# Patient Record
Sex: FEMALE | Race: WHITE | NOT HISPANIC OR LATINO | Employment: FULL TIME | URBAN - METROPOLITAN AREA
[De-identification: names, ages, dates, MRNs, and addresses within clinical notes are randomized per-mention and may not be internally consistent; named-entity substitution may affect disease eponyms.]

---

## 2020-01-01 ENCOUNTER — HOSPITAL ENCOUNTER (OUTPATIENT)
Dept: RADIOLOGY | Facility: HOSPITAL | Age: 45
Discharge: HOME | End: 2020-10-08
Attending: INTERNAL MEDICINE
Payer: COMMERCIAL

## 2020-01-01 ENCOUNTER — TELEPHONE (OUTPATIENT)
Dept: TRANSPLANT | Facility: CLINIC | Age: 45
End: 2020-01-01

## 2020-01-01 ENCOUNTER — PATIENT OUTREACH (OUTPATIENT)
Dept: ADMINISTRATIVE | Facility: CLINIC | Age: 45
End: 2020-01-01

## 2020-01-01 ENCOUNTER — OFFICE VISIT (OUTPATIENT)
Dept: GASTROENTEROLOGY | Facility: CLINIC | Age: 45
End: 2020-01-01
Payer: COMMERCIAL

## 2020-01-01 ENCOUNTER — CONFERENCE (OUTPATIENT)
Dept: TRANSPLANT | Facility: CLINIC | Age: 45
End: 2020-01-01

## 2020-01-01 ENCOUNTER — TELEPHONE (OUTPATIENT)
Dept: GASTROENTEROLOGY | Facility: CLINIC | Age: 45
End: 2020-01-01

## 2020-01-01 ENCOUNTER — PATIENT MESSAGE (OUTPATIENT)
Dept: TRANSPLANT | Facility: CLINIC | Age: 45
End: 2020-01-01

## 2020-01-01 ENCOUNTER — OFFICE VISIT (OUTPATIENT)
Dept: INTERVENTIONAL RADIOLOGY/VASCULAR | Facility: CLINIC | Age: 45
End: 2020-01-01
Payer: COMMERCIAL

## 2020-01-01 ENCOUNTER — HOSPITAL ENCOUNTER (OUTPATIENT)
Facility: HOSPITAL | Age: 45
Discharge: HOME | End: 2020-09-09
Attending: INTERNAL MEDICINE | Admitting: INTERNAL MEDICINE
Payer: COMMERCIAL

## 2020-01-01 ENCOUNTER — HOSPITAL ENCOUNTER (OUTPATIENT)
Dept: CARDIOLOGY | Facility: HOSPITAL | Age: 45
Discharge: HOME | End: 2020-10-09
Attending: INTERNAL MEDICINE
Payer: COMMERCIAL

## 2020-01-01 ENCOUNTER — DOCUMENTATION ONLY (OUTPATIENT)
Dept: TRANSPLANT | Facility: CLINIC | Age: 45
End: 2020-01-01

## 2020-01-01 ENCOUNTER — HOSPITAL ENCOUNTER (OUTPATIENT)
Dept: RADIOLOGY | Facility: HOSPITAL | Age: 45
Discharge: HOME | End: 2020-10-20
Attending: INTERNAL MEDICINE
Payer: COMMERCIAL

## 2020-01-01 ENCOUNTER — LAB VISIT (OUTPATIENT)
Dept: LAB | Facility: HOSPITAL | Age: 45
End: 2020-01-01
Attending: INTERNAL MEDICINE
Payer: COMMERCIAL

## 2020-01-01 ENCOUNTER — HOSPITAL ENCOUNTER (OUTPATIENT)
Dept: RADIOLOGY | Facility: CLINIC | Age: 45
Discharge: HOME | End: 2020-10-09
Attending: INTERNAL MEDICINE
Payer: COMMERCIAL

## 2020-01-01 ENCOUNTER — CLINICAL SUPPORT (OUTPATIENT)
Dept: TRANSPLANT | Facility: CLINIC | Age: 45
End: 2020-01-01
Payer: COMMERCIAL

## 2020-01-01 ENCOUNTER — PATIENT MESSAGE (OUTPATIENT)
Dept: GASTROENTEROLOGY | Facility: CLINIC | Age: 45
End: 2020-01-01

## 2020-01-01 ENCOUNTER — SOCIAL WORK (OUTPATIENT)
Dept: TRANSPLANT | Facility: CLINIC | Age: 45
End: 2020-01-01
Payer: COMMERCIAL

## 2020-01-01 ENCOUNTER — HOSPITAL ENCOUNTER (INPATIENT)
Facility: HOSPITAL | Age: 45
LOS: 2 days | Discharge: HOME | DRG: 433 | End: 2020-10-17
Attending: EMERGENCY MEDICINE | Admitting: INTERNAL MEDICINE
Payer: COMMERCIAL

## 2020-01-01 ENCOUNTER — HOSPITAL ENCOUNTER (INPATIENT)
Facility: HOSPITAL | Age: 45
LOS: 1 days | Discharge: HOME | DRG: 187 | End: 2020-10-05
Attending: EMERGENCY MEDICINE | Admitting: INTERNAL MEDICINE
Payer: COMMERCIAL

## 2020-01-01 ENCOUNTER — HOSPITAL ENCOUNTER (EMERGENCY)
Facility: HOSPITAL | Age: 45
Discharge: ANOTHER HOSPITAL | End: 2020-10-30
Attending: STUDENT IN AN ORGANIZED HEALTH CARE EDUCATION/TRAINING PROGRAM
Payer: COMMERCIAL

## 2020-01-01 ENCOUNTER — HOSPITAL ENCOUNTER (INPATIENT)
Facility: HOSPITAL | Age: 45
LOS: 8 days | DRG: 432 | End: 2020-11-07
Attending: INTERNAL MEDICINE | Admitting: INTERNAL MEDICINE
Payer: COMMERCIAL

## 2020-01-01 ENCOUNTER — COMMITTEE REVIEW (OUTPATIENT)
Dept: TRANSPLANT | Facility: CLINIC | Age: 45
End: 2020-01-01

## 2020-01-01 ENCOUNTER — TELEPHONE (OUTPATIENT)
Dept: ADMINISTRATIVE | Facility: HOSPITAL | Age: 45
End: 2020-01-01

## 2020-01-01 ENCOUNTER — INITIAL CONSULT (OUTPATIENT)
Dept: TRANSPLANT | Facility: CLINIC | Age: 45
End: 2020-01-01
Payer: COMMERCIAL

## 2020-01-01 ENCOUNTER — HOSPITAL ENCOUNTER (OUTPATIENT)
Dept: RADIOLOGY | Facility: HOSPITAL | Age: 45
Discharge: HOME | End: 2020-10-28
Attending: INTERNAL MEDICINE
Payer: COMMERCIAL

## 2020-01-01 ENCOUNTER — LAB VISIT (OUTPATIENT)
Dept: LAB | Facility: HOSPITAL | Age: 45
End: 2020-01-01
Attending: NURSE PRACTITIONER
Payer: COMMERCIAL

## 2020-01-01 ENCOUNTER — TELEPHONE (OUTPATIENT)
Dept: RADIOLOGY | Facility: HOSPITAL | Age: 45
End: 2020-01-01

## 2020-01-01 ENCOUNTER — ANESTHESIA (OUTPATIENT)
Dept: ENDOSCOPY | Facility: HOSPITAL | Age: 45
End: 2020-01-01
Payer: COMMERCIAL

## 2020-01-01 ENCOUNTER — TELEPHONE (OUTPATIENT)
Dept: PULMONOLOGY | Facility: CLINIC | Age: 45
End: 2020-01-01

## 2020-01-01 ENCOUNTER — ANESTHESIA EVENT (OUTPATIENT)
Dept: ENDOSCOPY | Facility: HOSPITAL | Age: 45
End: 2020-01-01
Payer: COMMERCIAL

## 2020-01-01 VITALS
DIASTOLIC BLOOD PRESSURE: 70 MMHG | HEART RATE: 94 BPM | SYSTOLIC BLOOD PRESSURE: 112 MMHG | WEIGHT: 158.75 LBS | HEIGHT: 67 IN | BODY MASS INDEX: 24.92 KG/M2

## 2020-01-01 VITALS
WEIGHT: 156.06 LBS | DIASTOLIC BLOOD PRESSURE: 70 MMHG | SYSTOLIC BLOOD PRESSURE: 112 MMHG | BODY MASS INDEX: 24.49 KG/M2 | OXYGEN SATURATION: 99 % | HEART RATE: 85 BPM | HEIGHT: 67 IN

## 2020-01-01 VITALS
DIASTOLIC BLOOD PRESSURE: 52 MMHG | DIASTOLIC BLOOD PRESSURE: 51 MMHG | HEART RATE: 75 BPM | SYSTOLIC BLOOD PRESSURE: 89 MMHG | HEART RATE: 77 BPM | OXYGEN SATURATION: 95 % | TEMPERATURE: 98 F | RESPIRATION RATE: 20 BRPM | BODY MASS INDEX: 24.75 KG/M2 | WEIGHT: 157.06 LBS | RESPIRATION RATE: 21 BRPM | BODY MASS INDEX: 25.24 KG/M2 | WEIGHT: 154 LBS | SYSTOLIC BLOOD PRESSURE: 108 MMHG | HEIGHT: 66 IN | HEIGHT: 66 IN

## 2020-01-01 VITALS
SYSTOLIC BLOOD PRESSURE: 130 MMHG | HEART RATE: 70 BPM | OXYGEN SATURATION: 96 % | HEIGHT: 67 IN | TEMPERATURE: 98 F | RESPIRATION RATE: 18 BRPM | WEIGHT: 153.69 LBS | BODY MASS INDEX: 24.12 KG/M2 | DIASTOLIC BLOOD PRESSURE: 66 MMHG

## 2020-01-01 VITALS
DIASTOLIC BLOOD PRESSURE: 52 MMHG | WEIGHT: 157 LBS | TEMPERATURE: 98 F | RESPIRATION RATE: 18 BRPM | OXYGEN SATURATION: 95 % | BODY MASS INDEX: 23.79 KG/M2 | SYSTOLIC BLOOD PRESSURE: 99 MMHG | HEART RATE: 90 BPM | HEIGHT: 68 IN

## 2020-01-01 VITALS
HEART RATE: 97 BPM | OXYGEN SATURATION: 93 % | BODY MASS INDEX: 24.8 KG/M2 | SYSTOLIC BLOOD PRESSURE: 130 MMHG | RESPIRATION RATE: 16 BRPM | HEIGHT: 66 IN | DIASTOLIC BLOOD PRESSURE: 65 MMHG | WEIGHT: 154.31 LBS

## 2020-01-01 VITALS
BODY MASS INDEX: 29.97 KG/M2 | WEIGHT: 186.5 LBS | TEMPERATURE: 98 F | DIASTOLIC BLOOD PRESSURE: 44 MMHG | OXYGEN SATURATION: 69 % | HEART RATE: 31 BPM | SYSTOLIC BLOOD PRESSURE: 97 MMHG | RESPIRATION RATE: 24 BRPM | HEIGHT: 66 IN

## 2020-01-01 VITALS
HEART RATE: 97 BPM | WEIGHT: 154.31 LBS | OXYGEN SATURATION: 93 % | BODY MASS INDEX: 24.8 KG/M2 | SYSTOLIC BLOOD PRESSURE: 130 MMHG | DIASTOLIC BLOOD PRESSURE: 65 MMHG | OXYGEN SATURATION: 93 % | RESPIRATION RATE: 16 BRPM | RESPIRATION RATE: 16 BRPM | WEIGHT: 154.31 LBS | HEART RATE: 97 BPM | HEIGHT: 66 IN | HEIGHT: 66 IN | SYSTOLIC BLOOD PRESSURE: 130 MMHG | DIASTOLIC BLOOD PRESSURE: 65 MMHG | BODY MASS INDEX: 24.8 KG/M2

## 2020-01-01 VITALS
WEIGHT: 152.75 LBS | OXYGEN SATURATION: 94 % | BODY MASS INDEX: 24.55 KG/M2 | HEART RATE: 85 BPM | RESPIRATION RATE: 16 BRPM | TEMPERATURE: 98 F | DIASTOLIC BLOOD PRESSURE: 51 MMHG | HEIGHT: 66 IN | SYSTOLIC BLOOD PRESSURE: 94 MMHG

## 2020-01-01 VITALS
DIASTOLIC BLOOD PRESSURE: 60 MMHG | BODY MASS INDEX: 24.48 KG/M2 | WEIGHT: 152.31 LBS | SYSTOLIC BLOOD PRESSURE: 100 MMHG | HEIGHT: 66 IN | HEART RATE: 92 BPM

## 2020-01-01 DIAGNOSIS — K70.9 ALCOHOLIC LIVER DISEASE: ICD-10-CM

## 2020-01-01 DIAGNOSIS — J90 PLEURAL EFFUSION ON RIGHT: Primary | ICD-10-CM

## 2020-01-01 DIAGNOSIS — K74.69 OTHER CIRRHOSIS OF LIVER: Primary | ICD-10-CM

## 2020-01-01 DIAGNOSIS — K74.69 OTHER CIRRHOSIS OF LIVER: ICD-10-CM

## 2020-01-01 DIAGNOSIS — R18.8 OTHER ASCITES: ICD-10-CM

## 2020-01-01 DIAGNOSIS — R06.02 SOB (SHORTNESS OF BREATH): Primary | ICD-10-CM

## 2020-01-01 DIAGNOSIS — K74.60 DECOMPENSATED HEPATIC CIRRHOSIS: ICD-10-CM

## 2020-01-01 DIAGNOSIS — J94.8 HYDROTHORAX: ICD-10-CM

## 2020-01-01 DIAGNOSIS — E87.20 LACTIC ACIDOSIS: ICD-10-CM

## 2020-01-01 DIAGNOSIS — K74.60 HEPATIC CIRRHOSIS, UNSPECIFIED HEPATIC CIRRHOSIS TYPE, UNSPECIFIED WHETHER ASCITES PRESENT: ICD-10-CM

## 2020-01-01 DIAGNOSIS — R06.00 DYSPNEA: ICD-10-CM

## 2020-01-01 DIAGNOSIS — Z76.82 ORGAN TRANSPLANT CANDIDATE: ICD-10-CM

## 2020-01-01 DIAGNOSIS — E88.01 ALPHA-1-ANTITRYPSIN DEFICIENCY: ICD-10-CM

## 2020-01-01 DIAGNOSIS — K72.90 DECOMPENSATED HEPATIC CIRRHOSIS: ICD-10-CM

## 2020-01-01 DIAGNOSIS — K76.9 PLEURAL EFFUSION ASSOCIATED WITH HEPATIC DISORDER: ICD-10-CM

## 2020-01-01 DIAGNOSIS — E55.9 VITAMIN D DEFICIENCY: Primary | ICD-10-CM

## 2020-01-01 DIAGNOSIS — K76.9 LIVER LESION: ICD-10-CM

## 2020-01-01 DIAGNOSIS — J90 PLEURAL EFFUSION: ICD-10-CM

## 2020-01-01 DIAGNOSIS — J18.9 PNEUMONIA DUE TO INFECTIOUS ORGANISM, UNSPECIFIED LATERALITY, UNSPECIFIED PART OF LUNG: ICD-10-CM

## 2020-01-01 DIAGNOSIS — K72.10 END STAGE LIVER DISEASE: ICD-10-CM

## 2020-01-01 DIAGNOSIS — R57.9 SHOCK: ICD-10-CM

## 2020-01-01 DIAGNOSIS — E87.1 HYPONATREMIA: ICD-10-CM

## 2020-01-01 DIAGNOSIS — J90 PLEURAL EFFUSION: Primary | ICD-10-CM

## 2020-01-01 DIAGNOSIS — R16.0 LIVER MASS: Primary | ICD-10-CM

## 2020-01-01 DIAGNOSIS — R06.02 SOB (SHORTNESS OF BREATH): ICD-10-CM

## 2020-01-01 DIAGNOSIS — R89.9 ABNORMAL LABORATORY TEST: ICD-10-CM

## 2020-01-01 DIAGNOSIS — E88.01 ALPHA-1-ANTITRYPSIN DEFICIENCY: Primary | ICD-10-CM

## 2020-01-01 DIAGNOSIS — I85.10 SECONDARY ESOPHAGEAL VARICES WITHOUT BLEEDING: ICD-10-CM

## 2020-01-01 DIAGNOSIS — K76.9 DISORDER OF LIVER: ICD-10-CM

## 2020-01-01 DIAGNOSIS — R57.9 SHOCK, UNSPECIFIED: ICD-10-CM

## 2020-01-01 DIAGNOSIS — J96.01 ACUTE HYPOXEMIC RESPIRATORY FAILURE: ICD-10-CM

## 2020-01-01 DIAGNOSIS — R74.01 ELEVATED TRANSAMINASE LEVEL: ICD-10-CM

## 2020-01-01 DIAGNOSIS — N17.0 ACUTE RENAL FAILURE WITH TUBULAR NECROSIS: ICD-10-CM

## 2020-01-01 DIAGNOSIS — K64.4 EXTERNAL HEMORRHOIDS: ICD-10-CM

## 2020-01-01 DIAGNOSIS — R89.9 ABNORMAL LABORATORY TEST: Primary | ICD-10-CM

## 2020-01-01 DIAGNOSIS — G47.62 NOCTURNAL LEG CRAMPS: Primary | ICD-10-CM

## 2020-01-01 DIAGNOSIS — R25.8 NOCTURNAL LEG MOVEMENTS: ICD-10-CM

## 2020-01-01 DIAGNOSIS — Z23 NEED FOR HEPATITIS A VACCINATION: ICD-10-CM

## 2020-01-01 DIAGNOSIS — E60 ZINC DEFICIENCY: Primary | ICD-10-CM

## 2020-01-01 DIAGNOSIS — Z01.818 PRE-TRANSPLANT EVALUATION FOR LIVER TRANSPLANT: Primary | ICD-10-CM

## 2020-01-01 DIAGNOSIS — N17.9 ACUTE RENAL FAILURE, UNSPECIFIED ACUTE RENAL FAILURE TYPE: ICD-10-CM

## 2020-01-01 DIAGNOSIS — C22.0 HCC (HEPATOCELLULAR CARCINOMA): Primary | ICD-10-CM

## 2020-01-01 DIAGNOSIS — E87.20 METABOLIC ACIDOSIS: ICD-10-CM

## 2020-01-01 DIAGNOSIS — J91.8 PLEURAL EFFUSION ASSOCIATED WITH HEPATIC DISORDER: ICD-10-CM

## 2020-01-01 DIAGNOSIS — R18.8 OTHER ASCITES: Primary | ICD-10-CM

## 2020-01-01 DIAGNOSIS — A41.9 SEPSIS, DUE TO UNSPECIFIED ORGANISM, UNSPECIFIED WHETHER ACUTE ORGAN DYSFUNCTION PRESENT: ICD-10-CM

## 2020-01-01 LAB
1,3 BETA GLUCAN SER-MCNC: 167 PG/ML
25(OH)D3+25(OH)D2 SERPL-MCNC: 20 NG/ML (ref 30–96)
ABO + RH BLD: NORMAL
ACANTHOCYTES BLD QL SMEAR: PRESENT
AFP SERPL-MCNC: 8 NG/ML (ref 0–8.4)
ALBUMIN FLD-MCNC: 0.4 G/DL
ALBUMIN SERPL BCP-MCNC: 1.4 G/DL (ref 3.5–5.2)
ALBUMIN SERPL BCP-MCNC: 1.5 G/DL (ref 3.5–5.2)
ALBUMIN SERPL BCP-MCNC: 1.5 G/DL (ref 3.5–5.2)
ALBUMIN SERPL BCP-MCNC: 1.6 G/DL (ref 3.5–5.2)
ALBUMIN SERPL BCP-MCNC: 1.7 G/DL (ref 3.5–5.2)
ALBUMIN SERPL BCP-MCNC: 1.8 G/DL (ref 3.5–5.2)
ALBUMIN SERPL BCP-MCNC: 1.9 G/DL (ref 3.5–5.2)
ALBUMIN SERPL BCP-MCNC: 1.9 G/DL (ref 3.5–5.2)
ALBUMIN SERPL BCP-MCNC: 2 G/DL (ref 3.5–5.2)
ALBUMIN SERPL BCP-MCNC: 2 G/DL (ref 3.5–5.2)
ALBUMIN SERPL BCP-MCNC: 2.1 G/DL (ref 3.5–5.2)
ALBUMIN SERPL BCP-MCNC: 2.5 G/DL (ref 3.5–5.2)
ALBUMIN SERPL BCP-MCNC: 2.6 G/DL (ref 3.5–5.2)
ALBUMIN SERPL BCP-MCNC: 2.6 G/DL (ref 3.5–5.2)
ALBUMIN SERPL BCP-MCNC: 2.7 G/DL (ref 3.5–5.2)
ALBUMIN SERPL BCP-MCNC: 2.8 G/DL (ref 3.5–5.2)
ALBUMIN SERPL BCP-MCNC: 2.9 G/DL (ref 3.5–5.2)
ALBUMIN SERPL BCP-MCNC: 3 G/DL (ref 3.5–5.2)
ALBUMIN SERPL BCP-MCNC: 3.1 G/DL (ref 3.5–5.2)
ALBUMIN SERPL BCP-MCNC: 3.3 G/DL (ref 3.5–5.2)
ALBUMIN SERPL BCP-MCNC: 3.4 G/DL (ref 3.5–5.2)
ALBUMIN SERPL BCP-MCNC: 3.5 G/DL (ref 3.5–5.2)
ALBUMIN SERPL BCP-MCNC: 3.6 G/DL (ref 3.5–5.2)
ALBUMIN SERPL BCP-MCNC: 3.6 G/DL (ref 3.5–5.2)
ALLENS TEST: ABNORMAL
ALP SERPL-CCNC: 210 U/L (ref 55–135)
ALP SERPL-CCNC: 234 U/L (ref 55–135)
ALP SERPL-CCNC: 249 U/L (ref 55–135)
ALP SERPL-CCNC: 260 U/L (ref 55–135)
ALP SERPL-CCNC: 264 U/L (ref 55–135)
ALP SERPL-CCNC: 265 U/L (ref 55–135)
ALP SERPL-CCNC: 266 U/L (ref 55–135)
ALP SERPL-CCNC: 275 U/L (ref 55–135)
ALP SERPL-CCNC: 275 U/L (ref 55–135)
ALP SERPL-CCNC: 277 U/L (ref 55–135)
ALP SERPL-CCNC: 287 U/L (ref 55–135)
ALP SERPL-CCNC: 296 U/L (ref 55–135)
ALP SERPL-CCNC: 352 U/L (ref 55–135)
ALP SERPL-CCNC: 408 U/L (ref 55–135)
ALP SERPL-CCNC: 408 U/L (ref 55–135)
ALP SERPL-CCNC: 477 U/L (ref 55–135)
ALP SERPL-CCNC: 528 U/L (ref 55–135)
ALP SERPL-CCNC: 539 U/L (ref 55–135)
ALP SERPL-CCNC: 540 U/L (ref 55–135)
ALP SERPL-CCNC: 545 U/L (ref 55–135)
ALP SERPL-CCNC: 546 U/L (ref 55–135)
ALP SERPL-CCNC: 563 U/L (ref 55–135)
ALP SERPL-CCNC: 580 U/L (ref 55–135)
ALP SERPL-CCNC: 582 U/L (ref 55–135)
ALP SERPL-CCNC: 604 U/L (ref 55–135)
ALP SERPL-CCNC: 642 U/L (ref 55–135)
ALP SERPL-CCNC: 735 U/L (ref 55–135)
ALP SERPL-CCNC: 761 U/L (ref 55–135)
ALT SERPL W/O P-5'-P-CCNC: 109 U/L (ref 10–44)
ALT SERPL W/O P-5'-P-CCNC: 109 U/L (ref 10–44)
ALT SERPL W/O P-5'-P-CCNC: 112 U/L (ref 10–44)
ALT SERPL W/O P-5'-P-CCNC: 112 U/L (ref 10–44)
ALT SERPL W/O P-5'-P-CCNC: 113 U/L (ref 10–44)
ALT SERPL W/O P-5'-P-CCNC: 123 U/L (ref 10–44)
ALT SERPL W/O P-5'-P-CCNC: 167 U/L (ref 10–44)
ALT SERPL W/O P-5'-P-CCNC: 170 U/L (ref 10–44)
ALT SERPL W/O P-5'-P-CCNC: 176 U/L (ref 10–44)
ALT SERPL W/O P-5'-P-CCNC: 180 U/L (ref 10–44)
ALT SERPL W/O P-5'-P-CCNC: 323 U/L (ref 10–44)
ALT SERPL W/O P-5'-P-CCNC: 53 U/L (ref 10–44)
ALT SERPL W/O P-5'-P-CCNC: 56 U/L (ref 10–44)
ALT SERPL W/O P-5'-P-CCNC: 58 U/L (ref 10–44)
ALT SERPL W/O P-5'-P-CCNC: 58 U/L (ref 10–44)
ALT SERPL W/O P-5'-P-CCNC: 68 U/L (ref 10–44)
ALT SERPL W/O P-5'-P-CCNC: 69 U/L (ref 10–44)
ALT SERPL W/O P-5'-P-CCNC: 720 U/L (ref 10–44)
ALT SERPL W/O P-5'-P-CCNC: 78 U/L (ref 10–44)
ALT SERPL W/O P-5'-P-CCNC: 78 U/L (ref 10–44)
ALT SERPL W/O P-5'-P-CCNC: 81 U/L (ref 10–44)
ALT SERPL W/O P-5'-P-CCNC: 82 U/L (ref 10–44)
ALT SERPL W/O P-5'-P-CCNC: 91 U/L (ref 10–44)
ALT SERPL W/O P-5'-P-CCNC: 94 U/L (ref 10–44)
ALT SERPL W/O P-5'-P-CCNC: 95 U/L (ref 10–44)
ALT SERPL W/O P-5'-P-CCNC: 99 U/L (ref 10–44)
AMMONIA PLAS-SCNC: 37 UMOL/L (ref 10–50)
AMMONIA PLAS-SCNC: 60 UMOL/L (ref 10–50)
AMMONIA PLAS-SCNC: 63 UMOL/L (ref 10–50)
AMPHET+METHAMPHET UR QL: NEGATIVE
AMYLASE SERPL-CCNC: 115 U/L (ref 20–110)
AMYLASE, BODY FLUID: 17 U/L
AMYLASE, BODY FLUID: 25 U/L
ANION GAP SERPL CALC-SCNC: 10 MMOL/L (ref 8–16)
ANION GAP SERPL CALC-SCNC: 11 MMOL/L (ref 8–16)
ANION GAP SERPL CALC-SCNC: 12 MMOL/L (ref 8–16)
ANION GAP SERPL CALC-SCNC: 13 MMOL/L (ref 8–16)
ANION GAP SERPL CALC-SCNC: 14 MMOL/L (ref 8–16)
ANION GAP SERPL CALC-SCNC: 15 MMOL/L (ref 8–16)
ANION GAP SERPL CALC-SCNC: 17 MMOL/L (ref 8–16)
ANION GAP SERPL CALC-SCNC: 18 MMOL/L (ref 8–16)
ANION GAP SERPL CALC-SCNC: 18 MMOL/L (ref 8–16)
ANION GAP SERPL CALC-SCNC: 19 MMOL/L (ref 8–16)
ANION GAP SERPL CALC-SCNC: 19 MMOL/L (ref 8–16)
ANION GAP SERPL CALC-SCNC: 20 MMOL/L (ref 8–16)
ANION GAP SERPL CALC-SCNC: 21 MMOL/L (ref 8–16)
ANION GAP SERPL CALC-SCNC: 23 MMOL/L (ref 8–16)
ANION GAP SERPL CALC-SCNC: 5 MMOL/L (ref 8–16)
ANION GAP SERPL CALC-SCNC: 6 MMOL/L (ref 8–16)
ANION GAP SERPL CALC-SCNC: 8 MMOL/L (ref 8–16)
ANION GAP SERPL CALC-SCNC: 9 MMOL/L (ref 8–16)
ANISOCYTOSIS BLD QL SMEAR: ABNORMAL
ANISOCYTOSIS BLD QL SMEAR: SLIGHT
AORTIC ROOT ANNULUS: 2.86 CM
APPEARANCE FLD: CLEAR
APPEARANCE FLD: NORMAL
APTT BLDCRRT: 31.3 SEC (ref 21–32)
APTT BLDCRRT: 33.5 SEC (ref 21–32)
APTT BLDCRRT: 33.6 SEC (ref 21–32)
APTT BLDCRRT: 34.5 SEC (ref 21–32)
APTT BLDCRRT: 39.6 SEC (ref 21–32)
APTT BLDCRRT: 40.4 SEC (ref 21–32)
ASCENDING AORTA: 2.57 CM
ASCENDING AORTA: 2.84 CM
ASCENDING AORTA: 2.92 CM
AST SERPL-CCNC: 111 U/L (ref 10–40)
AST SERPL-CCNC: 121 U/L (ref 10–40)
AST SERPL-CCNC: 121 U/L (ref 10–40)
AST SERPL-CCNC: 1245 U/L (ref 10–40)
AST SERPL-CCNC: 134 U/L (ref 10–40)
AST SERPL-CCNC: 141 U/L (ref 10–40)
AST SERPL-CCNC: 142 U/L (ref 10–40)
AST SERPL-CCNC: 164 U/L (ref 10–40)
AST SERPL-CCNC: 166 U/L (ref 10–40)
AST SERPL-CCNC: 176 U/L (ref 10–40)
AST SERPL-CCNC: 179 U/L (ref 10–40)
AST SERPL-CCNC: 180 U/L (ref 10–40)
AST SERPL-CCNC: 186 U/L (ref 10–40)
AST SERPL-CCNC: 187 U/L (ref 10–40)
AST SERPL-CCNC: 195 U/L (ref 10–40)
AST SERPL-CCNC: 207 U/L (ref 10–40)
AST SERPL-CCNC: 2999 U/L (ref 10–40)
AST SERPL-CCNC: 309 U/L (ref 10–40)
AST SERPL-CCNC: 314 U/L (ref 10–40)
AST SERPL-CCNC: 514 U/L (ref 10–40)
AST SERPL-CCNC: 519 U/L (ref 10–40)
AST SERPL-CCNC: 561 U/L (ref 10–40)
AST SERPL-CCNC: 632 U/L (ref 10–40)
AST SERPL-CCNC: 74 U/L (ref 10–40)
AST SERPL-CCNC: 80 U/L (ref 10–40)
AST SERPL-CCNC: 81 U/L (ref 10–40)
AST SERPL-CCNC: 89 U/L (ref 10–40)
AST SERPL-CCNC: 89 U/L (ref 10–40)
AV INDEX (PROSTH): 0.79
AV INDEX (PROSTH): 0.82
AV INDEX (PROSTH): 0.96
AV MEAN GRADIENT: 12 MMHG
AV MEAN GRADIENT: 15 MMHG
AV MEAN GRADIENT: 7 MMHG
AV PEAK GRADIENT: 17 MMHG
AV PEAK GRADIENT: 20 MMHG
AV PEAK GRADIENT: 21 MMHG
AV VALVE AREA: 2.34 CM2
AV VALVE AREA: 2.43 CM2
AV VALVE AREA: 2.62 CM2
AV VELOCITY RATIO: 0.71
AV VELOCITY RATIO: 0.83
AV VELOCITY RATIO: 0.88
BACTERIA #/AREA URNS AUTO: ABNORMAL /HPF
BACTERIA #/AREA URNS AUTO: ABNORMAL /HPF
BACTERIA #/AREA URNS AUTO: NORMAL /HPF
BACTERIA BLD CULT: NORMAL
BACTERIA FLD AEROBE CULT: NO GROWTH
BACTERIA FLD AEROBE CULT: NO GROWTH
BACTERIA SPEC AEROBE CULT: NO GROWTH
BACTERIA SPEC AEROBE CULT: NORMAL
BACTERIA SPEC AEROBE CULT: NORMAL
BACTERIA SPEC ANAEROBE CULT: NORMAL
BACTERIA UR CULT: NO GROWTH
BACTERIA UR CULT: NORMAL
BARBITURATES UR QL SCN>200 NG/ML: NEGATIVE
BASO STIPL BLD QL SMEAR: ABNORMAL
BASOPHILS # BLD AUTO: 0.01 K/UL (ref 0–0.2)
BASOPHILS # BLD AUTO: 0.02 K/UL (ref 0–0.2)
BASOPHILS # BLD AUTO: 0.03 K/UL (ref 0–0.2)
BASOPHILS # BLD AUTO: 0.03 K/UL (ref 0–0.2)
BASOPHILS # BLD AUTO: 0.04 K/UL (ref 0–0.2)
BASOPHILS # BLD AUTO: 0.05 K/UL (ref 0–0.2)
BASOPHILS # BLD AUTO: ABNORMAL K/UL (ref 0–0.2)
BASOPHILS NFR BLD: 0 % (ref 0–1.9)
BASOPHILS NFR BLD: 0.2 % (ref 0–1.9)
BASOPHILS NFR BLD: 0.3 % (ref 0–1.9)
BASOPHILS NFR BLD: 0.3 % (ref 0–1.9)
BASOPHILS NFR BLD: 0.4 % (ref 0–1.9)
BASOPHILS NFR BLD: 0.5 % (ref 0–1.9)
BASOPHILS NFR BLD: 0.5 % (ref 0–1.9)
BASOPHILS NFR BLD: 0.7 % (ref 0–1.9)
BASOPHILS NFR BLD: 0.8 % (ref 0–1.9)
BENZODIAZ UR QL SCN>200 NG/ML: NEGATIVE
BILIRUB DIRECT SERPL-MCNC: >14 MG/DL (ref 0.1–0.3)
BILIRUB SERPL-MCNC: 13.8 MG/DL (ref 0.1–1)
BILIRUB SERPL-MCNC: 14.2 MG/DL (ref 0.1–1)
BILIRUB SERPL-MCNC: 15.9 MG/DL (ref 0.1–1)
BILIRUB SERPL-MCNC: 16.6 MG/DL (ref 0.1–1)
BILIRUB SERPL-MCNC: 17.1 MG/DL (ref 0.1–1)
BILIRUB SERPL-MCNC: 17.2 MG/DL (ref 0.1–1)
BILIRUB SERPL-MCNC: 17.7 MG/DL (ref 0.1–1)
BILIRUB SERPL-MCNC: 19.3 MG/DL (ref 0.1–1)
BILIRUB SERPL-MCNC: 24 MG/DL (ref 0.1–1)
BILIRUB SERPL-MCNC: 24.6 MG/DL (ref 0.1–1)
BILIRUB SERPL-MCNC: 24.9 MG/DL (ref 0.1–1)
BILIRUB SERPL-MCNC: 25 MG/DL (ref 0.1–1)
BILIRUB SERPL-MCNC: 25 MG/DL (ref 0.1–1)
BILIRUB SERPL-MCNC: 25.6 MG/DL (ref 0.1–1)
BILIRUB SERPL-MCNC: 27.5 MG/DL (ref 0.1–1)
BILIRUB SERPL-MCNC: 28.3 MG/DL (ref 0.1–1)
BILIRUB SERPL-MCNC: 28.6 MG/DL (ref 0.1–1)
BILIRUB SERPL-MCNC: 29.1 MG/DL (ref 0.1–1)
BILIRUB SERPL-MCNC: 29.2 MG/DL (ref 0.1–1)
BILIRUB SERPL-MCNC: 29.2 MG/DL (ref 0.1–1)
BILIRUB SERPL-MCNC: 29.4 MG/DL (ref 0.1–1)
BILIRUB SERPL-MCNC: 31.6 MG/DL (ref 0.1–1)
BILIRUB SERPL-MCNC: 32.1 MG/DL (ref 0.1–1)
BILIRUB SERPL-MCNC: 32.3 MG/DL (ref 0.1–1)
BILIRUB SERPL-MCNC: 32.3 MG/DL (ref 0.1–1)
BILIRUB SERPL-MCNC: 32.6 MG/DL (ref 0.1–1)
BILIRUB SERPL-MCNC: 33.8 MG/DL (ref 0.1–1)
BILIRUB SERPL-MCNC: 34.2 MG/DL (ref 0.1–1)
BILIRUB UR QL STRIP: ABNORMAL
BLASTOMYCES AG INTERP: NEGATIVE
BLASTOMYCES AG RESULT: NORMAL NG/ML
BLD GP AB SCN CELLS X3 SERPL QL: NORMAL
BLD PROD TYP BPU: NORMAL
BLOOD UNIT EXPIRATION DATE: NORMAL
BLOOD UNIT TYPE CODE: 1700
BLOOD UNIT TYPE CODE: 1700
BLOOD UNIT TYPE CODE: 5100
BLOOD UNIT TYPE CODE: 5100
BLOOD UNIT TYPE CODE: 600
BLOOD UNIT TYPE CODE: 6200
BLOOD UNIT TYPE CODE: 7300
BLOOD UNIT TYPE CODE: 9500
BLOOD UNIT TYPE CODE: NORMAL
BLOOD UNIT TYPE: NORMAL
BNP SERPL-MCNC: 84 PG/ML (ref 0–99)
BODY FLD TYPE: NORMAL
BODY FLUID SOURCE AMYLASE: NORMAL
BODY FLUID SOURCE AMYLASE: NORMAL
BODY FLUID SOURCE, LDH: NORMAL
BSA FOR ECHO PROCEDURE: 1.8 M2
BSA FOR ECHO PROCEDURE: 1.82 M2
BSA FOR ECHO PROCEDURE: 1.85 M2
BUN SERPL-MCNC: 10 MG/DL (ref 6–20)
BUN SERPL-MCNC: 11 MG/DL (ref 6–20)
BUN SERPL-MCNC: 13 MG/DL (ref 6–20)
BUN SERPL-MCNC: 13 MG/DL (ref 6–20)
BUN SERPL-MCNC: 19 MG/DL (ref 6–20)
BUN SERPL-MCNC: 19 MG/DL (ref 6–20)
BUN SERPL-MCNC: 20 MG/DL (ref 6–20)
BUN SERPL-MCNC: 21 MG/DL (ref 6–20)
BUN SERPL-MCNC: 22 MG/DL (ref 6–20)
BUN SERPL-MCNC: 22 MG/DL (ref 6–20)
BUN SERPL-MCNC: 3 MG/DL (ref 6–20)
BUN SERPL-MCNC: 3 MG/DL (ref 6–20)
BUN SERPL-MCNC: 31 MG/DL (ref 6–20)
BUN SERPL-MCNC: 32 MG/DL (ref 6–20)
BUN SERPL-MCNC: 33 MG/DL (ref 6–20)
BUN SERPL-MCNC: 33 MG/DL (ref 6–20)
BUN SERPL-MCNC: 36 MG/DL (ref 6–20)
BUN SERPL-MCNC: 4 MG/DL (ref 6–20)
BUN SERPL-MCNC: 4 MG/DL (ref 6–20)
BUN SERPL-MCNC: 48 MG/DL (ref 6–20)
BUN SERPL-MCNC: 54 MG/DL (ref 6–20)
BUN SERPL-MCNC: 55 MG/DL (ref 6–20)
BUN SERPL-MCNC: 59 MG/DL (ref 6–20)
BUN SERPL-MCNC: 6 MG/DL (ref 6–20)
BUN SERPL-MCNC: 60 MG/DL (ref 6–20)
BUN SERPL-MCNC: 62 MG/DL (ref 6–20)
BUN SERPL-MCNC: 7 MG/DL (ref 6–20)
BUN SERPL-MCNC: 7 MG/DL (ref 6–20)
BUN SERPL-MCNC: 72 MG/DL (ref 6–20)
BUN SERPL-MCNC: 76 MG/DL (ref 6–20)
BUN SERPL-MCNC: 76 MG/DL (ref 6–20)
BUN SERPL-MCNC: 8 MG/DL (ref 6–20)
BUN SERPL-MCNC: 8 MG/DL (ref 6–20)
BUN SERPL-MCNC: 9 MG/DL (ref 6–20)
BURR CELLS BLD QL SMEAR: ABNORMAL
BZE UR QL SCN: NEGATIVE
CA-I BLDV-SCNC: 0.99 MMOL/L (ref 1.06–1.42)
CALCIUM SERPL-MCNC: 7.2 MG/DL (ref 8.7–10.5)
CALCIUM SERPL-MCNC: 7.2 MG/DL (ref 8.7–10.5)
CALCIUM SERPL-MCNC: 7.3 MG/DL (ref 8.7–10.5)
CALCIUM SERPL-MCNC: 7.4 MG/DL (ref 8.7–10.5)
CALCIUM SERPL-MCNC: 7.6 MG/DL (ref 8.7–10.5)
CALCIUM SERPL-MCNC: 7.7 MG/DL (ref 8.7–10.5)
CALCIUM SERPL-MCNC: 7.8 MG/DL (ref 8.7–10.5)
CALCIUM SERPL-MCNC: 7.9 MG/DL (ref 8.7–10.5)
CALCIUM SERPL-MCNC: 8 MG/DL (ref 8.7–10.5)
CALCIUM SERPL-MCNC: 8.1 MG/DL (ref 8.7–10.5)
CALCIUM SERPL-MCNC: 8.3 MG/DL (ref 8.7–10.5)
CALCIUM SERPL-MCNC: 8.4 MG/DL (ref 8.7–10.5)
CANNABINOIDS UR QL SCN: NEGATIVE
CHLORIDE SERPL-SCNC: 100 MMOL/L (ref 95–110)
CHLORIDE SERPL-SCNC: 100 MMOL/L (ref 95–110)
CHLORIDE SERPL-SCNC: 101 MMOL/L (ref 95–110)
CHLORIDE SERPL-SCNC: 102 MMOL/L (ref 95–110)
CHLORIDE SERPL-SCNC: 104 MMOL/L (ref 95–110)
CHLORIDE SERPL-SCNC: 104 MMOL/L (ref 95–110)
CHLORIDE SERPL-SCNC: 87 MMOL/L (ref 95–110)
CHLORIDE SERPL-SCNC: 88 MMOL/L (ref 95–110)
CHLORIDE SERPL-SCNC: 89 MMOL/L (ref 95–110)
CHLORIDE SERPL-SCNC: 90 MMOL/L (ref 95–110)
CHLORIDE SERPL-SCNC: 92 MMOL/L (ref 95–110)
CHLORIDE SERPL-SCNC: 93 MMOL/L (ref 95–110)
CHLORIDE SERPL-SCNC: 93 MMOL/L (ref 95–110)
CHLORIDE SERPL-SCNC: 94 MMOL/L (ref 95–110)
CHLORIDE SERPL-SCNC: 95 MMOL/L (ref 95–110)
CHLORIDE SERPL-SCNC: 95 MMOL/L (ref 95–110)
CHLORIDE SERPL-SCNC: 96 MMOL/L (ref 95–110)
CHLORIDE SERPL-SCNC: 97 MMOL/L (ref 95–110)
CHLORIDE SERPL-SCNC: 98 MMOL/L (ref 95–110)
CHLORIDE SERPL-SCNC: 99 MMOL/L (ref 95–110)
CHLORIDE SERPL-SCNC: 99 MMOL/L (ref 95–110)
CHLORIDE UR-SCNC: <20 MMOL/L (ref 25–200)
CHOLEST FLD-MCNC: 20 MG/DL
CHOLEST FLD-MCNC: 9 MG/DL
CK MB SERPL-MCNC: 0.8 NG/ML (ref 0.1–6.5)
CK MB SERPL-RTO: 1.7 % (ref 0–5)
CK SERPL-CCNC: 47 U/L (ref 20–180)
CLARITY UR REFRACT.AUTO: ABNORMAL
CLARITY UR REFRACT.AUTO: ABNORMAL
CLARITY UR REFRACT.AUTO: CLEAR
CO2 SERPL-SCNC: 11 MMOL/L (ref 23–29)
CO2 SERPL-SCNC: 11 MMOL/L (ref 23–29)
CO2 SERPL-SCNC: 13 MMOL/L (ref 23–29)
CO2 SERPL-SCNC: 14 MMOL/L (ref 23–29)
CO2 SERPL-SCNC: 15 MMOL/L (ref 23–29)
CO2 SERPL-SCNC: 16 MMOL/L (ref 23–29)
CO2 SERPL-SCNC: 16 MMOL/L (ref 23–29)
CO2 SERPL-SCNC: 17 MMOL/L (ref 23–29)
CO2 SERPL-SCNC: 18 MMOL/L (ref 23–29)
CO2 SERPL-SCNC: 19 MMOL/L (ref 23–29)
CO2 SERPL-SCNC: 20 MMOL/L (ref 23–29)
CO2 SERPL-SCNC: 21 MMOL/L (ref 23–29)
CO2 SERPL-SCNC: 22 MMOL/L (ref 23–29)
CO2 SERPL-SCNC: 23 MMOL/L (ref 23–29)
CO2 SERPL-SCNC: 24 MMOL/L (ref 23–29)
CO2 SERPL-SCNC: 25 MMOL/L (ref 23–29)
CO2 SERPL-SCNC: 25 MMOL/L (ref 23–29)
CO2 SERPL-SCNC: 26 MMOL/L (ref 23–29)
CO2 SERPL-SCNC: 26 MMOL/L (ref 23–29)
CO2 SERPL-SCNC: 28 MMOL/L (ref 23–29)
CODING SYSTEM: NORMAL
COLOR FLD: NORMAL
COLOR FLD: YELLOW
COLOR FLD: YELLOW
COLOR UR AUTO: ABNORMAL
COLOR UR AUTO: ABNORMAL
COLOR UR AUTO: YELLOW
CREAT SERPL-MCNC: 0.4 MG/DL (ref 0.5–1.4)
CREAT SERPL-MCNC: 0.5 MG/DL (ref 0.5–1.4)
CREAT SERPL-MCNC: 0.6 MG/DL (ref 0.5–1.4)
CREAT SERPL-MCNC: 0.7 MG/DL (ref 0.5–1.4)
CREAT SERPL-MCNC: 0.8 MG/DL (ref 0.5–1.4)
CREAT SERPL-MCNC: 0.9 MG/DL (ref 0.5–1.4)
CREAT SERPL-MCNC: 1 MG/DL (ref 0.5–1.4)
CREAT SERPL-MCNC: 1.1 MG/DL (ref 0.5–1.4)
CREAT SERPL-MCNC: 1.2 MG/DL (ref 0.5–1.4)
CREAT SERPL-MCNC: 1.7 MG/DL (ref 0.5–1.4)
CREAT SERPL-MCNC: 1.8 MG/DL (ref 0.5–1.4)
CREAT SERPL-MCNC: 2.1 MG/DL (ref 0.5–1.4)
CREAT SERPL-MCNC: 2.7 MG/DL (ref 0.5–1.4)
CREAT SERPL-MCNC: 2.8 MG/DL (ref 0.5–1.4)
CREAT SERPL-MCNC: 2.9 MG/DL (ref 0.5–1.4)
CREAT SERPL-MCNC: 2.9 MG/DL (ref 0.5–1.4)
CREAT SERPL-MCNC: 3 MG/DL (ref 0.5–1.4)
CREAT SERPL-MCNC: 3.3 MG/DL (ref 0.5–1.4)
CREAT SERPL-MCNC: 4 MG/DL (ref 0.5–1.4)
CREAT SERPL-MCNC: 4.1 MG/DL (ref 0.5–1.4)
CREAT SERPL-MCNC: 4.4 MG/DL (ref 0.5–1.4)
CREAT SERPL-MCNC: 4.6 MG/DL (ref 0.5–1.4)
CREAT SERPL-MCNC: 4.8 MG/DL (ref 0.5–1.4)
CREAT SERPL-MCNC: 5.4 MG/DL (ref 0.5–1.4)
CREAT SERPL-MCNC: 5.5 MG/DL (ref 0.5–1.4)
CREAT UR-MCNC: 150 MG/DL (ref 15–325)
CREAT UR-MCNC: 182 MG/DL (ref 15–325)
CRYPTOC AG SER QL LA: NEGATIVE
CV ECHO LV RWT: 0.38 CM
CV ECHO LV RWT: 0.48 CM
CV ECHO LV RWT: 0.64 CM
CV STRESS BASE HR: 75 BPM
D DIMER PPP IA.FEU-MCNC: >33 MG/L FEU
DACRYOCYTES BLD QL SMEAR: ABNORMAL
DELSYS: ABNORMAL
DIASTOLIC BLOOD PRESSURE: 65 MMHG
DIFFERENTIAL METHOD: ABNORMAL
DISPENSE STATUS: NORMAL
DOHLE BOD BLD QL SMEAR: PRESENT
DOP CALC AO PEAK VEL: 2.05 M/S
DOP CALC AO PEAK VEL: 2.24 M/S
DOP CALC AO PEAK VEL: 2.29 M/S
DOP CALC AO VTI: 32.84 CM
DOP CALC AO VTI: 36.54 CM
DOP CALC AO VTI: 44.89 CM
DOP CALC LVOT AREA: 2.7 CM2
DOP CALC LVOT AREA: 2.8 CM2
DOP CALC LVOT AREA: 3.1 CM2
DOP CALC LVOT DIAMETER: 1.87 CM
DOP CALC LVOT DIAMETER: 1.9 CM
DOP CALC LVOT DIAMETER: 1.98 CM
DOP CALC LVOT PEAK VEL: 1.58 M/S
DOP CALC LVOT PEAK VEL: 1.8 M/S
DOP CALC LVOT PEAK VEL: 1.89 M/S
DOP CALC LVOT STROKE VOLUME: 104.94 CM3
DOP CALC LVOT STROKE VOLUME: 86.17 CM3
DOP CALC LVOT STROKE VOLUME: 88.79 CM3
DOP CALC RVOT PEAK VEL: 1.47 M/S
DOP CALC RVOT VTI: 21.88 CM
DOP CALCLVOT PEAK VEL VTI: 28.85 CM
DOP CALCLVOT PEAK VEL VTI: 31.39 CM
DOP CALCLVOT PEAK VEL VTI: 37.03 CM
E WAVE DECELERATION TIME: 173.25 MSEC
E WAVE DECELERATION TIME: 192.93 MSEC
E WAVE DECELERATION TIME: 215.11 MSEC
E/A RATIO: 0.95
E/A RATIO: 1.24
E/A RATIO: 2.13
E/E' RATIO: 11.89 M/S
E/E' RATIO: 9.43 M/S
ECHO LV POSTERIOR WALL: 0.79 CM (ref 0.6–1.1)
ECHO LV POSTERIOR WALL: 1 CM (ref 0.6–1.1)
ECHO LV POSTERIOR WALL: 1.27 CM (ref 0.6–1.1)
EOSINOPHIL # BLD AUTO: 0.1 K/UL (ref 0–0.5)
EOSINOPHIL # BLD AUTO: 0.2 K/UL (ref 0–0.5)
EOSINOPHIL # BLD AUTO: 0.3 K/UL (ref 0–0.5)
EOSINOPHIL # BLD AUTO: 0.3 K/UL (ref 0–0.5)
EOSINOPHIL # BLD AUTO: 0.4 K/UL (ref 0–0.5)
EOSINOPHIL # BLD AUTO: ABNORMAL K/UL (ref 0–0.5)
EOSINOPHIL NFR BLD: 0 % (ref 0–8)
EOSINOPHIL NFR BLD: 0.2 % (ref 0–8)
EOSINOPHIL NFR BLD: 0.8 % (ref 0–8)
EOSINOPHIL NFR BLD: 1 % (ref 0–8)
EOSINOPHIL NFR BLD: 1.1 % (ref 0–8)
EOSINOPHIL NFR BLD: 1.3 % (ref 0–8)
EOSINOPHIL NFR BLD: 1.5 % (ref 0–8)
EOSINOPHIL NFR BLD: 1.6 % (ref 0–8)
EOSINOPHIL NFR BLD: 1.9 % (ref 0–8)
EOSINOPHIL NFR BLD: 2 % (ref 0–8)
EOSINOPHIL NFR BLD: 2.5 % (ref 0–8)
EOSINOPHIL NFR BLD: 2.8 % (ref 0–8)
EOSINOPHIL NFR BLD: 3.1 % (ref 0–8)
EOSINOPHIL NFR BLD: 3.2 % (ref 0–8)
EOSINOPHIL NFR BLD: 3.4 % (ref 0–8)
ERYTHROCYTE [DISTWIDTH] IN BLOOD BY AUTOMATED COUNT: 15.9 % (ref 11.5–14.5)
ERYTHROCYTE [DISTWIDTH] IN BLOOD BY AUTOMATED COUNT: 16.5 % (ref 11.5–14.5)
ERYTHROCYTE [DISTWIDTH] IN BLOOD BY AUTOMATED COUNT: 16.8 % (ref 11.5–14.5)
ERYTHROCYTE [DISTWIDTH] IN BLOOD BY AUTOMATED COUNT: 16.8 % (ref 11.5–14.5)
ERYTHROCYTE [DISTWIDTH] IN BLOOD BY AUTOMATED COUNT: 17.2 % (ref 11.5–14.5)
ERYTHROCYTE [DISTWIDTH] IN BLOOD BY AUTOMATED COUNT: 17.5 % (ref 11.5–14.5)
ERYTHROCYTE [DISTWIDTH] IN BLOOD BY AUTOMATED COUNT: 17.8 % (ref 11.5–14.5)
ERYTHROCYTE [DISTWIDTH] IN BLOOD BY AUTOMATED COUNT: 17.8 % (ref 11.5–14.5)
ERYTHROCYTE [DISTWIDTH] IN BLOOD BY AUTOMATED COUNT: 18.2 % (ref 11.5–14.5)
ERYTHROCYTE [DISTWIDTH] IN BLOOD BY AUTOMATED COUNT: 18.3 % (ref 11.5–14.5)
ERYTHROCYTE [DISTWIDTH] IN BLOOD BY AUTOMATED COUNT: 18.3 % (ref 11.5–14.5)
ERYTHROCYTE [DISTWIDTH] IN BLOOD BY AUTOMATED COUNT: 18.4 % (ref 11.5–14.5)
ERYTHROCYTE [DISTWIDTH] IN BLOOD BY AUTOMATED COUNT: 18.5 % (ref 11.5–14.5)
ERYTHROCYTE [DISTWIDTH] IN BLOOD BY AUTOMATED COUNT: 18.6 % (ref 11.5–14.5)
ERYTHROCYTE [DISTWIDTH] IN BLOOD BY AUTOMATED COUNT: 18.8 % (ref 11.5–14.5)
ERYTHROCYTE [DISTWIDTH] IN BLOOD BY AUTOMATED COUNT: 18.8 % (ref 11.5–14.5)
ERYTHROCYTE [DISTWIDTH] IN BLOOD BY AUTOMATED COUNT: 18.9 % (ref 11.5–14.5)
ERYTHROCYTE [SEDIMENTATION RATE] IN BLOOD BY WESTERGREN METHOD: 20 MM/H
ERYTHROCYTE [SEDIMENTATION RATE] IN BLOOD BY WESTERGREN METHOD: 24 MM/H
ERYTHROCYTE [SEDIMENTATION RATE] IN BLOOD BY WESTERGREN METHOD: 26 MM/H
EST. GFR  (AFRICAN AMERICAN): 10.2 ML/MIN/1.73 M^2
EST. GFR  (AFRICAN AMERICAN): 11.7 ML/MIN/1.73 M^2
EST. GFR  (AFRICAN AMERICAN): 12.3 ML/MIN/1.73 M^2
EST. GFR  (AFRICAN AMERICAN): 13 ML/MIN/1.73 M^2
EST. GFR  (AFRICAN AMERICAN): 14.2 ML/MIN/1.73 M^2
EST. GFR  (AFRICAN AMERICAN): 14.6 ML/MIN/1.73 M^2
EST. GFR  (AFRICAN AMERICAN): 18.4 ML/MIN/1.73 M^2
EST. GFR  (AFRICAN AMERICAN): 20.7 ML/MIN/1.73 M^2
EST. GFR  (AFRICAN AMERICAN): 21.6 ML/MIN/1.73 M^2
EST. GFR  (AFRICAN AMERICAN): 21.6 ML/MIN/1.73 M^2
EST. GFR  (AFRICAN AMERICAN): 22.5 ML/MIN/1.73 M^2
EST. GFR  (AFRICAN AMERICAN): 23 ML/MIN/1.73 M^2
EST. GFR  (AFRICAN AMERICAN): 32 ML/MIN/1.73 M^2
EST. GFR  (AFRICAN AMERICAN): 38.4 ML/MIN/1.73 M^2
EST. GFR  (AFRICAN AMERICAN): 41.1 ML/MIN/1.73 M^2
EST. GFR  (AFRICAN AMERICAN): 9.9 ML/MIN/1.73 M^2
EST. GFR  (AFRICAN AMERICAN): >60 ML/MIN/1.73 M^2
EST. GFR  (NON AFRICAN AMERICAN): 10.2 ML/MIN/1.73 M^2
EST. GFR  (NON AFRICAN AMERICAN): 10.7 ML/MIN/1.73 M^2
EST. GFR  (NON AFRICAN AMERICAN): 11.3 ML/MIN/1.73 M^2
EST. GFR  (NON AFRICAN AMERICAN): 12.3 ML/MIN/1.73 M^2
EST. GFR  (NON AFRICAN AMERICAN): 12.7 ML/MIN/1.73 M^2
EST. GFR  (NON AFRICAN AMERICAN): 16 ML/MIN/1.73 M^2
EST. GFR  (NON AFRICAN AMERICAN): 17.9 ML/MIN/1.73 M^2
EST. GFR  (NON AFRICAN AMERICAN): 18.7 ML/MIN/1.73 M^2
EST. GFR  (NON AFRICAN AMERICAN): 18.7 ML/MIN/1.73 M^2
EST. GFR  (NON AFRICAN AMERICAN): 19.5 ML/MIN/1.73 M^2
EST. GFR  (NON AFRICAN AMERICAN): 20 ML/MIN/1.73 M^2
EST. GFR  (NON AFRICAN AMERICAN): 28 ML/MIN/1.73 M^2
EST. GFR  (NON AFRICAN AMERICAN): 33.3 ML/MIN/1.73 M^2
EST. GFR  (NON AFRICAN AMERICAN): 35.7 ML/MIN/1.73 M^2
EST. GFR  (NON AFRICAN AMERICAN): 54.3 ML/MIN/1.73 M^2
EST. GFR  (NON AFRICAN AMERICAN): 54.3 ML/MIN/1.73 M^2
EST. GFR  (NON AFRICAN AMERICAN): 55 ML/MIN/1.73 M^2
EST. GFR  (NON AFRICAN AMERICAN): 55 ML/MIN/1.73 M^2
EST. GFR  (NON AFRICAN AMERICAN): 8.6 ML/MIN/1.73 M^2
EST. GFR  (NON AFRICAN AMERICAN): 8.8 ML/MIN/1.73 M^2
EST. GFR  (NON AFRICAN AMERICAN): >60 ML/MIN/1.73 M^2
ETHANOL UR-MCNC: <10 MG/DL
FIBRINOGEN PPP-MCNC: 246 MG/DL (ref 182–366)
FIBRINOGEN PPP-MCNC: <70 MG/DL (ref 182–366)
FINAL PATHOLOGIC DIAGNOSIS: NORMAL
FIO2: 100
FIO2: 100
FIO2: 40
FIO2: 50
FIO2: 60
FIO2: 70
FIO2: 80
FIO2: 80
FLOW: 15
FRACTIONAL SHORTENING: 38 % (ref 28–44)
FRACTIONAL SHORTENING: 40 % (ref 28–44)
FRACTIONAL SHORTENING: 40 % (ref 28–44)
FUNGITELL COMMENTS: POSITIVE
GALACTOMANNAN AG SERPL IA-ACNC: <0.5 INDEX
GAMMA INTERFERON BACKGROUND BLD IA-ACNC: 0.03 IU/ML
GLUCOSE FLD-MCNC: 105 MG/DL
GLUCOSE FLD-MCNC: 110 MG/DL
GLUCOSE FLD-MCNC: 112 MG/DL
GLUCOSE FLD-MCNC: 78 MG/DL
GLUCOSE SERPL-MCNC: 100 MG/DL (ref 70–110)
GLUCOSE SERPL-MCNC: 100 MG/DL (ref 70–110)
GLUCOSE SERPL-MCNC: 102 MG/DL (ref 70–110)
GLUCOSE SERPL-MCNC: 102 MG/DL (ref 70–110)
GLUCOSE SERPL-MCNC: 105 MG/DL (ref 70–110)
GLUCOSE SERPL-MCNC: 105 MG/DL (ref 70–110)
GLUCOSE SERPL-MCNC: 107 MG/DL (ref 70–110)
GLUCOSE SERPL-MCNC: 112 MG/DL (ref 70–110)
GLUCOSE SERPL-MCNC: 113 MG/DL (ref 70–110)
GLUCOSE SERPL-MCNC: 119 MG/DL (ref 70–110)
GLUCOSE SERPL-MCNC: 120 MG/DL (ref 70–110)
GLUCOSE SERPL-MCNC: 121 MG/DL (ref 70–110)
GLUCOSE SERPL-MCNC: 123 MG/DL (ref 70–110)
GLUCOSE SERPL-MCNC: 123 MG/DL (ref 70–110)
GLUCOSE SERPL-MCNC: 132 MG/DL (ref 70–110)
GLUCOSE SERPL-MCNC: 147 MG/DL (ref 70–110)
GLUCOSE SERPL-MCNC: 148 MG/DL (ref 70–110)
GLUCOSE SERPL-MCNC: 149 MG/DL (ref 70–110)
GLUCOSE SERPL-MCNC: 155 MG/DL (ref 70–110)
GLUCOSE SERPL-MCNC: 155 MG/DL (ref 70–110)
GLUCOSE SERPL-MCNC: 158 MG/DL (ref 70–110)
GLUCOSE SERPL-MCNC: 159 MG/DL (ref 70–110)
GLUCOSE SERPL-MCNC: 179 MG/DL (ref 70–110)
GLUCOSE SERPL-MCNC: 63 MG/DL (ref 70–110)
GLUCOSE SERPL-MCNC: 64 MG/DL (ref 70–110)
GLUCOSE SERPL-MCNC: 64 MG/DL (ref 70–110)
GLUCOSE SERPL-MCNC: 66 MG/DL (ref 70–110)
GLUCOSE SERPL-MCNC: 68 MG/DL (ref 70–110)
GLUCOSE SERPL-MCNC: 74 MG/DL (ref 70–110)
GLUCOSE SERPL-MCNC: 75 MG/DL (ref 70–110)
GLUCOSE SERPL-MCNC: 76 MG/DL (ref 70–110)
GLUCOSE SERPL-MCNC: 80 MG/DL (ref 70–110)
GLUCOSE SERPL-MCNC: 80 MG/DL (ref 70–110)
GLUCOSE SERPL-MCNC: 83 MG/DL (ref 70–110)
GLUCOSE SERPL-MCNC: 85 MG/DL (ref 70–110)
GLUCOSE SERPL-MCNC: 86 MG/DL (ref 70–110)
GLUCOSE SERPL-MCNC: 89 MG/DL (ref 70–110)
GLUCOSE SERPL-MCNC: 90 MG/DL (ref 70–110)
GLUCOSE SERPL-MCNC: 90 MG/DL (ref 70–110)
GLUCOSE SERPL-MCNC: 92 MG/DL (ref 70–110)
GLUCOSE SERPL-MCNC: 92 MG/DL (ref 70–110)
GLUCOSE SERPL-MCNC: 94 MG/DL (ref 70–110)
GLUCOSE SERPL-MCNC: 96 MG/DL (ref 70–110)
GLUCOSE SERPL-MCNC: 99 MG/DL (ref 70–110)
GLUCOSE UR QL STRIP: ABNORMAL
GLUCOSE UR QL STRIP: NEGATIVE
GLUCOSE UR QL STRIP: NEGATIVE
GRAM STN SPEC: NORMAL
GRAN CASTS UR QL COMP ASSIST: 20 /LPF
GROSS: NORMAL
HAPTOGLOB SERPL-MCNC: <10 MG/DL (ref 30–250)
HCO3 UR-SCNC: 15.3 MMOL/L (ref 24–28)
HCO3 UR-SCNC: 16.6 MMOL/L (ref 24–28)
HCO3 UR-SCNC: 17.9 MMOL/L (ref 24–28)
HCO3 UR-SCNC: 18.1 MMOL/L (ref 24–28)
HCO3 UR-SCNC: 18.6 MMOL/L (ref 24–28)
HCO3 UR-SCNC: 20.2 MMOL/L (ref 24–28)
HCO3 UR-SCNC: 21.5 MMOL/L (ref 24–28)
HCO3 UR-SCNC: 23.3 MMOL/L (ref 24–28)
HCO3 UR-SCNC: 24.5 MMOL/L (ref 24–28)
HCO3 UR-SCNC: 25.2 MMOL/L (ref 24–28)
HCO3 UR-SCNC: 25.4 MMOL/L (ref 24–28)
HCO3 UR-SCNC: 25.8 MMOL/L (ref 24–28)
HCT VFR BLD AUTO: 17.2 % (ref 37–48.5)
HCT VFR BLD AUTO: 20 % (ref 37–48.5)
HCT VFR BLD AUTO: 20.6 % (ref 37–48.5)
HCT VFR BLD AUTO: 20.8 % (ref 37–48.5)
HCT VFR BLD AUTO: 21 % (ref 37–48.5)
HCT VFR BLD AUTO: 22.2 % (ref 37–48.5)
HCT VFR BLD AUTO: 22.4 % (ref 37–48.5)
HCT VFR BLD AUTO: 22.6 % (ref 37–48.5)
HCT VFR BLD AUTO: 22.9 % (ref 37–48.5)
HCT VFR BLD AUTO: 22.9 % (ref 37–48.5)
HCT VFR BLD AUTO: 23.3 % (ref 37–48.5)
HCT VFR BLD AUTO: 23.3 % (ref 37–48.5)
HCT VFR BLD AUTO: 23.5 % (ref 37–48.5)
HCT VFR BLD AUTO: 23.8 % (ref 37–48.5)
HCT VFR BLD AUTO: 23.9 % (ref 37–48.5)
HCT VFR BLD AUTO: 26.7 % (ref 37–48.5)
HCT VFR BLD AUTO: 26.8 % (ref 37–48.5)
HCT VFR BLD AUTO: 27.6 % (ref 37–48.5)
HCT VFR BLD AUTO: 28.2 % (ref 37–48.5)
HCT VFR BLD AUTO: 29.2 % (ref 37–48.5)
HCT VFR BLD AUTO: 29.4 % (ref 37–48.5)
HCT VFR BLD AUTO: 30.3 % (ref 37–48.5)
HCT VFR BLD AUTO: 30.6 % (ref 37–48.5)
HCT VFR BLD AUTO: 30.8 % (ref 37–48.5)
HCT VFR BLD AUTO: 31.3 % (ref 37–48.5)
HCT VFR BLD AUTO: 33.7 % (ref 37–48.5)
HCT VFR BLD AUTO: 34.7 % (ref 37–48.5)
HCT VFR BLD AUTO: 36.5 % (ref 37–48.5)
HCT VFR BLD AUTO: 37 % (ref 37–48.5)
HCT VFR BLD CALC: 17 %PCV (ref 36–54)
HCV AB SERPL QL IA: NEGATIVE
HEPARIN INDUCED THROMBOCYTOPENIA: 0.13 OD (ref 0–0.4)
HGB BLD-MCNC: 10 G/DL (ref 12–16)
HGB BLD-MCNC: 10.1 G/DL (ref 12–16)
HGB BLD-MCNC: 10.3 G/DL (ref 12–16)
HGB BLD-MCNC: 10.8 G/DL (ref 12–16)
HGB BLD-MCNC: 11 G/DL (ref 12–16)
HGB BLD-MCNC: 11.2 G/DL (ref 12–16)
HGB BLD-MCNC: 11.6 G/DL (ref 12–16)
HGB BLD-MCNC: 11.9 G/DL (ref 12–16)
HGB BLD-MCNC: 11.9 G/DL (ref 12–16)
HGB BLD-MCNC: 12.2 G/DL (ref 12–16)
HGB BLD-MCNC: 12.2 G/DL (ref 12–16)
HGB BLD-MCNC: 12.4 G/DL (ref 12–16)
HGB BLD-MCNC: 5.7 G/DL (ref 12–16)
HGB BLD-MCNC: 6.8 G/DL (ref 12–16)
HGB BLD-MCNC: 6.9 G/DL (ref 12–16)
HGB BLD-MCNC: 7 G/DL (ref 12–16)
HGB BLD-MCNC: 7.5 G/DL (ref 12–16)
HGB BLD-MCNC: 7.6 G/DL (ref 12–16)
HGB BLD-MCNC: 7.7 G/DL (ref 12–16)
HGB BLD-MCNC: 7.7 G/DL (ref 12–16)
HGB BLD-MCNC: 7.8 G/DL (ref 12–16)
HGB BLD-MCNC: 7.9 G/DL (ref 12–16)
HGB BLD-MCNC: 7.9 G/DL (ref 12–16)
HGB BLD-MCNC: 8.1 G/DL (ref 12–16)
HGB BLD-MCNC: 8.2 G/DL (ref 12–16)
HGB BLD-MCNC: 8.9 G/DL (ref 12–16)
HGB BLD-MCNC: 9.3 G/DL (ref 12–16)
HGB BLD-MCNC: 9.4 G/DL (ref 12–16)
HGB BLD-MCNC: 9.5 G/DL (ref 12–16)
HGB UR QL STRIP: ABNORMAL
HGB UR QL STRIP: ABNORMAL
HGB UR QL STRIP: NEGATIVE
HISTOPLASMA AG INTERP.: NEGATIVE
HISTOPLASMA AG VALUE: 0.22 NG/ML
HISTOPLASMA ANTIGEN URINE: ABNORMAL
HISTOPLASMA RESULT: NORMAL NG/ML
HIV 1+2 AB+HIV1 P24 AG SERPL QL IA: NEGATIVE
HYALINE CASTS UR QL AUTO: 87 /LPF
HYALINE CASTS UR QL AUTO: 88 /LPF
HYPOCHROMIA BLD QL SMEAR: ABNORMAL
IMM GRANULOCYTES # BLD AUTO: 0.01 K/UL (ref 0–0.04)
IMM GRANULOCYTES # BLD AUTO: 0.01 K/UL (ref 0–0.04)
IMM GRANULOCYTES # BLD AUTO: 0.02 K/UL (ref 0–0.04)
IMM GRANULOCYTES # BLD AUTO: 0.02 K/UL (ref 0–0.04)
IMM GRANULOCYTES # BLD AUTO: 0.03 K/UL (ref 0–0.04)
IMM GRANULOCYTES # BLD AUTO: 0.12 K/UL (ref 0–0.04)
IMM GRANULOCYTES # BLD AUTO: 0.13 K/UL (ref 0–0.04)
IMM GRANULOCYTES # BLD AUTO: 0.14 K/UL (ref 0–0.04)
IMM GRANULOCYTES # BLD AUTO: 0.23 K/UL (ref 0–0.04)
IMM GRANULOCYTES # BLD AUTO: 0.39 K/UL (ref 0–0.04)
IMM GRANULOCYTES # BLD AUTO: 0.73 K/UL (ref 0–0.04)
IMM GRANULOCYTES # BLD AUTO: ABNORMAL K/UL (ref 0–0.04)
IMM GRANULOCYTES NFR BLD AUTO: 0.2 % (ref 0–0.5)
IMM GRANULOCYTES NFR BLD AUTO: 0.2 % (ref 0–0.5)
IMM GRANULOCYTES NFR BLD AUTO: 0.3 % (ref 0–0.5)
IMM GRANULOCYTES NFR BLD AUTO: 0.4 % (ref 0–0.5)
IMM GRANULOCYTES NFR BLD AUTO: 0.4 % (ref 0–0.5)
IMM GRANULOCYTES NFR BLD AUTO: 0.5 % (ref 0–0.5)
IMM GRANULOCYTES NFR BLD AUTO: 0.5 % (ref 0–0.5)
IMM GRANULOCYTES NFR BLD AUTO: 0.6 % (ref 0–0.5)
IMM GRANULOCYTES NFR BLD AUTO: 0.7 % (ref 0–0.5)
IMM GRANULOCYTES NFR BLD AUTO: 0.7 % (ref 0–0.5)
IMM GRANULOCYTES NFR BLD AUTO: 1.1 % (ref 0–0.5)
IMM GRANULOCYTES NFR BLD AUTO: 1.3 % (ref 0–0.5)
IMM GRANULOCYTES NFR BLD AUTO: 1.8 % (ref 0–0.5)
IMM GRANULOCYTES NFR BLD AUTO: 2.9 % (ref 0–0.5)
IMM GRANULOCYTES NFR BLD AUTO: ABNORMAL % (ref 0–0.5)
INR PPP: 1.4 (ref 0.8–1.2)
INR PPP: 1.4 (ref 0.8–1.2)
INR PPP: 1.5 (ref 0.8–1.2)
INR PPP: 1.6 (ref 0.8–1.2)
INR PPP: 1.7 (ref 0.8–1.2)
INR PPP: 1.9 (ref 0.8–1.2)
INR PPP: 2.3 (ref 0.8–1.2)
INR PPP: 2.4 (ref 0.8–1.2)
INR PPP: 2.6 (ref 0.8–1.2)
INR PPP: 2.7 (ref 0.8–1.2)
INR PPP: 2.7 (ref 0.8–1.2)
INR PPP: 2.8 (ref 0.8–1.2)
INR PPP: 3 (ref 0.8–1.2)
INR PPP: 3.1 (ref 0.8–1.2)
INR PPP: 3.4 (ref 0.8–1.2)
INR PPP: 3.5 (ref 0.8–1.2)
INR PPP: 3.5 (ref 0.8–1.2)
INR PPP: 3.6 (ref 0.8–1.2)
INR PPP: 4.1 (ref 0.8–1.2)
INTERVENTRICULAR SEPTUM: 0.85 CM (ref 0.6–1.1)
INTERVENTRICULAR SEPTUM: 0.88 CM (ref 0.6–1.1)
INTERVENTRICULAR SEPTUM: 1.38 CM (ref 0.6–1.1)
IVRT: 62.8 MSEC
IVRT: 68.51 MSEC
KETONES UR QL STRIP: NEGATIVE
KOH PREP SPEC: NORMAL
L PNEUMO AG UR QL IA: NEGATIVE
LA MAJOR: 4.03 CM
LA MAJOR: 4.27 CM
LA MAJOR: 5.16 CM
LA MINOR: 3.94 CM
LA MINOR: 4.45 CM
LA MINOR: 4.69 CM
LA WIDTH: 3.55 CM
LA WIDTH: 3.94 CM
LACTATE SERPL-SCNC: 1.5 MMOL/L (ref 0.5–2.2)
LACTATE SERPL-SCNC: 1.5 MMOL/L (ref 0.5–2.2)
LACTATE SERPL-SCNC: 1.7 MMOL/L (ref 0.5–2.2)
LACTATE SERPL-SCNC: 1.9 MMOL/L (ref 0.5–2.2)
LACTATE SERPL-SCNC: 2.1 MMOL/L (ref 0.5–2.2)
LACTATE SERPL-SCNC: 2.5 MMOL/L (ref 0.5–2.2)
LACTATE SERPL-SCNC: 3.2 MMOL/L (ref 0.5–2.2)
LACTATE SERPL-SCNC: 3.4 MMOL/L (ref 0.5–2.2)
LACTATE SERPL-SCNC: 3.5 MMOL/L (ref 0.5–2.2)
LACTATE SERPL-SCNC: 3.7 MMOL/L (ref 0.5–2.2)
LACTATE SERPL-SCNC: 4.2 MMOL/L (ref 0.5–2.2)
LACTATE SERPL-SCNC: 4.2 MMOL/L (ref 0.5–2.2)
LACTATE SERPL-SCNC: 4.8 MMOL/L (ref 0.5–2.2)
LACTATE SERPL-SCNC: 5.8 MMOL/L (ref 0.5–2.2)
LDH FLD L TO P-CCNC: 276 U/L
LDH FLD L TO P-CCNC: 51 U/L
LDH FLD L TO P-CCNC: 59 U/L
LDH FLD L TO P-CCNC: 92 U/L
LDH SERPL L TO P-CCNC: 277 U/L (ref 110–260)
LDH SERPL L TO P-CCNC: 289 U/L (ref 110–260)
LDH SERPL L TO P-CCNC: 305 U/L (ref 110–260)
LDH SERPL L TO P-CCNC: 308 U/L (ref 110–260)
LDH SERPL L TO P-CCNC: 661 U/L (ref 110–260)
LEFT ATRIUM SIZE: 3.09 CM
LEFT ATRIUM SIZE: 3.23 CM
LEFT ATRIUM SIZE: 3.92 CM
LEFT ATRIUM VOLUME INDEX: 25.1 ML/M2
LEFT ATRIUM VOLUME INDEX: 31.3 ML/M2
LEFT ATRIUM VOLUME: 44.86 CM3
LEFT ATRIUM VOLUME: 56.53 CM3
LEFT INTERNAL DIMENSION IN SYSTOLE: 2.38 CM (ref 2.1–4)
LEFT INTERNAL DIMENSION IN SYSTOLE: 2.47 CM (ref 2.1–4)
LEFT INTERNAL DIMENSION IN SYSTOLE: 2.62 CM (ref 2.1–4)
LEFT VENTRICLE DIASTOLIC VOLUME INDEX: 37.64 ML/M2
LEFT VENTRICLE DIASTOLIC VOLUME INDEX: 42.09 ML/M2
LEFT VENTRICLE DIASTOLIC VOLUME INDEX: 43.46 ML/M2
LEFT VENTRICLE DIASTOLIC VOLUME: 69.41 ML
LEFT VENTRICLE DIASTOLIC VOLUME: 75.33 ML
LEFT VENTRICLE DIASTOLIC VOLUME: 78.43 ML
LEFT VENTRICLE MASS INDEX: 104 G/M2
LEFT VENTRICLE MASS INDEX: 58 G/M2
LEFT VENTRICLE MASS INDEX: 68 G/M2
LEFT VENTRICLE SYSTOLIC VOLUME INDEX: 10.7 ML/M2
LEFT VENTRICLE SYSTOLIC VOLUME INDEX: 12.1 ML/M2
LEFT VENTRICLE SYSTOLIC VOLUME INDEX: 13.9 ML/M2
LEFT VENTRICLE SYSTOLIC VOLUME: 19.7 ML
LEFT VENTRICLE SYSTOLIC VOLUME: 21.64 ML
LEFT VENTRICLE SYSTOLIC VOLUME: 25.11 ML
LEFT VENTRICULAR INTERNAL DIMENSION IN DIASTOLE: 3.99 CM (ref 3.5–6)
LEFT VENTRICULAR INTERNAL DIMENSION IN DIASTOLE: 4.13 CM (ref 3.5–6)
LEFT VENTRICULAR INTERNAL DIMENSION IN DIASTOLE: 4.2 CM (ref 3.5–6)
LEFT VENTRICULAR MASS: 104.67 G
LEFT VENTRICULAR MASS: 122.6 G
LEFT VENTRICULAR MASS: 191.32 G
LEUKOCYTE ESTERASE UR QL STRIP: ABNORMAL
LEUKOCYTE ESTERASE UR QL STRIP: NEGATIVE
LEUKOCYTE ESTERASE UR QL STRIP: NEGATIVE
LIPASE SERPL-CCNC: 45 U/L (ref 4–60)
LIPASE SERPL-CCNC: 46 U/L (ref 4–60)
LIPASE SERPL-CCNC: 52 U/L (ref 4–60)
LV LATERAL E/E' RATIO: 8.25 M/S
LV LATERAL E/E' RATIO: 9.42 M/S
LV SEPTAL E/E' RATIO: 11 M/S
LV SEPTAL E/E' RATIO: 16.14 M/S
LYMPHOCYTES # BLD AUTO: 0.9 K/UL (ref 1–4.8)
LYMPHOCYTES # BLD AUTO: 1.2 K/UL (ref 1–4.8)
LYMPHOCYTES # BLD AUTO: 1.3 K/UL (ref 1–4.8)
LYMPHOCYTES # BLD AUTO: 1.5 K/UL (ref 1–4.8)
LYMPHOCYTES # BLD AUTO: 1.6 K/UL (ref 1–4.8)
LYMPHOCYTES # BLD AUTO: 1.6 K/UL (ref 1–4.8)
LYMPHOCYTES # BLD AUTO: 1.7 K/UL (ref 1–4.8)
LYMPHOCYTES # BLD AUTO: 1.7 K/UL (ref 1–4.8)
LYMPHOCYTES # BLD AUTO: 1.9 K/UL (ref 1–4.8)
LYMPHOCYTES # BLD AUTO: 2.1 K/UL (ref 1–4.8)
LYMPHOCYTES # BLD AUTO: ABNORMAL K/UL (ref 1–4.8)
LYMPHOCYTES NFR BLD: 1.5 % (ref 18–48)
LYMPHOCYTES NFR BLD: 10 % (ref 18–48)
LYMPHOCYTES NFR BLD: 10 % (ref 18–48)
LYMPHOCYTES NFR BLD: 11.1 % (ref 18–48)
LYMPHOCYTES NFR BLD: 13 % (ref 18–48)
LYMPHOCYTES NFR BLD: 14.3 % (ref 18–48)
LYMPHOCYTES NFR BLD: 17.5 % (ref 18–48)
LYMPHOCYTES NFR BLD: 2 % (ref 18–48)
LYMPHOCYTES NFR BLD: 2 % (ref 18–48)
LYMPHOCYTES NFR BLD: 22.2 % (ref 18–48)
LYMPHOCYTES NFR BLD: 23.2 % (ref 18–48)
LYMPHOCYTES NFR BLD: 24 % (ref 18–48)
LYMPHOCYTES NFR BLD: 25.4 % (ref 18–48)
LYMPHOCYTES NFR BLD: 27.5 % (ref 18–48)
LYMPHOCYTES NFR BLD: 27.8 % (ref 18–48)
LYMPHOCYTES NFR BLD: 30.2 % (ref 18–48)
LYMPHOCYTES NFR BLD: 4 % (ref 18–48)
LYMPHOCYTES NFR BLD: 4.5 % (ref 18–48)
LYMPHOCYTES NFR BLD: 4.5 % (ref 18–48)
LYMPHOCYTES NFR BLD: 5 % (ref 18–48)
LYMPHOCYTES NFR BLD: 6 % (ref 18–48)
LYMPHOCYTES NFR BLD: 6.5 % (ref 18–48)
LYMPHOCYTES NFR BLD: 7 % (ref 18–48)
LYMPHOCYTES NFR BLD: 7.7 % (ref 18–48)
LYMPHOCYTES NFR BLD: 8 % (ref 18–48)
LYMPHOCYTES NFR BLD: 9.9 % (ref 18–48)
LYMPHOCYTES NFR FLD MANUAL: 18 %
LYMPHOCYTES NFR FLD MANUAL: 19 %
LYMPHOCYTES NFR FLD MANUAL: 23 %
LYMPHOCYTES NFR FLD MANUAL: 31 %
LYMPHOCYTES NFR FLD MANUAL: 6 %
LYMPHOCYTES NFR FLD MANUAL: 9 %
M TB IFN-G BLD-IMP: NEGATIVE
M TB IFN-G CD4+ BCKGRND COR BLD-ACNC: -0.01 IU/ML
MAGNESIUM SERPL-MCNC: 1.8 MG/DL (ref 1.6–2.6)
MAGNESIUM SERPL-MCNC: 1.9 MG/DL (ref 1.6–2.6)
MAGNESIUM SERPL-MCNC: 2 MG/DL (ref 1.6–2.6)
MAGNESIUM SERPL-MCNC: 2.1 MG/DL (ref 1.6–2.6)
MAGNESIUM SERPL-MCNC: 2.2 MG/DL (ref 1.6–2.6)
MAGNESIUM SERPL-MCNC: 2.3 MG/DL (ref 1.6–2.6)
MAGNESIUM SERPL-MCNC: 2.3 MG/DL (ref 1.6–2.6)
MAGNESIUM SERPL-MCNC: 2.4 MG/DL (ref 1.6–2.6)
MAGNESIUM SERPL-MCNC: 2.5 MG/DL (ref 1.6–2.6)
MAGNESIUM SERPL-MCNC: 2.8 MG/DL (ref 1.6–2.6)
MAGNESIUM SERPL-MCNC: 3 MG/DL (ref 1.6–2.6)
MAGNESIUM SERPL-MCNC: 3.1 MG/DL (ref 1.6–2.6)
MAGNESIUM SERPL-MCNC: 3.1 MG/DL (ref 1.6–2.6)
MAGNESIUM SERPL-MCNC: 3.5 MG/DL (ref 1.6–2.6)
MAGNESIUM SERPL-MCNC: 3.6 MG/DL (ref 1.6–2.6)
MAGNESIUM SERPL-MCNC: 3.7 MG/DL (ref 1.6–2.6)
MCH RBC QN AUTO: 30.7 PG (ref 27–31)
MCH RBC QN AUTO: 31.2 PG (ref 27–31)
MCH RBC QN AUTO: 31.3 PG (ref 27–31)
MCH RBC QN AUTO: 31.5 PG (ref 27–31)
MCH RBC QN AUTO: 31.6 PG (ref 27–31)
MCH RBC QN AUTO: 31.7 PG (ref 27–31)
MCH RBC QN AUTO: 31.8 PG (ref 27–31)
MCH RBC QN AUTO: 31.9 PG (ref 27–31)
MCH RBC QN AUTO: 32 PG (ref 27–31)
MCH RBC QN AUTO: 32.1 PG (ref 27–31)
MCH RBC QN AUTO: 32.2 PG (ref 27–31)
MCH RBC QN AUTO: 32.5 PG (ref 27–31)
MCH RBC QN AUTO: 32.6 PG (ref 27–31)
MCH RBC QN AUTO: 32.7 PG (ref 27–31)
MCH RBC QN AUTO: 32.8 PG (ref 27–31)
MCH RBC QN AUTO: 32.9 PG (ref 27–31)
MCH RBC QN AUTO: 32.9 PG (ref 27–31)
MCH RBC QN AUTO: 33.1 PG (ref 27–31)
MCH RBC QN AUTO: 33.2 PG (ref 27–31)
MCHC RBC AUTO-ENTMCNC: 30.1 G/DL (ref 32–36)
MCHC RBC AUTO-ENTMCNC: 32.4 G/DL (ref 32–36)
MCHC RBC AUTO-ENTMCNC: 33 G/DL (ref 32–36)
MCHC RBC AUTO-ENTMCNC: 33 G/DL (ref 32–36)
MCHC RBC AUTO-ENTMCNC: 33.1 G/DL (ref 32–36)
MCHC RBC AUTO-ENTMCNC: 33.1 G/DL (ref 32–36)
MCHC RBC AUTO-ENTMCNC: 33.2 G/DL (ref 32–36)
MCHC RBC AUTO-ENTMCNC: 33.4 G/DL (ref 32–36)
MCHC RBC AUTO-ENTMCNC: 33.5 G/DL (ref 32–36)
MCHC RBC AUTO-ENTMCNC: 33.6 G/DL (ref 32–36)
MCHC RBC AUTO-ENTMCNC: 33.7 G/DL (ref 32–36)
MCHC RBC AUTO-ENTMCNC: 33.7 G/DL (ref 32–36)
MCHC RBC AUTO-ENTMCNC: 33.9 G/DL (ref 32–36)
MCHC RBC AUTO-ENTMCNC: 34 G/DL (ref 32–36)
MCHC RBC AUTO-ENTMCNC: 34.1 G/DL (ref 32–36)
MCHC RBC AUTO-ENTMCNC: 34.1 G/DL (ref 32–36)
MCHC RBC AUTO-ENTMCNC: 34.2 G/DL (ref 32–36)
MCHC RBC AUTO-ENTMCNC: 34.3 G/DL (ref 32–36)
MCHC RBC AUTO-ENTMCNC: 34.4 G/DL (ref 32–36)
MCHC RBC AUTO-ENTMCNC: 34.5 G/DL (ref 32–36)
MCHC RBC AUTO-ENTMCNC: 34.8 G/DL (ref 32–36)
MCHC RBC AUTO-ENTMCNC: 35.1 G/DL (ref 32–36)
MCHC RBC AUTO-ENTMCNC: 35.2 G/DL (ref 32–36)
MCHC RBC AUTO-ENTMCNC: 35.3 G/DL (ref 32–36)
MCHC RBC AUTO-ENTMCNC: 36.4 G/DL (ref 32–36)
MCV RBC AUTO: 101 FL (ref 82–98)
MCV RBC AUTO: 102 FL (ref 82–98)
MCV RBC AUTO: 89 FL (ref 82–98)
MCV RBC AUTO: 90 FL (ref 82–98)
MCV RBC AUTO: 90 FL (ref 82–98)
MCV RBC AUTO: 91 FL (ref 82–98)
MCV RBC AUTO: 92 FL (ref 82–98)
MCV RBC AUTO: 93 FL (ref 82–98)
MCV RBC AUTO: 94 FL (ref 82–98)
MCV RBC AUTO: 95 FL (ref 82–98)
MCV RBC AUTO: 96 FL (ref 82–98)
MCV RBC AUTO: 97 FL (ref 82–98)
MCV RBC AUTO: 97 FL (ref 82–98)
MCV RBC AUTO: 98 FL (ref 82–98)
MCV RBC AUTO: 99 FL (ref 82–98)
MESOTHL CELL NFR FLD MANUAL: 10 %
MESOTHL CELL NFR FLD MANUAL: 13 %
MESOTHL CELL NFR FLD MANUAL: 2 %
METAMYELOCYTES NFR BLD MANUAL: 1 %
METAMYELOCYTES NFR BLD MANUAL: 1.5 %
METAMYELOCYTES NFR BLD MANUAL: 2 %
METAMYELOCYTES NFR BLD MANUAL: 2.5 %
METAMYELOCYTES NFR BLD MANUAL: 3 %
METAMYELOCYTES NFR BLD MANUAL: 5 %
METHADONE UR QL SCN>300 NG/ML: NEGATIVE
MICROSCOPIC COMMENT: ABNORMAL
MICROSCOPIC COMMENT: ABNORMAL
MICROSCOPIC COMMENT: NORMAL
MICROSCOPIC EXAM: NORMAL
MICROSCOPIC EXAM: NORMAL
MIN VOL: 10.5
MIN VOL: 10.7
MIN VOL: 9.7
MITOGEN IGNF BCKGRD COR BLD-ACNC: 0.03 IU/ML
MODE: ABNORMAL
MONOCYTES # BLD AUTO: 0.3 K/UL (ref 0.3–1)
MONOCYTES # BLD AUTO: 0.4 K/UL (ref 0.3–1)
MONOCYTES # BLD AUTO: 0.5 K/UL (ref 0.3–1)
MONOCYTES # BLD AUTO: 0.5 K/UL (ref 0.3–1)
MONOCYTES # BLD AUTO: 0.6 K/UL (ref 0.3–1)
MONOCYTES # BLD AUTO: 0.7 K/UL (ref 0.3–1)
MONOCYTES # BLD AUTO: 0.9 K/UL (ref 0.3–1)
MONOCYTES # BLD AUTO: 1.1 K/UL (ref 0.3–1)
MONOCYTES # BLD AUTO: 1.2 K/UL (ref 0.3–1)
MONOCYTES # BLD AUTO: 1.3 K/UL (ref 0.3–1)
MONOCYTES # BLD AUTO: 1.3 K/UL (ref 0.3–1)
MONOCYTES # BLD AUTO: 1.4 K/UL (ref 0.3–1)
MONOCYTES # BLD AUTO: ABNORMAL K/UL (ref 0.3–1)
MONOCYTES NFR BLD: 1 % (ref 4–15)
MONOCYTES NFR BLD: 14 % (ref 4–15)
MONOCYTES NFR BLD: 3 % (ref 4–15)
MONOCYTES NFR BLD: 4 % (ref 4–15)
MONOCYTES NFR BLD: 4 % (ref 4–15)
MONOCYTES NFR BLD: 5 % (ref 4–15)
MONOCYTES NFR BLD: 5.4 % (ref 4–15)
MONOCYTES NFR BLD: 5.8 % (ref 4–15)
MONOCYTES NFR BLD: 6 % (ref 4–15)
MONOCYTES NFR BLD: 6 % (ref 4–15)
MONOCYTES NFR BLD: 6.2 % (ref 4–15)
MONOCYTES NFR BLD: 6.2 % (ref 4–15)
MONOCYTES NFR BLD: 6.5 % (ref 4–15)
MONOCYTES NFR BLD: 6.5 % (ref 4–15)
MONOCYTES NFR BLD: 6.8 % (ref 4–15)
MONOCYTES NFR BLD: 6.9 % (ref 4–15)
MONOCYTES NFR BLD: 7 % (ref 4–15)
MONOCYTES NFR BLD: 7.4 % (ref 4–15)
MONOCYTES NFR BLD: 7.5 % (ref 4–15)
MONOCYTES NFR BLD: 8.1 % (ref 4–15)
MONOCYTES NFR BLD: 8.3 % (ref 4–15)
MONOCYTES NFR BLD: 9.5 % (ref 4–15)
MONOCYTES NFR BLD: 9.6 % (ref 4–15)
MONOCYTES NFR BLD: 9.7 % (ref 4–15)
MONOCYTES NFR BLD: 9.9 % (ref 4–15)
MONOS+MACROS NFR FLD MANUAL: 14 %
MONOS+MACROS NFR FLD MANUAL: 16 %
MONOS+MACROS NFR FLD MANUAL: 23 %
MONOS+MACROS NFR FLD MANUAL: 6 %
MONOS+MACROS NFR FLD MANUAL: 65 %
MONOS+MACROS NFR FLD MANUAL: 71 %
MV PEAK A VEL: 0.82 M/S
MV PEAK A VEL: 0.91 M/S
MV PEAK A VEL: 1.04 M/S
MV PEAK E VEL: 0.99 M/S
MV PEAK E VEL: 1.13 M/S
MV PEAK E VEL: 1.75 M/S
MV STENOSIS PRESSURE HALF TIME: 50.24 MS
MV STENOSIS PRESSURE HALF TIME: 55.95 MS
MV STENOSIS PRESSURE HALF TIME: 62.38 MS
MV VALVE AREA P 1/2 METHOD: 3.53 CM2
MV VALVE AREA P 1/2 METHOD: 3.93 CM2
MV VALVE AREA P 1/2 METHOD: 4.38 CM2
MYELOCYTES NFR BLD MANUAL: 0.5 %
MYELOCYTES NFR BLD MANUAL: 1 %
MYELOCYTES NFR BLD MANUAL: 10 %
MYELOCYTES NFR BLD MANUAL: 2 %
MYELOCYTES NFR BLD MANUAL: 2.5 %
MYELOCYTES NFR BLD MANUAL: 3 %
MYELOCYTES NFR BLD MANUAL: 3.5 %
MYELOCYTES NFR BLD MANUAL: 3.5 %
MYELOCYTES NFR BLD MANUAL: 4 %
MYELOCYTES NFR BLD MANUAL: 5 %
NEUTROPHILS # BLD AUTO: 13.8 K/UL (ref 1.8–7.7)
NEUTROPHILS # BLD AUTO: 14.1 K/UL (ref 1.8–7.7)
NEUTROPHILS # BLD AUTO: 15.9 K/UL (ref 1.8–7.7)
NEUTROPHILS # BLD AUTO: 18.6 K/UL (ref 1.8–7.7)
NEUTROPHILS # BLD AUTO: 2.9 K/UL (ref 1.8–7.7)
NEUTROPHILS # BLD AUTO: 21.3 K/UL (ref 1.8–7.7)
NEUTROPHILS # BLD AUTO: 3 K/UL (ref 1.8–7.7)
NEUTROPHILS # BLD AUTO: 3.3 K/UL (ref 1.8–7.7)
NEUTROPHILS # BLD AUTO: 3.3 K/UL (ref 1.8–7.7)
NEUTROPHILS # BLD AUTO: 3.7 K/UL (ref 1.8–7.7)
NEUTROPHILS # BLD AUTO: 4.3 K/UL (ref 1.8–7.7)
NEUTROPHILS # BLD AUTO: 4.4 K/UL (ref 1.8–7.7)
NEUTROPHILS # BLD AUTO: 4.9 K/UL (ref 1.8–7.7)
NEUTROPHILS # BLD AUTO: 8.5 K/UL (ref 1.8–7.7)
NEUTROPHILS NFR BLD: 57.2 % (ref 38–73)
NEUTROPHILS NFR BLD: 58.6 % (ref 38–73)
NEUTROPHILS NFR BLD: 61.6 % (ref 38–73)
NEUTROPHILS NFR BLD: 63 % (ref 38–73)
NEUTROPHILS NFR BLD: 64.6 % (ref 38–73)
NEUTROPHILS NFR BLD: 66.2 % (ref 38–73)
NEUTROPHILS NFR BLD: 68.2 % (ref 38–73)
NEUTROPHILS NFR BLD: 69.4 % (ref 38–73)
NEUTROPHILS NFR BLD: 73.8 % (ref 38–73)
NEUTROPHILS NFR BLD: 77 % (ref 38–73)
NEUTROPHILS NFR BLD: 77 % (ref 38–73)
NEUTROPHILS NFR BLD: 78 % (ref 38–73)
NEUTROPHILS NFR BLD: 78 % (ref 38–73)
NEUTROPHILS NFR BLD: 79 % (ref 38–73)
NEUTROPHILS NFR BLD: 80 % (ref 38–73)
NEUTROPHILS NFR BLD: 80 % (ref 38–73)
NEUTROPHILS NFR BLD: 80.3 % (ref 38–73)
NEUTROPHILS NFR BLD: 81 % (ref 38–73)
NEUTROPHILS NFR BLD: 81.5 % (ref 38–73)
NEUTROPHILS NFR BLD: 81.5 % (ref 38–73)
NEUTROPHILS NFR BLD: 82 % (ref 38–73)
NEUTROPHILS NFR BLD: 83 % (ref 38–73)
NEUTROPHILS NFR BLD: 83.7 % (ref 38–73)
NEUTROPHILS NFR BLD: 84.1 % (ref 38–73)
NEUTROPHILS NFR BLD: 84.5 % (ref 38–73)
NEUTROPHILS NFR BLD: 84.8 % (ref 38–73)
NEUTROPHILS NFR BLD: 85 % (ref 38–73)
NEUTROPHILS NFR BLD: 86 % (ref 38–73)
NEUTROPHILS NFR BLD: 87 % (ref 38–73)
NEUTROPHILS NFR BLD: 87 % (ref 38–73)
NEUTROPHILS NFR BLD: 90 % (ref 38–73)
NEUTROPHILS NFR FLD MANUAL: 16 %
NEUTROPHILS NFR FLD MANUAL: 44 %
NEUTROPHILS NFR FLD MANUAL: 6 %
NEUTROPHILS NFR FLD MANUAL: 66 %
NEUTROPHILS NFR FLD MANUAL: 67 %
NEUTROPHILS NFR FLD MANUAL: 75 %
NEUTS BAND NFR BLD MANUAL: 1 %
NEUTS BAND NFR BLD MANUAL: 1.5 %
NEUTS BAND NFR BLD MANUAL: 2 %
NEUTS BAND NFR BLD MANUAL: 2.5 %
NEUTS BAND NFR BLD MANUAL: 2.5 %
NEUTS BAND NFR BLD MANUAL: 3 %
NEUTS BAND NFR BLD MANUAL: 3 %
NEUTS BAND NFR BLD MANUAL: 4 %
NEUTS BAND NFR BLD MANUAL: 5 %
NITRITE UR QL STRIP: NEGATIVE
NON-SQ EPI CELLS #/AREA URNS AUTO: 4 /HPF
NRBC BLD-RTO: 0 /100 WBC
NRBC BLD-RTO: 1 /100 WBC
NRBC BLD-RTO: 1 /100 WBC
NRBC BLD-RTO: 3 /100 WBC
NRBC BLD-RTO: 4 /100 WBC
NRBC BLD-RTO: 4 /100 WBC
NRBC BLD-RTO: 5 /100 WBC
NRBC BLD-RTO: 6 /100 WBC
NRBC BLD-RTO: 7 /100 WBC
NRBC BLD-RTO: 7 /100 WBC
NUM UNITS TRANS FFP: NORMAL
NUM UNITS TRANS PACKED RBC: NORMAL
OHS CV CPX 1 MINUTE RECOVERY HEART RATE: 96 BPM
OHS CV CPX 85 PERCENT MAX PREDICTED HEART RATE MALE: 141
OHS CV CPX MAX PREDICTED HEART RATE: 166
OHS CV CPX PATIENT IS FEMALE: 1
OHS CV CPX PATIENT IS MALE: 0
OHS CV CPX PEAK DIASTOLIC BLOOD PRESSURE: 41 MMHG
OHS CV CPX PEAK HEAR RATE: 101 BPM
OHS CV CPX PEAK RATE PRESSURE PRODUCT: 8181
OHS CV CPX PEAK SYSTOLIC BLOOD PRESSURE: 81 MMHG
OHS CV CPX PERCENT MAX PREDICTED HEART RATE ACHIEVED: 61
OHS CV CPX RATE PRESSURE PRODUCT PRESENTING: 7725
OPIATES UR QL SCN: NEGATIVE
OSMOLALITY UR: 315 MOSM/KG (ref 50–1200)
OVALOCYTES BLD QL SMEAR: ABNORMAL
PATH INTERP FLD-IMP: NORMAL
PATH INTERP FLD-IMP: NORMAL
PCO2 BLDA: 35.6 MMHG (ref 35–45)
PCO2 BLDA: 36.4 MMHG (ref 35–45)
PCO2 BLDA: 36.9 MMHG (ref 35–45)
PCO2 BLDA: 37.3 MMHG (ref 35–45)
PCO2 BLDA: 37.6 MMHG (ref 35–45)
PCO2 BLDA: 39.7 MMHG (ref 35–45)
PCO2 BLDA: 39.7 MMHG (ref 35–45)
PCO2 BLDA: 40.6 MMHG (ref 35–45)
PCO2 BLDA: 41.2 MMHG (ref 35–45)
PCO2 BLDA: 45.6 MMHG (ref 35–45)
PCO2 BLDA: 48 MMHG (ref 35–45)
PCO2 BLDA: 49.7 MMHG (ref 35–45)
PCP UR QL SCN>25 NG/ML: NEGATIVE
PEEP: 10
PEEP: 5
PEEP: 8
PH SMN: 7.2 [PH] (ref 7.35–7.45)
PH SMN: 7.22 [PH] (ref 7.35–7.45)
PH SMN: 7.23 [PH] (ref 7.35–7.45)
PH SMN: 7.25 [PH] (ref 7.35–7.45)
PH SMN: 7.26 [PH] (ref 7.35–7.45)
PH SMN: 7.29 [PH] (ref 7.35–7.45)
PH SMN: 7.31 [PH] (ref 7.35–7.45)
PH SMN: 7.39 [PH] (ref 7.35–7.45)
PH SMN: 7.4 [PH] (ref 7.35–7.45)
PH SMN: 7.46 [PH] (ref 7.35–7.45)
PH UR STRIP: 5 [PH] (ref 5–8)
PH UR STRIP: 5 [PH] (ref 5–8)
PH UR STRIP: 6 [PH] (ref 5–8)
PHOSPHATE SERPL-MCNC: 2.3 MG/DL (ref 2.7–4.5)
PHOSPHATE SERPL-MCNC: 2.4 MG/DL (ref 2.7–4.5)
PHOSPHATE SERPL-MCNC: 2.5 MG/DL (ref 2.7–4.5)
PHOSPHATE SERPL-MCNC: 2.5 MG/DL (ref 2.7–4.5)
PHOSPHATE SERPL-MCNC: 2.8 MG/DL (ref 2.7–4.5)
PHOSPHATE SERPL-MCNC: 3 MG/DL (ref 2.7–4.5)
PHOSPHATE SERPL-MCNC: 3.1 MG/DL (ref 2.7–4.5)
PHOSPHATE SERPL-MCNC: 3.1 MG/DL (ref 2.7–4.5)
PHOSPHATE SERPL-MCNC: 3.2 MG/DL (ref 2.7–4.5)
PHOSPHATE SERPL-MCNC: 3.2 MG/DL (ref 2.7–4.5)
PHOSPHATE SERPL-MCNC: 3.3 MG/DL (ref 2.7–4.5)
PHOSPHATE SERPL-MCNC: 3.9 MG/DL (ref 2.7–4.5)
PHOSPHATE SERPL-MCNC: 4.3 MG/DL (ref 2.7–4.5)
PHOSPHATE SERPL-MCNC: 4.3 MG/DL (ref 2.7–4.5)
PHOSPHATE SERPL-MCNC: 4.6 MG/DL (ref 2.7–4.5)
PHOSPHATE SERPL-MCNC: 4.6 MG/DL (ref 2.7–4.5)
PHOSPHATE SERPL-MCNC: 4.9 MG/DL (ref 2.7–4.5)
PHOSPHATE SERPL-MCNC: 5.1 MG/DL (ref 2.7–4.5)
PHOSPHATE SERPL-MCNC: 5.9 MG/DL (ref 2.7–4.5)
PHOSPHATE SERPL-MCNC: 7.1 MG/DL (ref 2.7–4.5)
PHOSPHATE SERPL-MCNC: 7.3 MG/DL (ref 2.7–4.5)
PHOSPHATE SERPL-MCNC: 8 MG/DL (ref 2.7–4.5)
PHOSPHATE SERPL-MCNC: 8.9 MG/DL (ref 2.7–4.5)
PIP: 30
PIP: 38
PIP: 40
PISA TR MAX VEL: 2.68 M/S
PISA TR MAX VEL: 2.77 M/S
PISA TR MAX VEL: 3.08 M/S
PLATELET # BLD AUTO: 104 K/UL (ref 150–350)
PLATELET # BLD AUTO: 105 K/UL (ref 150–350)
PLATELET # BLD AUTO: 107 K/UL (ref 150–350)
PLATELET # BLD AUTO: 108 K/UL (ref 150–350)
PLATELET # BLD AUTO: 114 K/UL (ref 150–350)
PLATELET # BLD AUTO: 117 K/UL (ref 150–350)
PLATELET # BLD AUTO: 119 K/UL (ref 150–350)
PLATELET # BLD AUTO: 122 K/UL (ref 150–350)
PLATELET # BLD AUTO: 124 K/UL (ref 150–350)
PLATELET # BLD AUTO: 134 K/UL (ref 150–350)
PLATELET # BLD AUTO: 150 K/UL (ref 150–350)
PLATELET # BLD AUTO: 163 K/UL (ref 150–350)
PLATELET # BLD AUTO: 171 K/UL (ref 150–350)
PLATELET # BLD AUTO: 176 K/UL (ref 150–350)
PLATELET # BLD AUTO: 234 K/UL (ref 150–350)
PLATELET # BLD AUTO: 33 K/UL (ref 150–350)
PLATELET # BLD AUTO: 37 K/UL (ref 150–350)
PLATELET # BLD AUTO: 40 K/UL (ref 150–350)
PLATELET # BLD AUTO: 40 K/UL (ref 150–350)
PLATELET # BLD AUTO: 42 K/UL (ref 150–350)
PLATELET # BLD AUTO: 43 K/UL (ref 150–350)
PLATELET # BLD AUTO: 48 K/UL (ref 150–350)
PLATELET # BLD AUTO: 52 K/UL (ref 150–350)
PLATELET # BLD AUTO: 60 K/UL (ref 150–350)
PLATELET # BLD AUTO: 61 K/UL (ref 150–350)
PLATELET # BLD AUTO: 61 K/UL (ref 150–350)
PLATELET # BLD AUTO: 62 K/UL (ref 150–350)
PLATELET # BLD AUTO: 63 K/UL (ref 150–350)
PLATELET # BLD AUTO: 80 K/UL (ref 150–350)
PLATELET BLD QL SMEAR: ABNORMAL
PMV BLD AUTO: 10.2 FL (ref 9.2–12.9)
PMV BLD AUTO: 10.4 FL (ref 9.2–12.9)
PMV BLD AUTO: 10.5 FL (ref 9.2–12.9)
PMV BLD AUTO: 10.6 FL (ref 9.2–12.9)
PMV BLD AUTO: 10.7 FL (ref 9.2–12.9)
PMV BLD AUTO: 10.7 FL (ref 9.2–12.9)
PMV BLD AUTO: 10.8 FL (ref 9.2–12.9)
PMV BLD AUTO: 11.1 FL (ref 9.2–12.9)
PMV BLD AUTO: 11.2 FL (ref 9.2–12.9)
PMV BLD AUTO: 11.3 FL (ref 9.2–12.9)
PMV BLD AUTO: 11.3 FL (ref 9.2–12.9)
PMV BLD AUTO: 11.4 FL (ref 9.2–12.9)
PMV BLD AUTO: 11.5 FL (ref 9.2–12.9)
PMV BLD AUTO: 11.5 FL (ref 9.2–12.9)
PMV BLD AUTO: 11.8 FL (ref 9.2–12.9)
PMV BLD AUTO: 12 FL (ref 9.2–12.9)
PMV BLD AUTO: 12.1 FL (ref 9.2–12.9)
PMV BLD AUTO: 12.1 FL (ref 9.2–12.9)
PMV BLD AUTO: 12.4 FL (ref 9.2–12.9)
PMV BLD AUTO: 12.5 FL (ref 9.2–12.9)
PMV BLD AUTO: 12.6 FL (ref 9.2–12.9)
PMV BLD AUTO: 12.9 FL (ref 9.2–12.9)
PMV BLD AUTO: 13 FL (ref 9.2–12.9)
PMV BLD AUTO: 13.1 FL (ref 9.2–12.9)
PMV BLD AUTO: 9.8 FL (ref 9.2–12.9)
PO2 BLDA: 103 MMHG (ref 80–100)
PO2 BLDA: 111 MMHG (ref 80–100)
PO2 BLDA: 206 MMHG (ref 80–100)
PO2 BLDA: 209 MMHG (ref 40–60)
PO2 BLDA: 237 MMHG (ref 80–100)
PO2 BLDA: 49 MMHG (ref 40–60)
PO2 BLDA: 69 MMHG (ref 80–100)
PO2 BLDA: 72 MMHG (ref 80–100)
PO2 BLDA: 76 MMHG (ref 80–100)
PO2 BLDA: 81 MMHG (ref 80–100)
PO2 BLDA: 81 MMHG (ref 80–100)
PO2 BLDA: 94 MMHG (ref 80–100)
POC BE: -1 MMOL/L
POC BE: -10 MMOL/L
POC BE: -11 MMOL/L
POC BE: -12 MMOL/L
POC BE: -2 MMOL/L
POC BE: -4 MMOL/L
POC BE: -7 MMOL/L
POC BE: -9 MMOL/L
POC BE: -9 MMOL/L
POC BE: 0 MMOL/L
POC BE: 1 MMOL/L
POC BE: 2 MMOL/L
POC IONIZED CALCIUM: 1 MMOL/L (ref 1.06–1.42)
POC SATURATED O2: 100 % (ref 95–100)
POC SATURATED O2: 78 % (ref 95–100)
POC SATURATED O2: 91 % (ref 95–100)
POC SATURATED O2: 91 % (ref 95–100)
POC SATURATED O2: 93 % (ref 95–100)
POC SATURATED O2: 95 % (ref 95–100)
POC SATURATED O2: 96 % (ref 95–100)
POC SATURATED O2: 96 % (ref 95–100)
POC SATURATED O2: 97 % (ref 95–100)
POC SATURATED O2: 98 % (ref 95–100)
POC TCO2: 16 MMOL/L (ref 23–27)
POC TCO2: 18 MMOL/L (ref 23–27)
POC TCO2: 19 MMOL/L (ref 23–27)
POC TCO2: 19 MMOL/L (ref 24–29)
POC TCO2: 20 MMOL/L (ref 23–27)
POC TCO2: 22 MMOL/L (ref 23–27)
POC TCO2: 23 MMOL/L (ref 24–29)
POC TCO2: 24 MMOL/L (ref 23–27)
POC TCO2: 26 MMOL/L (ref 23–27)
POC TCO2: 27 MMOL/L (ref 23–27)
POCT GLUCOSE: 101 MG/DL (ref 70–110)
POCT GLUCOSE: 104 MG/DL (ref 70–110)
POCT GLUCOSE: 106 MG/DL (ref 70–110)
POCT GLUCOSE: 107 MG/DL (ref 70–110)
POCT GLUCOSE: 111 MG/DL (ref 70–110)
POCT GLUCOSE: 113 MG/DL (ref 70–110)
POCT GLUCOSE: 117 MG/DL (ref 70–110)
POCT GLUCOSE: 121 MG/DL (ref 70–110)
POCT GLUCOSE: 123 MG/DL (ref 70–110)
POCT GLUCOSE: 132 MG/DL (ref 70–110)
POCT GLUCOSE: 135 MG/DL (ref 70–110)
POCT GLUCOSE: 148 MG/DL (ref 70–110)
POCT GLUCOSE: 156 MG/DL (ref 70–110)
POCT GLUCOSE: 157 MG/DL (ref 70–110)
POCT GLUCOSE: 202 MG/DL (ref 70–110)
POCT GLUCOSE: 51 MG/DL (ref 70–110)
POCT GLUCOSE: 56 MG/DL (ref 70–110)
POCT GLUCOSE: 64 MG/DL (ref 70–110)
POCT GLUCOSE: 74 MG/DL (ref 70–110)
POCT GLUCOSE: 75 MG/DL (ref 70–110)
POCT GLUCOSE: 75 MG/DL (ref 70–110)
POCT GLUCOSE: 76 MG/DL (ref 70–110)
POCT GLUCOSE: 77 MG/DL (ref 70–110)
POCT GLUCOSE: 78 MG/DL (ref 70–110)
POCT GLUCOSE: 80 MG/DL (ref 70–110)
POCT GLUCOSE: 85 MG/DL (ref 70–110)
POCT GLUCOSE: 86 MG/DL (ref 70–110)
POCT GLUCOSE: 93 MG/DL (ref 70–110)
POCT GLUCOSE: 96 MG/DL (ref 70–110)
POCT GLUCOSE: 97 MG/DL (ref 70–110)
POCT GLUCOSE: 98 MG/DL (ref 70–110)
POCT GLUCOSE: 99 MG/DL (ref 70–110)
POIKILOCYTOSIS BLD QL SMEAR: ABNORMAL
POIKILOCYTOSIS BLD QL SMEAR: SLIGHT
POLYCHROMASIA BLD QL SMEAR: ABNORMAL
POTASSIUM BLD-SCNC: 4.3 MMOL/L (ref 3.5–5.1)
POTASSIUM SERPL-SCNC: 3.2 MMOL/L (ref 3.5–5.1)
POTASSIUM SERPL-SCNC: 3.4 MMOL/L (ref 3.5–5.1)
POTASSIUM SERPL-SCNC: 3.5 MMOL/L (ref 3.5–5.1)
POTASSIUM SERPL-SCNC: 3.6 MMOL/L (ref 3.5–5.1)
POTASSIUM SERPL-SCNC: 3.7 MMOL/L (ref 3.5–5.1)
POTASSIUM SERPL-SCNC: 3.8 MMOL/L (ref 3.5–5.1)
POTASSIUM SERPL-SCNC: 3.9 MMOL/L (ref 3.5–5.1)
POTASSIUM SERPL-SCNC: 4 MMOL/L (ref 3.5–5.1)
POTASSIUM SERPL-SCNC: 4.1 MMOL/L (ref 3.5–5.1)
POTASSIUM SERPL-SCNC: 4.2 MMOL/L (ref 3.5–5.1)
POTASSIUM SERPL-SCNC: 4.3 MMOL/L (ref 3.5–5.1)
POTASSIUM SERPL-SCNC: 4.4 MMOL/L (ref 3.5–5.1)
POTASSIUM SERPL-SCNC: 4.5 MMOL/L (ref 3.5–5.1)
POTASSIUM SERPL-SCNC: 4.6 MMOL/L (ref 3.5–5.1)
POTASSIUM SERPL-SCNC: 4.6 MMOL/L (ref 3.5–5.1)
POTASSIUM SERPL-SCNC: 4.7 MMOL/L (ref 3.5–5.1)
POTASSIUM SERPL-SCNC: 4.8 MMOL/L (ref 3.5–5.1)
POTASSIUM SERPL-SCNC: 4.8 MMOL/L (ref 3.5–5.1)
POTASSIUM UR-SCNC: 53 MMOL/L (ref 15–95)
PROCALCITONIN SERPL IA-MCNC: 0.1 NG/ML
PROMYELOCYTES NFR BLD MANUAL: 0.5 %
PROMYELOCYTES NFR BLD MANUAL: 1 %
PROMYELOCYTES NFR BLD MANUAL: 2 %
PROT FLD-MCNC: <1 G/DL
PROT SERPL-MCNC: 4.2 G/DL (ref 6–8.4)
PROT SERPL-MCNC: 4.2 G/DL (ref 6–8.4)
PROT SERPL-MCNC: 4.4 G/DL (ref 6–8.4)
PROT SERPL-MCNC: 4.4 G/DL (ref 6–8.4)
PROT SERPL-MCNC: 4.5 G/DL (ref 6–8.4)
PROT SERPL-MCNC: 4.6 G/DL (ref 6–8.4)
PROT SERPL-MCNC: 4.9 G/DL (ref 6–8.4)
PROT SERPL-MCNC: 5 G/DL (ref 6–8.4)
PROT SERPL-MCNC: 5 G/DL (ref 6–8.4)
PROT SERPL-MCNC: 5.4 G/DL (ref 6–8.4)
PROT SERPL-MCNC: 5.6 G/DL (ref 6–8.4)
PROT SERPL-MCNC: 5.7 G/DL (ref 6–8.4)
PROT SERPL-MCNC: 5.8 G/DL (ref 6–8.4)
PROT SERPL-MCNC: 5.9 G/DL (ref 6–8.4)
PROT SERPL-MCNC: 5.9 G/DL (ref 6–8.4)
PROT SERPL-MCNC: 6.2 G/DL (ref 6–8.4)
PROT SERPL-MCNC: 6.2 G/DL (ref 6–8.4)
PROT SERPL-MCNC: 6.3 G/DL (ref 6–8.4)
PROT SERPL-MCNC: 6.3 G/DL (ref 6–8.4)
PROT SERPL-MCNC: 6.5 G/DL (ref 6–8.4)
PROT SERPL-MCNC: 6.5 G/DL (ref 6–8.4)
PROT SERPL-MCNC: 6.6 G/DL (ref 6–8.4)
PROT SERPL-MCNC: 6.7 G/DL (ref 6–8.4)
PROT SERPL-MCNC: 6.9 G/DL (ref 6–8.4)
PROT UR QL STRIP: ABNORMAL
PROT UR QL STRIP: NEGATIVE
PROT UR QL STRIP: NEGATIVE
PROTHROMBIN TIME: 14.9 SEC (ref 9–12.5)
PROTHROMBIN TIME: 15.1 SEC (ref 9–12.5)
PROTHROMBIN TIME: 15.9 SEC (ref 9–12.5)
PROTHROMBIN TIME: 16.7 SEC (ref 9–12.5)
PROTHROMBIN TIME: 17.7 SEC (ref 9–12.5)
PROTHROMBIN TIME: 19.8 SEC (ref 9–12.5)
PROTHROMBIN TIME: 23.6 SEC (ref 9–12.5)
PROTHROMBIN TIME: 25.2 SEC (ref 9–12.5)
PROTHROMBIN TIME: 25.6 SEC (ref 9–12.5)
PROTHROMBIN TIME: 26 SEC (ref 9–12.5)
PROTHROMBIN TIME: 26.6 SEC (ref 9–12.5)
PROTHROMBIN TIME: 27.8 SEC (ref 9–12.5)
PROTHROMBIN TIME: 27.8 SEC (ref 9–12.5)
PROTHROMBIN TIME: 27.9 SEC (ref 9–12.5)
PROTHROMBIN TIME: 28.7 SEC (ref 9–12.5)
PROTHROMBIN TIME: 28.7 SEC (ref 9–12.5)
PROTHROMBIN TIME: 29.3 SEC (ref 9–12.5)
PROTHROMBIN TIME: 31 SEC (ref 9–12.5)
PROTHROMBIN TIME: 31 SEC (ref 9–12.5)
PROTHROMBIN TIME: 31.5 SEC (ref 9–12.5)
PROTHROMBIN TIME: 31.9 SEC (ref 9–12.5)
PROTHROMBIN TIME: 33.9 SEC (ref 9–12.5)
PROTHROMBIN TIME: 35.5 SEC (ref 9–12.5)
PROTHROMBIN TIME: 36.8 SEC (ref 9–12.5)
PROTHROMBIN TIME: 38 SEC (ref 9–12.5)
PROTHROMBIN TIME: 40.9 SEC (ref 9–12.5)
PULM VEIN S/D RATIO: 1.65
PV MEAN GRADIENT: 6.4 MMHG
PV PEAK D VEL: 0.54 M/S
PV PEAK S VEL: 0.89 M/S
RA MAJOR: 3.25 CM
RA MAJOR: 3.43 CM
RA MAJOR: 4.48 CM
RA PRESSURE: 3 MMHG
RA WIDTH: 3.29 CM
RA WIDTH: 3.6 CM
RBC # BLD AUTO: 1.73 M/UL (ref 4–5.4)
RBC # BLD AUTO: 2.08 M/UL (ref 4–5.4)
RBC # BLD AUTO: 2.08 M/UL (ref 4–5.4)
RBC # BLD AUTO: 2.15 M/UL (ref 4–5.4)
RBC # BLD AUTO: 2.16 M/UL (ref 4–5.4)
RBC # BLD AUTO: 2.25 M/UL (ref 4–5.4)
RBC # BLD AUTO: 2.29 M/UL (ref 4–5.4)
RBC # BLD AUTO: 2.37 M/UL (ref 4–5.4)
RBC # BLD AUTO: 2.39 M/UL (ref 4–5.4)
RBC # BLD AUTO: 2.46 M/UL (ref 4–5.4)
RBC # BLD AUTO: 2.49 M/UL (ref 4–5.4)
RBC # BLD AUTO: 2.53 M/UL (ref 4–5.4)
RBC # BLD AUTO: 2.56 M/UL (ref 4–5.4)
RBC # BLD AUTO: 2.76 M/UL (ref 4–5.4)
RBC # BLD AUTO: 2.89 M/UL (ref 4–5.4)
RBC # BLD AUTO: 2.98 M/UL (ref 4–5.4)
RBC # BLD AUTO: 2.99 M/UL (ref 4–5.4)
RBC # BLD AUTO: 3.08 M/UL (ref 4–5.4)
RBC # BLD AUTO: 3.1 M/UL (ref 4–5.4)
RBC # BLD AUTO: 3.22 M/UL (ref 4–5.4)
RBC # BLD AUTO: 3.37 M/UL (ref 4–5.4)
RBC # BLD AUTO: 3.44 M/UL (ref 4–5.4)
RBC # BLD AUTO: 3.44 M/UL (ref 4–5.4)
RBC # BLD AUTO: 3.6 M/UL (ref 4–5.4)
RBC # BLD AUTO: 3.74 M/UL (ref 4–5.4)
RBC # BLD AUTO: 3.74 M/UL (ref 4–5.4)
RBC # BLD AUTO: 3.77 M/UL (ref 4–5.4)
RBC # BLD AUTO: 3.84 M/UL (ref 4–5.4)
RBC # BLD AUTO: 3.91 M/UL (ref 4–5.4)
RBC #/AREA URNS AUTO: 0 /HPF (ref 0–4)
RBC #/AREA URNS AUTO: 15 /HPF (ref 0–4)
RBC #/AREA URNS AUTO: 24 /HPF (ref 0–4)
RIGHT VENTRICULAR END-DIASTOLIC DIMENSION: 2.7 CM
RIGHT VENTRICULAR END-DIASTOLIC DIMENSION: 2.96 CM
RV TISSUE DOPPLER FREE WALL SYSTOLIC VELOCITY 1 (APICAL 4 CHAMBER VIEW): 20.61 CM/S
SAMPLE: ABNORMAL
SARS-COV-2 RDRP RESP QL NAA+PROBE: NEGATIVE
SARS-COV-2 RNA RESP QL NAA+PROBE: NOT DETECTED
SCHISTOCYTES BLD QL SMEAR: ABNORMAL
SINUS: 2.58 CM
SINUS: 2.76 CM
SINUS: 2.89 CM
SITE: ABNORMAL
SMUDGE CELLS BLD QL SMEAR: PRESENT
SODIUM BLD-SCNC: 134 MMOL/L (ref 136–145)
SODIUM SERPL-SCNC: 122 MMOL/L (ref 136–145)
SODIUM SERPL-SCNC: 122 MMOL/L (ref 136–145)
SODIUM SERPL-SCNC: 123 MMOL/L (ref 136–145)
SODIUM SERPL-SCNC: 123 MMOL/L (ref 136–145)
SODIUM SERPL-SCNC: 124 MMOL/L (ref 136–145)
SODIUM SERPL-SCNC: 124 MMOL/L (ref 136–145)
SODIUM SERPL-SCNC: 125 MMOL/L (ref 136–145)
SODIUM SERPL-SCNC: 126 MMOL/L (ref 136–145)
SODIUM SERPL-SCNC: 126 MMOL/L (ref 136–145)
SODIUM SERPL-SCNC: 127 MMOL/L (ref 136–145)
SODIUM SERPL-SCNC: 128 MMOL/L (ref 136–145)
SODIUM SERPL-SCNC: 129 MMOL/L (ref 136–145)
SODIUM SERPL-SCNC: 130 MMOL/L (ref 136–145)
SODIUM SERPL-SCNC: 131 MMOL/L (ref 136–145)
SODIUM SERPL-SCNC: 131 MMOL/L (ref 136–145)
SODIUM SERPL-SCNC: 132 MMOL/L (ref 136–145)
SODIUM SERPL-SCNC: 133 MMOL/L (ref 136–145)
SODIUM SERPL-SCNC: 135 MMOL/L (ref 136–145)
SODIUM SERPL-SCNC: 135 MMOL/L (ref 136–145)
SODIUM UR-SCNC: <20 MMOL/L (ref 20–250)
SP GR UR STRIP: 1.01 (ref 1–1.03)
SP GR UR STRIP: 1.02 (ref 1–1.03)
SP GR UR STRIP: 1.02 (ref 1–1.03)
SP02: 89
SP02: 90
SP02: 94
SP02: 96
SP02: 96
SP02: 97
SP02: 97
SP02: 98
SPECIMEN SOURCE: NORMAL
SPHEROCYTES BLD QL SMEAR: ABNORMAL
SQUAMOUS #/AREA URNS AUTO: 1 /HPF
SQUAMOUS #/AREA URNS AUTO: 6 /HPF
SQUAMOUS #/AREA URNS AUTO: 9 /HPF
STJ: 2.32 CM
STJ: 2.49 CM
STJ: 2.78 CM
SYSTOLIC BLOOD PRESSURE: 103 MMHG
TARGETS BLD QL SMEAR: ABNORMAL
TB GOLD PLUS: ABNORMAL
TB2 - NIL: 0 IU/ML
TDI LATERAL: 0.12 M/S
TDI LATERAL: 0.12 M/S
TDI SEPTAL: 0.07 M/S
TDI SEPTAL: 0.09 M/S
TDI: 0.1 M/S
TDI: 0.11 M/S
TOBRAMYCIN PEAK SERPL-MCNC: 7.3 UG/ML (ref 5–30)
TOBRAMYCIN SERPL-MCNC: 1.9 UG/ML
TOXIC GRANULES BLD QL SMEAR: PRESENT
TOXICOLOGY INFORMATION: NORMAL
TR MAX PG: 29 MMHG
TR MAX PG: 31 MMHG
TR MAX PG: 38 MMHG
TRANS ERYTHROCYTES VOL PATIENT: NORMAL ML
TRANS PLATPHERESIS VOL PATIENT: NORMAL ML
TRICUSPID ANNULAR PLANE SYSTOLIC EXCURSION: 2.13 CM
TRICUSPID ANNULAR PLANE SYSTOLIC EXCURSION: 2.66 CM
TROPONIN I SERPL DL<=0.01 NG/ML-MCNC: 0.01 NG/ML (ref 0–0.03)
TROPONIN I SERPL DL<=0.01 NG/ML-MCNC: 0.03 NG/ML (ref 0–0.03)
TROPONIN I SERPL DL<=0.01 NG/ML-MCNC: <0.006 NG/ML (ref 0–0.03)
TV REST PULMONARY ARTERY PRESSURE: 32 MMHG
TV REST PULMONARY ARTERY PRESSURE: 34 MMHG
TV REST PULMONARY ARTERY PRESSURE: 41 MMHG
UNIT NUMBER: NORMAL
URN SPEC COLLECT METH UR: ABNORMAL
VANCOMYCIN SERPL-MCNC: 12.7 UG/ML
VANCOMYCIN SERPL-MCNC: 14.7 UG/ML
VANCOMYCIN SERPL-MCNC: 15.7 UG/ML
VANCOMYCIN SERPL-MCNC: 16.1 UG/ML
VANCOMYCIN SERPL-MCNC: 16.3 UG/ML
VANCOMYCIN SERPL-MCNC: 18.5 UG/ML
VANCOMYCIN SERPL-MCNC: 25 UG/ML
VANCOMYCIN SERPL-MCNC: 25.6 UG/ML
VT: 400
VT: 430
WBC # BLD AUTO: 12.24 K/UL (ref 3.9–12.7)
WBC # BLD AUTO: 17.19 K/UL (ref 3.9–12.7)
WBC # BLD AUTO: 17.29 K/UL (ref 3.9–12.7)
WBC # BLD AUTO: 19.03 K/UL (ref 3.9–12.7)
WBC # BLD AUTO: 22.09 K/UL (ref 3.9–12.7)
WBC # BLD AUTO: 25.11 K/UL (ref 3.9–12.7)
WBC # BLD AUTO: 26.35 K/UL (ref 3.9–12.7)
WBC # BLD AUTO: 27.22 K/UL (ref 3.9–12.7)
WBC # BLD AUTO: 28.85 K/UL (ref 3.9–12.7)
WBC # BLD AUTO: 29.05 K/UL (ref 3.9–12.7)
WBC # BLD AUTO: 29.37 K/UL (ref 3.9–12.7)
WBC # BLD AUTO: 31.47 K/UL (ref 3.9–12.7)
WBC # BLD AUTO: 32.92 K/UL (ref 3.9–12.7)
WBC # BLD AUTO: 33.01 K/UL (ref 3.9–12.7)
WBC # BLD AUTO: 33.14 K/UL (ref 3.9–12.7)
WBC # BLD AUTO: 33.18 K/UL (ref 3.9–12.7)
WBC # BLD AUTO: 33.94 K/UL (ref 3.9–12.7)
WBC # BLD AUTO: 34.44 K/UL (ref 3.9–12.7)
WBC # BLD AUTO: 36.14 K/UL (ref 3.9–12.7)
WBC # BLD AUTO: 36.18 K/UL (ref 3.9–12.7)
WBC # BLD AUTO: 36.84 K/UL (ref 3.9–12.7)
WBC # BLD AUTO: 37.58 K/UL (ref 3.9–12.7)
WBC # BLD AUTO: 37.81 K/UL (ref 3.9–12.7)
WBC # BLD AUTO: 4.71 K/UL (ref 3.9–12.7)
WBC # BLD AUTO: 4.99 K/UL (ref 3.9–12.7)
WBC # BLD AUTO: 5.16 K/UL (ref 3.9–12.7)
WBC # BLD AUTO: 5.6 K/UL (ref 3.9–12.7)
WBC # BLD AUTO: 5.91 K/UL (ref 3.9–12.7)
WBC # BLD AUTO: 5.96 K/UL (ref 3.9–12.7)
WBC # BLD AUTO: 6.25 K/UL (ref 3.9–12.7)
WBC # BLD AUTO: 7.56 K/UL (ref 3.9–12.7)
WBC # FLD: 105 /CU MM
WBC # FLD: 2355 /CU MM
WBC # FLD: 2575 /CU MM
WBC # FLD: 268 /CU MM
WBC # FLD: 378 /CU MM
WBC # FLD: 860 /CU MM
WBC #/AREA URNS AUTO: 0 /HPF (ref 0–5)
WBC #/AREA URNS AUTO: 3 /HPF (ref 0–5)
WBC #/AREA URNS AUTO: 49 /HPF (ref 0–5)
WBC CLUMPS UR QL AUTO: ABNORMAL
WBC TOXIC VACUOLES BLD QL SMEAR: PRESENT
ZINC SERPL-MCNC: 43 UG/DL (ref 60–130)

## 2020-01-01 PROCEDURE — 36415 COLL VENOUS BLD VENIPUNCTURE: CPT | Mod: NTX

## 2020-01-01 PROCEDURE — 94660 CPAP INITIATION&MGMT: CPT | Mod: NTX

## 2020-01-01 PROCEDURE — 63600175 PHARM REV CODE 636 W HCPCS: Mod: NTX | Performed by: INTERNAL MEDICINE

## 2020-01-01 PROCEDURE — 99292 CRITICAL CARE ADDL 30 MIN: CPT | Mod: NTX,,, | Performed by: INTERNAL MEDICINE

## 2020-01-01 PROCEDURE — 25000003 PHARM REV CODE 250: Mod: NTX | Performed by: NURSE PRACTITIONER

## 2020-01-01 PROCEDURE — 82945 GLUCOSE OTHER FLUID: CPT | Mod: NTX

## 2020-01-01 PROCEDURE — 93325 DOPPLER ECHO COLOR FLOW MAPG: CPT | Mod: TXP

## 2020-01-01 PROCEDURE — 93325 DOPPLER ECHO COLOR FLOW MAPG: CPT | Mod: 26,TXP,, | Performed by: INTERNAL MEDICINE

## 2020-01-01 PROCEDURE — 83690 ASSAY OF LIPASE: CPT | Mod: NTX

## 2020-01-01 PROCEDURE — 99233 PR SUBSEQUENT HOSPITAL CARE,LEVL III: ICD-10-PCS | Mod: NTX,,, | Performed by: INTERNAL MEDICINE

## 2020-01-01 PROCEDURE — 25000003 PHARM REV CODE 250: Mod: NTX | Performed by: INTERNAL MEDICINE

## 2020-01-01 PROCEDURE — 89051 BODY FLUID CELL COUNT: CPT | Mod: 91,NTX

## 2020-01-01 PROCEDURE — 86850 RBC ANTIBODY SCREEN: CPT | Mod: NTX

## 2020-01-01 PROCEDURE — 80100008 HC CRRT DAILY MAINTENANCE: Mod: NTX

## 2020-01-01 PROCEDURE — 94760 N-INVAS EAR/PLS OXIMETRY 1: CPT | Mod: NTX

## 2020-01-01 PROCEDURE — 82150 ASSAY OF AMYLASE: CPT | Mod: NTX

## 2020-01-01 PROCEDURE — 85025 COMPLETE CBC W/AUTO DIFF WBC: CPT

## 2020-01-01 PROCEDURE — 83605 ASSAY OF LACTIC ACID: CPT | Mod: NTX

## 2020-01-01 PROCEDURE — 99900026 HC AIRWAY MAINTENANCE (STAT): Mod: NTX

## 2020-01-01 PROCEDURE — 87449 NOS EACH ORGANISM AG IA: CPT | Mod: 91,NTX

## 2020-01-01 PROCEDURE — 3008F BODY MASS INDEX DOCD: CPT | Mod: CPTII,S$GLB,, | Performed by: NURSE PRACTITIONER

## 2020-01-01 PROCEDURE — 99233 SBSQ HOSP IP/OBS HIGH 50: CPT | Mod: NTX,,, | Performed by: INTERNAL MEDICINE

## 2020-01-01 PROCEDURE — 85730 THROMBOPLASTIN TIME PARTIAL: CPT | Mod: NTX

## 2020-01-01 PROCEDURE — 93010 ELECTROCARDIOGRAM REPORT: CPT | Mod: NTX,,, | Performed by: INTERNAL MEDICINE

## 2020-01-01 PROCEDURE — C1751 CATH, INF, PER/CENT/MIDLINE: HCPCS | Mod: NTX

## 2020-01-01 PROCEDURE — 99233 SBSQ HOSP IP/OBS HIGH 50: CPT | Mod: NTX,,, | Performed by: STUDENT IN AN ORGANIZED HEALTH CARE EDUCATION/TRAINING PROGRAM

## 2020-01-01 PROCEDURE — 86403 PARTICLE AGGLUT ANTBDY SCRN: CPT | Mod: NTX

## 2020-01-01 PROCEDURE — 85025 COMPLETE CBC W/AUTO DIFF WBC: CPT | Mod: NTX

## 2020-01-01 PROCEDURE — 85610 PROTHROMBIN TIME: CPT | Mod: 91,NTX

## 2020-01-01 PROCEDURE — 63600175 PHARM REV CODE 636 W HCPCS: Mod: NTX | Performed by: PHYSICIAN ASSISTANT

## 2020-01-01 PROCEDURE — 36415 COLL VENOUS BLD VENIPUNCTURE: CPT

## 2020-01-01 PROCEDURE — 83735 ASSAY OF MAGNESIUM: CPT | Mod: NTX

## 2020-01-01 PROCEDURE — 84484 ASSAY OF TROPONIN QUANT: CPT | Mod: NTX

## 2020-01-01 PROCEDURE — 63600175 PHARM REV CODE 636 W HCPCS: Mod: NTX | Performed by: NURSE PRACTITIONER

## 2020-01-01 PROCEDURE — 85007 BL SMEAR W/DIFF WBC COUNT: CPT | Mod: 91,NTX

## 2020-01-01 PROCEDURE — 63600175 PHARM REV CODE 636 W HCPCS: Mod: NTX | Performed by: RADIOLOGY

## 2020-01-01 PROCEDURE — 83615 LACTATE (LD) (LDH) ENZYME: CPT | Mod: 91,NTX

## 2020-01-01 PROCEDURE — 71046 XR CHEST PA AND LATERAL: ICD-10-PCS | Mod: 26,NTX,, | Performed by: RADIOLOGY

## 2020-01-01 PROCEDURE — 84157 ASSAY OF PROTEIN OTHER: CPT | Mod: NTX

## 2020-01-01 PROCEDURE — 37799 UNLISTED PX VASCULAR SURGERY: CPT | Mod: NTX

## 2020-01-01 PROCEDURE — 88307 PR  SURG PATH,LEVEL V: ICD-10-PCS | Mod: 26,NTX,, | Performed by: PATHOLOGY

## 2020-01-01 PROCEDURE — 99233 PR SUBSEQUENT HOSPITAL CARE,LEVL III: ICD-10-PCS | Mod: NTX,,, | Performed by: PHYSICIAN ASSISTANT

## 2020-01-01 PROCEDURE — 99900035 HC TECH TIME PER 15 MIN (STAT): Mod: NTX

## 2020-01-01 PROCEDURE — 84100 ASSAY OF PHOSPHORUS: CPT | Mod: NTX

## 2020-01-01 PROCEDURE — 80053 COMPREHEN METABOLIC PANEL: CPT

## 2020-01-01 PROCEDURE — 87116 MYCOBACTERIA CULTURE: CPT | Mod: NTX

## 2020-01-01 PROCEDURE — 27000221 HC OXYGEN, UP TO 24 HOURS: Mod: NTX

## 2020-01-01 PROCEDURE — 94761 N-INVAS EAR/PLS OXIMETRY MLT: CPT | Mod: NTX

## 2020-01-01 PROCEDURE — P9017 PLASMA 1 DONOR FRZ W/IN 8 HR: HCPCS | Mod: NTX

## 2020-01-01 PROCEDURE — 99291 CRITICAL CARE FIRST HOUR: CPT | Mod: 25,NTX,, | Performed by: NURSE PRACTITIONER

## 2020-01-01 PROCEDURE — 94003 VENT MGMT INPAT SUBQ DAY: CPT | Mod: NTX

## 2020-01-01 PROCEDURE — 84155 ASSAY OF PROTEIN SERUM: CPT | Mod: NTX

## 2020-01-01 PROCEDURE — 88313 PR  SPECIAL STAINS,GROUP II: ICD-10-PCS | Mod: 26,NTX,, | Performed by: PATHOLOGY

## 2020-01-01 PROCEDURE — 99215 OFFICE O/P EST HI 40 MIN: CPT | Mod: NTX,S$GLB,, | Performed by: INTERNAL MEDICINE

## 2020-01-01 PROCEDURE — P9047 ALBUMIN (HUMAN), 25%, 50ML: HCPCS | Mod: JG,NTX | Performed by: PHYSICIAN ASSISTANT

## 2020-01-01 PROCEDURE — 83615 LACTATE (LD) (LDH) ENZYME: CPT | Mod: NTX

## 2020-01-01 PROCEDURE — 85384 FIBRINOGEN ACTIVITY: CPT | Mod: NTX

## 2020-01-01 PROCEDURE — 86965 POOLING BLOOD PLATELETS: CPT | Mod: NTX

## 2020-01-01 PROCEDURE — 63600175 PHARM REV CODE 636 W HCPCS: Mod: JG,NTX | Performed by: PHYSICIAN ASSISTANT

## 2020-01-01 PROCEDURE — 43235 EGD DIAGNOSTIC BRUSH WASH: CPT | Performed by: INTERNAL MEDICINE

## 2020-01-01 PROCEDURE — 85027 COMPLETE CBC AUTOMATED: CPT | Mod: NTX

## 2020-01-01 PROCEDURE — P9022 WASHED RED BLOOD CELLS UNIT: HCPCS | Mod: NTX

## 2020-01-01 PROCEDURE — 87206 SMEAR FLUORESCENT/ACID STAI: CPT | Mod: NTX

## 2020-01-01 PROCEDURE — 85335 FACTOR INHIBITOR TEST: CPT | Mod: NTX

## 2020-01-01 PROCEDURE — 80069 RENAL FUNCTION PANEL: CPT | Mod: NTX

## 2020-01-01 PROCEDURE — 25000003 PHARM REV CODE 250: Mod: NTX | Performed by: STUDENT IN AN ORGANIZED HEALTH CARE EDUCATION/TRAINING PROGRAM

## 2020-01-01 PROCEDURE — 85610 PROTHROMBIN TIME: CPT | Mod: NTX

## 2020-01-01 PROCEDURE — 82803 BLOOD GASES ANY COMBINATION: CPT | Mod: NTX

## 2020-01-01 PROCEDURE — 80069 RENAL FUNCTION PANEL: CPT | Mod: 91,NTX

## 2020-01-01 PROCEDURE — 99223 PR INITIAL HOSPITAL CARE,LEVL III: ICD-10-PCS | Mod: NTX,,, | Performed by: INTERNAL MEDICINE

## 2020-01-01 PROCEDURE — 63600175 PHARM REV CODE 636 W HCPCS: Mod: NTX | Performed by: EMERGENCY MEDICINE

## 2020-01-01 PROCEDURE — 88313 SPECIAL STAINS GROUP 2: CPT | Mod: 26,NTX,, | Performed by: PATHOLOGY

## 2020-01-01 PROCEDURE — 93975 VASCULAR STUDY: CPT | Mod: TC,TXP

## 2020-01-01 PROCEDURE — 99214 PR OFFICE/OUTPT VISIT, EST, LEVL IV, 30-39 MIN: ICD-10-PCS | Mod: S$GLB,,, | Performed by: NURSE PRACTITIONER

## 2020-01-01 PROCEDURE — 89051 BODY FLUID CELL COUNT: CPT | Mod: NTX

## 2020-01-01 PROCEDURE — 86901 BLOOD TYPING SEROLOGIC RH(D): CPT | Mod: NTX

## 2020-01-01 PROCEDURE — 20000000 HC ICU ROOM: Mod: NTX

## 2020-01-01 PROCEDURE — P9012 CRYOPRECIPITATE EACH UNIT: HCPCS | Mod: NTX

## 2020-01-01 PROCEDURE — 82140 ASSAY OF AMMONIA: CPT | Mod: NTX

## 2020-01-01 PROCEDURE — 99203 PR OFFICE/OUTPT VISIT, NEW, LEVL III, 30-44 MIN: ICD-10-PCS | Mod: 95,NTX,, | Performed by: FAMILY MEDICINE

## 2020-01-01 PROCEDURE — 99203 OFFICE O/P NEW LOW 30 MIN: CPT | Mod: 95,NTX,, | Performed by: FAMILY MEDICINE

## 2020-01-01 PROCEDURE — 83010 ASSAY OF HAPTOGLOBIN QUANT: CPT | Mod: NTX

## 2020-01-01 PROCEDURE — 84295 ASSAY OF SERUM SODIUM: CPT | Mod: NTX

## 2020-01-01 PROCEDURE — 27100171 HC OXYGEN HIGH FLOW UP TO 24 HOURS: Mod: NTX

## 2020-01-01 PROCEDURE — G0378 HOSPITAL OBSERVATION PER HR: HCPCS | Mod: NTX

## 2020-01-01 PROCEDURE — 90945 DIALYSIS ONE EVALUATION: CPT | Mod: NTX

## 2020-01-01 PROCEDURE — 99292 PR CRITICAL CARE, ADDL 30 MIN: ICD-10-PCS | Mod: NTX,,, | Performed by: INTERNAL MEDICINE

## 2020-01-01 PROCEDURE — 3008F PR BODY MASS INDEX (BMI) DOCUMENTED: ICD-10-PCS | Mod: CPTII,S$GLB,, | Performed by: NURSE PRACTITIONER

## 2020-01-01 PROCEDURE — 97803 MED NUTRITION INDIV SUBSEQ: CPT | Mod: NTX

## 2020-01-01 PROCEDURE — 85025 COMPLETE CBC W/AUTO DIFF WBC: CPT | Mod: 91,NTX

## 2020-01-01 PROCEDURE — 83735 ASSAY OF MAGNESIUM: CPT | Mod: 91,NTX

## 2020-01-01 PROCEDURE — 80200 ASSAY OF TOBRAMYCIN: CPT | Mod: NTX

## 2020-01-01 PROCEDURE — 99205 OFFICE O/P NEW HI 60 MIN: CPT | Mod: S$GLB,,, | Performed by: INTERNAL MEDICINE

## 2020-01-01 PROCEDURE — 93010 EKG 12-LEAD: ICD-10-PCS | Mod: NTX,,, | Performed by: INTERNAL MEDICINE

## 2020-01-01 PROCEDURE — 85610 PROTHROMBIN TIME: CPT

## 2020-01-01 PROCEDURE — 99205 PR OFFICE/OUTPT VISIT, NEW, LEVL V, 60-74 MIN: ICD-10-PCS | Mod: S$GLB,,, | Performed by: INTERNAL MEDICINE

## 2020-01-01 PROCEDURE — 81001 URINALYSIS AUTO W/SCOPE: CPT | Mod: TXP,59

## 2020-01-01 PROCEDURE — U0002 COVID-19 LAB TEST NON-CDC: HCPCS | Mod: NTX

## 2020-01-01 PROCEDURE — 76700 US EXAM ABDOM COMPLETE: CPT | Mod: 26,XS,TXP, | Performed by: RADIOLOGY

## 2020-01-01 PROCEDURE — C9113 INJ PANTOPRAZOLE SODIUM, VIA: HCPCS | Mod: NTX | Performed by: NURSE PRACTITIONER

## 2020-01-01 PROCEDURE — 25000003 PHARM REV CODE 250: Mod: NTX | Performed by: EMERGENCY MEDICINE

## 2020-01-01 PROCEDURE — 37000008 HC ANESTHESIA 1ST 15 MINUTES: Performed by: INTERNAL MEDICINE

## 2020-01-01 PROCEDURE — 77080 DXA BONE DENSITY AXIAL: CPT | Mod: 26,TXP,, | Performed by: INTERNAL MEDICINE

## 2020-01-01 PROCEDURE — 99999 PR PBB SHADOW E&M-EST. PATIENT-LVL III: CPT | Mod: PBBFAC,,, | Performed by: INTERNAL MEDICINE

## 2020-01-01 PROCEDURE — 31622 PR BRONCHOSCOPY,DIAGNOSTIC: ICD-10-PCS | Mod: 51,NTX,, | Performed by: INTERNAL MEDICINE

## 2020-01-01 PROCEDURE — 71046 X-RAY EXAM CHEST 2 VIEWS: CPT | Mod: TC,FY,TXP

## 2020-01-01 PROCEDURE — 99232 SBSQ HOSP IP/OBS MODERATE 35: CPT | Mod: NTX,,, | Performed by: INTERNAL MEDICINE

## 2020-01-01 PROCEDURE — 83880 ASSAY OF NATRIURETIC PEPTIDE: CPT | Mod: NTX

## 2020-01-01 PROCEDURE — 85014 HEMATOCRIT: CPT | Mod: NTX

## 2020-01-01 PROCEDURE — 99999 PR PBB SHADOW E&M-EST. PATIENT-LVL III: CPT | Mod: PBBFAC,TXP,, | Performed by: TRANSPLANT SURGERY

## 2020-01-01 PROCEDURE — 25000003 PHARM REV CODE 250: Mod: NTX | Performed by: PHYSICIAN ASSISTANT

## 2020-01-01 PROCEDURE — 99999 PR PBB SHADOW E&M-EST. PATIENT-LVL III: ICD-10-PCS | Mod: PBBFAC,,, | Performed by: NURSE PRACTITIONER

## 2020-01-01 PROCEDURE — 63600175 PHARM REV CODE 636 W HCPCS: Mod: NTX

## 2020-01-01 PROCEDURE — 87205 SMEAR GRAM STAIN: CPT | Mod: 59,NTX

## 2020-01-01 PROCEDURE — 80053 COMPREHEN METABOLIC PANEL: CPT | Mod: NTX

## 2020-01-01 PROCEDURE — 86901 BLOOD TYPING SEROLOGIC RH(D): CPT | Mod: TXP

## 2020-01-01 PROCEDURE — 77080 DEXA BONE DENSITY SPINE HIP: ICD-10-PCS | Mod: 26,TXP,, | Performed by: INTERNAL MEDICINE

## 2020-01-01 PROCEDURE — 88112 CYTOPATH CELL ENHANCE TECH: CPT | Mod: 26,NTX,, | Performed by: PATHOLOGY

## 2020-01-01 PROCEDURE — 99232 PR SUBSEQUENT HOSPITAL CARE,LEVL II: ICD-10-PCS | Mod: NTX,,, | Performed by: INTERNAL MEDICINE

## 2020-01-01 PROCEDURE — 87205 SMEAR GRAM STAIN: CPT | Mod: NTX

## 2020-01-01 PROCEDURE — 77080 DXA BONE DENSITY AXIAL: CPT | Mod: TC,TXP

## 2020-01-01 PROCEDURE — 87070 CULTURE OTHR SPECIMN AEROBIC: CPT | Mod: NTX

## 2020-01-01 PROCEDURE — 88112 PR  CYTOPATH, CELL ENHANCE TECH: ICD-10-PCS | Mod: 26,NTX,, | Performed by: PATHOLOGY

## 2020-01-01 PROCEDURE — 3008F PR BODY MASS INDEX (BMI) DOCUMENTED: ICD-10-PCS | Mod: CPTII,NTX,S$GLB, | Performed by: INTERNAL MEDICINE

## 2020-01-01 PROCEDURE — 63600175 PHARM REV CODE 636 W HCPCS: Mod: JG,NTX | Performed by: INTERNAL MEDICINE

## 2020-01-01 PROCEDURE — 88342 IMHCHEM/IMCYTCHM 1ST ANTB: CPT | Mod: NTX | Performed by: PATHOLOGY

## 2020-01-01 PROCEDURE — 99999 PR PBB SHADOW E&M-EST. PATIENT-LVL III: CPT | Mod: PBBFAC,TXP,,

## 2020-01-01 PROCEDURE — 87102 FUNGUS ISOLATION CULTURE: CPT | Mod: NTX

## 2020-01-01 PROCEDURE — 99223 1ST HOSP IP/OBS HIGH 75: CPT | Mod: NTX,,, | Performed by: INTERNAL MEDICINE

## 2020-01-01 PROCEDURE — 82330 ASSAY OF CALCIUM: CPT | Mod: NTX

## 2020-01-01 PROCEDURE — 87305 ASPERGILLUS AG IA: CPT | Mod: NTX

## 2020-01-01 PROCEDURE — 82550 ASSAY OF CK (CPK): CPT | Mod: NTX

## 2020-01-01 PROCEDURE — 99291 PR CRITICAL CARE, E/M 30-74 MINUTES: ICD-10-PCS | Mod: NTX,,, | Performed by: PHYSICIAN ASSISTANT

## 2020-01-01 PROCEDURE — 99999 PR PBB SHADOW E&M-EST. PATIENT-LVL I: CPT | Mod: PBBFAC,TXP,,

## 2020-01-01 PROCEDURE — 99291 CRITICAL CARE FIRST HOUR: CPT | Mod: NTX,,, | Performed by: NURSE PRACTITIONER

## 2020-01-01 PROCEDURE — 88112 CYTOPATH CELL ENHANCE TECH: CPT | Mod: NTX | Performed by: PATHOLOGY

## 2020-01-01 PROCEDURE — 84145 PROCALCITONIN (PCT): CPT | Mod: NTX

## 2020-01-01 PROCEDURE — 3008F BODY MASS INDEX DOCD: CPT | Mod: CPTII,S$GLB,, | Performed by: INTERNAL MEDICINE

## 2020-01-01 PROCEDURE — 87015 SPECIMEN INFECT AGNT CONCNTJ: CPT | Mod: NTX

## 2020-01-01 PROCEDURE — 85027 COMPLETE CBC AUTOMATED: CPT | Mod: 91,NTX

## 2020-01-01 PROCEDURE — 82306 VITAMIN D 25 HYDROXY: CPT

## 2020-01-01 PROCEDURE — 80053 COMPREHEN METABOLIC PANEL: CPT | Mod: 91,NTX

## 2020-01-01 PROCEDURE — 87086 URINE CULTURE/COLONY COUNT: CPT | Mod: NTX

## 2020-01-01 PROCEDURE — 51702 INSERT TEMP BLADDER CATH: CPT | Mod: NTX

## 2020-01-01 PROCEDURE — 99291 PR CRITICAL CARE, E/M 30-74 MINUTES: ICD-10-PCS | Mod: 25,NTX,, | Performed by: NURSE PRACTITIONER

## 2020-01-01 PROCEDURE — 80307 DRUG TEST PRSMV CHEM ANLYZR: CPT | Mod: TXP

## 2020-01-01 PROCEDURE — 82947 ASSAY GLUCOSE BLOOD QUANT: CPT | Mod: NTX

## 2020-01-01 PROCEDURE — 31500 INSERT EMERGENCY AIRWAY: CPT | Mod: 59,NTX,, | Performed by: INTERNAL MEDICINE

## 2020-01-01 PROCEDURE — 96376 TX/PRO/DX INJ SAME DRUG ADON: CPT

## 2020-01-01 PROCEDURE — 99285 EMERGENCY DEPT VISIT HI MDM: CPT | Mod: 25,NTX

## 2020-01-01 PROCEDURE — 88305 TISSUE EXAM BY PATHOLOGIST: CPT | Mod: 26,NTX,, | Performed by: PATHOLOGY

## 2020-01-01 PROCEDURE — 32555 ASPIRATE PLEURA W/ IMAGING: CPT | Mod: NTX

## 2020-01-01 PROCEDURE — 94640 AIRWAY INHALATION TREATMENT: CPT | Mod: NTX

## 2020-01-01 PROCEDURE — 25000003 PHARM REV CODE 250: Mod: NTX | Performed by: RADIOLOGY

## 2020-01-01 PROCEDURE — 99999 PR PBB SHADOW E&M-EST. PATIENT-LVL III: ICD-10-PCS | Mod: PBBFAC,TXP,,

## 2020-01-01 PROCEDURE — 81001 URINALYSIS AUTO W/SCOPE: CPT | Mod: NTX

## 2020-01-01 PROCEDURE — 99292 PR CRITICAL CARE, ADDL 30 MIN: ICD-10-PCS | Mod: ,,, | Performed by: INTERNAL MEDICINE

## 2020-01-01 PROCEDURE — 82105 ALPHA-FETOPROTEIN SERUM: CPT

## 2020-01-01 PROCEDURE — 36000 PLACE NEEDLE IN VEIN: CPT | Mod: NTX

## 2020-01-01 PROCEDURE — 76700 US ABDOMEN COMP WITH DOPPLER_PRE LIVER TRANSPLANT: ICD-10-PCS | Mod: 26,XS,TXP, | Performed by: RADIOLOGY

## 2020-01-01 PROCEDURE — 94002 VENT MGMT INPAT INIT DAY: CPT | Mod: NTX

## 2020-01-01 PROCEDURE — 80202 ASSAY OF VANCOMYCIN: CPT | Mod: NTX

## 2020-01-01 PROCEDURE — 93320 DOPPLER ECHO COMPLETE: CPT | Mod: 26,TXP,, | Performed by: INTERNAL MEDICINE

## 2020-01-01 PROCEDURE — P9035 PLATELET PHERES LEUKOREDUCED: HCPCS | Mod: NTX

## 2020-01-01 PROCEDURE — P9016 RBC LEUKOCYTES REDUCED: HCPCS | Mod: NTX

## 2020-01-01 PROCEDURE — 63600175 PHARM REV CODE 636 W HCPCS: Performed by: NURSE ANESTHETIST, CERTIFIED REGISTERED

## 2020-01-01 PROCEDURE — 99223 PR INITIAL HOSPITAL CARE,LEVL III: ICD-10-PCS | Mod: NTX,,, | Performed by: PHYSICIAN ASSISTANT

## 2020-01-01 PROCEDURE — 36415 COLL VENOUS BLD VENIPUNCTURE: CPT | Mod: TXP

## 2020-01-01 PROCEDURE — 99243 OFF/OP CNSLTJ NEW/EST LOW 30: CPT | Mod: 25,NTX,, | Performed by: INTERNAL MEDICINE

## 2020-01-01 PROCEDURE — 82040 ASSAY OF SERUM ALBUMIN: CPT | Mod: NTX

## 2020-01-01 PROCEDURE — 93975 VASCULAR STUDY: CPT | Mod: 26,TXP,, | Performed by: RADIOLOGY

## 2020-01-01 PROCEDURE — 87385 HISTOPLASMA CAPSUL AG IA: CPT | Mod: NTX

## 2020-01-01 PROCEDURE — 96365 THER/PROPH/DIAG IV INF INIT: CPT | Mod: NTX

## 2020-01-01 PROCEDURE — 99999 PR PBB SHADOW E&M-EST. PATIENT-LVL III: ICD-10-PCS | Mod: PBBFAC,TXP,, | Performed by: INTERNAL MEDICINE

## 2020-01-01 PROCEDURE — 86481 TB AG RESPONSE T-CELL SUSP: CPT | Mod: NTX

## 2020-01-01 PROCEDURE — 21400001 HC TELEMETRY ROOM: Mod: NTX

## 2020-01-01 PROCEDURE — 93320 STRESS ECHO (CUPID ONLY): ICD-10-PCS | Mod: 26,TXP,, | Performed by: INTERNAL MEDICINE

## 2020-01-01 PROCEDURE — 90686 IIV4 VACC NO PRSV 0.5 ML IM: CPT | Mod: NTX | Performed by: INTERNAL MEDICINE

## 2020-01-01 PROCEDURE — 99291 PR CRITICAL CARE, E/M 30-74 MINUTES: ICD-10-PCS | Mod: NTX,,, | Performed by: NURSE PRACTITIONER

## 2020-01-01 PROCEDURE — 88305 TISSUE EXAM BY PATHOLOGIST: CPT | Mod: NTX | Performed by: PATHOLOGY

## 2020-01-01 PROCEDURE — 85007 BL SMEAR W/DIFF WBC COUNT: CPT | Mod: NTX

## 2020-01-01 PROCEDURE — 99999 PR PBB SHADOW E&M-EST. PATIENT-LVL III: ICD-10-PCS | Mod: PBBFAC,TXP,, | Performed by: TRANSPLANT SURGERY

## 2020-01-01 PROCEDURE — 93975 US ABDOMEN COMP WITH DOPPLER_PRE LIVER TRANSPLANT: ICD-10-PCS | Mod: 26,TXP,, | Performed by: RADIOLOGY

## 2020-01-01 PROCEDURE — 99233 PR SUBSEQUENT HOSPITAL CARE,LEVL III: ICD-10-PCS | Mod: 25,NTX,, | Performed by: INTERNAL MEDICINE

## 2020-01-01 PROCEDURE — 36430 TRANSFUSION BLD/BLD COMPNT: CPT

## 2020-01-01 PROCEDURE — 71046 XR CHEST PA AND LATERAL: ICD-10-PCS | Mod: 26,TXP,, | Performed by: RADIOLOGY

## 2020-01-01 PROCEDURE — 3008F BODY MASS INDEX DOCD: CPT | Mod: CPTII,S$GLB,TXP, | Performed by: TRANSPLANT SURGERY

## 2020-01-01 PROCEDURE — 32555 ASPIRATE PLEURA W/ IMAGING: CPT | Mod: RT,NTX,, | Performed by: INTERNAL MEDICINE

## 2020-01-01 PROCEDURE — 88307 TISSUE EXAM BY PATHOLOGIST: CPT | Mod: 26,NTX,, | Performed by: PATHOLOGY

## 2020-01-01 PROCEDURE — 87070 CULTURE OTHR SPECIMN AEROBIC: CPT | Mod: 59,NTX

## 2020-01-01 PROCEDURE — 99204 OFFICE O/P NEW MOD 45 MIN: CPT | Mod: S$GLB,TXP,, | Performed by: TRANSPLANT SURGERY

## 2020-01-01 PROCEDURE — 86480 TB TEST CELL IMMUN MEASURE: CPT | Mod: TXP

## 2020-01-01 PROCEDURE — 87449 NOS EACH ORGANISM AG IA: CPT | Mod: NTX

## 2020-01-01 PROCEDURE — 85384 FIBRINOGEN ACTIVITY: CPT | Mod: 91,NTX

## 2020-01-01 PROCEDURE — 83935 ASSAY OF URINE OSMOLALITY: CPT | Mod: NTX

## 2020-01-01 PROCEDURE — 82570 ASSAY OF URINE CREATININE: CPT | Mod: NTX

## 2020-01-01 PROCEDURE — 86803 HEPATITIS C AB TEST: CPT | Mod: NTX

## 2020-01-01 PROCEDURE — C1752 CATH,HEMODIALYSIS,SHORT-TERM: HCPCS | Mod: NTX

## 2020-01-01 PROCEDURE — 88112 CYTOPATH CELL ENHANCE TECH: CPT | Mod: 26,NTX,, | Performed by: STUDENT IN AN ORGANIZED HEALTH CARE EDUCATION/TRAINING PROGRAM

## 2020-01-01 PROCEDURE — P9047 ALBUMIN (HUMAN), 25%, 50ML: HCPCS | Mod: JG,NTX | Performed by: INTERNAL MEDICINE

## 2020-01-01 PROCEDURE — 85379 FIBRIN DEGRADATION QUANT: CPT | Mod: NTX

## 2020-01-01 PROCEDURE — 87210 SMEAR WET MOUNT SALINE/INK: CPT | Mod: NTX

## 2020-01-01 PROCEDURE — 82553 CREATINE MB FRACTION: CPT | Mod: NTX

## 2020-01-01 PROCEDURE — 99233 PR SUBSEQUENT HOSPITAL CARE,LEVL III: ICD-10-PCS | Mod: NTX,,, | Performed by: STUDENT IN AN ORGANIZED HEALTH CARE EDUCATION/TRAINING PROGRAM

## 2020-01-01 PROCEDURE — 85240 CLOT FACTOR VIII AHG 1 STAGE: CPT | Mod: NTX

## 2020-01-01 PROCEDURE — U0003 INFECTIOUS AGENT DETECTION BY NUCLEIC ACID (DNA OR RNA); SEVERE ACUTE RESPIRATORY SYNDROME CORONAVIRUS 2 (SARS-COV-2) (CORONAVIRUS DISEASE [COVID-19]), AMPLIFIED PROBE TECHNIQUE, MAKING USE OF HIGH THROUGHPUT TECHNOLOGIES AS DESCRIBED BY CMS-2020-01-R: HCPCS | Mod: NTX

## 2020-01-01 PROCEDURE — 25000003 PHARM REV CODE 250: Performed by: NURSE ANESTHETIST, CERTIFIED REGISTERED

## 2020-01-01 PROCEDURE — 32555 PR THORACEN W/IMAG GUIDANCE: ICD-10-PCS | Mod: RT,NTX,, | Performed by: INTERNAL MEDICINE

## 2020-01-01 PROCEDURE — 63600175 PHARM REV CODE 636 W HCPCS: Mod: NTX | Performed by: STUDENT IN AN ORGANIZED HEALTH CARE EDUCATION/TRAINING PROGRAM

## 2020-01-01 PROCEDURE — 99999 PR PBB SHADOW E&M-EST. PATIENT-LVL I: ICD-10-PCS | Mod: PBBFAC,TXP,,

## 2020-01-01 PROCEDURE — 81001 URINALYSIS AUTO W/SCOPE: CPT | Mod: 91,NTX

## 2020-01-01 PROCEDURE — 99999 PR PBB SHADOW E&M-EST. PATIENT-LVL III: ICD-10-PCS | Mod: PBBFAC,,, | Performed by: INTERNAL MEDICINE

## 2020-01-01 PROCEDURE — 99291 CRITICAL CARE FIRST HOUR: CPT | Mod: NTX,,, | Performed by: PHYSICIAN ASSISTANT

## 2020-01-01 PROCEDURE — 84133 ASSAY OF URINE POTASSIUM: CPT | Mod: NTX

## 2020-01-01 PROCEDURE — 94799 UNLISTED PULMONARY SVC/PX: CPT | Mod: NTX

## 2020-01-01 PROCEDURE — 87040 BLOOD CULTURE FOR BACTERIA: CPT | Mod: NTX

## 2020-01-01 PROCEDURE — 88342 IMHCHEM/IMCYTCHM 1ST ANTB: CPT | Mod: 26,NTX,, | Performed by: PATHOLOGY

## 2020-01-01 PROCEDURE — 51701 INSERT BLADDER CATHETER: CPT | Mod: NTX

## 2020-01-01 PROCEDURE — 88305 TISSUE EXAM BY PATHOLOGIST: ICD-10-PCS | Mod: 26,NTX,, | Performed by: PATHOLOGY

## 2020-01-01 PROCEDURE — 25000003 PHARM REV CODE 250: Mod: NTX

## 2020-01-01 PROCEDURE — 87086 URINE CULTURE/COLONY COUNT: CPT | Mod: 59,NTX

## 2020-01-01 PROCEDURE — P9021 RED BLOOD CELLS UNIT: HCPCS | Mod: NTX

## 2020-01-01 PROCEDURE — 88112 CYTOPATH CELL ENHANCE TECH: CPT | Mod: NTX | Performed by: STUDENT IN AN ORGANIZED HEALTH CARE EDUCATION/TRAINING PROGRAM

## 2020-01-01 PROCEDURE — 3008F PR BODY MASS INDEX (BMI) DOCUMENTED: ICD-10-PCS | Mod: CPTII,S$GLB,TXP, | Performed by: TRANSPLANT SURGERY

## 2020-01-01 PROCEDURE — 99900025 HC BRONCHOSCOPY-ASST (STAT): Mod: NTX

## 2020-01-01 PROCEDURE — 80048 BASIC METABOLIC PNL TOTAL CA: CPT | Mod: NTX

## 2020-01-01 PROCEDURE — 3008F BODY MASS INDEX DOCD: CPT | Mod: CPTII,NTX,S$GLB, | Performed by: INTERNAL MEDICINE

## 2020-01-01 PROCEDURE — 88305 TISSUE EXAM BY PATHOLOGIST: CPT | Mod: 26,NTX,, | Performed by: STUDENT IN AN ORGANIZED HEALTH CARE EDUCATION/TRAINING PROGRAM

## 2020-01-01 PROCEDURE — 88305 TISSUE EXAM BY PATHOLOGIST: ICD-10-PCS | Mod: 26,NTX,, | Performed by: STUDENT IN AN ORGANIZED HEALTH CARE EDUCATION/TRAINING PROGRAM

## 2020-01-01 PROCEDURE — 96367 TX/PROPH/DG ADDL SEQ IV INF: CPT

## 2020-01-01 PROCEDURE — 88331 PR  PATH CONSULT IN SURG,W FRZ SEC: ICD-10-PCS | Mod: 26,NTX,, | Performed by: PATHOLOGY

## 2020-01-01 PROCEDURE — 82800 BLOOD PH: CPT | Mod: NTX

## 2020-01-01 PROCEDURE — 99204 PR OFFICE/OUTPT VISIT, NEW, LEVL IV, 45-59 MIN: ICD-10-PCS | Mod: S$GLB,TXP,, | Performed by: TRANSPLANT SURGERY

## 2020-01-01 PROCEDURE — 93351 STRESS ECHO (CUPID ONLY): ICD-10-PCS | Mod: 26,TXP,, | Performed by: INTERNAL MEDICINE

## 2020-01-01 PROCEDURE — 36556 INSERT NON-TUNNEL CV CATH: CPT | Mod: NTX

## 2020-01-01 PROCEDURE — 96375 TX/PRO/DX INJ NEW DRUG ADDON: CPT | Mod: NTX

## 2020-01-01 PROCEDURE — 37000009 HC ANESTHESIA EA ADD 15 MINS: Performed by: INTERNAL MEDICINE

## 2020-01-01 PROCEDURE — 11000001 HC ACUTE MED/SURG PRIVATE ROOM: Mod: NTX

## 2020-01-01 PROCEDURE — 87075 CULTR BACTERIA EXCEPT BLOOD: CPT | Mod: NTX

## 2020-01-01 PROCEDURE — 99243 PR OFFICE CONSULTATION,LEVEL III: ICD-10-PCS | Mod: 25,NTX,, | Performed by: INTERNAL MEDICINE

## 2020-01-01 PROCEDURE — 82042 OTHER SOURCE ALBUMIN QUAN EA: CPT | Mod: NTX

## 2020-01-01 PROCEDURE — 99223 1ST HOSP IP/OBS HIGH 75: CPT | Mod: NTX,,, | Performed by: PHYSICIAN ASSISTANT

## 2020-01-01 PROCEDURE — 84132 ASSAY OF SERUM POTASSIUM: CPT | Mod: NTX

## 2020-01-01 PROCEDURE — 99233 SBSQ HOSP IP/OBS HIGH 50: CPT | Mod: NTX,,, | Performed by: PHYSICIAN ASSISTANT

## 2020-01-01 PROCEDURE — 88305 TISSUE EXAM BY PATHOLOGIST: CPT | Mod: NTX | Performed by: STUDENT IN AN ORGANIZED HEALTH CARE EDUCATION/TRAINING PROGRAM

## 2020-01-01 PROCEDURE — 87385 HISTOPLASMA CAPSUL AG IA: CPT | Mod: 91,NTX

## 2020-01-01 PROCEDURE — 90471 IMMUNIZATION ADMIN: CPT | Mod: NTX | Performed by: INTERNAL MEDICINE

## 2020-01-01 PROCEDURE — 93005 ELECTROCARDIOGRAM TRACING: CPT | Mod: NTX

## 2020-01-01 PROCEDURE — 31500 INSERT EMERGENCY AIRWAY: CPT | Mod: NTX,,, | Performed by: INTERNAL MEDICINE

## 2020-01-01 PROCEDURE — 87385 HISTOPLASMA CAPSUL AG IA: CPT | Mod: 91

## 2020-01-01 PROCEDURE — 71046 X-RAY EXAM CHEST 2 VIEWS: CPT | Mod: TC,TXP

## 2020-01-01 PROCEDURE — 25000242 PHARM REV CODE 250 ALT 637 W/ HCPCS: Mod: NTX | Performed by: NURSE PRACTITIONER

## 2020-01-01 PROCEDURE — 99291 CRITICAL CARE FIRST HOUR: CPT | Mod: 25,NTX,, | Performed by: PHYSICIAN ASSISTANT

## 2020-01-01 PROCEDURE — 99292 CRITICAL CARE ADDL 30 MIN: CPT | Mod: ,,, | Performed by: INTERNAL MEDICINE

## 2020-01-01 PROCEDURE — 36556 INSERT NON-TUNNEL CV CATH: CPT

## 2020-01-01 PROCEDURE — 36620 INSERTION CATHETER ARTERY: CPT | Mod: NTX,,, | Performed by: NURSE PRACTITIONER

## 2020-01-01 PROCEDURE — 88307 TISSUE EXAM BY PATHOLOGIST: CPT | Mod: NTX | Performed by: PATHOLOGY

## 2020-01-01 PROCEDURE — 99291 PR CRITICAL CARE, E/M 30-74 MINUTES: ICD-10-PCS | Mod: 25,NTX,, | Performed by: PHYSICIAN ASSISTANT

## 2020-01-01 PROCEDURE — 32555 ASPIRATE PLEURA W/ IMAGING: CPT | Mod: NTX,,, | Performed by: INTERNAL MEDICINE

## 2020-01-01 PROCEDURE — 43235 EGD DIAGNOSTIC BRUSH WASH: CPT | Mod: ,,, | Performed by: INTERNAL MEDICINE

## 2020-01-01 PROCEDURE — 36620 INSERTION CATHETER ARTERY: CPT | Mod: NTX,,, | Performed by: STUDENT IN AN ORGANIZED HEALTH CARE EDUCATION/TRAINING PROGRAM

## 2020-01-01 PROCEDURE — 80076 HEPATIC FUNCTION PANEL: CPT | Mod: NTX

## 2020-01-01 PROCEDURE — 97802 PR MED NUTR THER, 1ST, INDIV, EA 15 MIN: ICD-10-PCS | Mod: S$GLB,TXP,, | Performed by: DIETITIAN, REGISTERED

## 2020-01-01 PROCEDURE — 93325 STRESS ECHO (CUPID ONLY): ICD-10-PCS | Mod: 26,TXP,, | Performed by: INTERNAL MEDICINE

## 2020-01-01 PROCEDURE — 84100 ASSAY OF PHOSPHORUS: CPT | Mod: 91,NTX

## 2020-01-01 PROCEDURE — 99238 PR HOSPITAL DISCHARGE DAY,<30 MIN: ICD-10-PCS | Mod: NTX,,, | Performed by: PHYSICIAN ASSISTANT

## 2020-01-01 PROCEDURE — 99215 PR OFFICE/OUTPT VISIT, EST, LEVL V, 40-54 MIN: ICD-10-PCS | Mod: NTX,S$GLB,, | Performed by: INTERNAL MEDICINE

## 2020-01-01 PROCEDURE — 27000190 HC CPAP FULL FACE MASK W/VALVE: Mod: NTX

## 2020-01-01 PROCEDURE — 31624 DX BRONCHOSCOPE/LAVAGE: CPT | Mod: NTX

## 2020-01-01 PROCEDURE — 32555 PR THORACEN W/IMAG GUIDANCE: ICD-10-PCS | Mod: NTX,,, | Performed by: INTERNAL MEDICINE

## 2020-01-01 PROCEDURE — 97802 MEDICAL NUTRITION INDIV IN: CPT | Mod: S$GLB,TXP,, | Performed by: DIETITIAN, REGISTERED

## 2020-01-01 PROCEDURE — 63600150 PHARM REV CODE 636: Mod: TXP | Performed by: INTERNAL MEDICINE

## 2020-01-01 PROCEDURE — 99233 SBSQ HOSP IP/OBS HIGH 50: CPT | Mod: 25,NTX,, | Performed by: INTERNAL MEDICINE

## 2020-01-01 PROCEDURE — 71046 X-RAY EXAM CHEST 2 VIEWS: CPT | Mod: 26,TXP,, | Performed by: RADIOLOGY

## 2020-01-01 PROCEDURE — 86022 PLATELET ANTIBODIES: CPT | Mod: NTX

## 2020-01-01 PROCEDURE — 93351 STRESS TTE COMPLETE: CPT | Mod: 26,TXP,, | Performed by: INTERNAL MEDICINE

## 2020-01-01 PROCEDURE — 84630 ASSAY OF ZINC: CPT

## 2020-01-01 PROCEDURE — 97802 MEDICAL NUTRITION INDIV IN: CPT | Mod: NTX

## 2020-01-01 PROCEDURE — 99999 PR PBB SHADOW E&M-EST. PATIENT-LVL III: CPT | Mod: PBBFAC,,, | Performed by: NURSE PRACTITIONER

## 2020-01-01 PROCEDURE — 99999 PR PBB SHADOW E&M-EST. PATIENT-LVL III: CPT | Mod: PBBFAC,TXP,, | Performed by: INTERNAL MEDICINE

## 2020-01-01 PROCEDURE — 84311 SPECTROPHOTOMETRY: CPT | Mod: NTX

## 2020-01-01 PROCEDURE — 99254 IP/OBS CNSLTJ NEW/EST MOD 60: CPT | Mod: NTX,,, | Performed by: PHYSICIAN ASSISTANT

## 2020-01-01 PROCEDURE — 99254 PR INITIAL INPATIENT CONSULT,LEVL IV: ICD-10-PCS | Mod: NTX,,, | Performed by: PHYSICIAN ASSISTANT

## 2020-01-01 PROCEDURE — 36600 WITHDRAWAL OF ARTERIAL BLOOD: CPT | Mod: NTX

## 2020-01-01 PROCEDURE — 99223 PR INITIAL HOSPITAL CARE,LEVL III: ICD-10-PCS | Mod: NTX,,, | Performed by: STUDENT IN AN ORGANIZED HEALTH CARE EDUCATION/TRAINING PROGRAM

## 2020-01-01 PROCEDURE — 90945 DIALYSIS ONE EVALUATION: CPT | Mod: NTX,,, | Performed by: INTERNAL MEDICINE

## 2020-01-01 PROCEDURE — 82436 ASSAY OF URINE CHLORIDE: CPT | Mod: NTX

## 2020-01-01 PROCEDURE — 90945 PR DIALYSIS, NOT HEMO, 1 EVAL: ICD-10-PCS | Mod: NTX,,, | Performed by: INTERNAL MEDICINE

## 2020-01-01 PROCEDURE — 3008F PR BODY MASS INDEX (BMI) DOCUMENTED: ICD-10-PCS | Mod: CPTII,S$GLB,, | Performed by: INTERNAL MEDICINE

## 2020-01-01 PROCEDURE — 31500 PR INSERT, EMERGENCY ENDOTRACH AIRWAY: ICD-10-PCS | Mod: NTX,,, | Performed by: INTERNAL MEDICINE

## 2020-01-01 PROCEDURE — 36620 ARTERIAL LINE: ICD-10-PCS | Mod: NTX,,, | Performed by: STUDENT IN AN ORGANIZED HEALTH CARE EDUCATION/TRAINING PROGRAM

## 2020-01-01 PROCEDURE — 43235 PR EGD, FLEX, DIAGNOSTIC: ICD-10-PCS | Mod: ,,, | Performed by: INTERNAL MEDICINE

## 2020-01-01 PROCEDURE — 80048 BASIC METABOLIC PNL TOTAL CA: CPT | Mod: TXP

## 2020-01-01 PROCEDURE — 71046 X-RAY EXAM CHEST 2 VIEWS: CPT | Mod: 26,NTX,, | Performed by: RADIOLOGY

## 2020-01-01 PROCEDURE — 87040 BLOOD CULTURE FOR BACTERIA: CPT | Mod: 59,NTX

## 2020-01-01 PROCEDURE — 88112 PR  CYTOPATH, CELL ENHANCE TECH: ICD-10-PCS | Mod: 26,NTX,, | Performed by: STUDENT IN AN ORGANIZED HEALTH CARE EDUCATION/TRAINING PROGRAM

## 2020-01-01 PROCEDURE — 88313 SPECIAL STAINS GROUP 2: CPT | Mod: NTX | Performed by: PATHOLOGY

## 2020-01-01 PROCEDURE — 36620 PR INSERT CATH,ART,PERCUT,SHORTTERM: ICD-10-PCS | Mod: NTX,,, | Performed by: NURSE PRACTITIONER

## 2020-01-01 PROCEDURE — 88342 CHG IMMUNOCYTOCHEMISTRY: ICD-10-PCS | Mod: 26,NTX,, | Performed by: PATHOLOGY

## 2020-01-01 PROCEDURE — 99223 1ST HOSP IP/OBS HIGH 75: CPT | Mod: NTX,,, | Performed by: STUDENT IN AN ORGANIZED HEALTH CARE EDUCATION/TRAINING PROGRAM

## 2020-01-01 PROCEDURE — 84300 ASSAY OF URINE SODIUM: CPT | Mod: NTX

## 2020-01-01 PROCEDURE — 86703 HIV-1/HIV-2 1 RESULT ANTBDY: CPT | Mod: NTX

## 2020-01-01 PROCEDURE — 86920 COMPATIBILITY TEST SPIN: CPT | Mod: NTX

## 2020-01-01 PROCEDURE — U0002 COVID-19 LAB TEST NON-CDC: HCPCS

## 2020-01-01 PROCEDURE — 88331 PATH CONSLTJ SURG 1 BLK 1SPC: CPT | Mod: 26,NTX,, | Performed by: PATHOLOGY

## 2020-01-01 PROCEDURE — 99238 HOSP IP/OBS DSCHRG MGMT 30/<: CPT | Mod: NTX,,, | Performed by: PHYSICIAN ASSISTANT

## 2020-01-01 PROCEDURE — 88331 PATH CONSLTJ SURG 1 BLK 1SPC: CPT | Mod: NTX | Performed by: PATHOLOGY

## 2020-01-01 PROCEDURE — 31622 DX BRONCHOSCOPE/WASH: CPT | Mod: 51,NTX,, | Performed by: INTERNAL MEDICINE

## 2020-01-01 PROCEDURE — 99214 OFFICE O/P EST MOD 30 MIN: CPT | Mod: S$GLB,,, | Performed by: NURSE PRACTITIONER

## 2020-01-01 RX ORDER — HYDROCORTISONE ACETATE 25 MG/1
25 SUPPOSITORY RECTAL 2 TIMES DAILY
Status: DISCONTINUED | OUTPATIENT
Start: 2020-01-01 | End: 2020-01-01

## 2020-01-01 RX ORDER — MORPHINE SULFATE 2 MG/ML
1 INJECTION, SOLUTION INTRAMUSCULAR; INTRAVENOUS ONCE
Status: COMPLETED | OUTPATIENT
Start: 2020-01-01 | End: 2020-01-01

## 2020-01-01 RX ORDER — ETOMIDATE 2 MG/ML
20 INJECTION INTRAVENOUS ONCE
Status: DISCONTINUED | OUTPATIENT
Start: 2020-01-01 | End: 2020-01-01

## 2020-01-01 RX ORDER — FLUCONAZOLE 2 MG/ML
200 INJECTION, SOLUTION INTRAVENOUS
Status: DISCONTINUED | OUTPATIENT
Start: 2020-01-01 | End: 2020-01-01

## 2020-01-01 RX ORDER — PHENYLEPHRINE HYDROCHLORIDE 10 MG/ML
INJECTION INTRAVENOUS
Status: COMPLETED
Start: 2020-01-01 | End: 2020-01-01

## 2020-01-01 RX ORDER — HYDROCODONE BITARTRATE AND ACETAMINOPHEN 500; 5 MG/1; MG/1
TABLET ORAL CONTINUOUS
Status: DISCONTINUED | OUTPATIENT
Start: 2020-01-01 | End: 2020-01-01

## 2020-01-01 RX ORDER — HYDROCODONE BITARTRATE AND ACETAMINOPHEN 500; 5 MG/1; MG/1
TABLET ORAL
Status: DISCONTINUED | OUTPATIENT
Start: 2020-01-01 | End: 2020-01-01

## 2020-01-01 RX ORDER — ROCURONIUM BROMIDE 10 MG/ML
100 INJECTION, SOLUTION INTRAVENOUS ONCE
Status: DISCONTINUED | OUTPATIENT
Start: 2020-01-01 | End: 2020-01-01

## 2020-01-01 RX ORDER — ETOMIDATE 2 MG/ML
10 INJECTION INTRAVENOUS ONCE
Status: COMPLETED | OUTPATIENT
Start: 2020-01-01 | End: 2020-01-01

## 2020-01-01 RX ORDER — ERGOCALCIFEROL 1.25 MG/1
50000 CAPSULE ORAL
Qty: 12 CAPSULE | Refills: 0 | Status: ON HOLD | OUTPATIENT
Start: 2020-01-01 | End: 2020-01-01 | Stop reason: HOSPADM

## 2020-01-01 RX ORDER — CHLORHEXIDINE GLUCONATE ORAL RINSE 1.2 MG/ML
15 SOLUTION DENTAL 2 TIMES DAILY
Status: DISCONTINUED | OUTPATIENT
Start: 2020-01-01 | End: 2020-01-01

## 2020-01-01 RX ORDER — SPIRONOLACTONE 50 MG/1
100 TABLET, FILM COATED ORAL 2 TIMES DAILY
Qty: 60 TABLET | Refills: 5 | Status: ON HOLD | OUTPATIENT
Start: 2020-01-01 | End: 2020-01-01 | Stop reason: HOSPADM

## 2020-01-01 RX ORDER — SODIUM CHLORIDE 0.9 % (FLUSH) 0.9 %
10 SYRINGE (ML) INJECTION
Status: CANCELLED | OUTPATIENT
Start: 2020-01-01

## 2020-01-01 RX ORDER — OCTREOTIDE ACETATE 100 UG/ML
100 INJECTION, SOLUTION INTRAVENOUS; SUBCUTANEOUS EVERY 8 HOURS
Status: DISCONTINUED | OUTPATIENT
Start: 2020-01-01 | End: 2020-01-01

## 2020-01-01 RX ORDER — HEPARIN SODIUM 5000 [USP'U]/ML
5000 INJECTION, SOLUTION INTRAVENOUS; SUBCUTANEOUS EVERY 8 HOURS
Status: DISCONTINUED | OUTPATIENT
Start: 2020-01-01 | End: 2020-01-01

## 2020-01-01 RX ORDER — LACTULOSE 10 G/15ML
20 SOLUTION ORAL; RECTAL DAILY PRN
Status: ON HOLD | COMMUNITY
Start: 2020-01-01 | End: 2020-01-01 | Stop reason: HOSPADM

## 2020-01-01 RX ORDER — SPIRONOLACTONE 25 MG/1
100 TABLET ORAL DAILY
Status: DISCONTINUED | OUTPATIENT
Start: 2020-01-01 | End: 2020-01-01

## 2020-01-01 RX ORDER — PROPOFOL 10 MG/ML
5 INJECTION, EMULSION INTRAVENOUS CONTINUOUS
Status: DISCONTINUED | OUTPATIENT
Start: 2020-01-01 | End: 2020-01-01

## 2020-01-01 RX ORDER — SPIRONOLACTONE 50 MG/1
TABLET, FILM COATED ORAL
COMMUNITY
Start: 2020-01-01 | End: 2020-01-01 | Stop reason: DRUGHIGH

## 2020-01-01 RX ORDER — PHENYLEPHRINE HCL IN 0.9% NACL 1 MG/10 ML
SYRINGE (ML) INTRAVENOUS
Status: DISPENSED
Start: 2020-01-01 | End: 2020-01-01

## 2020-01-01 RX ORDER — ERGOCALCIFEROL 1.25 MG/1
50000 CAPSULE ORAL
Status: DISCONTINUED | OUTPATIENT
Start: 2020-01-01 | End: 2020-01-01

## 2020-01-01 RX ORDER — DEXMEDETOMIDINE HYDROCHLORIDE 4 UG/ML
0.2 INJECTION, SOLUTION INTRAVENOUS CONTINUOUS
Status: DISCONTINUED | OUTPATIENT
Start: 2020-01-01 | End: 2020-01-01

## 2020-01-01 RX ORDER — FAMOTIDINE 10 MG/ML
20 INJECTION INTRAVENOUS DAILY
Status: DISCONTINUED | OUTPATIENT
Start: 2020-01-01 | End: 2020-01-01

## 2020-01-01 RX ORDER — POTASSIUM CHLORIDE 20 MEQ/1
60 TABLET, EXTENDED RELEASE ORAL ONCE
Status: DISCONTINUED | OUTPATIENT
Start: 2020-01-01 | End: 2020-01-01

## 2020-01-01 RX ORDER — AMIODARONE HYDROCHLORIDE 200 MG/1
200 TABLET ORAL DAILY
Status: DISCONTINUED | OUTPATIENT
Start: 2020-01-01 | End: 2020-01-01

## 2020-01-01 RX ORDER — LIDOCAINE HYDROCHLORIDE 10 MG/ML
1 INJECTION, SOLUTION EPIDURAL; INFILTRATION; INTRACAUDAL; PERINEURAL
Status: DISPENSED | OUTPATIENT
Start: 2020-01-01 | End: 2020-01-01

## 2020-01-01 RX ORDER — MIDODRINE HYDROCHLORIDE 5 MG/1
10 TABLET ORAL
Status: DISCONTINUED | OUTPATIENT
Start: 2020-01-01 | End: 2020-01-01

## 2020-01-01 RX ORDER — HYDROMORPHONE HYDROCHLORIDE 1 MG/ML
0.2 INJECTION, SOLUTION INTRAMUSCULAR; INTRAVENOUS; SUBCUTANEOUS ONCE
Status: COMPLETED | OUTPATIENT
Start: 2020-01-01 | End: 2020-01-01

## 2020-01-01 RX ORDER — LACTULOSE 10 G/15ML
20 SOLUTION ORAL DAILY PRN
Status: DISCONTINUED | OUTPATIENT
Start: 2020-01-01 | End: 2020-01-01 | Stop reason: HOSPADM

## 2020-01-01 RX ORDER — MAGNESIUM SULFATE HEPTAHYDRATE 40 MG/ML
2 INJECTION, SOLUTION INTRAVENOUS
Status: ACTIVE | OUTPATIENT
Start: 2020-01-01 | End: 2020-01-01

## 2020-01-01 RX ORDER — ACETAMINOPHEN 325 MG/1
325 TABLET ORAL EVERY 8 HOURS PRN
Status: DISCONTINUED | OUTPATIENT
Start: 2020-01-01 | End: 2020-01-01 | Stop reason: HOSPADM

## 2020-01-01 RX ORDER — HYDROCODONE BITARTRATE AND ACETAMINOPHEN 500; 5 MG/1; MG/1
TABLET ORAL CONTINUOUS
Status: ACTIVE | OUTPATIENT
Start: 2020-01-01 | End: 2020-01-01

## 2020-01-01 RX ORDER — FUROSEMIDE 40 MG/1
40 TABLET ORAL DAILY
Status: DISCONTINUED | OUTPATIENT
Start: 2020-01-01 | End: 2020-01-01

## 2020-01-01 RX ORDER — PROPOFOL 10 MG/ML
VIAL (ML) INTRAVENOUS
Status: DISCONTINUED | OUTPATIENT
Start: 2020-01-01 | End: 2020-01-01

## 2020-01-01 RX ORDER — LACTULOSE 10 G/15ML
30 SOLUTION ORAL DAILY
Status: DISCONTINUED | OUTPATIENT
Start: 2020-01-01 | End: 2020-01-01

## 2020-01-01 RX ORDER — LACTULOSE 10 G/15ML
20 SOLUTION ORAL 3 TIMES DAILY
Status: DISCONTINUED | OUTPATIENT
Start: 2020-01-01 | End: 2020-01-01 | Stop reason: HOSPADM

## 2020-01-01 RX ORDER — FUROSEMIDE 10 MG/ML
40 INJECTION INTRAMUSCULAR; INTRAVENOUS DAILY
Status: DISCONTINUED | OUTPATIENT
Start: 2020-01-01 | End: 2020-01-01

## 2020-01-01 RX ORDER — FUROSEMIDE 10 MG/ML
80 INJECTION INTRAMUSCULAR; INTRAVENOUS ONCE
Status: COMPLETED | OUTPATIENT
Start: 2020-01-01 | End: 2020-01-01

## 2020-01-01 RX ORDER — ZINC GLUCONATE 50 MG
50 TABLET ORAL DAILY
Status: ON HOLD | COMMUNITY
End: 2020-01-01 | Stop reason: HOSPADM

## 2020-01-01 RX ORDER — ALBUMIN HUMAN 250 G/1000ML
25 SOLUTION INTRAVENOUS 2 TIMES DAILY
Status: COMPLETED | OUTPATIENT
Start: 2020-01-01 | End: 2020-01-01

## 2020-01-01 RX ORDER — OCTREOTIDE ACETATE 50 UG/ML
50 INJECTION, SOLUTION INTRAVENOUS; SUBCUTANEOUS ONCE
Status: COMPLETED | OUTPATIENT
Start: 2020-01-01 | End: 2020-01-01

## 2020-01-01 RX ORDER — MEROPENEM AND SODIUM CHLORIDE 1 G/50ML
1 INJECTION, SOLUTION INTRAVENOUS
Status: DISCONTINUED | OUTPATIENT
Start: 2020-01-01 | End: 2020-01-01

## 2020-01-01 RX ORDER — SODIUM CHLORIDE 0.9 % (FLUSH) 0.9 %
10 SYRINGE (ML) INJECTION
Status: DISCONTINUED | OUTPATIENT
Start: 2020-01-01 | End: 2020-01-01 | Stop reason: HOSPADM

## 2020-01-01 RX ORDER — FUROSEMIDE 10 MG/ML
40 INJECTION INTRAMUSCULAR; INTRAVENOUS DAILY
Status: DISCONTINUED | OUTPATIENT
Start: 2020-01-01 | End: 2020-01-01 | Stop reason: HOSPADM

## 2020-01-01 RX ORDER — LIDOCAINE HYDROCHLORIDE 10 MG/ML
INJECTION INFILTRATION; PERINEURAL CODE/TRAUMA/SEDATION MEDICATION
Status: DISCONTINUED | OUTPATIENT
Start: 2020-01-01 | End: 2020-01-01

## 2020-01-01 RX ORDER — FUROSEMIDE 10 MG/ML
60 INJECTION INTRAMUSCULAR; INTRAVENOUS
Status: COMPLETED | OUTPATIENT
Start: 2020-01-01 | End: 2020-01-01

## 2020-01-01 RX ORDER — SODIUM CHLORIDE, SODIUM LACTATE, POTASSIUM CHLORIDE, CALCIUM CHLORIDE 600; 310; 30; 20 MG/100ML; MG/100ML; MG/100ML; MG/100ML
INJECTION, SOLUTION INTRAVENOUS CONTINUOUS PRN
Status: DISCONTINUED | OUTPATIENT
Start: 2020-01-01 | End: 2020-01-01

## 2020-01-01 RX ORDER — VANCOMYCIN HCL IN 5 % DEXTROSE 1G/250ML
1000 PLASTIC BAG, INJECTION (ML) INTRAVENOUS ONCE
Status: DISCONTINUED | OUTPATIENT
Start: 2020-01-01 | End: 2020-01-01

## 2020-01-01 RX ORDER — FUROSEMIDE 20 MG/1
40 TABLET ORAL EVERY 12 HOURS
Qty: 120 TABLET | Refills: 2 | Status: ON HOLD | OUTPATIENT
Start: 2020-01-01 | End: 2020-01-01 | Stop reason: HOSPADM

## 2020-01-01 RX ORDER — MIDAZOLAM HYDROCHLORIDE 1 MG/ML
INJECTION INTRAMUSCULAR; INTRAVENOUS CODE/TRAUMA/SEDATION MEDICATION
Status: DISCONTINUED | OUTPATIENT
Start: 2020-01-01 | End: 2020-01-01

## 2020-01-01 RX ORDER — LIDOCAINE HYDROCHLORIDE 20 MG/ML
INJECTION, SOLUTION EPIDURAL; INFILTRATION; INTRACAUDAL; PERINEURAL
Status: DISCONTINUED | OUTPATIENT
Start: 2020-01-01 | End: 2020-01-01

## 2020-01-01 RX ORDER — LIDOCAINE HYDROCHLORIDE 10 MG/ML
INJECTION INFILTRATION; PERINEURAL
Status: DISCONTINUED
Start: 2020-01-01 | End: 2020-01-01 | Stop reason: WASHOUT

## 2020-01-01 RX ORDER — SODIUM CHLORIDE 0.9 % (FLUSH) 0.9 %
10 SYRINGE (ML) INJECTION
Status: DISCONTINUED | OUTPATIENT
Start: 2020-01-01 | End: 2020-01-01

## 2020-01-01 RX ORDER — MIDODRINE HYDROCHLORIDE 5 MG/1
5 TABLET ORAL
Status: DISCONTINUED | OUTPATIENT
Start: 2020-01-01 | End: 2020-01-01

## 2020-01-01 RX ORDER — HYDROCORTISONE ACETATE PRAMOXINE HCL 1; 1 G/100G; G/100G
CREAM TOPICAL 3 TIMES DAILY
Qty: 1 TUBE | Refills: 1 | Status: ON HOLD | OUTPATIENT
Start: 2020-01-01 | End: 2020-01-01 | Stop reason: HOSPADM

## 2020-01-01 RX ORDER — LIDOCAINE HYDROCHLORIDE 10 MG/ML
INJECTION INFILTRATION; PERINEURAL
Status: COMPLETED
Start: 2020-01-01 | End: 2020-01-01

## 2020-01-01 RX ORDER — ZINC SULFATE 50(220)MG
220 CAPSULE ORAL NIGHTLY
Status: DISCONTINUED | OUTPATIENT
Start: 2020-01-01 | End: 2020-01-01 | Stop reason: HOSPADM

## 2020-01-01 RX ORDER — LORAZEPAM 2 MG/ML
2 INJECTION INTRAMUSCULAR
Status: DISCONTINUED | OUTPATIENT
Start: 2020-01-01 | End: 2020-01-01 | Stop reason: HOSPADM

## 2020-01-01 RX ORDER — LACTULOSE 10 G/15ML
20 SOLUTION ORAL 3 TIMES DAILY
Status: DISCONTINUED | OUTPATIENT
Start: 2020-01-01 | End: 2020-01-01

## 2020-01-01 RX ORDER — FUROSEMIDE 10 MG/ML
20 INJECTION INTRAMUSCULAR; INTRAVENOUS
Status: DISCONTINUED | OUTPATIENT
Start: 2020-01-01 | End: 2020-01-01 | Stop reason: HOSPADM

## 2020-01-01 RX ORDER — DEXTROSE MONOHYDRATE 100 MG/ML
INJECTION, SOLUTION INTRAVENOUS CONTINUOUS
Status: DISCONTINUED | OUTPATIENT
Start: 2020-01-01 | End: 2020-01-01

## 2020-01-01 RX ORDER — MIDODRINE HYDROCHLORIDE 5 MG/1
15 TABLET ORAL
Status: DISCONTINUED | OUTPATIENT
Start: 2020-01-01 | End: 2020-01-01

## 2020-01-01 RX ORDER — MORPHINE SULFATE 2 MG/ML
INJECTION, SOLUTION INTRAMUSCULAR; INTRAVENOUS
Status: COMPLETED
Start: 2020-01-01 | End: 2020-01-01

## 2020-01-01 RX ORDER — PANTOPRAZOLE SODIUM 40 MG/10ML
40 INJECTION, POWDER, LYOPHILIZED, FOR SOLUTION INTRAVENOUS 2 TIMES DAILY
Status: DISCONTINUED | OUTPATIENT
Start: 2020-01-01 | End: 2020-01-01

## 2020-01-01 RX ORDER — ALBUMIN HUMAN 250 G/1000ML
75 SOLUTION INTRAVENOUS ONCE
Status: COMPLETED | OUTPATIENT
Start: 2020-01-01 | End: 2020-01-01

## 2020-01-01 RX ORDER — ZINC SULFATE 50(220)MG
220 CAPSULE ORAL DAILY
Status: DISCONTINUED | OUTPATIENT
Start: 2020-01-01 | End: 2020-01-01

## 2020-01-01 RX ORDER — POLYETHYLENE GLYCOL 3350 17 G/17G
17 POWDER, FOR SOLUTION ORAL 2 TIMES DAILY PRN
Status: DISCONTINUED | OUTPATIENT
Start: 2020-01-01 | End: 2020-01-01 | Stop reason: HOSPADM

## 2020-01-01 RX ORDER — MORPHINE SULFATE 4 MG/ML
2 INJECTION, SOLUTION INTRAMUSCULAR; INTRAVENOUS
Status: DISCONTINUED | OUTPATIENT
Start: 2020-01-01 | End: 2020-01-01

## 2020-01-01 RX ORDER — PHENYLEPHRINE HCL IN 0.9% NACL 1 MG/10 ML
500 SYRINGE (ML) INTRAVENOUS
Status: DISCONTINUED | OUTPATIENT
Start: 2020-01-01 | End: 2020-01-01

## 2020-01-01 RX ORDER — LIDOCAINE HYDROCHLORIDE 20 MG/ML
INJECTION INTRAVENOUS
Status: DISCONTINUED
Start: 2020-01-01 | End: 2020-01-01 | Stop reason: WASHOUT

## 2020-01-01 RX ORDER — DEXTROSE MONOHYDRATE 100 MG/ML
INJECTION, SOLUTION INTRAVENOUS CONTINUOUS
Status: DISCONTINUED | OUTPATIENT
Start: 2020-01-01 | End: 2020-01-01 | Stop reason: HOSPADM

## 2020-01-01 RX ORDER — HYDROCORTISONE ACETATE PRAMOXINE HCL 1; 1 G/100G; G/100G
CREAM TOPICAL 3 TIMES DAILY
Status: DISCONTINUED | OUTPATIENT
Start: 2020-01-01 | End: 2020-01-01

## 2020-01-01 RX ORDER — FENTANYL CITRATE 50 UG/ML
INJECTION, SOLUTION INTRAMUSCULAR; INTRAVENOUS CODE/TRAUMA/SEDATION MEDICATION
Status: DISCONTINUED | OUTPATIENT
Start: 2020-01-01 | End: 2020-01-01

## 2020-01-01 RX ORDER — ATROPINE SULFATE 0.1 MG/ML
1 INJECTION INTRAVENOUS
Status: CANCELLED | OUTPATIENT
Start: 2020-01-01 | End: 2020-01-01

## 2020-01-01 RX ORDER — ALBUMIN HUMAN 250 G/1000ML
12.5 SOLUTION INTRAVENOUS ONCE
Status: COMPLETED | OUTPATIENT
Start: 2020-01-01 | End: 2020-01-01

## 2020-01-01 RX ORDER — POTASSIUM CHLORIDE 20 MEQ/1
60 TABLET, EXTENDED RELEASE ORAL ONCE
Status: COMPLETED | OUTPATIENT
Start: 2020-01-01 | End: 2020-01-01

## 2020-01-01 RX ORDER — FLUDROCORTISONE ACETATE 0.1 MG/1
100 TABLET ORAL DAILY
Status: DISCONTINUED | OUTPATIENT
Start: 2020-01-01 | End: 2020-01-01

## 2020-01-01 RX ORDER — ONDANSETRON 2 MG/ML
4 INJECTION INTRAMUSCULAR; INTRAVENOUS EVERY 6 HOURS PRN
Status: DISCONTINUED | OUTPATIENT
Start: 2020-01-01 | End: 2020-01-01

## 2020-01-01 RX ORDER — IPRATROPIUM BROMIDE AND ALBUTEROL SULFATE 2.5; .5 MG/3ML; MG/3ML
3 SOLUTION RESPIRATORY (INHALATION) 4 TIMES DAILY
Status: DISCONTINUED | OUTPATIENT
Start: 2020-01-01 | End: 2020-01-01 | Stop reason: HOSPADM

## 2020-01-01 RX ORDER — MIDODRINE HYDROCHLORIDE 2.5 MG/1
7.5 TABLET ORAL
Qty: 270 TABLET | Refills: 2 | Status: ON HOLD | OUTPATIENT
Start: 2020-01-01 | End: 2020-01-01 | Stop reason: HOSPADM

## 2020-01-01 RX ORDER — LIDOCAINE HYDROCHLORIDE 10 MG/ML
10 INJECTION INFILTRATION; PERINEURAL ONCE
Status: COMPLETED | OUTPATIENT
Start: 2020-01-01 | End: 2020-01-01

## 2020-01-01 RX ORDER — PHENYLEPHRINE HCL IN 0.9% NACL 20MG/250ML
0.5 PLASTIC BAG, INJECTION (ML) INTRAVENOUS CONTINUOUS
Status: DISCONTINUED | OUTPATIENT
Start: 2020-01-01 | End: 2020-01-01 | Stop reason: HOSPADM

## 2020-01-01 RX ORDER — FUROSEMIDE 20 MG/1
40 TABLET ORAL DAILY
Status: ON HOLD | COMMUNITY
Start: 2020-01-01 | End: 2020-01-01 | Stop reason: SDUPTHER

## 2020-01-01 RX ORDER — VASOPRESSIN 20 [USP'U]/ML
INJECTION, SOLUTION INTRAMUSCULAR; SUBCUTANEOUS
Status: COMPLETED
Start: 2020-01-01 | End: 2020-01-01

## 2020-01-01 RX ORDER — OXYMETAZOLINE HCL 0.05 %
2 SPRAY, NON-AEROSOL (ML) NASAL ONCE
Status: COMPLETED | OUTPATIENT
Start: 2020-01-01 | End: 2020-01-01

## 2020-01-01 RX ORDER — LANOLIN ALCOHOL/MO/W.PET/CERES
400 CREAM (GRAM) TOPICAL 2 TIMES DAILY
Qty: 60 TABLET | Refills: 2 | Status: ON HOLD | OUTPATIENT
Start: 2020-01-01 | End: 2020-01-01 | Stop reason: HOSPADM

## 2020-01-01 RX ORDER — GLUCAGON 1 MG
1 KIT INJECTION
Status: DISCONTINUED | OUTPATIENT
Start: 2020-01-01 | End: 2020-01-01

## 2020-01-01 RX ORDER — ROCURONIUM BROMIDE 10 MG/ML
100 INJECTION, SOLUTION INTRAVENOUS ONCE
Status: COMPLETED | OUTPATIENT
Start: 2020-01-01 | End: 2020-01-01

## 2020-01-01 RX ORDER — VANCOMYCIN HCL IN 5 % DEXTROSE 1G/250ML
1000 PLASTIC BAG, INJECTION (ML) INTRAVENOUS ONCE
Status: COMPLETED | OUTPATIENT
Start: 2020-01-01 | End: 2020-01-01

## 2020-01-01 RX ORDER — FENTANYL CITRATE 50 UG/ML
50 INJECTION, SOLUTION INTRAMUSCULAR; INTRAVENOUS
Status: DISPENSED | OUTPATIENT
Start: 2020-01-01 | End: 2020-01-01

## 2020-01-01 RX ORDER — DOBUTAMINE HYDROCHLORIDE 100 MG/100ML
20 INJECTION INTRAVENOUS
Status: COMPLETED | OUTPATIENT
Start: 2020-01-01 | End: 2020-01-01

## 2020-01-01 RX ORDER — LORAZEPAM 2 MG/ML
4 INJECTION INTRAMUSCULAR ONCE
Status: COMPLETED | OUTPATIENT
Start: 2020-01-01 | End: 2020-01-01

## 2020-01-01 RX ORDER — ALBUMIN HUMAN 250 G/1000ML
25 SOLUTION INTRAVENOUS EVERY 8 HOURS
Status: DISCONTINUED | OUTPATIENT
Start: 2020-01-01 | End: 2020-01-01 | Stop reason: HOSPADM

## 2020-01-01 RX ORDER — ONDANSETRON 8 MG/1
8 TABLET, ORALLY DISINTEGRATING ORAL EVERY 8 HOURS PRN
Status: DISCONTINUED | OUTPATIENT
Start: 2020-01-01 | End: 2020-01-01

## 2020-01-01 RX ORDER — SENNOSIDES 8.8 MG/5ML
5 LIQUID ORAL NIGHTLY
Status: DISCONTINUED | OUTPATIENT
Start: 2020-01-01 | End: 2020-01-01

## 2020-01-01 RX ORDER — SPIRONOLACTONE 100 MG/1
100 TABLET, FILM COATED ORAL DAILY
Status: DISCONTINUED | OUTPATIENT
Start: 2020-01-01 | End: 2020-01-01 | Stop reason: HOSPADM

## 2020-01-01 RX ORDER — SPIRONOLACTONE 50 MG/1
100 TABLET, FILM COATED ORAL DAILY
COMMUNITY
Start: 2020-01-01 | End: 2020-01-01 | Stop reason: SDUPTHER

## 2020-01-01 RX ORDER — PANTOPRAZOLE SODIUM 40 MG/10ML
80 INJECTION, POWDER, LYOPHILIZED, FOR SOLUTION INTRAVENOUS ONCE
Status: COMPLETED | OUTPATIENT
Start: 2020-01-01 | End: 2020-01-01

## 2020-01-01 RX ORDER — MIDODRINE HYDROCHLORIDE 2.5 MG/1
7.5 TABLET ORAL
Status: DISCONTINUED | OUTPATIENT
Start: 2020-01-01 | End: 2020-01-01 | Stop reason: HOSPADM

## 2020-01-01 RX ORDER — FUROSEMIDE 20 MG/1
TABLET ORAL
COMMUNITY
Start: 2020-01-01 | End: 2020-01-01 | Stop reason: DRUGHIGH

## 2020-01-01 RX ORDER — FLUCONAZOLE 2 MG/ML
400 INJECTION, SOLUTION INTRAVENOUS ONCE
Status: COMPLETED | OUTPATIENT
Start: 2020-01-01 | End: 2020-01-01

## 2020-01-01 RX ORDER — POLYETHYLENE GLYCOL 3350 17 G/17G
17 POWDER, FOR SOLUTION ORAL DAILY
Status: DISCONTINUED | OUTPATIENT
Start: 2020-01-01 | End: 2020-01-01

## 2020-01-01 RX ORDER — FENTANYL CITRATE-0.9 % NACL/PF 10 MCG/ML
25 PLASTIC BAG, INJECTION (ML) INTRAVENOUS CONTINUOUS
Status: DISCONTINUED | OUTPATIENT
Start: 2020-01-01 | End: 2020-01-01 | Stop reason: HOSPADM

## 2020-01-01 RX ORDER — ONDANSETRON 2 MG/ML
4 INJECTION INTRAMUSCULAR; INTRAVENOUS EVERY 4 HOURS PRN
Status: DISCONTINUED | OUTPATIENT
Start: 2020-01-01 | End: 2020-01-01 | Stop reason: HOSPADM

## 2020-01-01 RX ORDER — ALBUMIN HUMAN 250 G/1000ML
25 SOLUTION INTRAVENOUS ONCE
Status: COMPLETED | OUTPATIENT
Start: 2020-01-01 | End: 2020-01-01

## 2020-01-01 RX ORDER — HYDROCORTISONE 25 MG/G
CREAM TOPICAL 2 TIMES DAILY PRN
Status: DISCONTINUED | OUTPATIENT
Start: 2020-01-01 | End: 2020-01-01 | Stop reason: HOSPADM

## 2020-01-01 RX ORDER — FENTANYL CITRATE 50 UG/ML
50 INJECTION, SOLUTION INTRAMUSCULAR; INTRAVENOUS
Status: DISCONTINUED | OUTPATIENT
Start: 2020-01-01 | End: 2020-01-01 | Stop reason: HOSPADM

## 2020-01-01 RX ORDER — IPRATROPIUM BROMIDE AND ALBUTEROL SULFATE 2.5; .5 MG/3ML; MG/3ML
3 SOLUTION RESPIRATORY (INHALATION) EVERY 4 HOURS PRN
Status: DISCONTINUED | OUTPATIENT
Start: 2020-01-01 | End: 2020-01-01 | Stop reason: HOSPADM

## 2020-01-01 RX ORDER — ONDANSETRON 2 MG/ML
INJECTION INTRAMUSCULAR; INTRAVENOUS
Status: COMPLETED
Start: 2020-01-01 | End: 2020-01-01

## 2020-01-01 RX ORDER — HYDROCORTISONE ACETATE 25 MG/1
25 SUPPOSITORY RECTAL 2 TIMES DAILY
Qty: 20 SUPPOSITORY | Refills: 0 | Status: ON HOLD | OUTPATIENT
Start: 2020-01-01 | End: 2020-01-01 | Stop reason: HOSPADM

## 2020-01-01 RX ORDER — NOREPINEPHRINE BITARTRATE/D5W 4MG/250ML
0.02 PLASTIC BAG, INJECTION (ML) INTRAVENOUS CONTINUOUS
Status: DISCONTINUED | OUTPATIENT
Start: 2020-01-01 | End: 2020-01-01

## 2020-01-01 RX ORDER — MAGNESIUM SULFATE HEPTAHYDRATE 40 MG/ML
2 INJECTION, SOLUTION INTRAVENOUS
Status: DISPENSED | OUTPATIENT
Start: 2020-01-01 | End: 2020-01-01

## 2020-01-01 RX ORDER — HYDROMORPHONE HYDROCHLORIDE 1 MG/ML
0.2 INJECTION, SOLUTION INTRAMUSCULAR; INTRAVENOUS; SUBCUTANEOUS EVERY 6 HOURS PRN
Status: DISCONTINUED | OUTPATIENT
Start: 2020-01-01 | End: 2020-01-01

## 2020-01-01 RX ORDER — ONDANSETRON HYDROCHLORIDE 4 MG/5ML
4 SOLUTION ORAL ONCE
Status: DISCONTINUED | OUTPATIENT
Start: 2020-01-01 | End: 2020-01-01

## 2020-01-01 RX ADMIN — PHYTONADIONE 10 MG: 10 INJECTION, EMULSION INTRAMUSCULAR; INTRAVENOUS; SUBCUTANEOUS at 06:11

## 2020-01-01 RX ADMIN — PHYTONADIONE 10 MG: 10 INJECTION, EMULSION INTRAMUSCULAR; INTRAVENOUS; SUBCUTANEOUS at 07:11

## 2020-01-01 RX ADMIN — MEROPENEM AND SODIUM CHLORIDE 1 G: 1 INJECTION, SOLUTION INTRAVENOUS at 11:11

## 2020-01-01 RX ADMIN — DEXTROSE: 10 SOLUTION INTRAVENOUS at 12:11

## 2020-01-01 RX ADMIN — FLUCONAZOLE 200 MG: 2 INJECTION, SOLUTION INTRAVENOUS at 10:11

## 2020-01-01 RX ADMIN — PROPOFOL 25 MCG/KG/MIN: 10 INJECTION, EMULSION INTRAVENOUS at 02:11

## 2020-01-01 RX ADMIN — AZITHROMYCIN MONOHYDRATE 500 MG: 500 INJECTION, POWDER, LYOPHILIZED, FOR SOLUTION INTRAVENOUS at 11:11

## 2020-01-01 RX ADMIN — OCTREOTIDE ACETATE 100 MCG: 100 INJECTION, SOLUTION INTRAVENOUS; SUBCUTANEOUS at 01:11

## 2020-01-01 RX ADMIN — NOREPINEPHRINE BITARTRATE 1.3 MCG/KG/MIN: 1 INJECTION, SOLUTION, CONCENTRATE INTRAVENOUS at 07:11

## 2020-01-01 RX ADMIN — FLUDROCORTISONE ACETATE 100 MCG: 0.1 TABLET ORAL at 08:11

## 2020-01-01 RX ADMIN — SPIRONOLACTONE 100 MG: 100 TABLET ORAL at 08:10

## 2020-01-01 RX ADMIN — Medication 0.5 MCG/KG/MIN: at 07:11

## 2020-01-01 RX ADMIN — NOREPINEPHRINE BITARTRATE 0.74 MCG/KG/MIN: 1 INJECTION, SOLUTION, CONCENTRATE INTRAVENOUS at 06:11

## 2020-01-01 RX ADMIN — NOREPINEPHRINE BITARTRATE 1.25 MCG/KG/MIN: 1 INJECTION, SOLUTION, CONCENTRATE INTRAVENOUS at 01:11

## 2020-01-01 RX ADMIN — ALBUMIN (HUMAN) 25 G: 12.5 SOLUTION INTRAVENOUS at 08:10

## 2020-01-01 RX ADMIN — HYDROCORTISONE SODIUM SUCCINATE 100 MG: 100 INJECTION, POWDER, FOR SOLUTION INTRAMUSCULAR; INTRAVENOUS at 05:11

## 2020-01-01 RX ADMIN — NOREPINEPHRINE BITARTRATE 1.6 MCG/KG/MIN: 1 INJECTION, SOLUTION, CONCENTRATE INTRAVENOUS at 01:11

## 2020-01-01 RX ADMIN — HYDROCORTISONE SODIUM SUCCINATE 100 MG: 100 INJECTION, POWDER, FOR SOLUTION INTRAMUSCULAR; INTRAVENOUS at 03:11

## 2020-01-01 RX ADMIN — VASOPRESSIN 0.04 UNITS/MIN: 20 INJECTION INTRAVENOUS at 06:11

## 2020-01-01 RX ADMIN — FUROSEMIDE 40 MG: 40 TABLET ORAL at 03:10

## 2020-01-01 RX ADMIN — FENTANYL CITRATE 50 MCG: 50 INJECTION, SOLUTION INTRAMUSCULAR; INTRAVENOUS at 05:10

## 2020-01-01 RX ADMIN — Medication 0.1 MCG/KG/MIN: at 07:10

## 2020-01-01 RX ADMIN — VASOPRESSIN 0.04 UNITS/MIN: 20 INJECTION INTRAVENOUS at 12:11

## 2020-01-01 RX ADMIN — PANTOPRAZOLE SODIUM 80 MG: 40 INJECTION, POWDER, LYOPHILIZED, FOR SOLUTION INTRAVENOUS at 01:11

## 2020-01-01 RX ADMIN — NOREPINEPHRINE BITARTRATE 3 MCG/KG/MIN: 1 INJECTION, SOLUTION, CONCENTRATE INTRAVENOUS at 03:11

## 2020-01-01 RX ADMIN — ALBUMIN (HUMAN) 12.5 G: 12.5 SOLUTION INTRAVENOUS at 05:11

## 2020-01-01 RX ADMIN — SPIRONOLACTONE 100 MG: 100 TABLET ORAL at 03:10

## 2020-01-01 RX ADMIN — PROMETHAZINE HYDROCHLORIDE 6.25 MG: 25 INJECTION INTRAMUSCULAR; INTRAVENOUS at 09:10

## 2020-01-01 RX ADMIN — AZITHROMYCIN MONOHYDRATE 500 MG: 500 INJECTION, POWDER, LYOPHILIZED, FOR SOLUTION INTRAVENOUS at 08:10

## 2020-01-01 RX ADMIN — RIFAXIMIN 550 MG: 550 TABLET ORAL at 10:11

## 2020-01-01 RX ADMIN — MIDODRINE HYDROCHLORIDE 15 MG: 5 TABLET ORAL at 09:11

## 2020-01-01 RX ADMIN — ETOMIDATE 10 MG: 40 INJECTION, SOLUTION INTRAVENOUS at 02:11

## 2020-01-01 RX ADMIN — MORPHINE SULFATE 1 MG: 2 INJECTION, SOLUTION INTRAMUSCULAR; INTRAVENOUS at 08:10

## 2020-01-01 RX ADMIN — LACTULOSE 20 G: 20 SOLUTION ORAL at 11:10

## 2020-01-01 RX ADMIN — LACTULOSE 20 G: 10 SOLUTION ORAL at 09:11

## 2020-01-01 RX ADMIN — Medication 50 MCG/HR: at 09:11

## 2020-01-01 RX ADMIN — AMIODARONE HYDROCHLORIDE 200 MG: 200 TABLET ORAL at 09:11

## 2020-01-01 RX ADMIN — PHYTONADIONE 10 MG: 10 INJECTION, EMULSION INTRAMUSCULAR; INTRAVENOUS; SUBCUTANEOUS at 12:11

## 2020-01-01 RX ADMIN — NOREPINEPHRINE BITARTRATE 1 MCG/KG/MIN: 1 INJECTION INTRAVENOUS at 08:11

## 2020-01-01 RX ADMIN — FUROSEMIDE 40 MG: 40 TABLET ORAL at 02:10

## 2020-01-01 RX ADMIN — LACTULOSE 20 G: 10 SOLUTION ORAL at 10:11

## 2020-01-01 RX ADMIN — LIDOCAINE HYDROCHLORIDE 60 MG: 20 INJECTION, SOLUTION EPIDURAL; INFILTRATION; INTRACAUDAL; PERINEURAL at 10:09

## 2020-01-01 RX ADMIN — PIPERACILLIN AND TAZOBACTAM 4.5 G: 4; .5 INJECTION, POWDER, LYOPHILIZED, FOR SOLUTION INTRAVENOUS; PARENTERAL at 08:10

## 2020-01-01 RX ADMIN — LIDOCAINE HYDROCHLORIDE 10 ML: 10 INJECTION, SOLUTION INFILTRATION; PERINEURAL at 05:10

## 2020-01-01 RX ADMIN — HYDROCORTISONE: 25 CREAM TOPICAL at 08:10

## 2020-01-01 RX ADMIN — NOREPINEPHRINE BITARTRATE 1.5 MCG/KG/MIN: 1 INJECTION, SOLUTION, CONCENTRATE INTRAVENOUS at 11:11

## 2020-01-01 RX ADMIN — FAMOTIDINE 20 MG: 10 INJECTION INTRAVENOUS at 08:11

## 2020-01-01 RX ADMIN — FAMOTIDINE 20 MG: 10 INJECTION INTRAVENOUS at 09:11

## 2020-01-01 RX ADMIN — LACTULOSE 20 G: 10 SOLUTION ORAL at 08:10

## 2020-01-01 RX ADMIN — LACTULOSE 20 G: 10 SOLUTION ORAL at 08:11

## 2020-01-01 RX ADMIN — SPIRONOLACTONE 100 MG: 100 TABLET, FILM COATED ORAL at 08:10

## 2020-01-01 RX ADMIN — LACTULOSE 20 G: 20 SOLUTION ORAL at 08:10

## 2020-01-01 RX ADMIN — LIDOCAINE HYDROCHLORIDE 10 ML: 10 INJECTION, SOLUTION INFILTRATION; PERINEURAL at 06:10

## 2020-01-01 RX ADMIN — VANCOMYCIN HYDROCHLORIDE 1250 MG: 1.25 INJECTION, POWDER, LYOPHILIZED, FOR SOLUTION INTRAVENOUS at 06:10

## 2020-01-01 RX ADMIN — VASOPRESSIN 0.04 UNITS/MIN: 20 INJECTION INTRAVENOUS at 07:11

## 2020-01-01 RX ADMIN — FLUCONAZOLE 200 MG: 2 INJECTION, SOLUTION INTRAVENOUS at 11:11

## 2020-01-01 RX ADMIN — ONDANSETRON 8 MG: 8 TABLET, ORALLY DISINTEGRATING ORAL at 02:10

## 2020-01-01 RX ADMIN — MIDODRINE HYDROCHLORIDE 15 MG: 5 TABLET ORAL at 05:11

## 2020-01-01 RX ADMIN — HYDROCORTISONE SODIUM SUCCINATE 100 MG: 100 INJECTION, POWDER, FOR SOLUTION INTRAMUSCULAR; INTRAVENOUS at 09:11

## 2020-01-01 RX ADMIN — MIDODRINE HYDROCHLORIDE 15 MG: 5 TABLET ORAL at 11:11

## 2020-01-01 RX ADMIN — OCTREOTIDE ACETATE 50 MCG/HR: 100 INJECTION, SOLUTION INTRAVENOUS; SUBCUTANEOUS at 02:11

## 2020-01-01 RX ADMIN — OCTREOTIDE ACETATE 100 MCG: 100 INJECTION, SOLUTION INTRAVENOUS; SUBCUTANEOUS at 10:10

## 2020-01-01 RX ADMIN — TOBRAMYCIN SULFATE 180 MG: 40 INJECTION, SOLUTION INTRAMUSCULAR; INTRAVENOUS at 05:11

## 2020-01-01 RX ADMIN — VANCOMYCIN HYDROCHLORIDE 750 MG: 750 INJECTION, POWDER, LYOPHILIZED, FOR SOLUTION INTRAVENOUS at 08:11

## 2020-01-01 RX ADMIN — VASOPRESSIN 20 UNITS: 20 INJECTION INTRAVENOUS at 06:10

## 2020-01-01 RX ADMIN — AZITHROMYCIN MONOHYDRATE 500 MG: 500 INJECTION, POWDER, LYOPHILIZED, FOR SOLUTION INTRAVENOUS at 10:11

## 2020-01-01 RX ADMIN — MIDODRINE HYDROCHLORIDE 15 MG: 5 TABLET ORAL at 07:11

## 2020-01-01 RX ADMIN — ROCURONIUM BROMIDE 100 MG: 10 INJECTION, SOLUTION INTRAVENOUS at 02:11

## 2020-01-01 RX ADMIN — Medication 5 MCG/KG/MIN: at 10:11

## 2020-01-01 RX ADMIN — CEFTRIAXONE 1 G: 1 INJECTION, SOLUTION INTRAVENOUS at 04:10

## 2020-01-01 RX ADMIN — VASOPRESSIN 0.04 UNITS/MIN: 20 INJECTION INTRAVENOUS at 02:11

## 2020-01-01 RX ADMIN — HYDROCORTISONE SODIUM SUCCINATE 100 MG: 100 INJECTION, POWDER, FOR SOLUTION INTRAMUSCULAR; INTRAVENOUS at 01:11

## 2020-01-01 RX ADMIN — HYDROCORTISONE SODIUM SUCCINATE 100 MG: 100 INJECTION, POWDER, FOR SOLUTION INTRAMUSCULAR; INTRAVENOUS at 08:11

## 2020-01-01 RX ADMIN — FUROSEMIDE 80 MG: 10 INJECTION, SOLUTION INTRAMUSCULAR; INTRAVENOUS at 07:10

## 2020-01-01 RX ADMIN — SENNOSIDES 5 ML: 8.8 SYRUP ORAL at 12:11

## 2020-01-01 RX ADMIN — NOREPINEPHRINE BITARTRATE 0.96 MCG/KG/MIN: 1 INJECTION, SOLUTION, CONCENTRATE INTRAVENOUS at 09:11

## 2020-01-01 RX ADMIN — PHYTONADIONE 10 MG: 10 INJECTION, EMULSION INTRAMUSCULAR; INTRAVENOUS; SUBCUTANEOUS at 10:11

## 2020-01-01 RX ADMIN — SODIUM CHLORIDE, SODIUM LACTATE, POTASSIUM CHLORIDE, AND CALCIUM CHLORIDE: .6; .31; .03; .02 INJECTION, SOLUTION INTRAVENOUS at 10:09

## 2020-01-01 RX ADMIN — MAGNESIUM SULFATE IN WATER 2 G: 40 INJECTION, SOLUTION INTRAVENOUS at 07:11

## 2020-01-01 RX ADMIN — VASOPRESSIN 0.04 UNITS/MIN: 20 INJECTION INTRAVENOUS at 10:11

## 2020-01-01 RX ADMIN — PROPOFOL 20 MCG/KG/MIN: 10 INJECTION, EMULSION INTRAVENOUS at 04:11

## 2020-01-01 RX ADMIN — AZITHROMYCIN MONOHYDRATE 500 MG: 500 INJECTION, POWDER, LYOPHILIZED, FOR SOLUTION INTRAVENOUS at 04:10

## 2020-01-01 RX ADMIN — FLUCONAZOLE 200 MG: 2 INJECTION, SOLUTION INTRAVENOUS at 08:11

## 2020-01-01 RX ADMIN — RIFAXIMIN 550 MG: 550 TABLET ORAL at 08:10

## 2020-01-01 RX ADMIN — HYDROMORPHONE HYDROCHLORIDE 0.2 MG: 1 INJECTION, SOLUTION INTRAMUSCULAR; INTRAVENOUS; SUBCUTANEOUS at 12:11

## 2020-01-01 RX ADMIN — ZINC SULFATE 220 MG (50 MG) CAPSULE 220 MG: CAPSULE at 02:10

## 2020-01-01 RX ADMIN — LACTULOSE 20 G: 10 SOLUTION ORAL at 02:11

## 2020-01-01 RX ADMIN — RIFAXIMIN 550 MG: 550 TABLET ORAL at 08:11

## 2020-01-01 RX ADMIN — PIPERACILLIN AND TAZOBACTAM 4.5 G: 4; .5 INJECTION, POWDER, LYOPHILIZED, FOR SOLUTION INTRAVENOUS; PARENTERAL at 06:10

## 2020-01-01 RX ADMIN — VASOPRESSIN 0.04 UNITS/MIN: 20 INJECTION INTRAVENOUS at 03:11

## 2020-01-01 RX ADMIN — SENNOSIDES 5 ML: 8.8 SYRUP ORAL at 10:11

## 2020-01-01 RX ADMIN — NOREPINEPHRINE BITARTRATE 1 MCG/KG/MIN: 1 INJECTION, SOLUTION, CONCENTRATE INTRAVENOUS at 06:11

## 2020-01-01 RX ADMIN — PHENYLEPHRINE HYDROCHLORIDE: 10 INJECTION INTRAVENOUS at 01:11

## 2020-01-01 RX ADMIN — Medication 5 MCG/KG/MIN: at 06:11

## 2020-01-01 RX ADMIN — THERA TABS 1 TABLET: TAB at 08:10

## 2020-01-01 RX ADMIN — MEROPENEM AND SODIUM CHLORIDE 1 G: 1 INJECTION, SOLUTION INTRAVENOUS at 01:11

## 2020-01-01 RX ADMIN — LACTULOSE 20 G: 10 SOLUTION ORAL at 03:11

## 2020-01-01 RX ADMIN — INFLUENZA VIRUS VACCINE 0.5 ML: 15; 15; 15; 15 SUSPENSION INTRAMUSCULAR at 11:10

## 2020-01-01 RX ADMIN — OCTREOTIDE ACETATE 50 MCG: 50 INJECTION, SOLUTION INTRAVENOUS; SUBCUTANEOUS at 01:11

## 2020-01-01 RX ADMIN — MIDODRINE HYDROCHLORIDE 5 MG: 5 TABLET ORAL at 10:10

## 2020-01-01 RX ADMIN — PIPERACILLIN AND TAZOBACTAM 4.5 G: 4; .5 INJECTION, POWDER, LYOPHILIZED, FOR SOLUTION INTRAVENOUS; PARENTERAL at 09:10

## 2020-01-01 RX ADMIN — NOREPINEPHRINE BITARTRATE 0.96 MCG/KG/MIN: 1 INJECTION INTRAVENOUS at 11:11

## 2020-01-01 RX ADMIN — MEROPENEM AND SODIUM CHLORIDE 1 G: 1 INJECTION, SOLUTION INTRAVENOUS at 10:11

## 2020-01-01 RX ADMIN — MIDODRINE HYDROCHLORIDE 15 MG: 5 TABLET ORAL at 08:11

## 2020-01-01 RX ADMIN — MIDODRINE HYDROCHLORIDE 10 MG: 5 TABLET ORAL at 11:11

## 2020-01-01 RX ADMIN — FAMOTIDINE 20 MG: 10 INJECTION INTRAVENOUS at 04:11

## 2020-01-01 RX ADMIN — PROPOFOL 10 MCG/KG/MIN: 10 INJECTION, EMULSION INTRAVENOUS at 10:11

## 2020-01-01 RX ADMIN — NOREPINEPHRINE BITARTRATE 0.72 MCG/KG/MIN: 1 INJECTION INTRAVENOUS at 06:11

## 2020-01-01 RX ADMIN — PROPOFOL 30 MCG/KG/MIN: 10 INJECTION, EMULSION INTRAVENOUS at 09:11

## 2020-01-01 RX ADMIN — Medication 0.04 MCG/KG/MIN: at 08:10

## 2020-01-01 RX ADMIN — OCTREOTIDE ACETATE 100 MCG: 100 INJECTION, SOLUTION INTRAVENOUS; SUBCUTANEOUS at 06:10

## 2020-01-01 RX ADMIN — HYDROCORTISONE SODIUM SUCCINATE 100 MG: 100 INJECTION, POWDER, FOR SOLUTION INTRAMUSCULAR; INTRAVENOUS at 10:11

## 2020-01-01 RX ADMIN — SODIUM CHLORIDE: 0.9 INJECTION, SOLUTION INTRAVENOUS at 05:11

## 2020-01-01 RX ADMIN — NOREPINEPHRINE BITARTRATE 3 MCG/KG/MIN: 1 INJECTION, SOLUTION, CONCENTRATE INTRAVENOUS at 08:11

## 2020-01-01 RX ADMIN — Medication 5 MCG/KG/MIN: at 09:11

## 2020-01-01 RX ADMIN — SODIUM CHLORIDE: 0.9 INJECTION, SOLUTION INTRAVENOUS at 06:11

## 2020-01-01 RX ADMIN — MEROPENEM AND SODIUM CHLORIDE 1 G: 1 INJECTION, SOLUTION INTRAVENOUS at 12:11

## 2020-01-01 RX ADMIN — MIDODRINE HYDROCHLORIDE 15 MG: 5 TABLET ORAL at 04:11

## 2020-01-01 RX ADMIN — MEROPENEM AND SODIUM CHLORIDE 1 G: 1 INJECTION, SOLUTION INTRAVENOUS at 08:11

## 2020-01-01 RX ADMIN — SODIUM CHLORIDE 0.54 MCG/KG/MIN: 9 INJECTION, SOLUTION INTRAVENOUS at 02:11

## 2020-01-01 RX ADMIN — OCTREOTIDE ACETATE 100 MCG: 100 INJECTION, SOLUTION INTRAVENOUS; SUBCUTANEOUS at 05:11

## 2020-01-01 RX ADMIN — ALBUMIN (HUMAN) 25 G: 12.5 SOLUTION INTRAVENOUS at 08:11

## 2020-01-01 RX ADMIN — OCTREOTIDE ACETATE 100 MCG: 100 INJECTION, SOLUTION INTRAVENOUS; SUBCUTANEOUS at 09:11

## 2020-01-01 RX ADMIN — MIDODRINE HYDROCHLORIDE 10 MG: 5 TABLET ORAL at 04:11

## 2020-01-01 RX ADMIN — FLUDROCORTISONE ACETATE 100 MCG: 0.1 TABLET ORAL at 01:11

## 2020-01-01 RX ADMIN — ALBUMIN (HUMAN) 25 G: 12.5 SOLUTION INTRAVENOUS at 10:10

## 2020-01-01 RX ADMIN — PIPERACILLIN AND TAZOBACTAM 4.5 G: 4; .5 INJECTION, POWDER, LYOPHILIZED, FOR SOLUTION INTRAVENOUS; PARENTERAL at 09:11

## 2020-01-01 RX ADMIN — DEXMEDETOMIDINE HYDROCHLORIDE 0.2 MCG/KG/HR: 4 INJECTION, SOLUTION INTRAVENOUS at 09:11

## 2020-01-01 RX ADMIN — VASOPRESSIN 0.04 UNITS/MIN: 20 INJECTION INTRAVENOUS at 04:11

## 2020-01-01 RX ADMIN — FUROSEMIDE 60 MG: 10 INJECTION, SOLUTION INTRAMUSCULAR; INTRAVENOUS at 02:10

## 2020-01-01 RX ADMIN — MIDODRINE HYDROCHLORIDE 10 MG: 5 TABLET ORAL at 07:11

## 2020-01-01 RX ADMIN — IPRATROPIUM BROMIDE AND ALBUTEROL SULFATE 3 ML: .5; 3 SOLUTION RESPIRATORY (INHALATION) at 01:10

## 2020-01-01 RX ADMIN — Medication 5 MCG/KG/MIN: at 02:11

## 2020-01-01 RX ADMIN — HYDROMORPHONE HYDROCHLORIDE 0.2 MG: 1 INJECTION, SOLUTION INTRAMUSCULAR; INTRAVENOUS; SUBCUTANEOUS at 05:10

## 2020-01-01 RX ADMIN — FAMOTIDINE 20 MG: 10 INJECTION INTRAVENOUS at 10:11

## 2020-01-01 RX ADMIN — PROPOFOL 60 MG: 10 INJECTION, EMULSION INTRAVENOUS at 10:09

## 2020-01-01 RX ADMIN — MIDODRINE HYDROCHLORIDE 7.5 MG: 2.5 TABLET ORAL at 06:10

## 2020-01-01 RX ADMIN — AMIODARONE HYDROCHLORIDE 150 MG: 1.5 INJECTION, SOLUTION INTRAVENOUS at 12:11

## 2020-01-01 RX ADMIN — PROPOFOL 50 MG: 10 INJECTION, EMULSION INTRAVENOUS at 10:09

## 2020-01-01 RX ADMIN — OCTREOTIDE ACETATE 100 MCG: 100 INJECTION, SOLUTION INTRAVENOUS; SUBCUTANEOUS at 06:11

## 2020-01-01 RX ADMIN — POTASSIUM CHLORIDE 60 MEQ: 1500 TABLET, EXTENDED RELEASE ORAL at 01:10

## 2020-01-01 RX ADMIN — DOBUTAMINE IN DEXTROSE 20 MCG/KG/MIN: 100 INJECTION, SOLUTION INTRAVENOUS at 10:10

## 2020-01-01 RX ADMIN — AMIODARONE HYDROCHLORIDE 0.5 MG/MIN: 1.8 INJECTION, SOLUTION INTRAVENOUS at 02:11

## 2020-01-01 RX ADMIN — FENTANYL CITRATE 50 MCG: 50 INJECTION, SOLUTION INTRAMUSCULAR; INTRAVENOUS at 09:11

## 2020-01-01 RX ADMIN — RIFAXIMIN 550 MG: 550 TABLET ORAL at 09:11

## 2020-01-01 RX ADMIN — AZITHROMYCIN MONOHYDRATE 500 MG: 500 INJECTION, POWDER, LYOPHILIZED, FOR SOLUTION INTRAVENOUS at 03:11

## 2020-01-01 RX ADMIN — MIDAZOLAM HYDROCHLORIDE 1 MG: 1 INJECTION, SOLUTION INTRAMUSCULAR; INTRAVENOUS at 05:10

## 2020-01-01 RX ADMIN — DEXTROSE MONOHYDRATE 12.5 G: 25 INJECTION, SOLUTION INTRAVENOUS at 08:11

## 2020-01-01 RX ADMIN — PROPOFOL 10 MCG/KG/MIN: 10 INJECTION, EMULSION INTRAVENOUS at 06:11

## 2020-01-01 RX ADMIN — NOREPINEPHRINE BITARTRATE 1 MCG/KG/MIN: 1 INJECTION, SOLUTION, CONCENTRATE INTRAVENOUS at 07:11

## 2020-01-01 RX ADMIN — PIPERACILLIN AND TAZOBACTAM 4.5 G: 4; .5 INJECTION, POWDER, LYOPHILIZED, FOR SOLUTION INTRAVENOUS; PARENTERAL at 01:10

## 2020-01-01 RX ADMIN — ZINC SULFATE 220 MG (50 MG) CAPSULE 220 MG: CAPSULE at 08:10

## 2020-01-01 RX ADMIN — POLYETHYLENE GLYCOL 3350 17 G: 17 POWDER, FOR SOLUTION ORAL at 12:11

## 2020-01-01 RX ADMIN — NOREPINEPHRINE BITARTRATE 0.72 MCG/KG/MIN: 1 INJECTION INTRAVENOUS at 04:11

## 2020-01-01 RX ADMIN — Medication 5 MCG/KG/MIN: at 04:11

## 2020-01-01 RX ADMIN — VASOPRESSIN 0.04 UNITS/MIN: 20 INJECTION INTRAVENOUS at 11:11

## 2020-01-01 RX ADMIN — ONDANSETRON 4 MG: 2 INJECTION INTRAMUSCULAR; INTRAVENOUS at 03:10

## 2020-01-01 RX ADMIN — POLYETHYLENE GLYCOL 3350 17 G: 17 POWDER, FOR SOLUTION ORAL at 08:11

## 2020-01-01 RX ADMIN — ONDANSETRON 4 MG: 2 INJECTION INTRAMUSCULAR; INTRAVENOUS at 05:10

## 2020-01-01 RX ADMIN — SODIUM CHLORIDE 0.26 MCG/KG/MIN: 9 INJECTION, SOLUTION INTRAVENOUS at 09:10

## 2020-01-01 RX ADMIN — FLUCONAZOLE 200 MG: 2 INJECTION, SOLUTION INTRAVENOUS at 09:11

## 2020-01-01 RX ADMIN — LIDOCAINE HYDROCHLORIDE 10 ML: 10 INJECTION INFILTRATION; PERINEURAL at 06:10

## 2020-01-01 RX ADMIN — ALBUMIN (HUMAN) 25 G: 12.5 SOLUTION INTRAVENOUS at 06:10

## 2020-01-01 RX ADMIN — MIDODRINE HYDROCHLORIDE 7.5 MG: 2.5 TABLET ORAL at 11:10

## 2020-01-01 RX ADMIN — Medication 5 MCG/KG/MIN: at 08:11

## 2020-01-01 RX ADMIN — OCTREOTIDE ACETATE 100 MCG: 100 INJECTION, SOLUTION INTRAVENOUS; SUBCUTANEOUS at 03:11

## 2020-01-01 RX ADMIN — Medication 220 MG: at 08:10

## 2020-01-01 RX ADMIN — FENTANYL CITRATE 50 MCG: 50 INJECTION, SOLUTION INTRAMUSCULAR; INTRAVENOUS at 11:11

## 2020-01-01 RX ADMIN — Medication 5 MCG/KG/MIN: at 05:11

## 2020-01-01 RX ADMIN — LIDOCAINE HYDROCHLORIDE 10 ML: 10 INJECTION, SOLUTION INFILTRATION; PERINEURAL at 06:11

## 2020-01-01 RX ADMIN — LACTULOSE 20 G: 20 SOLUTION ORAL at 03:10

## 2020-01-01 RX ADMIN — THERA TABS 1 TABLET: TAB at 02:10

## 2020-01-01 RX ADMIN — VANCOMYCIN HYDROCHLORIDE 1000 MG: 1 INJECTION, POWDER, LYOPHILIZED, FOR SOLUTION INTRAVENOUS at 08:11

## 2020-01-01 RX ADMIN — Medication 5 MCG/KG/MIN: at 03:11

## 2020-01-01 RX ADMIN — LACTULOSE 20 G: 20 SOLUTION ORAL at 04:10

## 2020-01-01 RX ADMIN — Medication 5 MCG/KG/MIN: at 07:11

## 2020-01-01 RX ADMIN — AMIODARONE HYDROCHLORIDE 1 MG/MIN: 1.8 INJECTION, SOLUTION INTRAVENOUS at 12:11

## 2020-01-01 RX ADMIN — FUROSEMIDE 20 MG: 10 INJECTION, SOLUTION INTRAMUSCULAR; INTRAVENOUS at 06:10

## 2020-01-01 RX ADMIN — FLUDROCORTISONE ACETATE 100 MCG: 0.1 TABLET ORAL at 09:11

## 2020-01-01 RX ADMIN — SODIUM CHLORIDE: 0.9 INJECTION, SOLUTION INTRAVENOUS at 07:11

## 2020-01-01 RX ADMIN — DEXTROSE MONOHYDRATE 12.5 G: 25 INJECTION, SOLUTION INTRAVENOUS at 12:11

## 2020-01-01 RX ADMIN — AMIODARONE HYDROCHLORIDE 200 MG: 200 TABLET ORAL at 08:11

## 2020-01-01 RX ADMIN — PHYTONADIONE 10 MG: 10 INJECTION, EMULSION INTRAMUSCULAR; INTRAVENOUS; SUBCUTANEOUS at 11:11

## 2020-01-01 RX ADMIN — FLUCONAZOLE 400 MG: 2 INJECTION, SOLUTION INTRAVENOUS at 10:11

## 2020-01-01 RX ADMIN — VASOPRESSIN 0.04 UNITS/MIN: 20 INJECTION INTRAVENOUS at 05:11

## 2020-01-01 RX ADMIN — ALBUMIN (HUMAN) 75 G: 12.5 SOLUTION INTRAVENOUS at 06:10

## 2020-01-01 RX ADMIN — NOREPINEPHRINE BITARTRATE 1.2 MCG/KG/MIN: 1 INJECTION INTRAVENOUS at 02:11

## 2020-01-01 RX ADMIN — SODIUM CHLORIDE, SODIUM LACTATE, POTASSIUM CHLORIDE, AND CALCIUM CHLORIDE 500 ML: .6; .31; .03; .02 INJECTION, SOLUTION INTRAVENOUS at 08:11

## 2020-01-01 RX ADMIN — FUROSEMIDE 40 MG: 40 TABLET ORAL at 08:10

## 2020-01-01 RX ADMIN — NOREPINEPHRINE BITARTRATE 1.48 MCG/KG/MIN: 1 INJECTION, SOLUTION, CONCENTRATE INTRAVENOUS at 09:11

## 2020-01-01 RX ADMIN — NOREPINEPHRINE BITARTRATE 0.84 MCG/KG/MIN: 1 INJECTION INTRAVENOUS at 02:11

## 2020-01-01 RX ADMIN — FUROSEMIDE 20 MG: 10 INJECTION, SOLUTION INTRAMUSCULAR; INTRAVENOUS at 05:10

## 2020-01-01 RX ADMIN — SODIUM PHOSPHATE, MONOBASIC, MONOHYDRATE 20.01 MMOL: 276; 142 INJECTION, SOLUTION INTRAVENOUS at 08:11

## 2020-01-01 RX ADMIN — NOREPINEPHRINE BITARTRATE 3 MCG/KG/MIN: 1 INJECTION, SOLUTION, CONCENTRATE INTRAVENOUS at 10:11

## 2020-01-01 RX ADMIN — AMIODARONE HYDROCHLORIDE 1 MG/MIN: 1.8 INJECTION, SOLUTION INTRAVENOUS at 04:11

## 2020-01-01 RX ADMIN — NOREPINEPHRINE BITARTRATE 2.6 MCG/KG/MIN: 1 INJECTION, SOLUTION, CONCENTRATE INTRAVENOUS at 08:11

## 2020-01-01 RX ADMIN — MIDODRINE HYDROCHLORIDE 10 MG: 5 TABLET ORAL at 04:10

## 2020-01-01 RX ADMIN — MICAFUNGIN SODIUM 100 MG: 20 INJECTION, POWDER, LYOPHILIZED, FOR SOLUTION INTRAVENOUS at 02:11

## 2020-01-01 RX ADMIN — VASOPRESSIN 0.04 UNITS/MIN: 20 INJECTION INTRAVENOUS at 01:11

## 2020-01-01 RX ADMIN — HYDROCORTISONE SODIUM SUCCINATE 100 MG: 100 INJECTION, POWDER, FOR SOLUTION INTRAMUSCULAR; INTRAVENOUS at 11:11

## 2020-01-01 RX ADMIN — NOREPINEPHRINE BITARTRATE 0.62 MCG/KG/MIN: 1 INJECTION, SOLUTION, CONCENTRATE INTRAVENOUS at 06:11

## 2020-01-01 RX ADMIN — NOREPINEPHRINE BITARTRATE 3 MCG/KG/MIN: 1 INJECTION, SOLUTION, CONCENTRATE INTRAVENOUS at 05:11

## 2020-01-01 RX ADMIN — PHYTONADIONE 10 MG: 10 INJECTION, EMULSION INTRAMUSCULAR; INTRAVENOUS; SUBCUTANEOUS at 08:10

## 2020-01-01 RX ADMIN — SENNOSIDES 5 ML: 8.8 SYRUP ORAL at 08:11

## 2020-01-01 RX ADMIN — VASOPRESSIN 0.04 UNITS/MIN: 20 INJECTION INTRAVENOUS at 06:10

## 2020-01-01 RX ADMIN — CALCIUM GLUCONATE 1 G: 94 INJECTION, SOLUTION INTRAVENOUS at 01:11

## 2020-01-01 RX ADMIN — ALBUMIN (HUMAN) 25 G: 12.5 SOLUTION INTRAVENOUS at 10:11

## 2020-01-01 RX ADMIN — NOREPINEPHRINE BITARTRATE 1.64 MCG/KG/MIN: 1 INJECTION, SOLUTION, CONCENTRATE INTRAVENOUS at 12:11

## 2020-01-01 RX ADMIN — OCTREOTIDE ACETATE 100 MCG: 100 INJECTION, SOLUTION INTRAVENOUS; SUBCUTANEOUS at 02:10

## 2020-01-01 RX ADMIN — NOREPINEPHRINE BITARTRATE 1.2 MCG/KG/MIN: 1 INJECTION, SOLUTION, CONCENTRATE INTRAVENOUS at 01:11

## 2020-01-01 RX ADMIN — SODIUM CHLORIDE 0.54 MCG/KG/MIN: 9 INJECTION, SOLUTION INTRAVENOUS at 07:10

## 2020-01-01 RX ADMIN — NOREPINEPHRINE BITARTRATE 1.66 MCG/KG/MIN: 1 INJECTION, SOLUTION, CONCENTRATE INTRAVENOUS at 05:11

## 2020-01-01 RX ADMIN — HYDROMORPHONE HYDROCHLORIDE 0.2 MG: 1 INJECTION, SOLUTION INTRAMUSCULAR; INTRAVENOUS; SUBCUTANEOUS at 08:11

## 2020-01-01 RX ADMIN — Medication 25 MCG/HR: at 02:11

## 2020-01-01 RX ADMIN — DEXTROSE MONOHYDRATE 12.5 G: 25 INJECTION, SOLUTION INTRAVENOUS at 05:11

## 2020-01-01 RX ADMIN — VANCOMYCIN HYDROCHLORIDE 1000 MG: 1 INJECTION, POWDER, LYOPHILIZED, FOR SOLUTION INTRAVENOUS at 09:11

## 2020-01-01 RX ADMIN — PROPOFOL 10 MCG/KG/MIN: 10 INJECTION, EMULSION INTRAVENOUS at 05:11

## 2020-01-01 RX ADMIN — MIDODRINE HYDROCHLORIDE 15 MG: 5 TABLET ORAL at 12:11

## 2020-01-01 RX ADMIN — POLYETHYLENE GLYCOL 3350 17 G: 17 POWDER, FOR SOLUTION ORAL at 11:11

## 2020-01-01 RX ADMIN — IPRATROPIUM BROMIDE AND ALBUTEROL SULFATE 3 ML: .5; 3 SOLUTION RESPIRATORY (INHALATION) at 07:10

## 2020-01-01 RX ADMIN — ALBUMIN (HUMAN) 25 G: 12.5 SOLUTION INTRAVENOUS at 04:11

## 2020-01-01 RX ADMIN — MIDODRINE HYDROCHLORIDE 10 MG: 5 TABLET ORAL at 08:10

## 2020-01-01 RX ADMIN — MIDODRINE HYDROCHLORIDE 10 MG: 5 TABLET ORAL at 12:10

## 2020-01-01 RX ADMIN — NOREPINEPHRINE BITARTRATE 1.46 MCG/KG/MIN: 1 INJECTION, SOLUTION, CONCENTRATE INTRAVENOUS at 07:11

## 2020-01-01 RX ADMIN — NOREPINEPHRINE BITARTRATE 1.06 MCG/KG/MIN: 1 INJECTION, SOLUTION, CONCENTRATE INTRAVENOUS at 12:11

## 2020-01-01 RX ADMIN — LACTULOSE 20 G: 10 SOLUTION ORAL at 02:10

## 2020-01-01 RX ADMIN — DEXTROSE MONOHYDRATE 12.5 G: 25 INJECTION, SOLUTION INTRAVENOUS at 06:11

## 2020-01-01 RX ADMIN — OCTREOTIDE ACETATE 100 MCG: 100 INJECTION, SOLUTION INTRAVENOUS; SUBCUTANEOUS at 11:10

## 2020-01-01 RX ADMIN — Medication 2 SPRAY: at 10:10

## 2020-01-01 RX ADMIN — DEXTROSE: 10 SOLUTION INTRAVENOUS at 09:11

## 2020-01-01 RX ADMIN — NOREPINEPHRINE BITARTRATE 2.2 MCG/KG/MIN: 1 INJECTION, SOLUTION, CONCENTRATE INTRAVENOUS at 05:11

## 2020-01-01 RX ADMIN — MIDODRINE HYDROCHLORIDE 7.5 MG: 2.5 TABLET ORAL at 07:10

## 2020-01-01 RX ADMIN — LORAZEPAM 4 MG: 2 INJECTION INTRAMUSCULAR; INTRAVENOUS at 11:11

## 2020-01-01 RX ADMIN — HYDROCORTISONE ACETATE PRAMOXINE HCL: 1; 1 CREAM TOPICAL at 10:10

## 2020-01-01 RX ADMIN — LACTULOSE 20 G: 10 SOLUTION ORAL at 04:11

## 2020-01-01 RX ADMIN — MIDODRINE HYDROCHLORIDE 10 MG: 5 TABLET ORAL at 05:10

## 2020-01-01 RX ADMIN — VASOPRESSIN 0.04 UNITS/MIN: 20 INJECTION INTRAVENOUS at 09:11

## 2020-01-01 RX ADMIN — PHYTONADIONE 10 MG: 10 INJECTION, EMULSION INTRAMUSCULAR; INTRAVENOUS; SUBCUTANEOUS at 09:11

## 2020-01-01 RX ADMIN — HYDROCORTISONE SODIUM SUCCINATE 100 MG: 100 INJECTION, POWDER, FOR SOLUTION INTRAMUSCULAR; INTRAVENOUS at 04:11

## 2020-01-01 RX ADMIN — Medication 5 MCG/KG/MIN: at 01:11

## 2020-01-01 RX ADMIN — LORAZEPAM 2 MG: 2 INJECTION INTRAMUSCULAR; INTRAVENOUS at 09:11

## 2020-01-01 RX ADMIN — NOREPINEPHRINE BITARTRATE 2.2 MCG/KG/MIN: 1 INJECTION, SOLUTION, CONCENTRATE INTRAVENOUS at 04:11

## 2020-08-24 NOTE — TELEPHONE ENCOUNTER
Attempted to contact patient at [phone] with no answer. Left voicemail message for patient to return call.     Re: schedule appointment to sooner date per Dr. Gray

## 2020-08-26 PROBLEM — E88.01 ALPHA-1-ANTITRYPSIN DEFICIENCY: Status: ACTIVE | Noted: 2020-01-01

## 2020-08-26 PROBLEM — K70.9 ALCOHOLIC LIVER DISEASE: Status: ACTIVE | Noted: 2020-01-01

## 2020-08-26 PROBLEM — K74.69 OTHER CIRRHOSIS OF LIVER: Status: ACTIVE | Noted: 2020-01-01

## 2020-08-26 PROBLEM — R18.8 OTHER ASCITES: Status: ACTIVE | Noted: 2020-01-01

## 2020-08-26 NOTE — Clinical Note
Please send a copy to referring physician, Dr. Cordova at [facility], also let me know when patient brings back the disc to have scanned sac and have our radiologist review.

## 2020-08-26 NOTE — Clinical Note
Outside Imaging reviewed.  Would like to get repeat MRI scan of the abdomen mid to late November to follow-up. Radha please schedule

## 2020-08-26 NOTE — H&P (VIEW-ONLY)
Subjective:     Madhavi Bell is here for evaluation of Cirrhosis      HPI  Madhvai Bell is seen for evaluation and management of cirrhosis.  Of note she was followed here in 2016 but has not followed up since then.  She had a recent inpatient admission at [facility] with ascites requiring paracentesis.  She is on diuretics.  She reports she has taken every day.  She denies any significant issues with ascites at this time.  Overall she actually feels well.  Seems she had lesions that were found on ultrasound.  She reports having a CT scan MRI scan done at [facility] but I do not have a copy of those reports.  She is unaware of what they showed.  She did have an upper endoscopy while there reports that she had varices that required banding.  She has not yet been scheduled for follow-up banding.  She denies any encephalopathy.  She reports she does not drink any alcohol for about 2 months.  She reports commitment to not drinking.    Ultrasound from August 13, 2020 shows cirrhosis of the liver with 2 small hyperechoic lesions within the left lobe.    Lab work from August 14, 2020 shows white blood cell count of 4.7, hemoglobin 12.1, platelets 107, creatinine 0.64, sodium 130 6, albumin 3.1, bilirubin 12.2, alkaline phosphatase 483, , ALT 74, iron percent saturation 35, INR 1.5, alpha-1 antitrypsin low at 25, phenotype ZZ, alpha fetoprotein 8.2, hepatitis-B surface antibody reactive, anti mitochondrial antibody negative, hepatitis-C antibody negative, , ferritin 53, hepatitis-B surface antigen negative, hepatitis-B antibody total negative, ammonia 228, STEPHANIE negative    Review of Systems   Constitutional: Negative.    HENT: Negative.    Eyes: Negative.    Respiratory: Negative.    Cardiovascular: Negative.    Gastrointestinal: Negative.    Genitourinary: Negative.    Musculoskeletal: Negative.    Skin: Positive for color change.   Neurological: Negative.     Psychiatric/Behavioral: Negative.        Objective:     Physical Exam  Vitals signs reviewed.   Constitutional:       General: She is not in acute distress.     Appearance: She is well-developed.   HENT:      Head: Normocephalic and atraumatic.      Mouth/Throat:      Pharynx: No oropharyngeal exudate.   Eyes:      General: Scleral icterus present.         Right eye: No discharge.         Left eye: No discharge.      Pupils: Pupils are equal, round, and reactive to light.   Pulmonary:      Effort: Pulmonary effort is normal. No respiratory distress.      Breath sounds: Normal breath sounds. No wheezing.   Abdominal:      General: There is no distension.      Palpations: Abdomen is soft.      Tenderness: There is no abdominal tenderness.   Skin:     Comments: Jaundice   Neurological:      Mental Status: She is alert and oriented to person, place, and time.   Psychiatric:         Behavior: Behavior normal.         Computed MELD-Na score unavailable. Necessary lab results were not found in the last year.  Computed MELD score unavailable. Necessary lab results were not found in the last year.    WBC   Date Value Ref Range Status   02/24/2016 4.25 3.90 - 12.70 K/uL Final     Hemoglobin   Date Value Ref Range Status   02/24/2016 13.7 12.0 - 16.0 g/dL Final     Hematocrit   Date Value Ref Range Status   02/24/2016 41.3 37.0 - 48.5 % Final     Platelets   Date Value Ref Range Status   02/24/2016 156 150 - 350 K/uL Final     BUN, Bld   Date Value Ref Range Status   03/23/2016 7 6 - 20 mg/dL Final     Creatinine   Date Value Ref Range Status   03/23/2016 0.7 0.5 - 1.4 mg/dL Final     Glucose   Date Value Ref Range Status   03/23/2016 76 70 - 110 mg/dL Final     Calcium   Date Value Ref Range Status   03/23/2016 9.0 8.7 - 10.5 mg/dL Final     Sodium   Date Value Ref Range Status   03/23/2016 135 (L) 136 - 145 mmol/L Final     Potassium   Date Value Ref Range Status   03/23/2016 4.2 3.5 - 5.1 mmol/L Final     Chloride   Date  Value Ref Range Status   03/23/2016 102 95 - 110 mmol/L Final     AST   Date Value Ref Range Status   03/23/2016 89 (H) 10 - 40 U/L Final     ALT   Date Value Ref Range Status   03/23/2016 72 (H) 10 - 44 U/L Final     Alkaline Phosphatase   Date Value Ref Range Status   03/23/2016 221 (H) 55 - 135 U/L Final     Total Bilirubin   Date Value Ref Range Status   03/23/2016 3.0 (H) 0.1 - 1.0 mg/dL Final     Comment:     For infants and newborns, interpretation of results should be based  on gestational age, weight and in agreement with clinical  observations.  Premature Infant recommended reference ranges:  Up to 24 hours.............<8.0 mg/dL  Up to 48 hours............<12.0 mg/dL  3-5 days..................<15.0 mg/dL  6-29 days.................<15.0 mg/dL       Albumin   Date Value Ref Range Status   03/23/2016 3.0 (L) 3.5 - 5.2 g/dL Final     INR   Date Value Ref Range Status   02/24/2016 1.1 0.8 - 1.2 Final     Comment:     Coumadin Therapy:  2.0 - 3.0 for INR for all indicators except mechanical heart valves  and antiphospholipid syndromes which should use 2.5 - 3.5.           Assessment/Plan:     1. Other cirrhosis of liver    2. Other ascites    3. Alpha-1-antitrypsin deficiency    4. Alcoholic liver disease    5. Need for hepatitis A vaccination    6. Liver disease, unspecified    7. Liver lesion    8. Secondary esophageal varices without bleeding      Madhavi Hamptonox is a 44 y.o. female withCirrhosis    Other cirrhosis of liver-likely combination of alpha-1 antitrypsin deficiency and alcohol.  Significant decompensation with elevated meld score.  Patient seems to have a more acute on chronic decompensation requiring her recent hospitalization.  -check meld labs today  -advised that meld score may go down as she gets further out from alcohol but I am concerned given her underlying liver disease that her meld may not go below 15 in transplant may be needed  -started discussions about transplant evaluation in  the process  -     Comprehensive metabolic panel; Standing  -     CBC auto differential; Standing  -     AFP tumor marker; Future; Expected date: 08/26/2020  -     Protime-INR; Standing  -     CT Abdomen With Without Contrast; Future; Expected date: 08/26/2020  -     Vitamin D; Future; Expected date: 08/26/2020  -     Zinc; Future; Expected date: 08/26/2020    Other ascites-improving  -continue current diuretics  -low-salt diet    Alpha-1-antitrypsin deficiency-ZZ phenotype without any respiratory symptoms    Alcoholic liver disease-concerned about significant component of alcohol-related liver disease  -advised continued alcohol abstinence  -     Comprehensive metabolic panel; Standing  -     CBC auto differential; Standing    Need for hepatitis A vaccination-she started vaccination    Liver lesion-unclear with the MRI and CT scan showed that she artery had done  -advised that she get her outside images and sent here for review  -     AFP tumor marker; Future; Expected date: 08/26/2020  -     CT Abdomen With Without Contrast; Future; Expected date: 08/26/2020    Secondary esophageal varices without bleeding-on primary prevention.  At the outside hospital they started with banding.  Therefore will repeat EGD.  If varices look like they are at risk of bleeding would proceed with banding otherwise would plan to use beta-blocker therapy.  -     Case request GI: EGD (ESOPHAGOGASTRODUODENOSCOPY)  -     COVID-19 Routine Screening; Future; Expected date: 08/26/2020      Return to clinic in 4 weeks with preclinic labs with nurse practitioner and in 8 weeks from now with preclinic labs with rhonda Gray MD

## 2020-08-26 NOTE — PROGRESS NOTES
Subjective:     Madhavi Bell is here for evaluation of Cirrhosis      HPI  Madhavi Bell is seen for evaluation and management of cirrhosis.  Of note she was followed here in 2016 but has not followed up since then.  She had a recent inpatient admission at [facility] with ascites requiring paracentesis.  She is on diuretics.  She reports she has taken every day.  She denies any significant issues with ascites at this time.  Overall she actually feels well.  Seems she had lesions that were found on ultrasound.  She reports having a CT scan MRI scan done at [facility] but I do not have a copy of those reports.  She is unaware of what they showed.  She did have an upper endoscopy while there reports that she had varices that required banding.  She has not yet been scheduled for follow-up banding.  She denies any encephalopathy.  She reports she does not drink any alcohol for about 2 months.  She reports commitment to not drinking.    Ultrasound from August 13, 2020 shows cirrhosis of the liver with 2 small hyperechoic lesions within the left lobe.    Lab work from August 14, 2020 shows white blood cell count of 4.7, hemoglobin 12.1, platelets 107, creatinine 0.64, sodium 130 6, albumin 3.1, bilirubin 12.2, alkaline phosphatase 483, , ALT 74, iron percent saturation 35, INR 1.5, alpha-1 antitrypsin low at 25, phenotype ZZ, alpha fetoprotein 8.2, hepatitis-B surface antibody reactive, anti mitochondrial antibody negative, hepatitis-C antibody negative, , ferritin 53, hepatitis-B surface antigen negative, hepatitis-B antibody total negative, ammonia 228, STEPHANIE negative    Review of Systems   Constitutional: Negative.    HENT: Negative.    Eyes: Negative.    Respiratory: Negative.    Cardiovascular: Negative.    Gastrointestinal: Negative.    Genitourinary: Negative.    Musculoskeletal: Negative.    Skin: Positive for color change.   Neurological: Negative.     Psychiatric/Behavioral: Negative.        Objective:     Physical Exam  Vitals signs reviewed.   Constitutional:       General: She is not in acute distress.     Appearance: She is well-developed.   HENT:      Head: Normocephalic and atraumatic.      Mouth/Throat:      Pharynx: No oropharyngeal exudate.   Eyes:      General: Scleral icterus present.         Right eye: No discharge.         Left eye: No discharge.      Pupils: Pupils are equal, round, and reactive to light.   Pulmonary:      Effort: Pulmonary effort is normal. No respiratory distress.      Breath sounds: Normal breath sounds. No wheezing.   Abdominal:      General: There is no distension.      Palpations: Abdomen is soft.      Tenderness: There is no abdominal tenderness.   Skin:     Comments: Jaundice   Neurological:      Mental Status: She is alert and oriented to person, place, and time.   Psychiatric:         Behavior: Behavior normal.         Computed MELD-Na score unavailable. Necessary lab results were not found in the last year.  Computed MELD score unavailable. Necessary lab results were not found in the last year.    WBC   Date Value Ref Range Status   02/24/2016 4.25 3.90 - 12.70 K/uL Final     Hemoglobin   Date Value Ref Range Status   02/24/2016 13.7 12.0 - 16.0 g/dL Final     Hematocrit   Date Value Ref Range Status   02/24/2016 41.3 37.0 - 48.5 % Final     Platelets   Date Value Ref Range Status   02/24/2016 156 150 - 350 K/uL Final     BUN, Bld   Date Value Ref Range Status   03/23/2016 7 6 - 20 mg/dL Final     Creatinine   Date Value Ref Range Status   03/23/2016 0.7 0.5 - 1.4 mg/dL Final     Glucose   Date Value Ref Range Status   03/23/2016 76 70 - 110 mg/dL Final     Calcium   Date Value Ref Range Status   03/23/2016 9.0 8.7 - 10.5 mg/dL Final     Sodium   Date Value Ref Range Status   03/23/2016 135 (L) 136 - 145 mmol/L Final     Potassium   Date Value Ref Range Status   03/23/2016 4.2 3.5 - 5.1 mmol/L Final     Chloride   Date  Value Ref Range Status   03/23/2016 102 95 - 110 mmol/L Final     AST   Date Value Ref Range Status   03/23/2016 89 (H) 10 - 40 U/L Final     ALT   Date Value Ref Range Status   03/23/2016 72 (H) 10 - 44 U/L Final     Alkaline Phosphatase   Date Value Ref Range Status   03/23/2016 221 (H) 55 - 135 U/L Final     Total Bilirubin   Date Value Ref Range Status   03/23/2016 3.0 (H) 0.1 - 1.0 mg/dL Final     Comment:     For infants and newborns, interpretation of results should be based  on gestational age, weight and in agreement with clinical  observations.  Premature Infant recommended reference ranges:  Up to 24 hours.............<8.0 mg/dL  Up to 48 hours............<12.0 mg/dL  3-5 days..................<15.0 mg/dL  6-29 days.................<15.0 mg/dL       Albumin   Date Value Ref Range Status   03/23/2016 3.0 (L) 3.5 - 5.2 g/dL Final     INR   Date Value Ref Range Status   02/24/2016 1.1 0.8 - 1.2 Final     Comment:     Coumadin Therapy:  2.0 - 3.0 for INR for all indicators except mechanical heart valves  and antiphospholipid syndromes which should use 2.5 - 3.5.           Assessment/Plan:     1. Other cirrhosis of liver    2. Other ascites    3. Alpha-1-antitrypsin deficiency    4. Alcoholic liver disease    5. Need for hepatitis A vaccination    6. Liver disease, unspecified    7. Liver lesion    8. Secondary esophageal varices without bleeding      Madhavi Hamptonox is a 44 y.o. female withCirrhosis    Other cirrhosis of liver-likely combination of alpha-1 antitrypsin deficiency and alcohol.  Significant decompensation with elevated meld score.  Patient seems to have a more acute on chronic decompensation requiring her recent hospitalization.  -check meld labs today  -advised that meld score may go down as she gets further out from alcohol but I am concerned given her underlying liver disease that her meld may not go below 15 in transplant may be needed  -started discussions about transplant evaluation in  the process  -     Comprehensive metabolic panel; Standing  -     CBC auto differential; Standing  -     AFP tumor marker; Future; Expected date: 08/26/2020  -     Protime-INR; Standing  -     CT Abdomen With Without Contrast; Future; Expected date: 08/26/2020  -     Vitamin D; Future; Expected date: 08/26/2020  -     Zinc; Future; Expected date: 08/26/2020    Other ascites-improving  -continue current diuretics  -low-salt diet    Alpha-1-antitrypsin deficiency-ZZ phenotype without any respiratory symptoms    Alcoholic liver disease-concerned about significant component of alcohol-related liver disease  -advised continued alcohol abstinence  -     Comprehensive metabolic panel; Standing  -     CBC auto differential; Standing    Need for hepatitis A vaccination-she started vaccination    Liver lesion-unclear with the MRI and CT scan showed that she artery had done  -advised that she get her outside images and sent here for review  -     AFP tumor marker; Future; Expected date: 08/26/2020  -     CT Abdomen With Without Contrast; Future; Expected date: 08/26/2020    Secondary esophageal varices without bleeding-on primary prevention.  At the outside hospital they started with banding.  Therefore will repeat EGD.  If varices look like they are at risk of bleeding would proceed with banding otherwise would plan to use beta-blocker therapy.  -     Case request GI: EGD (ESOPHAGOGASTRODUODENOSCOPY)  -     COVID-19 Routine Screening; Future; Expected date: 08/26/2020      Return to clinic in 4 weeks with preclinic labs with nurse practitioner and in 8 weeks from now with preclinic labs with rhonda Gray MD        Addendum    MRI scan from August 18, 2020 shows cirrhosis with nodular contour and market heterogeneity with multiple regenerating nodules a few which demonstrate mild enhancement.  Dominant nodule in the right lobe of the liver 3.4 cm.  There is no robust arterial enhancement or washout.  Perihepatic and  perisplenic ascites, splenomegaly, mildly edematous pancreas concerning for acute pancreatitis.  Status post cholecystectomy    CT scan of the abdomen from August 17, 2020 shows cirrhotic liver, hepatosplenomegaly, ascites, extensive esophageal varices, no focal abnormal mass identified.

## 2020-08-26 NOTE — LETTER
August 26, 2020      Julieth Cordova MD  [address]  [place] [address]           [facility]  [address]  [place] [address]  Phone: [phone]  Fax: [phone]          Patient: Madhavi Bell   MR Number: [id number]   YOB: 1975   Date of Visit: 8/26/2020       Dear Dr. Julieth Cordova:    Thank you for referring Madhavi Bell to me for evaluation. Attached you will find relevant portions of my assessment and plan of care.    If you have questions, please do not hesitate to call me. I look forward to following Madhavi Bell along with you.    Sincerely,    Maria De Jesus Gray MD    Enclosure  CC:  No Recipients    If you would like to receive this communication electronically, please contact [email or web address] or [phone] to request more information on [employer] access.    For providers and/or their staff who would like to refer a patient to [facility], please contact us through our one-stop-shop provider referral line, [facility], at [phone].    If you feel you have received this communication in error or would no longer like to receive these types of communications, please e-mail [email or web address]

## 2020-08-28 NOTE — PROGRESS NOTES
Copy of office visit note from on 08/26/2020 with Dr. Maria De Jesus Gray has been faxed to Dr. Jacky Cordova.Pending disc from patient.--- ynb

## 2020-09-08 NOTE — TELEPHONE ENCOUNTER
Disc has been placed in radiology box on first floor registration to be uploaded into patient chart. Report has been placed in Dr. Maria De Jesus Gray folder for review.

## 2020-09-08 NOTE — TELEPHONE ENCOUNTER
----- Message from Radha Muniz LPN sent at 9/1/2020 11:09 AM CDT -----  Regarding: Radiology Disc  Patient to bring radiology disc to have scanned into chart for radiologist to review.

## 2020-09-09 NOTE — ANESTHESIA POSTPROCEDURE EVALUATION
Anesthesia Post Evaluation    Patient: Madhavi Carr Adcox    Procedure(s) Performed: Procedure(s) (LRB):  EGD (ESOPHAGOGASTRODUODENOSCOPY)-need rapid covid (N/A)    Final Anesthesia Type: MAC    Patient location during evaluation: PACU  Patient participation: Yes- Able to Participate  Level of consciousness: awake and alert and oriented  Post-procedure vital signs: reviewed and stable  Pain management: adequate  Airway patency: patent  CASSIA mitigation strategies: Extubation while patient is awake, Multimodal analgesia, Verification of full reversal of neuromuscular block and Extubation and recovery carried out in lateral, semiupright, or other nonsupine position  PONV status at discharge: No PONV  Anesthetic complications: no      Cardiovascular status: blood pressure returned to baseline and hemodynamically stable  Respiratory status: unassisted, spontaneous ventilation and room air  Hydration status: euvolemic  Follow-up not needed.          Vitals Value Taken Time   /73 09/09/20 1039   Temp 36.6 °C (97.8 °F) 09/09/20 1039   Pulse 90 09/09/20 1039   Resp 17 09/09/20 1039   SpO2 98 % 09/09/20 1039           Pain/David Score: David Score: 10 (9/9/2020 10:39 AM)

## 2020-09-09 NOTE — TRANSFER OF CARE
"Anesthesia Transfer of Care Note    Patient: Madhavi Carr Adcox    Procedure(s) Performed: Procedure(s) (LRB):  EGD (ESOPHAGOGASTRODUODENOSCOPY)-need rapid covid (N/A)    Patient location: PACU    Anesthesia Type: MAC    Transport from OR: Transported from OR on room air with adequate spontaneous ventilation    Post pain: adequate analgesia    Post assessment: no apparent anesthetic complications and tolerated procedure well    Post vital signs: stable    Level of consciousness: awake, alert and oriented    Nausea/Vomiting: no nausea/vomiting    Complications: none    Transfer of care protocol was followed      Last vitals:   Visit Vitals  /73 (BP Location: Left arm, Patient Position: Lying)   Pulse 90   Temp 36.6 °C (97.8 °F) (Temporal)   Resp 17   Ht 5' 7" (1.702 m)   Wt 69.7 kg (153 lb 10.6 oz)   LMP 08/25/2020   SpO2 98%   BMI 24.07 kg/m²     "

## 2020-09-09 NOTE — DISCHARGE INSTRUCTIONS

## 2020-09-09 NOTE — ANESTHESIA PREPROCEDURE EVALUATION
09/08/2020  Madhavi Bell is a 44 y.o., female.    Anesthesia Evaluation    I have reviewed the Patient Summary Reports.    I have reviewed the Nursing Notes. I have reviewed the NPO Status.   I have reviewed the Medications.     Review of Systems  Anesthesia Hx:  No problems with previous Anesthesia Denies Hx of Anesthetic complications  History of prior surgery of interest to airway management or planning: Previous anesthesia: MAC Denies Family Hx of Anesthesia complications.   Denies Personal Hx of Anesthesia complications.   Social:  Alcohol Use    Hematology/Oncology:  Hematology Normal   Oncology Normal   Hematology Comments: 8/26/2020 14:11  WBC: 4.71  RBC: 3.74 (L)  Hemoglobin: 12.2  Hematocrit: 37.0  MCV: 99 (H)  MCH: 32.6 (H)  MCHC: 33.0  RDW: 15.9 (H)  Platelets: 124 (L)    8/26/2020 14:11  Protime: 15.1 (H)  INR: 1.4 (H)      EENT/Dental:EENT/Dental Normal   Cardiovascular:  Cardiovascular Normal Exercise tolerance: good     Pulmonary:   Alpha-1 antitrypsin deficiency   Renal/:  Renal/ Normal     Hepatic/GI:   Liver Disease, Alcoholic cirrhosis, eosinophilic esophagitis, esophageal stricture   Musculoskeletal:  Musculoskeletal Normal    OB/GYN/PEDS:  UPT Negative   Neurological:  Neurology Normal    Endocrine:  Endocrine Normal 8/26/2020 14:11  Sodium: 132 (L)  Potassium: 3.7  Chloride: 98  CO2: 25  Anion Gap: 9  BUN, Bld: 8  Creatinine: 0.8  eGFR if non : >60  eGFR if African American: >60  Glucose: 147 (H)  Calcium: 7.9 (L)  Alkaline Phosphatase: 545 (H)  PROTEIN TOTAL: 6.9  Albumin: 2.1 (L)  BILIRUBIN TOTAL: 14.2 (H)  AST: 134 (H)  ALT: 95 (H)   Dermatological:  Skin Normal    Psych:  Psychiatric Normal           Physical Exam  General:  Well nourished, Jaundice    Airway/Jaw/Neck:  Airway Findings: Mouth Opening: Normal Tongue: Normal  General Airway Assessment: Adult   Mallampati: II  TM Distance: Normal, at least 6 cm     Eyes/Ears/Nose:  EYES/EARS/NOSE FINDINGS: Normal   Dental:  Dental Findings: In tact   Chest/Lungs:  Chest/Lungs Clear    Heart/Vascular:  Heart Findings: Normal Heart murmur: negative Vascular Findings: Normal    Abdomen:  Abdomen Findings: Normal    Musculoskeletal:  Musculoskeletal Findings: Normal   Skin:  Skin Findings: Normal    Mental Status:  Mental Status Findings:  Alert and Oriented, Cooperative         Anesthesia Plan  Type of Anesthesia, risks & benefits discussed:  Anesthesia Type:  MAC  Patient's Preference:   Intra-op Monitoring Plan: standard ASA monitors  Intra-op Monitoring Plan Comments:   Post Op Pain Control Plan: per primary service following discharge from PACU  Post Op Pain Control Plan Comments:   Induction:   IV  Beta Blocker:  Patient is not currently on a Beta-Blocker (No further documentation required).       Informed Consent: Patient understands risks and agrees with Anesthesia plan.  Questions answered. Anesthesia consent signed with patient.  ASA Score: 2     Day of Surgery Review of History & Physical: I have interviewed and examined the patient. I have reviewed the patient's H&P dated: 08/26/2020. There are no significant changes.          Ready For Surgery From Anesthesia Perspective.

## 2020-09-09 NOTE — INTERVAL H&P NOTE
The patient has been examined and the H&P has been reviewed:    I concur with the findings and no changes have occurred since H&P was written.    EGD risks, benefits and alternative options discussed and understood by patient/family.          There are no hospital problems to display for this patient.

## 2020-09-09 NOTE — [employer] PATIENT INSTRUCTIONS
Discharge Summary/Instructions after an Endoscopic Procedure  Patient Name: Madhavi Bell  Patient MRN: [id number]  Patient YOB: 1975  Wednesday, September 9, 2020 Jose Smith MD  RESTRICTIONS:  During your procedure today, you received medications for sedation.  These   medications may affect your judgment, balance and coordination.  Therefore,   for 24 hours, you have the following restrictions:   - DO NOT drive a car, operate machinery, make legal/financial decisions,   sign important papers or drink alcohol.    ACTIVITY:  Today: no heavy lifting, straining or running due to procedural   sedation/anesthesia.  The following day: return to full activity including work.  DIET:  Eat and drink normally unless instructed otherwise.     TREATMENT FOR COMMON SIDE EFFECTS:  - Mild abdominal pain, nausea, belching, bloating or excessive gas:  rest,   eat lightly and use a heating pad.  - Sore Throat: treat with throat lozenges and/or gargle with warm salt   water.  - Because air was used during the procedure, expelling large amounts of air   from your rectum or belching is normal.  - If a bowel prep was taken, you may not have a bowel movement for 1-3 days.    This is normal.  SYMPTOMS TO WATCH FOR AND REPORT TO YOUR PHYSICIAN:  1. Abdominal pain or bloating, other than gas cramps.  2. Chest pain.  3. Back pain.  4. Signs of infection such as: chills or fever occurring within 24 hours   after the procedure.  5. Rectal bleeding, which would show as bright red, maroon, or black stools.   (A tablespoon of blood from the rectum is not serious, especially if   hemorrhoids are present.)  6. Vomiting.  7. Weakness or dizziness.  GO DIRECTLY TO THE NEAREST EMERGENCY ROOM IF YOU HAVE ANY OF THE FOLLOWING:      Difficulty breathing              Chills and/or fever over 101 F   Persistent vomiting and/or vomiting blood   Severe abdominal pain   Severe chest pain   Black, tarry stools   Bleeding- more than one  tablespoon   Any other symptom or condition that you feel may need urgent attention  Your doctor recommends these additional instructions:  If any biopsies were taken, your doctors clinic will contact you in 1 to 2   weeks with any results.  - Discharge patient to home.   - Resume previous diet.   - Continue present medications.   - Follow up in hepatology clinic  For questions, problems or results please call your physician Jose Smith MD at Work:  [phone]  If you have any questions about the above instructions, call the GI   department at [phone] or call the endoscopy unit at [phone]   from 7am until 3 pm.  [facility], EMERGENCY ROOM PHONE NUMBER:   [phone]  IF A COMPLICATION OR EMERGENCY SITUATION ARISES AND YOU ARE UNABLE TO REACH   YOUR PHYSICIAN - GO DIRECTLY TO THE EMERGENCY ROOM.  I have read or have had read to me these discharge instructions for my   procedure and have received a written copy.  I understand these   instructions and will follow-up with my physician if I have any questions.     __________________________________       _____________________________________  Nurse Signature                                          Patient/Designated   Responsible Party Signature  MD Jose Garrison MD  9/9/2020 10:54:44 AM  This report has been verified and signed electronically.  [employer]

## 2020-09-09 NOTE — PLAN OF CARE
MD at , speaking with pt, all questions answered, pt denies c/o pain, VSS, dc instructions reviewed, verbalized understanding, pt ok to dc to home when criteria met

## 2020-09-23 NOTE — TELEPHONE ENCOUNTER
Called patient had to leave message.  Since she being referred for liver transplant evaluation I called to see if she had any questions since I did not get to talk with her earlier while she was seeing nurse practitioner Kenneth.

## 2020-09-23 NOTE — PROGRESS NOTES
Clinic Follow Up:  [facility] Follow Up Note    Reason for Follow Up:  The primary encounter diagnosis was Other ascites. Diagnoses of Other cirrhosis of liver, Alpha-1-antitrypsin deficiency, Alcoholic liver disease, and Liver lesion were also pertinent to this visit.    PCP: Anurag Knutson       HPI:  This is a 44 y.o. female here for follow up of the above  Pt previously seen by Dr. Gray  Today, she states that since her last visit, she has been feeling overall stable.  She has had progressive return of ascites.  She states that after her last visit, she reviewed her medications and is taking lasix 20mg and aldactone 50mg once daily.  She was on 40 and 100 in the hospital.  She is unclear as to why the dose was changed upon discharge.   Waiting for labs to be resulted today.   She denies any upper GI bleeding.  No overt confusion.   Per Dr. Gray's note, she will need an MRI in 3 months for further evaluation of previously seen lesion at [facility].       Review of Systems   Constitutional: Negative for chills, fever, malaise/fatigue and weight loss.   Respiratory: Negative for cough.    Cardiovascular: Negative for chest pain.   Gastrointestinal:        Per HPI   Musculoskeletal: Negative for myalgias.   Skin: Negative for itching and rash.   Neurological: Negative for headaches.   Psychiatric/Behavioral: The patient is not nervous/anxious.        Medical History:  Past Medical History:   Diagnosis Date    Alpha-1-antitrypsin deficiency     ZZ phenotype    Eosinophilic esophagitis     h/o pill impaction, bx proven    Hyperlipidemia     may be related to liver enzymes, 2ary to high HDL    Schatzki's ring     s/p dilatation       Surgical History:   Past Surgical History:   Procedure Laterality Date    APPENDECTOMY       SECTION      CHOLECYSTECTOMY      DILATION AND CURETTAGE OF UTERUS      ESOPHAGOGASTRODUODENOSCOPY N/A 2020    Procedure: EGD (ESOPHAGOGASTRODUODENOSCOPY)-need  "rapid covid;  Surgeon: Jose Smith MD;  Location: [facility];  Service: Endoscopy;  Laterality: N/A;       Family History:   Family History   Problem Relation Age of Onset    Breast cancer Neg Hx     Colon cancer Neg Hx     Ovarian cancer Neg Hx        Social History:   Social History     Tobacco Use    Smoking status: Never Smoker   Substance Use Topics    Alcohol use: Yes     Alcohol/week: 12.0 standard drinks     Types: 12 Cans of beer per week    Drug use: No       Allergies: Reviewed    Home Medications:  Current Outpatient Medications on File Prior to Visit   Medication Sig Dispense Refill    ergocalciferol (ERGOCALCIFEROL) 50,000 unit Cap Take 1 capsule (50,000 Units total) by mouth every 7 days. 12 capsule 0    furosemide (LASIX) 40 MG tablet TK 1 T PO ONCE D      lactulose (CHRONULAC) 10 gram/15 mL solution DRINK 30 MLS PO QD      spironolactone (ALDACTONE) 100 MG tablet TK 1 T PO ONCE D      zinc gluconate 50 mg tablet Take 50 mg by mouth once daily.       No current facility-administered medications on file prior to visit.        Physical Exam:  Vital Signs:  /70   Pulse 94   Ht 5' 7" (1.702 m)   Wt 72 kg (158 lb 11.7 oz)   LMP 08/25/2020   BMI 24.86 kg/m²   Body mass index is 24.86 kg/m².  Physical Exam   Constitutional: She is oriented to person, place, and time. She appears well-developed and well-nourished.   HENT:   Head: Normocephalic.   Eyes: Scleral icterus is present.   Neck: Normal range of motion.   Cardiovascular: Normal rate.   Pulmonary/Chest: Effort normal.   Abdominal: She exhibits distension (ascites). There is abdominal tenderness.   Musculoskeletal: Normal range of motion.   Neurological: She is alert and oriented to person, place, and time.   Skin: Skin is dry.   jaundice   Psychiatric: She has a normal mood and affect.   Vitals reviewed.      Labs: Pertinent labs reviewed.  MELD-Na score: 24 at 9/23/2020 10:21 AM  MELD score: 21 at 9/23/2020 10:21 " AM  Calculated from:  Serum Creatinine: 0.7 mg/dL (Rounded to 1 mg/dL) at 9/23/2020 10:21 AM  Serum Sodium: 132 mmol/L at 9/23/2020 10:21 AM  Total Bilirubin: 13.8 mg/dL at 9/23/2020 10:21 AM  INR(ratio): 1.5 at 9/23/2020 10:21 AM  Age: 44 years 10 months    Assessment:  1. Other ascites    2. Other cirrhosis of liver    3. Alpha-1-antitrypsin deficiency    4. Alcoholic liver disease    5. Liver lesion        Recommendations:  Case discussed with Dr. Gray  - given that the MELD has remained elevated, will plan for transplant evaluation.   - Will have her increase Lasix to 40mg and Aldactone 100mg   - repeat BMP on Monday to recheck electrolyte status.   - Low sodium diet discussed.   - continue with F/U with Dr. Gray as planned.       Return to Clinic:    As previously planned.

## 2020-09-24 NOTE — TELEPHONE ENCOUNTER
MELD 24.  Per Dr. Gray's recommendation, patient to start liver transplant evaluation.  Requesting financial clearance to proceed.    =====================================    ----- Message from JUSTIN Wayne sent at 9/23/2020 12:55 PM CDT -----  Per Dr. Gray, please schedule for transplant evaluation.

## 2020-09-25 NOTE — TELEPHONE ENCOUNTER
Transplant evaluation recommended by Dr. Gray.  CLEARED FOR LIVER TRANSPLANT EVALUATION.  Called pt to discuss [facility] liver transplant evaluation.  No answer at this time. Message left on VM. Return phone call requested to discuss evaluation and her/ his availability. Contact info provided. Awaiting callback.   ------------------------------------------------  Received call back from patient.  Informed her that financial clearance has been obtained from the insurance company to initiate liver transplant evaluation.  Informed patient that evaluation will take approx 2 days to complete.  Informed her that all testing will be done outpatient in [place] at [facility]. Patient voiced understanding of the need to have her caregiver present for the  and surgeon consult, as well as for the patient education.  All questions/ concerns regarding liver transplant evaluation answered/ addressed.    Orders for liver transplant evaluation entered and submitted to  for scheduling.  Will schedule appts 10/8 and 10/9.

## 2020-10-04 PROBLEM — R18.8 ASCITES: Status: ACTIVE | Noted: 2020-01-01

## 2020-10-04 PROBLEM — J90 PLEURAL EFFUSION ON RIGHT: Status: ACTIVE | Noted: 2020-01-01

## 2020-10-04 PROBLEM — E87.1 HYPONATREMIA: Status: ACTIVE | Noted: 2020-01-01

## 2020-10-04 PROBLEM — E88.09 HYPOALBUMINEMIA: Status: ACTIVE | Noted: 2020-01-01

## 2020-10-04 PROBLEM — J18.9 PNEUMONIA: Status: ACTIVE | Noted: 2020-01-01

## 2020-10-04 NOTE — HPI
"This is a 43 yo female with PMHx of  HLD, Schatzki's ring, Alpha-1-antitrypsin deficiency with cirrohosis of the liver, and recent paracentesis in August with 1.5 Liters of fluid removed.  Patient came to ER for evaluation of increased SOB over the past week which has been becoming progressively worse. Patient reports she was concerned she might have Covid virus and went to the  [facility] for evaluation. She reports she was told that she had an  " abnormal CXR" and was sent to the ER for further evaluation and treatment. Patient was evaluated in our ER and found to have a large right pleural effusion and pneumonia.     Patient reports she has an appointment with [facility] in [place] later this week to discuss getting on the liver transplant list.         Per report, a COVID-19 test was negative done at [facility] earlier today.     Patient reports she has not had a pleural effusion before. Code status discussed with patient and she is a Full Code.     Patient placed in Observation.   "

## 2020-10-04 NOTE — ED NOTES
Pt showed proof of a negative covid test performed today at [facility]. Dr. Dahl provided picture of form and added to pt's chart.

## 2020-10-04 NOTE — ASSESSMENT & PLAN NOTE
Monitor O2 sats, supplemental as needed  Telemetry  Consult Pulmonology for evaluation- patient reports no prior Hx of effusion     Continue Iv lasix and home spironalactone  Suspect patient will need a thoracentesis  Check INR  Check echocardiogram

## 2020-10-04 NOTE — ED PROVIDER NOTES
"SCRIBE #1 NOTE: I, Poonam Tolbert, am scribing for, and in the presence of, Familia Dahl Jr., MD. I have scribed the entire note.       History     Chief Complaint   Patient presents with    Shortness of Breath     pt c/o SOB, x1 week; sent to ED by [facility] for further eval/tx of abnormal cxray     Review of patient's allergies indicates:  No Known Allergies      History of Present Illness     HPI    10/4/2020, 2:18 PM  History obtained from the patient      History of Present Illness: Madhavi Bell is a 44 y.o. female patient with a PMHx of HLD, alpha-1-antitrypsin deficiency, Schatzki's ring, cirrohosis who presents to the Emergency Department for evaluation of SOB which onset gradually 1 week PTA. Pt was sent to this facility from [facility] for an "abmnormal chest x-ray". Pt notes that she has an ongoing issue with fluid retention in her abdomen. Pt was last tapped in August and they removed 1.5 L of fluid. Pt will be going to [facility] later this week to discuss getting on the liver transplant list. Symptoms are constant and moderate in severity. No mitigating or exacerbating factors reported. Associated sxs include cough. Patient denies any CP, rhinorrhea, sore throat, fever, abd pain and all other sxs at this time. No prior Tx. No further complaints or concerns at this time.       Arrival mode: Personal vehicle      PCP: Anurag Knutson MD        Past Medical History:  Past Medical History:   Diagnosis Date    Alpha-1-antitrypsin deficiency     ZZ phenotype    Eosinophilic esophagitis     h/o pill impaction, bx proven    Hyperlipidemia     may be related to liver enzymes, 2ary to high HDL    Schatzki's ring     s/p dilatation       Past Surgical History:  Past Surgical History:   Procedure Laterality Date    APPENDECTOMY       SECTION      CHOLECYSTECTOMY      DILATION AND CURETTAGE OF UTERUS      ESOPHAGOGASTRODUODENOSCOPY N/A 2020    Procedure: EGD " (ESOPHAGOGASTRODUODENOSCOPY)-need rapid covid;  Surgeon: Jose Smith MD;  Location: [facility];  Service: Endoscopy;  Laterality: N/A;         Family History:  Family History   Problem Relation Age of Onset    Breast cancer Neg Hx     Colon cancer Neg Hx     Ovarian cancer Neg Hx        Social History:  Social History     Tobacco Use    Smoking status: Never Smoker   Substance and Sexual Activity    Alcohol use: Yes     Alcohol/week: 12.0 standard drinks     Types: 12 Cans of beer per week    Drug use: No    Sexual activity: Yes     Partners: Male     Birth control/protection: OCP        Review of Systems     Review of Systems   Constitutional: Negative for fever.   HENT: Negative for rhinorrhea and sore throat.    Respiratory: Positive for cough and shortness of breath.    Cardiovascular: Negative for chest pain.   Gastrointestinal: Negative for abdominal pain and nausea.   Genitourinary: Negative for dysuria.   Musculoskeletal: Negative for back pain.   Skin: Negative for rash.   Neurological: Negative for weakness.   Hematological: Does not bruise/bleed easily.   All other systems reviewed and are negative.       Physical Exam     Initial Vitals [10/04/20 1411]   BP Pulse Resp Temp SpO2   120/70 100 (!) 24 98.1 °F (36.7 °C) (!) 92 %      MAP       --          Physical Exam  Nursing Notes and Vital Signs Reviewed.  Constitutional: Patient is in no acute distress. Well-developed and well-nourished.  Patient with bronze coloration of scan  Head: Atraumatic. Normocephalic.  Eyes:. EOM intact. Conjunctivae are not pale.  Scleral icterus is present.  ENT: Mucous membranes are moist. Oropharynx is clear and symmetric.    Neck:. Full ROM.  Trachea midline  Cardiovascular: Regular rate. Regular rhythm. No murmurs, rubs, or gallops. Distal pulses are 2+ and symmetric.  Pulmonary/Chest: No respiratory distress.  Mild tachypnea.  Diminished on the right.  No crackles    Abdominal: Soft and non-distended.   "There is no tenderness.  No rebound, guarding, or rigidity. Good bowel sounds.  Genitourinary: No CVA tenderness.  No suprapubic tenderness.  Musculoskeletal: Moves all extremities. No obvious deformities. No edema. No calf tenderness.  Skin: Warm and dry.  Neurological:  Alert, awake, and appropriate.  Normal speech.  No acute focal neurological deficits are appreciated.  Psychiatric: Normal affect. Good eye contact. Appropriate in content.     ED Course   Procedures  ED Vital Signs:  Vitals:    10/04/20 1411 10/04/20 1450 10/04/20 1455   BP: 120/70  117/63   Pulse: 100 96 100   Resp: (!) 24  20   Temp: 98.1 °F (36.7 °C)     TempSrc: Oral     SpO2: (!) 92%  96%   Weight: 71.6 kg (157 lb 11.8 oz)     Height: 5' 8" (1.727 m)         Abnormal Lab Results:  Labs Reviewed   CBC W/ AUTO DIFFERENTIAL - Abnormal; Notable for the following components:       Result Value    RBC 3.91 (*)     Hematocrit 36.5 (*)     Mean Corpuscular Hemoglobin 31.7 (*)     RDW 18.6 (*)     Platelets 107 (*)     All other components within normal limits   COMPREHENSIVE METABOLIC PANEL - Abnormal; Notable for the following components:    Sodium 130 (*)     Calcium 8.0 (*)     Albumin 1.9 (*)     Total Bilirubin 17.2 (*)     Alkaline Phosphatase 604 (*)      (*)     ALT 99 (*)     All other components within normal limits   CULTURE, BLOOD   CULTURE, BLOOD   HIV 1 / 2 ANTIBODY   HEPATITIS C ANTIBODY   LIPASE   TROPONIN I   B-TYPE NATRIURETIC PEPTIDE   APTT   SARS-COV-2 RNA AMPLIFICATION, QUAL        All Lab Results:  Results for orders placed or performed during the hospital encounter of 10/04/20   HIV 1/2 Ag/Ab (4th Gen)   Result Value Ref Range    HIV 1/2 Ag/Ab Negative Negative   Hepatitis C antibody   Result Value Ref Range    Hepatitis C Ab Negative Negative   CBC auto differential   Result Value Ref Range    WBC 6.25 3.90 - 12.70 K/uL    RBC 3.91 (L) 4.00 - 5.40 M/uL    Hemoglobin 12.4 12.0 - 16.0 g/dL    Hematocrit 36.5 (L) 37.0 - " 48.5 %    Mean Corpuscular Volume 93 82 - 98 fL    Mean Corpuscular Hemoglobin 31.7 (H) 27.0 - 31.0 pg    Mean Corpuscular Hemoglobin Conc 34.0 32.0 - 36.0 g/dL    RDW 18.6 (H) 11.5 - 14.5 %    Platelets 107 (L) 150 - 350 K/uL    MPV 11.4 9.2 - 12.9 fL    Immature Granulocytes 0.3 0.0 - 0.5 %    Gran # (ANC) 4.3 1.8 - 7.7 K/uL    Immature Grans (Abs) 0.02 0.00 - 0.04 K/uL    Lymph # 1.5 1.0 - 4.8 K/uL    Mono # 0.4 0.3 - 1.0 K/uL    Eos # 0.1 0.0 - 0.5 K/uL    Baso # 0.03 0.00 - 0.20 K/uL    nRBC 0 0 /100 WBC    Gran% 68.2 38.0 - 73.0 %    Lymph% 23.2 18.0 - 48.0 %    Mono% 6.2 4.0 - 15.0 %    Eosinophil% 1.6 0.0 - 8.0 %    Basophil% 0.5 0.0 - 1.9 %    Differential Method Automated    Comprehensive metabolic panel   Result Value Ref Range    Sodium 130 (L) 136 - 145 mmol/L    Potassium 3.9 3.5 - 5.1 mmol/L    Chloride 97 95 - 110 mmol/L    CO2 23 23 - 29 mmol/L    Glucose 105 70 - 110 mg/dL    BUN, Bld 11 6 - 20 mg/dL    Creatinine 0.7 0.5 - 1.4 mg/dL    Calcium 8.0 (L) 8.7 - 10.5 mg/dL    Total Protein 6.7 6.0 - 8.4 g/dL    Albumin 1.9 (L) 3.5 - 5.2 g/dL    Total Bilirubin 17.2 (H) 0.1 - 1.0 mg/dL    Alkaline Phosphatase 604 (H) 55 - 135 U/L     (H) 10 - 40 U/L    ALT 99 (H) 10 - 44 U/L    Anion Gap 10 8 - 16 mmol/L    eGFR if African American >60 >60 mL/min/1.73 m^2    eGFR if non African American >60 >60 mL/min/1.73 m^2   Lipase   Result Value Ref Range    Lipase 46 4 - 60 U/L   Troponin I   Result Value Ref Range    Troponin I <0.006 0.000 - 0.026 ng/mL   Brain natriuretic peptide   Result Value Ref Range    BNP 84 0 - 99 pg/mL   APTT   Result Value Ref Range    aPTT 31.3 21.0 - 32.0 sec               Imaging Results:  Imaging Results          CT Chest Without Contrast (Final result)  Result time 10/04/20 15:28:56    Final result by Rachid Renee MD (10/04/20 15:28:56)                 Impression:      Large right pleural effusion with right lower lobe compressive atelectasis.    Subsegmental ground-glass  opacity in the left lung apex, likely infectious or inflammatory in nature.    Cirrhosis.    All CT scans at this facility are performed  using dose modulation techniques as appropriate to performed exam including the following:  automated exposure control; adjustment of mA and/or kV according to the patients size (this includes techniques or standardized protocols for targeted exams where dose is matched to indication/reason for exam: i.e. extremities or head);  iterative reconstruction technique.      Electronically signed by: Rachid Renee MD  Date:    10/04/2020  Time:    15:28             Narrative:    EXAMINATION:  CT CHEST WITHOUT CONTRAST    CLINICAL HISTORY:  Chest pain or SOB, pleurisy or effusion suspected;    TECHNIQUE:  Axial CT images performed through the chest without intravenous contrast.    COMPARISON:  Chest radiograph on 08/17/2020    FINDINGS:  The heart, great vessels, and mediastinal structures are within normal limits. No thoracic adenopathy.    Development of a large right pleural effusion with right lower lobe compressive atelectasis.  Subsegmental ground-glass opacity in the left lung apex.  No pneumothorax.    Cirrhotic morphology of the liver with splenomegaly and paraesophageal varices consistent with portal hypertension.  Small volume perihepatic ascites.    The bones are intact.                                 The EKG was ordered, reviewed, and independently interpreted by the ED provider.  Interpretation time: 14:52  Rate: 95 BPM  Rhythm: Sinus rhythm with short PA  Interpretation: Low voltage QRS. No STEMI.             The Emergency Provider reviewed the vital signs and test results, which are outlined above.     ED Discussion       4:25 PM: Discussed case with PHUC Duong ([facility]). Dr. Campos agrees with current care and management of pt and accepts admission.   Admitting Service: Hospital Medicine  Admitting Physician: Dr. Campos  Admit to: Obs  tele    4:27 PM: Re-evaluated pt. I have discussed test results, shared treatment plan, and the need for admission with patient and family at bedside. Pt and family express understanding at this time and agree with all information. All questions answered. Pt and family have no further questions or concerns at this time. Pt is ready for admit.    4:30 PM  Patient is stable and nontoxic.  Patient has a history of cirrhosis due to alpha-1 antitrypsin deficiency and is pending evaluation at the end of the week by liver Transplant Service.  Patient arrives today on referral from [facility] for abnormal chest x-ray secondary to presentation for shortness of breath.  The pleural effusion noted on that x-ray prompting a CT here that shows a very significant effusion.  Patient does have a history of a paracentesis in the past but never thoracentesis.  Patient CT shows what may be a pneumonia as well as the effusion.  Will start antibiotics and admit for diuresis as well as possible thoracentesis if no improvement.  Patient is aware of her condition as well as the plan of care.  She is in agreement.             Medical Decision Making:   Clinical Tests:   Lab Tests: Ordered and Reviewed  Radiological Study: Ordered and Reviewed  Medical Tests: Ordered and Reviewed           ED Medication(s):  Medications   cefTRIAXone (ROCEPHIN) 1 g/50 mL D5W IVPB (1 g Intravenous New Bag 10/4/20 1618)   azithromycin 500 mg in dextrose 5 % 250 mL IVPB (ready to mix system) (500 mg Intravenous New Bag 10/4/20 1622)   furosemide injection 60 mg (60 mg Intravenous Given 10/4/20 1458)       New Prescriptions    No medications on file               Scribe Attestation:   Scribe #1: I performed the above scribed service and the documentation accurately describes the services I performed. I attest to the accuracy of the note.     Attending:   Physician Attestation Statement for Scribe #1: I, Familia Dahl Jr., MD, personally performed the  services described in this documentation, as scribed by Poonam Tolbert, in my presence, and it is both accurate and complete.           Clinical Impression       ICD-10-CM ICD-9-CM   1. Pleural effusion on right  J90 511.9   2. Dyspnea  R06.00 786.09   3. Pneumonia due to infectious organism, unspecified laterality, unspecified part of lung  J18.9 486       Disposition:   Disposition: Placed in Observation  Condition: Fair         Familia Dahl Jr., MD  10/04/20 3593

## 2020-10-04 NOTE — ASSESSMENT & PLAN NOTE
With Cirrhosis of the liver, Elevated liver enzymes, Ascites-  Patient reports her first paracentesis was in August 2020 with 1.5 liters of fluid obtained  Patient reports she has appointment with [facility] in [place] later this week to see about getting put on the Liver transplant list  Continue home aldactone  Give home  lasix IV for now  Order paracentesis per IR  Check INR

## 2020-10-04 NOTE — SUBJECTIVE & OBJECTIVE
Past Medical History:   Diagnosis Date    Alpha-1-antitrypsin deficiency     ZZ phenotype    Eosinophilic esophagitis     h/o pill impaction, bx proven    Hyperlipidemia     may be related to liver enzymes, 2ary to high HDL    Schatzki's ring     s/p dilatation       Past Surgical History:   Procedure Laterality Date    APPENDECTOMY       SECTION      CHOLECYSTECTOMY      DILATION AND CURETTAGE OF UTERUS      ESOPHAGOGASTRODUODENOSCOPY N/A 2020    Procedure: EGD (ESOPHAGOGASTRODUODENOSCOPY)-need rapid covid;  Surgeon: Jose Smith MD;  Location: [facility];  Service: Endoscopy;  Laterality: N/A;       Review of patient's allergies indicates:  No Known Allergies    No current facility-administered medications on file prior to encounter.      Current Outpatient Medications on File Prior to Encounter   Medication Sig    ergocalciferol (ERGOCALCIFEROL) 50,000 unit Cap Take 1 capsule (50,000 Units total) by mouth every 7 days.    furosemide (LASIX) 40 MG tablet TK 1 T PO ONCE D    lactulose (CHRONULAC) 10 gram/15 mL solution Take 20 g by mouth daily as needed.     spironolactone (ALDACTONE) 100 MG tablet TK 1 T PO ONCE D    zinc gluconate 50 mg tablet Take 50 mg by mouth once daily.     Family History     None        Tobacco Use    Smoking status: Never Smoker   Substance and Sexual Activity    Alcohol use: Not Currently     Comment: patient reports she has not had any alcohol in past few months    Drug use: No    Sexual activity: Yes     Partners: Male     Birth control/protection: OCP     Review of Systems   Constitutional: Positive for activity change and fatigue. Negative for chills, diaphoresis and fever.   HENT: Negative for ear discharge, ear pain, facial swelling and hearing loss.    Eyes: Negative for pain and redness.   Respiratory: Positive for cough and shortness of breath.         Increased SOB with exertion  Cough but no sputum   Cardiovascular: Negative for chest  pain, palpitations and leg swelling.   Gastrointestinal: Positive for abdominal distention and nausea. Negative for abdominal pain, blood in stool, constipation and vomiting.        Chronic intermittent nausea    Hx Ascites with first paracentesis done in August 2020 with 1.5 liters of fluid obtained per Patient   Endocrine: Negative for polydipsia and polyphagia.   Genitourinary: Negative for difficulty urinating, dysuria, flank pain and hematuria.   Musculoskeletal: Negative for gait problem, neck pain and neck stiffness.   Skin: Negative for color change.   Allergic/Immunologic: Negative for food allergies.   Neurological: Negative for facial asymmetry, speech difficulty and weakness.   Hematological: Does not bruise/bleed easily.   Psychiatric/Behavioral: Negative for agitation, behavioral problems, confusion, hallucinations and suicidal ideas. The patient is not nervous/anxious.      Objective:     Vital Signs (Most Recent):  Temp: 98.1 °F (36.7 °C) (10/04/20 1411)  Pulse: 110 (10/04/20 1705)  Resp: 18 (10/04/20 1705)  BP: 117/66 (10/04/20 1705)  SpO2: 95 % (10/04/20 1705) Vital Signs (24h Range):  Temp:  [98.1 °F (36.7 °C)] 98.1 °F (36.7 °C)  Pulse:  [] 110  Resp:  [18-24] 18  SpO2:  [92 %-96 %] 95 %  BP: (116-120)/(63-70) 117/66     Weight: 71.6 kg (157 lb 11.8 oz)  Body mass index is 23.98 kg/m².    Physical Exam  Constitutional:       General: She is not in acute distress.     Appearance: She is not diaphoretic.   HENT:      Head: Normocephalic and atraumatic.      Mouth/Throat:      Mouth: Mucous membranes are moist.   Eyes:      General:         Right eye: No discharge.         Left eye: No discharge.      Extraocular Movements: Extraocular movements intact.      Conjunctiva/sclera: Conjunctivae normal.      Pupils: Pupils are equal, round, and reactive to light.      Comments: Juandiced   Neck:      Musculoskeletal: Normal range of motion and neck supple.      Vascular: No carotid bruit.    Cardiovascular:      Rate and Rhythm: Tachycardia present.      Pulses: Normal pulses.      Heart sounds: Normal heart sounds. No murmur.   Pulmonary:      Effort: No respiratory distress.      Comments: Crackles Left mid and lower lobes  Right side very diminished    SOB with exertion  Abdominal:      General: Bowel sounds are normal. There is distension.      Tenderness: There is no abdominal tenderness. There is no guarding.      Comments: Significant ascites   Genitourinary:     Comments: Not examined  Musculoskeletal: Normal range of motion.         General: No swelling.      Right lower leg: No edema.      Left lower leg: No edema.   Lymphadenopathy:      Cervical: No cervical adenopathy.   Skin:     General: Skin is warm and dry.      Capillary Refill: Capillary refill takes less than 2 seconds.   Neurological:      General: No focal deficit present.      Mental Status: She is alert and oriented to person, place, and time. Mental status is at baseline.      Cranial Nerves: No cranial nerve deficit.   Psychiatric:         Mood and Affect: Mood normal.         Behavior: Behavior normal.         Thought Content: Thought content normal.         Judgment: Judgment normal.           CRANIAL NERVES     CN III, IV, VI   Pupils are equal, round, and reactive to light.    Imaging Results          CT Chest Without Contrast (Final result)  Result time 10/04/20 15:28:56    Final result by Rachid Renee MD (10/04/20 15:28:56)                 Impression:      Large right pleural effusion with right lower lobe compressive atelectasis.    Subsegmental ground-glass opacity in the left lung apex, likely infectious or inflammatory in nature.    Cirrhosis.    All CT scans at this facility are performed  using dose modulation techniques as appropriate to performed exam including the following:  automated exposure control; adjustment of mA and/or kV according to the patients size (this includes techniques or standardized  protocols for targeted exams where dose is matched to indication/reason for exam: i.e. extremities or head);  iterative reconstruction technique.      Electronically signed by: Rachid Renee MD  Date:    10/04/2020  Time:    15:28             Narrative:    EXAMINATION:  CT CHEST WITHOUT CONTRAST    CLINICAL HISTORY:  Chest pain or SOB, pleurisy or effusion suspected;    TECHNIQUE:  Axial CT images performed through the chest without intravenous contrast.    COMPARISON:  Chest radiograph on 08/17/2020    FINDINGS:  The heart, great vessels, and mediastinal structures are within normal limits. No thoracic adenopathy.    Development of a large right pleural effusion with right lower lobe compressive atelectasis.  Subsegmental ground-glass opacity in the left lung apex.  No pneumothorax.    Cirrhotic morphology of the liver with splenomegaly and paraesophageal varices consistent with portal hypertension.  Small volume perihepatic ascites.    The bones are intact.                                Results for orders placed or performed during the hospital encounter of 10/04/20   HIV 1/2 Ag/Ab (4th Gen)   Result Value Ref Range    HIV 1/2 Ag/Ab Negative Negative   Hepatitis C antibody   Result Value Ref Range    Hepatitis C Ab Negative Negative   CBC auto differential   Result Value Ref Range    WBC 6.25 3.90 - 12.70 K/uL    RBC 3.91 (L) 4.00 - 5.40 M/uL    Hemoglobin 12.4 12.0 - 16.0 g/dL    Hematocrit 36.5 (L) 37.0 - 48.5 %    Mean Corpuscular Volume 93 82 - 98 fL    Mean Corpuscular Hemoglobin 31.7 (H) 27.0 - 31.0 pg    Mean Corpuscular Hemoglobin Conc 34.0 32.0 - 36.0 g/dL    RDW 18.6 (H) 11.5 - 14.5 %    Platelets 107 (L) 150 - 350 K/uL    MPV 11.4 9.2 - 12.9 fL    Immature Granulocytes 0.3 0.0 - 0.5 %    Gran # (ANC) 4.3 1.8 - 7.7 K/uL    Immature Grans (Abs) 0.02 0.00 - 0.04 K/uL    Lymph # 1.5 1.0 - 4.8 K/uL    Mono # 0.4 0.3 - 1.0 K/uL    Eos # 0.1 0.0 - 0.5 K/uL    Baso # 0.03 0.00 - 0.20 K/uL    nRBC 0 0 /100 WBC     Gran% 68.2 38.0 - 73.0 %    Lymph% 23.2 18.0 - 48.0 %    Mono% 6.2 4.0 - 15.0 %    Eosinophil% 1.6 0.0 - 8.0 %    Basophil% 0.5 0.0 - 1.9 %    Differential Method Automated    Comprehensive metabolic panel   Result Value Ref Range    Sodium 130 (L) 136 - 145 mmol/L    Potassium 3.9 3.5 - 5.1 mmol/L    Chloride 97 95 - 110 mmol/L    CO2 23 23 - 29 mmol/L    Glucose 105 70 - 110 mg/dL    BUN, Bld 11 6 - 20 mg/dL    Creatinine 0.7 0.5 - 1.4 mg/dL    Calcium 8.0 (L) 8.7 - 10.5 mg/dL    Total Protein 6.7 6.0 - 8.4 g/dL    Albumin 1.9 (L) 3.5 - 5.2 g/dL    Total Bilirubin 17.2 (H) 0.1 - 1.0 mg/dL    Alkaline Phosphatase 604 (H) 55 - 135 U/L     (H) 10 - 40 U/L    ALT 99 (H) 10 - 44 U/L    Anion Gap 10 8 - 16 mmol/L    eGFR if African American >60 >60 mL/min/1.73 m^2    eGFR if non African American >60 >60 mL/min/1.73 m^2   Lipase   Result Value Ref Range    Lipase 46 4 - 60 U/L   Troponin I   Result Value Ref Range    Troponin I <0.006 0.000 - 0.026 ng/mL   Brain natriuretic peptide   Result Value Ref Range    BNP 84 0 - 99 pg/mL   APTT   Result Value Ref Range    aPTT 31.3 21.0 - 32.0 sec   Lactic acid, plasma   Result Value Ref Range    Lactate (Lactic Acid) 1.5 0.5 - 2.2 mmol/L

## 2020-10-04 NOTE — ASSESSMENT & PLAN NOTE
Monitor O2 sats, Supplemental O2 as needed  Continue Iv rocephin and Iv zithromax  Check procalcitonin level and sputum culture  Blood cultures x 2 pending  Add qid manny

## 2020-10-04 NOTE — H&P
"[facility] Hospital Medicine  History & Physical    Patient Name: Madhavi Bell  MRN: [id number]  Admission Date: 10/4/2020  Attending Physician: Dr. Campos  Primary Care Provider: Anurag Knutson MD     Patient seen in ER-  at bedside    Patient information was obtained from patient and ER records.     Subjective:     Principal Problem: Pneumonia, right pleural effusion, Liver cirrhosis, Ascites    Chief Complaint:   Chief Complaint   Patient presents with    Shortness of Breath     pt c/o SOB, x1 week; sent to ED by [facility] for further eval/tx of abnormal cxray        HPI: This is a 45 yo female with PMHx of  HLD, Schatzki's ring, Alpha-1-antitrypsin deficiency with cirrohosis of the liver, and recent paracentesis in August with 1.5 Liters of fluid removed.  Patient came to ER for evaluation of increased SOB over the past week which has been becoming progressively worse. Patient reports she was concerned she might have Covid virus and went to the  [facility] for evaluation. She reports she was told that she had an  " abnormal CXR" and was sent to the ER for further evaluation and treatment. Patient was evaluated in our ER and found to have a large right pleural effusion and pneumonia.     Patient reports she has an appointment with [facility] in [place] later this week to discuss getting on the liver transplant list.         Per report, a COVID-19 test was negative done at [facility] earlier today.     Patient reports she has not had a pleural effusion before. Code status discussed with patient and she is a Full Code.     Patient placed in Observation.     Past Medical History:   Diagnosis Date    Alpha-1-antitrypsin deficiency     ZZ phenotype    Eosinophilic esophagitis     h/o pill impaction, bx proven    Hyperlipidemia     may be related to liver enzymes, 2ary to high HDL    Schatzki's ring     s/p dilatation       Past Surgical History: "   Procedure Laterality Date    APPENDECTOMY       SECTION      CHOLECYSTECTOMY      DILATION AND CURETTAGE OF UTERUS      ESOPHAGOGASTRODUODENOSCOPY N/A 2020    Procedure: EGD (ESOPHAGOGASTRODUODENOSCOPY)-need rapid covid;  Surgeon: Jose Smith MD;  Location: [facility];  Service: Endoscopy;  Laterality: N/A;       Review of patient's allergies indicates:  No Known Allergies    No current facility-administered medications on file prior to encounter.      Current Outpatient Medications on File Prior to Encounter   Medication Sig    ergocalciferol (ERGOCALCIFEROL) 50,000 unit Cap Take 1 capsule (50,000 Units total) by mouth every 7 days.    furosemide (LASIX) 40 MG tablet TK 1 T PO ONCE D    lactulose (CHRONULAC) 10 gram/15 mL solution Take 20 g by mouth daily as needed.     spironolactone (ALDACTONE) 100 MG tablet TK 1 T PO ONCE D    zinc gluconate 50 mg tablet Take 50 mg by mouth once daily.     Family History     None        Tobacco Use    Smoking status: Never Smoker   Substance and Sexual Activity    Alcohol use: Not Currently     Comment: patient reports she has not had any alcohol in past few months    Drug use: No    Sexual activity: Yes     Partners: Male     Birth control/protection: OCP     Review of Systems   Constitutional: Positive for activity change and fatigue. Negative for chills, diaphoresis and fever.   HENT: Negative for ear discharge, ear pain, facial swelling and hearing loss.    Eyes: Negative for pain and redness.   Respiratory: Positive for cough and shortness of breath.         Increased SOB with exertion  Cough but no sputum   Cardiovascular: Negative for chest pain, palpitations and leg swelling.   Gastrointestinal: Positive for abdominal distention and nausea. Negative for abdominal pain, blood in stool, constipation and vomiting.        Chronic intermittent nausea    Hx Ascites with first paracentesis done in 2020 with 1.5 liters of fluid  obtained per Patient   Endocrine: Negative for polydipsia and polyphagia.   Genitourinary: Negative for difficulty urinating, dysuria, flank pain and hematuria.   Musculoskeletal: Negative for gait problem, neck pain and neck stiffness.   Skin: Negative for color change.   Allergic/Immunologic: Negative for food allergies.   Neurological: Negative for facial asymmetry, speech difficulty and weakness.   Hematological: Does not bruise/bleed easily.   Psychiatric/Behavioral: Negative for agitation, behavioral problems, confusion, hallucinations and suicidal ideas. The patient is not nervous/anxious.      Objective:     Vital Signs (Most Recent):  Temp: 98.1 °F (36.7 °C) (10/04/20 1411)  Pulse: 110 (10/04/20 1705)  Resp: 18 (10/04/20 1705)  BP: 117/66 (10/04/20 1705)  SpO2: 95 % (10/04/20 1705) Vital Signs (24h Range):  Temp:  [98.1 °F (36.7 °C)] 98.1 °F (36.7 °C)  Pulse:  [] 110  Resp:  [18-24] 18  SpO2:  [92 %-96 %] 95 %  BP: (116-120)/(63-70) 117/66     Weight: 71.6 kg (157 lb 11.8 oz)  Body mass index is 23.98 kg/m².    Physical Exam  Constitutional:       General: She is not in acute distress.     Appearance: She is not diaphoretic.   HENT:      Head: Normocephalic and atraumatic.      Mouth/Throat:      Mouth: Mucous membranes are moist.   Eyes:      General:         Right eye: No discharge.         Left eye: No discharge.      Extraocular Movements: Extraocular movements intact.      Conjunctiva/sclera: Conjunctivae normal.      Pupils: Pupils are equal, round, and reactive to light.      Comments: Juandiced   Neck:      Musculoskeletal: Normal range of motion and neck supple.      Vascular: No carotid bruit.   Cardiovascular:      Rate and Rhythm: Tachycardia present.      Pulses: Normal pulses.      Heart sounds: Normal heart sounds. No murmur.   Pulmonary:      Effort: No respiratory distress.      Comments: Crackles Left mid and lower lobes  Right side very diminished    SOB with exertion  Abdominal:       General: Bowel sounds are normal. There is distension.      Tenderness: There is no abdominal tenderness. There is no guarding.      Comments: Significant ascites   Genitourinary:     Comments: Not examined  Musculoskeletal: Normal range of motion.         General: No swelling.      Right lower leg: No edema.      Left lower leg: No edema.   Lymphadenopathy:      Cervical: No cervical adenopathy.   Skin:     General: Skin is warm and dry.      Capillary Refill: Capillary refill takes less than 2 seconds.   Neurological:      General: No focal deficit present.      Mental Status: She is alert and oriented to person, place, and time. Mental status is at baseline.      Cranial Nerves: No cranial nerve deficit.   Psychiatric:         Mood and Affect: Mood normal.         Behavior: Behavior normal.         Thought Content: Thought content normal.         Judgment: Judgment normal.     CRANIAL NERVES     CN III, IV, VI   Pupils are equal, round, and reactive to light.    Imaging Results          CT Chest Without Contrast (Final result)  Result time 10/04/20 15:28:56    Final result by Rachid Renee MD (10/04/20 15:28:56)                 Impression:      Large right pleural effusion with right lower lobe compressive atelectasis.    Subsegmental ground-glass opacity in the left lung apex, likely infectious or inflammatory in nature.    Cirrhosis.    All CT scans at this facility are performed  using dose modulation techniques as appropriate to performed exam including the following:  automated exposure control; adjustment of mA and/or kV according to the patients size (this includes techniques or standardized protocols for targeted exams where dose is matched to indication/reason for exam: i.e. extremities or head);  iterative reconstruction technique.      Electronically signed by: Rachid Renee MD  Date:    10/04/2020  Time:    15:28             Narrative:    EXAMINATION:  CT CHEST WITHOUT CONTRAST    CLINICAL  HISTORY:  Chest pain or SOB, pleurisy or effusion suspected;    TECHNIQUE:  Axial CT images performed through the chest without intravenous contrast.    COMPARISON:  Chest radiograph on 08/17/2020    FINDINGS:  The heart, great vessels, and mediastinal structures are within normal limits. No thoracic adenopathy.    Development of a large right pleural effusion with right lower lobe compressive atelectasis.  Subsegmental ground-glass opacity in the left lung apex.  No pneumothorax.    Cirrhotic morphology of the liver with splenomegaly and paraesophageal varices consistent with portal hypertension.  Small volume perihepatic ascites.    The bones are intact.                                Results for orders placed or performed during the hospital encounter of 10/04/20   HIV 1/2 Ag/Ab (4th Gen)   Result Value Ref Range    HIV 1/2 Ag/Ab Negative Negative   Hepatitis C antibody   Result Value Ref Range    Hepatitis C Ab Negative Negative   CBC auto differential   Result Value Ref Range    WBC 6.25 3.90 - 12.70 K/uL    RBC 3.91 (L) 4.00 - 5.40 M/uL    Hemoglobin 12.4 12.0 - 16.0 g/dL    Hematocrit 36.5 (L) 37.0 - 48.5 %    Mean Corpuscular Volume 93 82 - 98 fL    Mean Corpuscular Hemoglobin 31.7 (H) 27.0 - 31.0 pg    Mean Corpuscular Hemoglobin Conc 34.0 32.0 - 36.0 g/dL    RDW 18.6 (H) 11.5 - 14.5 %    Platelets 107 (L) 150 - 350 K/uL    MPV 11.4 9.2 - 12.9 fL    Immature Granulocytes 0.3 0.0 - 0.5 %    Gran # (ANC) 4.3 1.8 - 7.7 K/uL    Immature Grans (Abs) 0.02 0.00 - 0.04 K/uL    Lymph # 1.5 1.0 - 4.8 K/uL    Mono # 0.4 0.3 - 1.0 K/uL    Eos # 0.1 0.0 - 0.5 K/uL    Baso # 0.03 0.00 - 0.20 K/uL    nRBC 0 0 /100 WBC    Gran% 68.2 38.0 - 73.0 %    Lymph% 23.2 18.0 - 48.0 %    Mono% 6.2 4.0 - 15.0 %    Eosinophil% 1.6 0.0 - 8.0 %    Basophil% 0.5 0.0 - 1.9 %    Differential Method Automated    Comprehensive metabolic panel   Result Value Ref Range    Sodium 130 (L) 136 - 145 mmol/L    Potassium 3.9 3.5 - 5.1 mmol/L     Chloride 97 95 - 110 mmol/L    CO2 23 23 - 29 mmol/L    Glucose 105 70 - 110 mg/dL    BUN, Bld 11 6 - 20 mg/dL    Creatinine 0.7 0.5 - 1.4 mg/dL    Calcium 8.0 (L) 8.7 - 10.5 mg/dL    Total Protein 6.7 6.0 - 8.4 g/dL    Albumin 1.9 (L) 3.5 - 5.2 g/dL    Total Bilirubin 17.2 (H) 0.1 - 1.0 mg/dL    Alkaline Phosphatase 604 (H) 55 - 135 U/L     (H) 10 - 40 U/L    ALT 99 (H) 10 - 44 U/L    Anion Gap 10 8 - 16 mmol/L    eGFR if African American >60 >60 mL/min/1.73 m^2    eGFR if non African American >60 >60 mL/min/1.73 m^2   Lipase   Result Value Ref Range    Lipase 46 4 - 60 U/L   Troponin I   Result Value Ref Range    Troponin I <0.006 0.000 - 0.026 ng/mL   Brain natriuretic peptide   Result Value Ref Range    BNP 84 0 - 99 pg/mL   APTT   Result Value Ref Range    aPTT 31.3 21.0 - 32.0 sec   Lactic acid, plasma   Result Value Ref Range    Lactate (Lactic Acid) 1.5 0.5 - 2.2 mmol/L         Assessment/Plan:     Ascites  See below      Hypoalbuminemia  Add po supplement      Hyponatremia  Monitor      Pneumonia  Monitor O2 sats, Supplemental O2 as needed  Continue Iv rocephin and Iv zithromax  Check procalcitonin level and sputum culture  Blood cultures x 2 pending  Add qid duonebs    Pleural effusion on right  Monitor O2 sats, supplemental as needed  Telemetry  Consult Pulmonology for evaluation- patient reports no prior Hx of effusion     Continue Iv lasix and home spironalactone  Suspect patient will need a thoracentesis  Check INR  Check echocardiogram     Alpha-1-antitrypsin deficiency  With Cirrhosis of the liver, Elevated liver enzymes, Ascites-  Patient reports her first paracentesis was in August 2020 with 1.5 liters of fluid obtained  Patient reports she has appointment with [facility] in [place] later this week to see about getting put on the Liver transplant list  Continue home aldactone  Give home  lasix IV for now  Order paracentesis per IR  Check INR  Patient reports she has not had any alcohol  intake in past few months    Other cirrhosis of liver  As above        VTE Risk Mitigation (From admission, onward)         Ordered     Place NUHA hose  Until discontinued      10/04/20 1727     IP VTE HIGH RISK PATIENT  Once      10/04/20 1727     Place NUHA hose  Until discontinued      10/04/20 1720                 Time spent seeing patient( greater than 1/2 spent in direct contact) : 79 minutes    Soniya Coe, JUSTIN  Department of Hospital Medicine   [facility]

## 2020-10-05 PROBLEM — E87.6 HYPOKALEMIA: Status: ACTIVE | Noted: 2020-01-01

## 2020-10-05 NOTE — CONSULTS
[facility] -   Pulmonology  Consult Note    Patient Name: Madhavi Bell  MRN: [id number]  Admission Date: 10/4/2020  Hospital Length of Stay: 0 days  Code Status: Full Code  Attending Physician: Joaquin Campos MD  Primary Care Provider: Anurag Knutson MD   Principal Problem: Pleural effusion on right      Subjective:     HPI:  Madhavi Bell is 44 y.o.   Asked to see for large Right Pleural effusion  Known Liver cirrhoses: Alpha 1 def: ZZ phenotype per patient:   Liver transplant Eligible  No Hx of Thoracentesis but had PARA: 2 lit.  Had intermittent cough and SOB  COVID -ve  Not on Oxygen      Past Medical History:   Diagnosis Date    Alpha-1-antitrypsin deficiency     ZZ phenotype    Eosinophilic esophagitis     h/o pill impaction, bx proven    Hyperlipidemia     may be related to liver enzymes, 2ary to high HDL    Schatzki's ring     s/p dilatation       Past Surgical History:   Procedure Laterality Date    APPENDECTOMY       SECTION      CHOLECYSTECTOMY      DILATION AND CURETTAGE OF UTERUS      ESOPHAGOGASTRODUODENOSCOPY N/A 2020    Procedure: EGD (ESOPHAGOGASTRODUODENOSCOPY)-need rapid covid;  Surgeon: Jose Smith MD;  Location: [facility];  Service: Endoscopy;  Laterality: N/A;       Review of patient's allergies indicates:  No Known Allergies    Family History     None        Tobacco Use    Smoking status: Never Smoker   Substance and Sexual Activity    Alcohol use: Not Currently     Comment: patient reports she has not had any alcohol in past few months    Drug use: No    Sexual activity: Yes     Partners: Male     Birth control/protection: OCP         Review of Systems   Respiratory: Positive for shortness of breath.    All other systems reviewed and are negative.    Objective:     Vital Signs (Most Recent):  Temp: 98.1 °F (36.7 °C) (10/05/20 0336)  Pulse: 89 (10/05/20 0336)  Resp: 18 (10/05/20 0336)  BP: (!) 98/58 (10/05/20 0510)  SpO2: (!) 93  % (10/05/20 0510) Vital Signs (24h Range):  Temp:  [98.1 °F (36.7 °C)-98.2 °F (36.8 °C)] 98.1 °F (36.7 °C)  Pulse:  [] 89  Resp:  [15-24] 18  SpO2:  [92 %-98 %] 93 %  BP: ()/(50-70) 98/58     Weight: 71.6 kg (157 lb 11.8 oz)  Body mass index is 23.98 kg/m².      Intake/Output Summary (Last 24 hours) at 10/5/2020 0703  Last data filed at 10/4/2020 1722  Gross per 24 hour   Intake 300 ml   Output --   Net 300 ml       Physical Exam  Vitals signs and nursing note reviewed.   Constitutional:       Appearance: Normal appearance. She is normal weight.   HENT:      Head: Normocephalic and atraumatic.      Nose: Nose normal.      Mouth/Throat:      Mouth: Mucous membranes are dry.      Pharynx: Oropharynx is clear.   Eyes:      Extraocular Movements: Extraocular movements intact.      Conjunctiva/sclera: Conjunctivae normal.      Pupils: Pupils are equal, round, and reactive to light.   Neck:      Musculoskeletal: Normal range of motion and neck supple.   Cardiovascular:      Rate and Rhythm: Normal rate and regular rhythm.      Pulses: Normal pulses.   Pulmonary:      Effort: Pulmonary effort is normal.      Breath sounds: Decreased air movement present. Examination of the right-middle field reveals decreased breath sounds. Examination of the right-lower field reveals decreased breath sounds. Decreased breath sounds and rales present. No wheezing or rhonchi.       Abdominal:      General: Bowel sounds are normal.      Palpations: Abdomen is soft.   Musculoskeletal: Normal range of motion.   Skin:     General: Skin is warm and dry.      Capillary Refill: Capillary refill takes 2 to 3 seconds.   Neurological:      General: No focal deficit present.      Mental Status: She is alert and oriented to person, place, and time.   Psychiatric:         Mood and Affect: Mood normal.         Vents:       Lines/Drains/Airways     Peripheral Intravenous Line                 Peripheral IV - Single Lumen 10/04/20 1458 20 G Left  Antecubital less than 1 day                Significant Labs:    CBC/Anemia Profile:  Recent Labs   Lab 10/04/20  1506   WBC 6.25   HGB 12.4   HCT 36.5*   *   MCV 93   RDW 18.6*        Chemistries:  Recent Labs   Lab 10/04/20  1506   *   K 3.9   CL 97   CO2 23   BUN 11   CREATININE 0.7   CALCIUM 8.0*   ALBUMIN 1.9*   PROT 6.7   BILITOT 17.2*   ALKPHOS 604*   ALT 99*   *       ABGs: No results for input(s): PH, PCO2, HCO3, POCSATURATED, BE in the last 48 hours.  Cardiac Markers: No results for input(s): CKMB, TROPONINT, MYOGLOBIN in the last 48 hours.  Lactic Acid:   Recent Labs   Lab 10/04/20  1630   LACTATE 1.5     POCT Glucose: No results for input(s): POCTGLUCOSE in the last 48 hours.  Respiratory Culture: No results for input(s): GSRESP, RESPIRATORYC in the last 48 hours.  Troponin:   Recent Labs   Lab 10/04/20  1506   TROPONINI <0.006     All pertinent labs within the past 24 hours have been reviewed.    Significant Imaging:   CT: I have reviewed all pertinent results/findings within the past 24 hours and my personal findings are:  Large right pleural effusion         EXAMINATION:  CT CHEST WITHOUT CONTRAST     CLINICAL HISTORY:  Chest pain or SOB, pleurisy or effusion suspected;     TECHNIQUE:  Axial CT images performed through the chest without intravenous contrast.     COMPARISON:  Chest radiograph on 08/17/2020     FINDINGS:  The heart, great vessels, and mediastinal structures are within normal limits. No thoracic adenopathy.     Development of a large right pleural effusion with right lower lobe compressive atelectasis.  Subsegmental ground-glass opacity in the left lung apex.  No pneumothorax.     Cirrhotic morphology of the liver with splenomegaly and paraesophageal varices consistent with portal hypertension.  Small volume perihepatic ascites.     The bones are intact.     Impression:     Large right pleural effusion with right lower lobe compressive atelectasis.     Subsegmental  ground-glass opacity in the left lung apex, likely infectious or inflammatory in nature.     Cirrhosis.         ABG  No results for input(s): PH, PO2, PCO2, HCO3, BE in the last 168 hours.  Assessment/Plan:     * Pleural effusion on right  Likely transudate    Complications of the procedure discussed in detail with patient. Complications including but not limited to infection that may require hospital admission, bleeding that may require blood transfusion and or hospital admission, perforation of the lung which may require surgery. Patient expressed and verbalized understanding. Alternate treatments and material risks associated with such alternatives were discussed with pateint. These include radiologic surveillance with minimal risk and sugery with an indeterminate risk. The material risks of refusing the procedure was discussed in detail. This includes no diagnosis or confirmation of diagnisis and rendering of appropriate treatment the risk of which depends on the nature of the diagnosed illness. Patient expressed and verbalized understanding. Pleural fluid will be sent for chenistry, microbiology and cytology.     Ascites  PER GI    Alpha-1-antitrypsin deficiency  Needs PFT outpatient    Other cirrhosis of liver  Follow up  with transplant          Thank you for your consult. I will follow-up with patient. Please contact us if you have any additional questions.     Julio César Avila MD  Pulmonology  [facility]

## 2020-10-05 NOTE — ASSESSMENT & PLAN NOTE
Likely transudate    Complications of the procedure discussed in detail with patient. Complications including but not limited to infection that may require hospital admission, bleeding that may require blood transfusion and or hospital admission, perforation of the lung which may require surgery. Patient expressed and verbalized understanding. Alternate treatments and material risks associated with such alternatives were discussed with pateint. These include radiologic surveillance with minimal risk and sugery with an indeterminate risk. The material risks of refusing the procedure was discussed in detail. This includes no diagnosis or confirmation of diagnisis and rendering of appropriate treatment the risk of which depends on the nature of the diagnosed illness. Patient expressed and verbalized understanding. Pleural fluid will be sent for chenistry, microbiology and cytology.

## 2020-10-05 NOTE — HPI
Madhavi Carr Adcox is 44 y.o.   Asked to see for large Right Pleural effusion  Known Liver cirrhoses: Alpha 1 def: ZZ phenotype per patient:   Liver transplant Eligible  No Hx of Thoracentesis but had PARA: 2 lit.  Had intermittent cough and SOB  COVID -ve  Not on Oxygen

## 2020-10-05 NOTE — PROCEDURES
"Madhavi Bell is a 44 y.o. female patient.    Temp: 98.1 °F (36.7 °C) (10/05/20 0726)  Pulse: 99 (10/05/20 0924)  Resp: 19 (10/05/20 0726)  BP: (!) 104/56 (10/05/20 0924)  SpO2: (!) 92 % (10/05/20 0726)  Weight: 71.2 kg (157 lb) (10/05/20 0924)  Height: 5' 8" (172.7 cm) (10/05/20 0924)       Thoracentesis    Date/Time: 10/5/2020 10:42 AM  Location procedure was performed: [facility] PULMONARY MEDICINE  Performed by: Julio César Avila MD  Authorized by: Julio César Avila MD   Pre-operative diagnosis: right pleural effusion  Post-operative diagnosis: SAME  Consent Done: Yes  Consent: Written consent obtained.  Risks and benefits: risks, benefits and alternatives were discussed  Consent given by: patient  Patient understanding: patient states understanding of the procedure being performed  Patient consent: the patient's understanding of the procedure matches consent given  Procedure consent: procedure consent matches procedure scheduled  Relevant documents: relevant documents present and verified  Test results: test results available and properly labeled  Site marked: the operative site was marked  Imaging studies: imaging studies available  Required items: required blood products, implants, devices, and special equipment available  Patient identity confirmed: , MRN, name, provided demographic data and verbally with patient  Time out: Immediately prior to procedure a "time out" was called to verify the correct patient, procedure, equipment, support staff and site/side marked as required.  Procedure purpose: diagnostic and therapeutic  Indications: pleural effusion  Preparation: Patient was prepped and draped in the usual sterile fashion.  Local anesthesia used: yes  Anesthesia: local infiltration    Anesthesia:  Local anesthesia used: yes  Local Anesthetic: lidocaine 1% without epinephrine  Anesthetic total: 10 mL  Patient sedated: no  Preparation: skin prepped with ChloraPrep  Patient position: " sitting  Ultrasound guidance: yes  Location: right posterior  Intercostal space: 6th  Puncture method: over-the-needle catheter  Needle size: 18  Catheter size: 8 Italian  Number of attempts: 1  Drainage amount: 2200 ml  Drainage characteristics: serosanguinous  Patient tolerance: Patient tolerated the procedure well with no immediate complications  Chest x-ray performed: yes  Chest x-ray interpreted by me.  Chest x-ray findings: pleural effusion  Complications: No (felt nauseous through procedure)  Specimens: Yes  Implants: No  Comments:     Tube #1: Gram stain, culture, KOH, AFB, Tube #2: Chemistry: Alb, Jennifer, Chol, LDH, Prot, Tube #3: Cytology, Lavender for wbcc, Urine specimen cup for cytology        CXR  No Sapna Avila  10/5/2020

## 2020-10-05 NOTE — SUBJECTIVE & OBJECTIVE
Past Medical History:   Diagnosis Date    Alpha-1-antitrypsin deficiency     ZZ phenotype    Eosinophilic esophagitis     h/o pill impaction, bx proven    Hyperlipidemia     may be related to liver enzymes, 2ary to high HDL    Schatzki's ring     s/p dilatation       Past Surgical History:   Procedure Laterality Date    APPENDECTOMY       SECTION      CHOLECYSTECTOMY      DILATION AND CURETTAGE OF UTERUS      ESOPHAGOGASTRODUODENOSCOPY N/A 2020    Procedure: EGD (ESOPHAGOGASTRODUODENOSCOPY)-need rapid covid;  Surgeon: Jose Smith MD;  Location: [facility];  Service: Endoscopy;  Laterality: N/A;       Review of patient's allergies indicates:  No Known Allergies    Family History     None        Tobacco Use    Smoking status: Never Smoker   Substance and Sexual Activity    Alcohol use: Not Currently     Comment: patient reports she has not had any alcohol in past few months    Drug use: No    Sexual activity: Yes     Partners: Male     Birth control/protection: OCP         Review of Systems   Respiratory: Positive for shortness of breath.    All other systems reviewed and are negative.    Objective:     Vital Signs (Most Recent):  Temp: 98.1 °F (36.7 °C) (10/05/20 033)  Pulse: 89 (10/05/20 033)  Resp: 18 (10/05/20 0336)  BP: (!) 98/58 (10/05/20 0510)  SpO2: (!) 93 % (10/05/20 0510) Vital Signs (24h Range):  Temp:  [98.1 °F (36.7 °C)-98.2 °F (36.8 °C)] 98.1 °F (36.7 °C)  Pulse:  [] 89  Resp:  [15-24] 18  SpO2:  [92 %-98 %] 93 %  BP: ()/(50-70) 98/58     Weight: 71.6 kg (157 lb 11.8 oz)  Body mass index is 23.98 kg/m².      Intake/Output Summary (Last 24 hours) at 10/5/2020 0703  Last data filed at 10/4/2020 1722  Gross per 24 hour   Intake 300 ml   Output --   Net 300 ml       Physical Exam  Vitals signs and nursing note reviewed.   Constitutional:       Appearance: Normal appearance. She is normal weight.   HENT:      Head: Normocephalic and atraumatic.      Nose:  Nose normal.      Mouth/Throat:      Mouth: Mucous membranes are dry.      Pharynx: Oropharynx is clear.   Eyes:      Extraocular Movements: Extraocular movements intact.      Conjunctiva/sclera: Conjunctivae normal.      Pupils: Pupils are equal, round, and reactive to light.   Neck:      Musculoskeletal: Normal range of motion and neck supple.   Cardiovascular:      Rate and Rhythm: Normal rate and regular rhythm.      Pulses: Normal pulses.   Pulmonary:      Effort: Pulmonary effort is normal.      Breath sounds: Decreased air movement present. Examination of the right-middle field reveals decreased breath sounds. Examination of the right-lower field reveals decreased breath sounds. Decreased breath sounds and rales present. No wheezing or rhonchi.       Abdominal:      General: Bowel sounds are normal.      Palpations: Abdomen is soft.   Musculoskeletal: Normal range of motion.   Skin:     General: Skin is warm and dry.      Capillary Refill: Capillary refill takes 2 to 3 seconds.   Neurological:      General: No focal deficit present.      Mental Status: She is alert and oriented to person, place, and time.   Psychiatric:         Mood and Affect: Mood normal.         Vents:       Lines/Drains/Airways     Peripheral Intravenous Line                 Peripheral IV - Single Lumen 10/04/20 1458 20 G Left Antecubital less than 1 day                Significant Labs:    CBC/Anemia Profile:  Recent Labs   Lab 10/04/20  1506   WBC 6.25   HGB 12.4   HCT 36.5*   *   MCV 93   RDW 18.6*        Chemistries:  Recent Labs   Lab 10/04/20  1506   *   K 3.9   CL 97   CO2 23   BUN 11   CREATININE 0.7   CALCIUM 8.0*   ALBUMIN 1.9*   PROT 6.7   BILITOT 17.2*   ALKPHOS 604*   ALT 99*   *       ABGs: No results for input(s): PH, PCO2, HCO3, POCSATURATED, BE in the last 48 hours.  Cardiac Markers: No results for input(s): CKMB, TROPONINT, MYOGLOBIN in the last 48 hours.  Lactic Acid:   Recent Labs   Lab  10/04/20  1630   LACTATE 1.5     POCT Glucose: No results for input(s): POCTGLUCOSE in the last 48 hours.  Respiratory Culture: No results for input(s): GSRESP, RESPIRATORYC in the last 48 hours.  Troponin:   Recent Labs   Lab 10/04/20  1506   TROPONINI <0.006     All pertinent labs within the past 24 hours have been reviewed.    Significant Imaging:   CT: I have reviewed all pertinent results/findings within the past 24 hours and my personal findings are:  Large right pleural effusion         EXAMINATION:  CT CHEST WITHOUT CONTRAST     CLINICAL HISTORY:  Chest pain or SOB, pleurisy or effusion suspected;     TECHNIQUE:  Axial CT images performed through the chest without intravenous contrast.     COMPARISON:  Chest radiograph on 08/17/2020     FINDINGS:  The heart, great vessels, and mediastinal structures are within normal limits. No thoracic adenopathy.     Development of a large right pleural effusion with right lower lobe compressive atelectasis.  Subsegmental ground-glass opacity in the left lung apex.  No pneumothorax.     Cirrhotic morphology of the liver with splenomegaly and paraesophageal varices consistent with portal hypertension.  Small volume perihepatic ascites.     The bones are intact.     Impression:     Large right pleural effusion with right lower lobe compressive atelectasis.     Subsegmental ground-glass opacity in the left lung apex, likely infectious or inflammatory in nature.     Cirrhosis.

## 2020-10-05 NOTE — DISCHARGE SUMMARY
"[facility] Hospital Medicine  Discharge Summary      Patient Name: Madhavi Bell  MRN: [id number]  Admission Date: 10/4/2020  Hospital Length of Stay: 0 days  Discharge Date and Time:  10/05/2020 1:41 PM  Attending Physician: Joaquin Campos MD   Discharging Provider: JUSTIN Franco  Primary Care Provider: Anurag Knutson MD      HPI:   This is a 45 yo female with PMHx of  HLD, Schatzki's ring, Alpha-1-antitrypsin deficiency with cirrohosis of the liver, and recent paracentesis in August with 1.5 Liters of fluid removed.  Patient came to ER for evaluation of increased SOB over the past week which has been becoming progressively worse. Patient reports she was concerned she might have Covid virus and went to the  [facility] for evaluation. She reports she was told that she had an  " abnormal CXR" and was sent to the ER for further evaluation and treatment. Patient was evaluated in our ER and found to have a large right pleural effusion and pneumonia.     Patient reports she has an appointment with [facility] in [place] later this week to discuss getting on the liver transplant list.         Per report, a COVID-19 test was negative [facility] earlier today.     Patient reports she has not had a pleural effusion before. Code status discussed with patient and she is a Full Code.     Patient placed in Observation.     * No surgery found *      Hospital Course:   The patient was monitored closely during her stay. Her O2 saturations were monitored closely. She was initially placed on Iv rocephin and Iv zithromycin for possible pneumonia. Pulmonology was consulted for evaluation and recommendations.   Patient was also evaluated for ascites and not found to have enough fluid for paracentesis.  It was later felt patient did not have pneumonia and the Iv abx were discontinued. On 10/05/2020, patient underwent a right thoracentesis with 2200 ml fluid obtained. Fluid " was sent to the lab for analysis. Patient tolerated procedure well. Patient was monitored post procedure where her overall condition remained stable and she was discharged to home.      Consults:   Consults (From admission, onward)        Status Ordering Provider     Inpatient consult to Pulmonology  Once     Provider:  Tyrell Henriquez MD    Completed MARLENE REYNA          Hyponatremia  Chronic- Stable  Monitor        Final Active Diagnoses:    Diagnosis Date Noted POA    PRINCIPAL PROBLEM:  Pleural effusion on right [J90] 10/04/2020 Yes    Hypokalemia [E87.6] 10/05/2020 No    Pneumonia [J18.9] 10/04/2020 Yes    Hyponatremia [E87.1] 10/04/2020 Yes    Hypoalbuminemia [E88.09] 10/04/2020 Yes    Ascites [R18.8] 10/04/2020 Yes    Alpha-1-antitrypsin deficiency [E88.01] 08/26/2020 Yes    Other cirrhosis of liver [K74.69] 08/26/2020 Yes      Problems Resolved During this Admission:       Discharged Condition: stable    Disposition: Home or Self Care    Follow Up:  Follow-up Information     Needs PFT as outpatient .           Follow up this thursday.    Why: With already scheduled appointment with Transplant Team           Anurag Knutson MD In 1 week.    Specialty: Family Medicine  Why: patient to follow-up with non [facility]  Contact information:  [address] [place] [address]  [phone]             Julio César Avila MD In 2 weeks.    Specialty: Pulmonary Disease  Contact information:  [address]  [place] [address]  [phone]                 Patient Instructions:      Diet Cardiac     Notify your health care provider if you experience any of the following:  temperature >100.4     Notify your health care provider if you experience any of the following:  severe uncontrolled pain     Notify your health care provider if you experience any of the following:  redness, tenderness, or signs of infection (pain, swelling, redness, odor or green/yellow discharge around incision site)      Notify your health care provider if you experience any of the following:  difficulty breathing or increased cough     Notify your health care provider if you experience any of the following:   Order Comments: Any signs of decline     Pulmonary function test   Standing Status: Future Standing Exp. Date: 11/05/20     Activity as tolerated   Order Comments: No heavy lifting, no strenuous activity       Significant Diagnostic Studies: Labs:   CMP   Recent Labs   Lab 10/04/20  1506 10/05/20  0818 10/05/20  1049   * 132*  --    K 3.9 3.2*  --    CL 97 96  --    CO2 23 26  --     94  --    BUN 11 11  --    CREATININE 0.7 0.7  --    CALCIUM 8.0* 7.8*  --    PROT 6.7 6.3 6.2   ALBUMIN 1.9* 1.8*  --    BILITOT 17.2* 15.9*  --    ALKPHOS 604* 539*  --    * 164*  --    ALT 99* 94*  --    ANIONGAP 10 10  --    ESTGFRAFRICA >60 >60  --    EGFRNONAA >60 >60  --    , CBC   Recent Labs   Lab 10/04/20  1506 10/05/20  0818   WBC 6.25 5.91   HGB 12.4 11.9*   HCT 36.5* 34.7*   * 105*    and INR   Lab Results   Component Value Date    INR 1.4 (H) 10/04/2020    INR 1.5 (H) 09/23/2020    INR 1.4 (H) 09/09/2020       Pending Diagnostic Studies:     Procedure Component Value Units Date/Time    Albumin, Peritoneal, Pleural Fluid or ELIN Drainage, In-House Thoracentesis Fluid (right) [[id number]] Collected: 10/05/20 1030    Order Status: Sent Lab Status: In process Updated: 10/05/20 1134    Specimen: Body Fluid     Amylase, Peritoneal, Pleural Fluid or ELIN Drainage, In-House Thoracentesis Fluid (right) [[id number]] Collected: 10/05/20 1032    Order Status: Sent Lab Status: In process Updated: 10/05/20 1132    Specimen: Body Fluid     Cholesterol, Body Fluid (Reference Lab) Pleural Fluid, Right [[id number]] Collected: 10/05/20 1030    Order Status: Sent Lab Status: In process Updated: 10/05/20 1135    Specimen: Body Fluid     Cytology, Pulmonary [[id number]] Collected: 10/05/20 1139    Order Status: Sent Lab Status: In  process Updated: 10/05/20 1140    Glucose, Peritoneal, Pleural Fluid or ELIN Drainage, In-House Thoracentesis Fluid (right) [[id number]] Collected: 10/05/20 1032    Order Status: Sent Lab Status: In process Updated: 10/05/20 1133    Specimen: Body Fluid     LDH, Peritoneal, Pleural Fluid or ELIN Drainage, In-House Thoracentesis Fluid (right) [[id number]] Collected: 10/05/20 1030    Order Status: Sent Lab Status: In process Updated: 10/05/20 1133    Specimen: Body Fluid     Protein, Peritoneal, Pleural Fluid or ELIN Drainage, In-House Thoracentesis Fluid (right) [[id number]] Collected: 10/05/20 1033    Order Status: Sent Lab Status: In process Updated: 10/05/20 1134    Specimen: Body Fluid     WBC & Diff,Body Fluid Thoracentesis Fluid (right) [[id number]] Collected: 10/05/20 1020    Order Status: Sent Lab Status: In process Updated: 10/05/20 1131    Specimen: Body Fluid     X-Ray Chest 1 View [[id number]]     Order Status: Sent Lab Status: No result          Medications:  Reconciled Home Medications:      Medication List      START taking these medications    multivitamin Tab  Take 1 tablet by mouth once daily.  Start taking on: October 6, 2020        CONTINUE taking these medications    ergocalciferol 50,000 unit Cap  Commonly known as: ERGOCALCIFEROL  Take 1 capsule (50,000 Units total) by mouth every 7 days.     furosemide 40 MG tablet  Commonly known as: LASIX  TK 1 T PO ONCE D     lactulose 10 gram/15 mL solution  Commonly known as: CHRONULAC  Take 20 g by mouth daily as needed.     spironolactone 100 MG tablet  Commonly known as: ALDACTONE  TK 1 T PO ONCE D     zinc gluconate 50 mg tablet  Take 50 mg by mouth once daily.            Indwelling Lines/Drains at time of discharge:   Lines/Drains/Airways     None                 Time spent on the discharge of patient: 59 minutes  Patient was seen and examined on the date of discharge and determined to be suitable for discharge.         JUSTIN Franco  Department of  [facility] Medicine  [facility]

## 2020-10-05 NOTE — ASSESSMENT & PLAN NOTE
Patient initially felt to possibly have Pneumonia however after further evaluation it was felt she did not have pneumonia  Procalcitonin level was Wnl  Iv abx discontinued  Blood cultures x 2 no growth so far  Continue  alexis bryant

## 2020-10-05 NOTE — ASSESSMENT & PLAN NOTE
With Cirrhosis of the liver, Elevated liver enzymes, Ascites-  Patient reports her first paracentesis was in August 2020 with 1.5 liters of fluid obtained  Patient reports she has appointment with [facility] in [place] later this week to see about getting put on the Liver transplant list  Continue home aldactone  Give home  lasix IV for now  Paracentesis ordered but patient with not enough fluid for paracentesis per Radiology   INR was 1.4

## 2020-10-05 NOTE — HOSPITAL COURSE
The patient was monitored closely during her stay. Her O2 saturations were monitored closely. She was initially placed on Iv rocephin and Iv zithromycin for possible pneumonia. Pulmonology was consulted for evaluation and recommendations.   Patient was also evaluated for ascites and not found to have enough fluid for paracentesis.  It was later felt patient did not have pneumonia and the Iv abx were discontinued. On 10/05/2020, patient underwent a right thoracentesis with 2200 ml fluid obtained. Fluid was sent to the lab for analysis. Patient tolerated procedure well. Patient was monitored post procedure where her overall condition remained stable and she was discharged to home.

## 2020-10-05 NOTE — PROGRESS NOTES
Discharge instructions given, patient verbalized understanding. IV removed, tip intact. Cardiac monitor removed. Patient to be discharged via wheelchair.

## 2020-10-05 NOTE — SUBJECTIVE & OBJECTIVE
Interval  History: Patient reports feeling much better. Ascites evaluated and not enough fluid for paracentesis. Patient scheduled for right thoracentesis for today.      Review of Systems   Constitutional: Positive for activity change and fatigue. Negative for chills, diaphoresis and fever.   HENT: Negative for ear discharge, ear pain, facial swelling and hearing loss.    Eyes: Negative for pain and redness.   Respiratory: Positive for cough and shortness of breath.          SOB with exertion      Cardiovascular: Negative for chest pain, palpitations and leg swelling.   Gastrointestinal: Positive for abdominal distention. Negative for abdominal pain, blood in stool, constipation, nausea and vomiting.            Endocrine: Negative for polydipsia and polyphagia.   Genitourinary: Negative for difficulty urinating, dysuria, flank pain and hematuria.   Musculoskeletal: Negative for gait problem, neck pain and neck stiffness.   Skin: Negative for color change.   Allergic/Immunologic: Negative for food allergies.   Neurological: Negative for facial asymmetry, speech difficulty and weakness.   Hematological: Does not bruise/bleed easily.   Psychiatric/Behavioral: Negative for agitation, behavioral problems, confusion, hallucinations and suicidal ideas. The patient is not nervous/anxious.      Objective:     Vital Signs (Most Recent):  Temp: 98.3 °F (36.8 °C) (10/05/20 1151)  Pulse: 90 (10/05/20 1151)  Resp: 18 (10/05/20 1151)  BP: (!) 99/52 (10/05/20 1151)  SpO2: 96 % (10/05/20 1151) Vital Signs (24h Range):  Temp:  [98.1 °F (36.7 °C)-98.3 °F (36.8 °C)] 98.3 °F (36.8 °C)  Pulse:  [] 90  Resp:  [15-24] 18  SpO2:  [92 %-98 %] 96 %  BP: ()/(50-70) 99/52     Weight: 71.2 kg (157 lb)  Body mass index is 23.87 kg/m².    Physical Exam  Constitutional:       General: She is not in acute distress.     Appearance: She is not diaphoretic.   HENT:      Head: Normocephalic and atraumatic.      Mouth/Throat:      Mouth:  Mucous membranes are moist.   Eyes:      General:         Right eye: No discharge.         Left eye: No discharge.      Extraocular Movements: Extraocular movements intact.      Conjunctiva/sclera: Conjunctivae normal.      Pupils: Pupils are equal, round, and reactive to light.      Comments: Juandiced   Neck:      Musculoskeletal: Normal range of motion and neck supple.      Vascular: No carotid bruit.   Cardiovascular:      Rate and Rhythm: Normal rate.      Pulses: Normal pulses.      Heart sounds: Normal heart sounds. No murmur.   Pulmonary:      Effort: No respiratory distress.      Comments: Right side very diminished    SOB with exertion  Abdominal:      General: Bowel sounds are normal. There is distension.      Tenderness: There is no abdominal tenderness. There is no guarding.      Comments: Ascites   Genitourinary:     Comments: Not examined  Musculoskeletal: Normal range of motion.         General: No swelling.      Right lower leg: No edema.      Left lower leg: No edema.   Lymphadenopathy:      Cervical: No cervical adenopathy.   Skin:     General: Skin is warm and dry.      Capillary Refill: Capillary refill takes less than 2 seconds.   Neurological:      General: No focal deficit present.      Mental Status: She is alert and oriented to person, place, and time. Mental status is at baseline.      Cranial Nerves: No cranial nerve deficit.   Psychiatric:         Mood and Affect: Mood normal.         Behavior: Behavior normal.         Thought Content: Thought content normal.         Judgment: Judgment normal.           CRANIAL NERVES     CN III, IV, VI   Pupils are equal, round, and reactive to light.     Lab Results   Component Value Date    WBC 5.91 10/05/2020    HGB 11.9 (L) 10/05/2020    HCT 34.7 (L) 10/05/2020    MCV 93 10/05/2020     (L) 10/05/2020         BMP  Lab Results   Component Value Date     (L) 10/05/2020    K 3.2 (L) 10/05/2020    CL 96 10/05/2020    CO2 26 10/05/2020    BUN  11 10/05/2020    CREATININE 0.7 10/05/2020    CALCIUM 7.8 (L) 10/05/2020    ANIONGAP 10 10/05/2020    ESTGFRAFRICA >60 10/05/2020    EGFRNONAA >60 10/05/2020

## 2020-10-05 NOTE — PROGRESS NOTES
"[facility] Hospital Medicine  Progress Note    Patient Name: Madhavi Bell  MRN: [id number]  Patient Class: OP- Observation   Admission Date: 10/4/2020  Length of Stay: 0 days  Attending Physician: Joaquin Campos MD  Primary Care Provider: Anurag Knutson MD      Subjective:     Principal Problem:Pleural effusion on right, Pneumonia Excluded, Liver cirrhosis, Ascites    HPI:  This is a 43 yo female with PMHx of  HLD, Schatzki's ring, Alpha-1-antitrypsin deficiency with cirrohosis of the liver, and recent paracentesis in August with 1.5 Liters of fluid removed.  Patient came to ER for evaluation of increased SOB over the past week which has been becoming progressively worse. Patient reports she was concerned she might have Covid virus and went to the  [facility] for evaluation. She reports she was told that she had an  " abnormal CXR" and was sent to the ER for further evaluation and treatment. Patient was evaluated in our ER and found to have a large right pleural effusion and pneumonia.     Patient reports she has an appointment with [facility] in [place] later this week to discuss getting on the liver transplant list.         Per report, a COVID-19 test was negative done [facility] earlier today.     Patient reports she has not had a pleural effusion before. Code status discussed with patient and she is a Full Code.     Patient placed in Observation.     Overview/Hospital Course:  The patient was monitored closely during her stay. Her O2 saturations were monitored closely. She was initially placed on Iv rocephin and Iv zithromycin for possible pneumonia. Pulmonology was consulted for evaluation and recommendations. Patient was also evaluated for ascites and not found to have enough fluid for paracentesis.     Interval  History: Patient reports feeling much better. Ascites evaluated and not enough fluid for paracentesis. Patient scheduled for right thoracentesis for " today.      Review of Systems   Constitutional: Positive for activity change and fatigue. Negative for chills, diaphoresis and fever.   HENT: Negative for ear discharge, ear pain, facial swelling and hearing loss.    Eyes: Negative for pain and redness.   Respiratory: Positive for cough and shortness of breath.          SOB with exertion      Cardiovascular: Negative for chest pain, palpitations and leg swelling.   Gastrointestinal: Positive for abdominal distention. Negative for abdominal pain, blood in stool, constipation, nausea and vomiting.            Endocrine: Negative for polydipsia and polyphagia.   Genitourinary: Negative for difficulty urinating, dysuria, flank pain and hematuria.   Musculoskeletal: Negative for gait problem, neck pain and neck stiffness.   Skin: Negative for color change.   Allergic/Immunologic: Negative for food allergies.   Neurological: Negative for facial asymmetry, speech difficulty and weakness.   Hematological: Does not bruise/bleed easily.   Psychiatric/Behavioral: Negative for agitation, behavioral problems, confusion, hallucinations and suicidal ideas. The patient is not nervous/anxious.      Objective:     Vital Signs (Most Recent):  Temp: 98.3 °F (36.8 °C) (10/05/20 1151)  Pulse: 90 (10/05/20 1151)  Resp: 18 (10/05/20 1151)  BP: (!) 99/52 (10/05/20 1151)  SpO2: 96 % (10/05/20 1151) Vital Signs (24h Range):  Temp:  [98.1 °F (36.7 °C)-98.3 °F (36.8 °C)] 98.3 °F (36.8 °C)  Pulse:  [] 90  Resp:  [15-24] 18  SpO2:  [92 %-98 %] 96 %  BP: ()/(50-70) 99/52     Weight: 71.2 kg (157 lb)  Body mass index is 23.87 kg/m².    Physical Exam  Constitutional:       General: She is not in acute distress.     Appearance: She is not diaphoretic.   HENT:      Head: Normocephalic and atraumatic.      Mouth/Throat:      Mouth: Mucous membranes are moist.   Eyes:      General:         Right eye: No discharge.         Left eye: No discharge.      Extraocular Movements: Extraocular  movements intact.      Conjunctiva/sclera: Conjunctivae normal.      Pupils: Pupils are equal, round, and reactive to light.      Comments: Juandiced   Neck:      Musculoskeletal: Normal range of motion and neck supple.      Vascular: No carotid bruit.   Cardiovascular:      Rate and Rhythm: Normal rate.      Pulses: Normal pulses.      Heart sounds: Normal heart sounds. No murmur.   Pulmonary:      Effort: No respiratory distress.      Comments: Right side very diminished    SOB with exertion  Abdominal:      General: Bowel sounds are normal. There is distension.      Tenderness: There is no abdominal tenderness. There is no guarding.      Comments: Ascites   Genitourinary:     Comments: Not examined  Musculoskeletal: Normal range of motion.         General: No swelling.      Right lower leg: No edema.      Left lower leg: No edema.   Lymphadenopathy:      Cervical: No cervical adenopathy.   Skin:     General: Skin is warm and dry.      Capillary Refill: Capillary refill takes less than 2 seconds.   Neurological:      General: No focal deficit present.      Mental Status: She is alert and oriented to person, place, and time. Mental status is at baseline.      Cranial Nerves: No cranial nerve deficit.   Psychiatric:         Mood and Affect: Mood normal.         Behavior: Behavior normal.         Thought Content: Thought content normal.         Judgment: Judgment normal.           CRANIAL NERVES     CN III, IV, VI   Pupils are equal, round, and reactive to light.     Lab Results   Component Value Date    WBC 5.91 10/05/2020    HGB 11.9 (L) 10/05/2020    HCT 34.7 (L) 10/05/2020    MCV 93 10/05/2020     (L) 10/05/2020         BMP  Lab Results   Component Value Date     (L) 10/05/2020    K 3.2 (L) 10/05/2020    CL 96 10/05/2020    CO2 26 10/05/2020    BUN 11 10/05/2020    CREATININE 0.7 10/05/2020    CALCIUM 7.8 (L) 10/05/2020    ANIONGAP 10 10/05/2020    ESTGFRAFRICA >60 10/05/2020    EGFRNONAA >60  10/05/2020           Assessment/Plan:      * Pleural effusion on right  Monitor O2 sats, supplemental as needed  Telemetry  Pulmonology consulted for evaluation- patient reports no prior Hx of effusion     Continue Iv lasix and home spironalactone  Patient scheduled for a  Thoracentesis for today   INR 1.4   10/05 2020 Echocardiogram shows-  · There is mild left ventricular concentric hypertrophy.  · The left ventricle is normal in size with normal systolic function. The estimated ejection fraction is 60%.  · Normal left ventricular diastolic function.  · Normal right ventricular systolic function.  · Mild tricuspid regurgitation.  · Normal central venous pressure (3 mmHg).  · The estimated PA systolic pressure is 32 mmHg.  · Trivial pericardial effusion.    Hypokalemia  Monitor  Supplement as needed- Patient ordered for 60 meq po q 4 hours x 2 doses      Ascites  As above      Hypoalbuminemia  Continue po supplement      Hyponatremia  Chronic- Stable  Monitor      Pneumonia  Patient initially felt to possibly have Pneumonia however after further evaluation it was felt she did not have pneumonia  Procalcitonin level was Wnl  Iv abx discontinued  Blood cultures x 2 no growth so far  Continue  qid duonebs        Alpha-1-antitrypsin deficiency  With Cirrhosis of the liver, Elevated liver enzymes, Ascites-  Patient reports her first paracentesis was in August 2020 with 1.5 liters of fluid obtained  Patient reports she has appointment with [facility] in [place] later this week to see about getting put on the Liver transplant list  Continue home aldactone  Give home  lasix IV for now  Paracentesis ordered but patient with not enough fluid for paracentesis per Radiology   INR was 1.4          Other cirrhosis of liver  As above        VTE Risk Mitigation (From admission, onward)         Ordered     IP VTE HIGH RISK PATIENT  Once      10/04/20 1727     Place NUHA hose  Until discontinued      10/04/20 1720                 Discharge Planning   WILL:      Code Status: Full Code   Is the patient medically ready for discharge?:     Reason for patient still in hospital (select all that apply): Treatment        Time spent seeing patient( greater than 1/2 spent in direct contact) : 38 minutes    JUSTIN Franco  Department of Hospital Medicine   [facility]

## 2020-10-05 NOTE — ASSESSMENT & PLAN NOTE
Monitor O2 sats, supplemental as needed  Telemetry  Pulmonology consulted for evaluation- patient reports no prior Hx of effusion     Continue Iv lasix and home spironalactone  Patient scheduled for a  Thoracentesis for today   INR 1.4   10/05 2020 Echocardiogram shows-  · There is mild left ventricular concentric hypertrophy.  · The left ventricle is normal in size with normal systolic function. The estimated ejection fraction is 60%.  · Normal left ventricular diastolic function.  · Normal right ventricular systolic function.  · Mild tricuspid regurgitation.  · Normal central venous pressure (3 mmHg).  · The estimated PA systolic pressure is 32 mmHg.  · Trivial pericardial effusion.

## 2020-10-06 NOTE — PROGRESS NOTES
C3 nurse attempted to contact patient. No answer. The following message was left for the patient to return the call:  Good afternoon,  I am a nurse calling on behalf of [employer] [facility] from the [facility].  This is a Transitional Care Call for Madhavi Bell. When you have a moment please contact us at [phone] or [phone] Monday through Friday, between the hours of 8 am to 4 pm. We look forward to speaking with you. On behalf of [facility] have a nice day.    The patient has a scheduled [facility] appointment on 10/8 @ 0900.

## 2020-10-06 NOTE — PLAN OF CARE
10/06/20 1607   Final Note   Assessment Type Final Discharge Note   Anticipated Discharge Disposition Home   Right Care Referral Info   Post Acute Recommendation No Care

## 2020-10-07 NOTE — TELEPHONE ENCOUNTER
Patient called to confirm that they will be attending the scheduled appointments for Liver Transplant Fast Pass Evaluation scheduled to start 10/8/20  at 0730.  Patient confirms that caregivers will be present for the scheduled appointments.  Patient appointments reviewed along with location and special instructions.  Patient questions answered at this time and number provided to call the office if there is any problem.

## 2020-10-07 NOTE — PROGRESS NOTES
Please forward this important TCC information to your provider in order to maximize the post discharge care delivery of this patient.    C3 nurse spoke with Madhavi Lilly Bell  for a TCC post hospital discharge follow up call. The patient does not have a scheduled HOSFU appointment with Anurag Knutson MD within 7-14 days post hospital discharge date 10/05/2020. C3 nurse was unable to schedule HOSFU appointment in [facility].  Please contact Anurag Knutson MD and schedule follow up appointment using HOSFU visit type on or before 0/19/2020    Respectfully,  Courtney Herbert LPANAY    [facility]    [email or web address]       Please do not reply to this message, as this inbox is not routinely monitored.

## 2020-10-07 NOTE — PATIENT INSTRUCTIONS
Discharge Instructions for Thoracentesis  Thoracentesis is a procedure that removes extra fluid (pleural effusion) from the pleural space. This space is between the outside surface of the lungs (pleura) and the chest wall. The procedure may be done to take a sample of the fluid for testing to help find the cause. Or it may be done to drain the extra fluid if you are having trouble breathing.  Home care  · You may have some pain after the procedure. Your doctor can prescribe or recommend pain medicine for you to take at home, if needed. Take these exactly as directed. If you stopped taking other medicines before the procedure, ask your doctor when you can start them again.  · Take it easy for 48 hours after the procedure. Don't do anything active until your doctor says its OK.  · Don't do strenuous activities, such as lifting, until your doctor says its OK.  · You will have a small bandage over the puncture site. You may remove the bandage in 24 hours.  · Check the puncture site for the signs of infection listed below.  Follow-up  Make a follow-up appointment with your doctor as directed. During your follow-up visit, your doctor will check your healing. Be sure to let your doctor know how you are feeling.  When to call your doctor  Call your doctor right away if you have any of the following:  · Coughing up blood  · Chest pain. If chest pain suddenly gets worse, it may be an emergency.  · Shortness of breath  · Fever of 100.4°F (38°C) or higher, or as directed by your healthcare provider  · Pain that doesn't get better after taking pain medicine  · Signs of infection at the puncture site. These include increased pain, redness, swelling, or warmth.  · Fluid draining from the puncture site   Date Last Reviewed: 10/1/2016  © 9186-1702 [employer]. [address] [place] [address], [place] [address]. All rights reserved. This information is not intended as a substitute for professional medical care. Always follow  your healthcare professional's instructions.

## 2020-10-08 NOTE — PROGRESS NOTES
MYCOPHENOLATE REMS PROGRAM:    I reviewed the mycophenolate REMS program with the patient.  Provided the mycophenolate REMS Guide to the patient.   The patient and prescriber signed the acknowledgement form, which will be scanned into the permanent electronic medical record.  Pt verbalized understanding and had the opportunity to ask questions.    .

## 2020-10-08 NOTE — PROGRESS NOTES
PHARM.D. PRE-TRANSPLANT NOTE:    This patient's medication therapy was evaluated as part of her pre-transplant evaluation.      The following general pharmacologic concerns were noted: none    The following concerns for post-operative pain management were noted: none    The following pharmacologic concerns related to HCV therapy were noted: none      This patient's medication profile was reviewed for considerations for DAA Hepatitis C therapy:    [x]  No current inducers of CYP 3A4 or PGP  [x]  No amiodarone on this patient's EMR profile in the last 24 months  [x]  No past or current atrial fibrillation on this patient's EMR profile       Current Outpatient Medications   Medication Sig Dispense Refill    ergocalciferol (ERGOCALCIFEROL) 50,000 unit Cap Take 1 capsule (50,000 Units total) by mouth every 7 days. 12 capsule 0    furosemide (LASIX) 20 MG tablet Take 40 mg by mouth once daily.       lactulose (CHRONULAC) 10 gram/15 mL solution Take 20 g by mouth daily as needed.       multivitamin Tab Take 1 tablet by mouth once daily.      spironolactone (ALDACTONE) 50 MG tablet Take 100 mg by mouth once daily.       zinc gluconate 50 mg tablet Take 50 mg by mouth once daily.       No current facility-administered medications for this visit.          Currently Ms Arabella is responsible for preparing / administering this patient's medications on a daily basis.  I am available for consultation and can be contacted, as needed by the other members of the Liver Transplant team.

## 2020-10-08 NOTE — PROGRESS NOTES
Madhavi Arabella seen in clinic for Fast Pass evaluation.  Handbook on pre-liver transplant information (see outline below) was given to the patient.  Patient's , accompanied her. Per clinic staff, patient viewed pre-liver transplant education slides via desktop in transplant clinic.  Informed consent signed and written information given on selection criteria.    LIVER TRANSPLANT WORK-UP EDUCATION   I. UNDERSTANDING THE TRANSPLANT PROCESS     A. Transplant team      B. MELD score      C. Balancing urgency and outcome     D. Liver Transplant Options         1.  Donor         2. Living Donor--rationale, benefits     E. Transplant Work-up         1. Medical         2. Psychological and Social--lifetime commitment, life changes, personal plan/ goal         3. Financial--fundraising     F.  Completed work-up and Next Steps    G. Wait Time         1.  Can be listed at more than one center         2.  Can transfer wait time     H. The Call       I. Possible donor options         1. DCD         2. Hep B Core and Hep C Positive         3. Increased Risk     J.  Liver Transplant Surgery         1. Length         2. Transfusions, cell saver         3. Surgical risks         4. What to expect after sugery--Central lines, drains, Garcia catheter, incision, endotracheal              tube, NG tube, length of stay in ICU/ TSU  II.  HOW TO BEST CARE FOR YOURSELF (Take Five To Thrive)  III. UNDERSTANDING LIFE AFTER TRANSPLANT  A. Medicines after transplant      1. Immunosuppression--infection and rejection  B. Labs   IV. ADULT LIVER SURVIVAL RATES

## 2020-10-08 NOTE — PROGRESS NOTES
Transplant Surgery  Liver Transplant Recipient Evaluation    Referring Provider:     Subjective:     Reason for Visit: evaluation for liver transplant    History of Present Illness: Madhavi Bell is a 44 y.o. female who is being evaluated for liver transplant due to alpah 1 antitrypsin deficiency. Madhavi reports ascites (diuretic-dependent), ascites (recurrent paracentesis needed), edema, esophageal or gastric varices, fatigue, hepatic hydrothorax, jaundice and portal hypertension.    Review of Systems   Constitutional: Negative for activity change, appetite change, chills, fatigue and fever.   HENT: Negative for sore throat and trouble swallowing.    Eyes: Negative for visual disturbance.   Respiratory: Negative for cough, chest tightness and shortness of breath.    Cardiovascular: Negative for chest pain, palpitations and leg swelling.   Gastrointestinal: Negative for abdominal distention, abdominal pain, blood in stool, constipation, diarrhea, nausea and vomiting.   Endocrine: Negative for polyuria.   Genitourinary: Negative for decreased urine volume, difficulty urinating, dysuria, flank pain, frequency and hematuria.   Musculoskeletal: Negative for gait problem, myalgias and neck stiffness.   Skin: Negative for rash and wound.   Neurological: Negative for dizziness, tremors, seizures, weakness, light-headedness and headaches.   Hematological: Negative for adenopathy.   Psychiatric/Behavioral: Negative for agitation, confusion and sleep disturbance.       Objective:     Physical Exam  Vitals signs reviewed.   Constitutional:       General: She is not in acute distress.     Appearance: She is well-developed.   HENT:      Head: Normocephalic.   Eyes:      General: No scleral icterus.     Conjunctiva/sclera: Conjunctivae normal.      Pupils: Pupils are equal, round, and reactive to light.   Neck:      Musculoskeletal: Normal range of motion and neck supple.      Thyroid: No thyromegaly.      Trachea: No  tracheal deviation.   Cardiovascular:      Rate and Rhythm: Normal rate and regular rhythm.      Heart sounds: Normal heart sounds. No murmur.   Pulmonary:      Effort: Pulmonary effort is normal. No respiratory distress.      Breath sounds: Normal breath sounds.   Abdominal:      General: Bowel sounds are normal. There is no distension.      Palpations: Abdomen is soft. There is no mass.      Tenderness: There is no abdominal tenderness. There is no guarding or rebound.          Comments: No inguinal adenopathy   Musculoskeletal: Normal range of motion.      Comments: No clubbing or cyanosis   Lymphadenopathy:      Cervical: No cervical adenopathy.   Skin:     General: Skin is warm and dry.      Findings: No erythema or rash.   Neurological:      Mental Status: She is alert and oriented to person, place, and time.   Psychiatric:         Behavior: Behavior normal.         MELD-Na score: 24 at 10/8/2020  7:47 AM  MELD score: 22 at 10/8/2020  7:47 AM  Calculated from:  Serum Creatinine: 1.0 mg/dL at 10/8/2020  7:47 AM  Serum Sodium: 134 mmol/L at 10/8/2020  7:47 AM  Total Bilirubin: 18.2 mg/dL at 10/8/2020  7:47 AM  INR(ratio): 1.5 at 10/8/2020  7:47 AM  Age: 44 years 11 months    Diagnoses:  1. Pre-transplant evaluation for liver transplant    2. Alpha-1-antitrypsin deficiency    3. Alcoholic liver disease    4. Other ascites    5. Organ transplant candidate    6. Hepatic cirrhosis, unspecified hepatic cirrhosis type, unspecified whether ascites present    7. Disorder of liver        Transplant Surgery - Candidacy   Assessment/Plan:     Transplant Candidacy: Madhavi Bell is a 44 y.o. female with ESLD secondary to  here for evaluation for possible OLT.  Based on available information, Madhavi is a suitable liver transplant candidate.  She will be presented to selection committee after all tests and evaluations are complete.    Kathleen Talbert MD       [facility] Patient Status  Functional Status: 50% - Requires  considerable assistance and frequent medical care  Physical Capacity: No Limitations    Counseling: I discussed with Madhavi the benefits of liver transplantation.  We discussed the evaluation and listing procedures.  We discussed the MELD system and the associated waiting times.  We discussed national and center specific survival results.  We discussed the option of being multiply listed in different OPOs.  We discussed the option of living donation versus  donor transplantation and the advantages and relative disadvantages of each.   We discussed the risks, benefits and potential complications related to surgery including the risks related to anesthesia, bleeding, infection, primary non-function of the allograft, the risk of reoperation as well as the risk of death.  We discussed the typical post-operative course, length of hospitalization, the long-term use of immunosuppressive therapy as well as the need for long-term routine followup.    PHS: I discussed the use of organs from donors with PHS increased-risk behavior, including the testing protocols utilized, as well as data from the literature regarding the likelihood of transmission of hepatitis or HIV.  The patient is willing to consider such grafts.  DCD: I discussed the use of organs recovered by donation after cardiac death (DCD), including slightly decreased graft survival and greater risk of arterial and biliary complications. The potential advantage to the recipient is possibly receiving a transplant sooner by accepting such an organ. The patient is willing to consider such grafts.  HBcAb: I discussed the use of organs from donors with HBcAb in conjunction with long term use of HBV antiviral drugs, such as lamivudine. The small but measurable risk of hepatitis B seroconversion was discussed as well as the potentially life long need to continue antiviral drugs. The patient is willing to consider such grafts.  HCV Non-viremic recipient: I  discussed the use of HCV-positive organs in naive recipients, including the risk of viral transmission to the patients or others, potential insurance barriers for antiviral medication coverage, risk for fibrosing cholestatic hepatitis, death or graft loss. The potential advantage to the recipient is the possibility of receiving a transplant sooner with decreased mortality risk by accepting such an organ. The patient is willing to consider such grafts.  LDLT: I discussed the nature of living donor liver transplant, including donor risks and more frequent recipient complications. The patient is willing to consider such grafts.

## 2020-10-08 NOTE — PROGRESS NOTES
"TRANSPLANT NUTRITIONAL ASSESSMENT    Referring Provider: Maria De Jesus Gray MD    Reason for Visit: Pre-liver transplant work-up    Age: 44 y.o.  Sex: female    Patient Active Problem List   Diagnosis    Routine gynecological examination    Contraceptive surveillance    Elevated liver enzymes    Other cirrhosis of liver    Other ascites    Alcoholic liver disease    Alpha-1-antitrypsin deficiency    Pleural effusion on right    Pneumonia    Hyponatremia    Hypoalbuminemia    Ascites    Hypokalemia     Past Medical History:   Diagnosis Date    Alpha-1-antitrypsin deficiency     ZZ phenotype    Eosinophilic esophagitis     h/o pill impaction, bx proven    Hyperlipidemia     may be related to liver enzymes, 2ary to high HDL    Schatzki's ring     s/p dilatation     Lab Results   Component Value Date    GLU 83 10/08/2020    K 4.2 10/08/2020    PHOS 3.2 10/05/2020    MG 1.9 10/05/2020    ALBUMIN 1.9 (L) 10/08/2020    CALCIUM 8.3 (L) 10/08/2020     Other Pertinent Labs: none    Current Outpatient Medications   Medication Sig    ergocalciferol (ERGOCALCIFEROL) 50,000 unit Cap Take 1 capsule (50,000 Units total) by mouth every 7 days.    furosemide (LASIX) 20 MG tablet Take 40 mg by mouth once daily.     lactulose (CHRONULAC) 10 gram/15 mL solution Take 20 g by mouth daily as needed.     multivitamin Tab Take 1 tablet by mouth once daily.    spironolactone (ALDACTONE) 50 MG tablet Take 100 mg by mouth once daily.     zinc gluconate 50 mg tablet Take 50 mg by mouth once daily.     No current facility-administered medications for this visit.      Allergies: Patient has no known allergies.    Ht Readings from Last 1 Encounters:   10/08/20 5' 6.02" (1.677 m)     Wt Readings from Last 1 Encounters:   10/08/20 70 kg (154 lb 5.2 oz)      BMI: Body mass index is 24.89 kg/m².    Usual Weight: 155-158 lb  Weight Change/Time: weight has been stable  Current Diet: regular  Appetite/Current Intake: good "   Exercise/Physical Activity: functional in ADLs, some walking, SOB is limiting exercise  Nutritional/Herbal Supplements: vit D, zinc, multi vit  Potential Food/Medication Interactions: reviewed  Chewing/Swallowing Problems: none  Symptoms: none  Assessment of Lab Values: Alb 1.9  Support System: spouse present and supportive in nutrition/diet regimen for pt    Estimated Kcal Need: 8585-0242 kcal (25-30 kcal/kg)  Estimated Protein Need:  gm (1-1.5 gm/kg)    Nutritional History:   Pt reports early satiety, fluid pushing on stomach and SOB make it difficult to eat larger portions at a time. She may use fresh, frozen or canned vegetables, no canned soup or frozen meals though. She does add salt to her food in cooking. She denies n/v/d/abd pain, if she takes lactulose that may induce very loose stools for a period of time. Pt provided the following diet recall:  B: granola bar, maybe coffee   L: pretzels, fruit, chips, granola bar  D: usually home cooked; chicken, grilled steak, grilled or fried shrimp, pork loin, potatoes/corn/green beans/broccoli, occasional rice, likes pasta / pizza or take out 1x.week approx    Nutritional Diagnoses  Problem: food- and nutrition-related knowledge deficit  Etiology: r/t no prior edu on low sodium diet recommendations, protein intake recommendations  Symptoms: aeb diet recall    Educational Need? yes  Barriers: none identified  Discussed with: patient and spouse  Interventions: Patient taught nutrition information regarding Pre-liver transplant work-up.    Reviewed Low Sodium packet (low Na diet, foods recommended/not recommended, food label strategies, flavoring tips, & sample menu).   Provided education on protein content in foods, goal intake per day, suggestions for ways to reach protein intake goal; nutrition supplements, snacks.   Encouraged physical activity daily, regular exercise as tolerated, stay mobile.    Goals/Recommendations: diet adherence  Actions Taken:  instruct/provide written information  Strategies Used: problem solving, goal setting, motivational interviewing  Patient and/or family comprehend instructions: yes , adherence expected  Outcome: Verbalizes understanding  Monitoring:     Contact information provided, will f/u in clinic and communicate with the care team as needed.     Counseling Time: 15 minutes

## 2020-10-08 NOTE — PROGRESS NOTES
Transplant Recipient Adult Psychosocial Assessment    Madhavi Bell  [address] [place] [address]  Telephone Information:   Mobile [phone]   Home  [phone] (home)  Work  There is no work phone number on file.  E-mail  [email or web address]    Sex: female  YOB: 1975  Age: 44 y.o.    Encounter Date: 10/8/2020  [place] Citizen: yes  Primary Language: English   Needed: no    Emergency Contact:  Name: Alli Bell  Relationship:   Address: same as patient  Phone Numbers:   [phone] (mobile)    Family/Social Support:   Number of dependents/: Patient 15 year old daughter Yue to stay in family home with her 18 year old brother.  Marital history: Patient will be  to her  for 22 years in November.  Other family dynamics: Patient mother living, she is 82 y.o.    Patient father .  Patient with one  brother and 3 living brothers.  Patient mother and 2 brothers live in [place], [place].  Patient has one brother in [place], [place].   Patient  family living in [facility] near patient and has voiced willingness to assist post transplant.  Patient with friends who have offered to assist as well.   Patient  will speak further with his brother and sister in law this evening or over the weekend and have them call .    Household Composition:  Name: Alli Bell  Age: 46  Relationship:   Does person drive? yes    Name: Akil Bell  Age: 18  Relationship: son  Does person drive? yes   Phone: [phone]  Freshman at [employer] living at home with online classes    Name: Yue Bell  Age: 14  Relationship: daughter  Does person drive? yes   Sophomore at [employer]     Do you and your caregivers have access to reliable transportation? yes  PRIMARY CAREGIVER: Alli Bell, patient , will be primary caregiver, phone number [phone].      provided in-depth information to patient and caregiver regarding pre- and  post-transplant caregiver role.   strongly encourages patient and caregiver to have concrete plan regarding post-transplant care giving, including back-up caregiver(s) to ensure care giving needs are met as needed.    Patient and Caregiver states understanding all aspects of caregiver role/commitment and is able/willing/committed to being caregiver to the fullest extent necessary.    Patient and Caregiver verbalizes understanding of the education provided today and caregiver responsibilities.         remains available. Patient and Caregiver agree to contact  in a timely manner if concerns arise.      Able to take time off work without financial concerns: yes ; he will be able to work remotely.     Additional Significant Others who will Assist with Transplant:  Name: Shell Bell  Age: unknown  City: [facility] State: [place]  Relationship: Brother and Sister In Law  Does person drive? yes   Phone: [phone]    Living Will: no  Healthcare Power of : no  Advance Directives on file: <<no information> per medical record.  Verbally reviewed LW/HCPA information.   provided patient with copy of LW/HCPA documents and provided education on completion of forms.    Living Donors: No.    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  She does not have access to any vacation or disability benefits since she is PRN, but does work full time hours.  Patient reports able to take time off without patient and no financial concerns identified...    Spouse/Significant Other Employment:  for [employer].  He  is able to work remotely.    Disabled: no    Monthly Income:  Other household members total: $9,000  Able to afford all costs now and if transplanted, including medications: yes  Patient and Caregiver verbalizes understanding of personal responsibilities related to transplant costs and the importance of having a financial plan to ensure that patients transplant costs are fully covered.       provided fundraising information/education. Patient and Caregiververbalizes understanding.   remains available.    Insurance:   Payor/Plan Subscr  Sex Relation Sub. Ins. ID Effective Group Num   1. BLUE CROSS BL* JASBIR YBRD* 10/[birth year]/1972 Male  [id number]* 4/20/15                                    [address] [address]     Primary Insurance (for UNOS reporting): Private Insurance  Secondary Insurance (for UNOS reporting): None  Patient and Caregiver verbalizes clear understanding that patient may experience difficulty obtaining and/or be denied insurance coverage post-surgery. This includes and is not limited to disability insurance, life insurance, health insurance, burial insurance, long term care insurance, and other insurances.      Patient and Caregiver also reports understanding that future health concerns related to or unrelated to transplantation may not be covered by patient's insurance.  Resources and information provided and reviewed.     Patient and Caregiver provides verbal permission to release any necessary information to outside resources for patient care and discharge planning.  Resources and information provided are reviewed.      Dialysis Adherence: Patient and Caregiver reports none.     Infusion Service: patient utilizing? no  Home Health: patient utilizing? no  DME: no  Pulmonary/Cardiac Rehab: none   ADLS:  Patient reports she remains independent with all ADL's and is driving.    Adherence:   Patient reports following healthcare recommendations.  Patient did acknowledge being  followed only for the last 5 years for liver disease diagnosis and was advised to not drinkg.  Adherence education and counseling provided.     Per History Section:  Past Medical History:   Diagnosis Date    Alpha-1-antitrypsin deficiency     ZZ phenotype    Eosinophilic esophagitis     h/o pill impaction, bx proven    Hyperlipidemia     may be related to liver enzymes, 2ary to high HDL    Schatzki's ring     s/p dilatation     Social History     Tobacco Use    Smoking status: Never Smoker   Substance Use Topics    Alcohol use: Not Currently     Comment: patient reports she has not had any alcohol in past few months     Social History     Substance and Sexual Activity   Drug Use No     Social History     Substance and Sexual Activity   Sexual Activity Yes    Partners: Male    Birth control/protection: OCP       Per Today's Psychosocial:  Tobacco: none, patient denies any use.  Alcohol: Patient reports last alcohol use end of June 2020.  Patient reports that she was a beer drinker, having 2-3 beers a sitting, but not everyday and no more than 5-6 beers on weekends.  Patient acknowledges lifetime committment to sobriety.  Patient denies every feeling that she was abusing alcohol but could acknowledge that she drank more than CDC recommends.  Patient denies any legal problems related to alcohol use..  Illicit Drugs/Non-prescribed Medications: none, patient denies any use.    Patient and Caregiver states clear understanding of the potential impact of substance use as it relates to transplant candidacy and is aware of possible random substance screening.  Substance abstinence/cessation counseling, education and resources provided and reviewed.     Arrests/DWI/Treatment/Rehab: patient denies    Psychiatric History:    Mental Health: Patient denies any current and/or past mental health concerns  Psychiatrist/Counselor: none  Medications:  none  Suicide/Homicide Issues: Patient denies any current and/or past SI/HI.    Safety at home: none identified.     Knowledge: Patient and Caregiver states having clear understanding and realistic expectations regarding the potential risks and potential benefits of organ transplantation and organ donation and agrees to discuss with health care team members and support system members, as well as to utilize available resources and express questions and/or concerns in order to further facilitate the pt informed decision-making.  Resources and information provided and reviewed.    Patient and Caregiver is aware of [facility]'s affiliation and/or partnership with agencies in home health care, LTAC, SNF, [facility], and other hospitals and clinics.    Understanding: Patient and Caregiver reports having a clear understanding of the many lifetime commitments involved with being a transplant recipient, including costs, compliance, medications, lab work, procedures, appointments, concrete and financial planning, preparedness, timely and appropriate communication of concerns, abstinence (ETOH, tobacco, illicit non-prescribed drugs), adherence to all health care team recommendations, support system and caregiver involvement, appropriate and timely resource utilization and follow-through, mental health counseling as needed/recommended, and patient and caregiver responsibilities.  Social Service Handbook, resources and detailed educational information provided and reviewed.  Educational information provided.    Patient and Caregiver also reports current and expected compliance with health care regime and states having a clear understanding of the importance of compliance.      Patient and Caregiver reports a clear understanding that risks and benefits may be involved with organ transplantation and with organ donation.       Patient and Caregiver also reports clear understanding that psychosocial risk factors may affect patient, and include but are not limited to feelings of depression, generalized anxiety, anxiety  regarding dependence on others, post traumatic stress disorder, feelings of guilt and other emotional and/or mental concerns, and/or exacerbation of existing mental health concerns.  Detailed resources provided and discussed.      Patient and Caregiver agrees to access appropriate resources in a timely manner as needed and/or as recommended, and to communicate concerns appropriately.  Patient and Caregiver also reports a clear understanding of treatment options available.     Patient and Caregiver received education in a group setting.   reviewed education, provided additional information, and answered questions.    Feelings or Concerns: Patient aware of need for transplant sooner than later and denies any concerns at this time.     Coping: Identify Patient & Caregiver Strategies to [place]:   1. Currently & Pre-transplant - Patient denies coping issues at this time.  She stays occupied with her children.   2. At the time of surgery - Patient plans to cope appropriately with support from family.    3. During post-Transplant & Recovery Period - Patient plans to cope appropriately with support from family and children    Goals: Return to work, back to a normal life.      Interview Behavior: Patient and Caregiver presents as alert and oriented x 4, pleasant, good eye contact, well groomed, recall good, concentration/judgement good, average intelligence, calm, communicative, cooperative and asking and answering questions appropriately.          Transplant Social Work - Candidacy  Assessment/Plan:     Psychosocial Suitability: Patient presents as medium risk candidate for liver transplant at this time. Based on psychosocial risk factors, patient presents as medium risk, due to consuming alcohol with known liver diseae, though patient did stop on her own with no issues in June 2020.   Protective Factors include adequate support system provided we verify secondary caregivers; adequate insurance and finances; lack  of tobacco or other illegal drug use; lack of mental health concerns.     Recommendations/Additional Comments:   Maintain Sobriety  Random Peth Testing  Confirm Secondary Caregivers  Possible need for local lodging post transplant, verify with surgeons, as [place] [facility]

## 2020-10-08 NOTE — PROGRESS NOTES
PATIENT seen post Fast Pass evaluation w/   present.  All consents complete and signed.  Discussed remainder of appts scheduled to complete the liver transplant evaluation.  Informed patient that if all remainding test results are acceptable, will present her case in the Liver Selection Committee meeting next week.  Understanding verbalized and no questions/concerns noted at this time.

## 2020-10-09 NOTE — TELEPHONE ENCOUNTER
----- Message from Maria De Jesus Gray MD sent at 10/9/2020  3:17 PM CDT -----  Patient seems volume overload which may be contributing to the increase PA pressure. Needs to be seen in clinic soon by myself or NP to address volume issue hopefully increase in diuretics will help. Patient did not achieve adequate heart rate she will need nuclear stress test.  ===============================  Called patient to discuss MD's recommendations.  No answer.  Left messages on VMs requesting a callback.  Contact info provided.  Awaiting callback.

## 2020-10-09 NOTE — TELEPHONE ENCOUNTER
----- Message from Maria De Jesus Gray MD sent at 10/8/2020  1:23 PM CDT -----  Patient has large pleural effusion increase diuretics to bid dosing lasix 40 bid and aldactone 100bid, repeat BMP in 5-7 days. If she is having significant shortness of breath will have to consider thoracentesis  ===============================  Patient notified of cxr results and above recommendations.  She verbalized understanding and able to accurately repeat back dosage changes.  Lab appt scheduled for Monday, 10/12 at the [facility].

## 2020-10-09 NOTE — TELEPHONE ENCOUNTER
Received phone call from Siena godfrey/ echo lab.  Had to abort DSE due to symptomatic hypotension.  Patient's SBP dropped to 60s.  At that time she became diaphoretic, c/o SOB, and had audible wheezing.  Will review w/ Dr. Gray to see how to proceed and will notify pt of recommendations.

## 2020-10-09 NOTE — NURSING NOTE
Patient verified by 2 identifiers and allergies reviewed.  22g IV placed to Rt FA.  DSE explained to patient & consent obtained.  Pt did not tolerate testing well. At 20mcg/kg/min, pt became hypotensive w/ SOB/ air hunger/ gasping for air, diaphoretic, nauseated & pale.  Exam table placed in trendelenburg & Dr. Russ notified & in to assess pt.  IV removed post testing.  Post study discharge instructions reviewed with patient, patient verbalized understanding.  Patient discharged to home in stable condition.

## 2020-10-12 NOTE — TELEPHONE ENCOUNTER
Patient: Madhavi Carr Adcox       MRN: [id number]      : 1975     Age: 44 y.o.  [address] [place] [address]    Provider: Hepatologist - Maria De Jesus Gray MD    Priority of review: Multiple liver lesions noted on u/s of abdomen    Patient Transplant Status: In Evaluation    Reason for presentation: Other liver lesions    Clinical Summary: 45 y/o female with Diagnoses of Other cirrhosis of liver, Alpha-1-antitrypsin deficiency, Alcoholic liver disease, and Liver lesion    Imaging to be reviewed: Ultrasound of abdomen---10/18/2020 and outside MRI of abdomen    HCC Treatment History: N/A    ABO: B NEG    Platelets:   Lab Results   Component Value Date/Time     (L) 10/08/2020 07:47 AM     Creatinine:   Lab Results   Component Value Date/Time    CREATININE 1.0 10/09/2020 09:12 AM     Bilirubin:   Lab Results   Component Value Date/Time    BILITOT 18.2 (H) 10/08/2020 07:47 AM     AFP Last 3 each if available:   Lab Results   Component Value Date/Time    AFP 9.8 (H) 10/08/2020 07:47 AM    AFP 8.0 2020 02:11 PM       MELD: MELD-Na score: 24 at 10/9/2020  9:12 AM  MELD score: 22 at 10/9/2020  9:12 AM  Calculated from:  Serum Creatinine: 1.0 mg/dL at 10/9/2020  9:12 AM  Serum Sodium: 134 mmol/L at 10/8/2020  7:47 AM  Total Bilirubin: 18.2 mg/dL at 10/8/2020  7:47 AM  INR(ratio): 1.5 at 10/8/2020  7:47 AM  Age: 44 years 11 months    Plan:     Follow-up Provider:

## 2020-10-14 PROBLEM — R74.01 TRANSAMINITIS: Status: ACTIVE | Noted: 2020-01-01

## 2020-10-14 PROBLEM — J90 PLEURAL EFFUSION: Status: ACTIVE | Noted: 2020-01-01

## 2020-10-14 PROBLEM — K70.9 ALCOHOLIC LIVER DISEASE: Status: RESOLVED | Noted: 2020-01-01 | Resolved: 2020-01-01

## 2020-10-14 NOTE — TELEPHONE ENCOUNTER
Called patient to inform her of recommendations made by Nadir Avila and Isaac regarding patient's c/o SOB and need for thoracentesis, which is not scheduled until next week.  No answer.  Left her a message on her VM and sent a message via [facility] portal instructing patient to report to ER as recommended.

## 2020-10-14 NOTE — ED PROVIDER NOTES
SCRIBE #1 NOTE: I, Glenis Augustine, am scribing for, and in the presence of, Joleen Grayson DO. I have scribed the entire note.       History     Chief Complaint   Patient presents with    Shortness of Breath     States sent over for thoracentesis and chest Xray.  States had one scheduled for next week, but having increased SOB     Review of patient's allergies indicates:  No Known Allergies      History of Present Illness     HPI    10/14/2020, 5:39 PM  History obtained from the patient      History of Present Illness: Madhavi Bell is a 44 y.o. female patient with a PMHx of alpha antitrypsin deficiency, eosinophilic esophagitis, and HLD who presents to the Emergency Department for evaluation of SOB, worse with exertion which onset gradually 2 days ago. Pt was has appointment for thoracentesis and chest XR on Tuesday of next week but was referred to ED today for progressing SOB. Symptoms are constant and moderate in severity. No mitigating or exacerbating factors reported. Associated sxs include cough when talking for extended period of time. Patient denies any fever, chills, CP, leg pain/swelling, palpitations, abd pain, n/v,  and all other sxs at this time. No further complaints or concerns at this time.       Arrival mode: Personal vehicle    PCP: Anurag Knutson MD        Past Medical History:  Past Medical History:   Diagnosis Date    Alpha-1-antitrypsin deficiency     ZZ phenotype    Eosinophilic esophagitis     h/o pill impaction, bx proven    Hyperlipidemia     may be related to liver enzymes, 2ary to high HDL    Schatzki's ring     s/p dilatation       Past Surgical History:  Past Surgical History:   Procedure Laterality Date    APPENDECTOMY       SECTION      CHOLECYSTECTOMY      DILATION AND CURETTAGE OF UTERUS      ESOPHAGOGASTRODUODENOSCOPY N/A 2020    Procedure: EGD (ESOPHAGOGASTRODUODENOSCOPY)-need rapid covid;  Surgeon: Jose Smith MD;  Location: [facility];   Service: Endoscopy;  Laterality: N/A;         Family History:  Family History   Problem Relation Age of Onset    Breast cancer Neg Hx     Colon cancer Neg Hx     Ovarian cancer Neg Hx        Social History:  Social History     Tobacco Use    Smoking status: Never Smoker   Substance and Sexual Activity    Alcohol use: Not Currently     Comment: patient reports she has not had any alcohol in past few months    Drug use: No    Sexual activity: Yes     Partners: Male     Birth control/protection: OCP        Review of Systems     Review of Systems   Constitutional: Negative for chills and fever.   HENT: Negative for sore throat.    Respiratory: Positive for cough and shortness of breath (worse w exertion).    Cardiovascular: Negative for chest pain, palpitations and leg swelling.   Gastrointestinal: Negative for abdominal pain, nausea and vomiting.   Genitourinary: Negative for dysuria.   Musculoskeletal: Negative for back pain.   Skin: Negative for rash.   Neurological: Negative for weakness.   Hematological: Does not bruise/bleed easily.   All other systems reviewed and are negative.       Physical Exam     Initial Vitals [10/14/20 1729]   BP Pulse Resp Temp SpO2   (!) 120/59 90 20 98.4 °F (36.9 °C) (!) 93 %      MAP       --          Physical Exam  Nursing Notes and Vital Signs Reviewed.  Constitutional: Patient is in no acute distress. Well-developed and well-nourished.  Head: Atraumatic. Normocephalic.  Eyes: PERRL. EOM intact. Conjunctivae are not pale. There is scleral icterus.  ENT: Mucous membranes are moist.   Neck: Supple. Full ROM. No lymphadenopathy.  Cardiovascular: Regular rate. Regular rhythm. No murmurs, rubs, or gallops. Distal pulses are 2+ and symmetric.  Pulmonary/Chest: No respiratory distress. Decreased breath sounds on the R. No wheezing or rales.  Abdominal: Soft. Small ascites noted.  There is no tenderness.  No rebound, guarding, or rigidity. Good bowel sounds.  Genitourinary: No CVA  "tenderness  Musculoskeletal: Moves all extremities. No obvious deformities. No edema. No calf tenderness.  Skin: Warm and dry.  Neurological:  Alert, awake, and appropriate.  Normal speech.  No acute focal neurological deficits are appreciated.  Psychiatric: Normal affect. Good eye contact. Appropriate in content.     ED Course   Procedures  ED Vital Signs:  Vitals:    10/14/20 1729 10/14/20 1755 10/14/20 1757 10/14/20 1831   BP: (!) 120/59 (!) 103/56  (!) 103/53   Pulse: 90 84 83 79   Resp: 20      Temp: 98.4 °F (36.9 °C)      TempSrc: Oral      SpO2: (!) 93% 95%  96%   Weight: 72.8 kg (160 lb 7.9 oz)      Height: 5' 6" (1.676 m)       10/14/20 1900 10/14/20 1930 10/14/20 2000   BP: (!) 97/52 (!) 111/59 (!) 103/52   Pulse: 82 90 84   Resp: 20 20 18   Temp:      TempSrc:      SpO2: 95% 95% 95%   Weight:      Height:          Abnormal Lab Results:  Labs Reviewed   CBC W/ AUTO DIFFERENTIAL - Abnormal; Notable for the following components:       Result Value    RBC 3.84 (*)     Hematocrit 34.7 (*)     Mean Corpuscular Hemoglobin 31.8 (*)     RDW 18.4 (*)     All other components within normal limits   APTT - Abnormal; Notable for the following components:    aPTT 33.5 (*)     All other components within normal limits   PROTIME-INR - Abnormal; Notable for the following components:    Prothrombin Time 17.7 (*)     INR 1.7 (*)     All other components within normal limits   COMPREHENSIVE METABOLIC PANEL - Abnormal; Notable for the following components:    Sodium 126 (*)     Chloride 93 (*)     BUN, Bld 22 (*)     Calcium 7.9 (*)     Albumin 1.7 (*)     Total Bilirubin 19.3 (*)     Alkaline Phosphatase 642 (*)      (*)      (*)     eGFR if non  55 (*)     All other components within normal limits   SARS-COV-2 RNA AMPLIFICATION, QUAL        All Lab Results:  Results for orders placed or performed during the hospital encounter of 10/14/20   CBC auto differential   Result Value Ref Range    WBC " 7.56 3.90 - 12.70 K/uL    RBC 3.84 (L) 4.00 - 5.40 M/uL    Hemoglobin 12.2 12.0 - 16.0 g/dL    Hematocrit 34.7 (L) 37.0 - 48.5 %    Mean Corpuscular Volume 90 82 - 98 fL    Mean Corpuscular Hemoglobin 31.8 (H) 27.0 - 31.0 pg    Mean Corpuscular Hemoglobin Conc 35.2 32.0 - 36.0 g/dL    RDW 18.4 (H) 11.5 - 14.5 %    Platelets 171 150 - 350 K/uL    MPV 10.7 9.2 - 12.9 fL    Immature Granulocytes 0.4 0.0 - 0.5 %    Gran # (ANC) 4.9 1.8 - 7.7 K/uL    Immature Grans (Abs) 0.03 0.00 - 0.04 K/uL    Lymph # 1.7 1.0 - 4.8 K/uL    Mono # 0.7 0.3 - 1.0 K/uL    Eos # 0.2 0.0 - 0.5 K/uL    Baso # 0.04 0.00 - 0.20 K/uL    nRBC 0 0 /100 WBC    Gran% 64.6 38.0 - 73.0 %    Lymph% 22.2 18.0 - 48.0 %    Mono% 9.5 4.0 - 15.0 %    Eosinophil% 2.8 0.0 - 8.0 %    Basophil% 0.5 0.0 - 1.9 %    Platelet Estimate Appears normal     Differential Method Automated    COVID-19 Rapid Screening   Result Value Ref Range    SARS-CoV-2 RNA, Amplification, Qual Negative Negative   APTT   Result Value Ref Range    aPTT 33.5 (H) 21.0 - 32.0 sec   Protime-INR   Result Value Ref Range    Prothrombin Time 17.7 (H) 9.0 - 12.5 sec    INR 1.7 (H) 0.8 - 1.2   Comprehensive metabolic panel   Result Value Ref Range    Sodium 126 (L) 136 - 145 mmol/L    Potassium 4.1 3.5 - 5.1 mmol/L    Chloride 93 (L) 95 - 110 mmol/L    CO2 24 23 - 29 mmol/L    Glucose 94 70 - 110 mg/dL    BUN, Bld 22 (H) 6 - 20 mg/dL    Creatinine 1.2 0.5 - 1.4 mg/dL    Calcium 7.9 (L) 8.7 - 10.5 mg/dL    Total Protein 6.2 6.0 - 8.4 g/dL    Albumin 1.7 (L) 3.5 - 5.2 g/dL    Total Bilirubin 19.3 (H) 0.1 - 1.0 mg/dL    Alkaline Phosphatase 642 (H) 55 - 135 U/L     (H) 10 - 40 U/L     (H) 10 - 44 U/L    Anion Gap 9 8 - 16 mmol/L    eGFR if African American >60 >60 mL/min/1.73 m^2    eGFR if non African American 55 (A) >60 mL/min/1.73 m^2       Imaging Results:  Imaging Results          X-Ray Chest AP Portable (Final result)  Result time 10/14/20 19:57:08    Final result by Sherine  MD Freddy (10/14/20 19:57:08)                 Impression:      Increasing large right-sided pleural effusion with compression of the right lung parenchyma with small aerated portion of the lung in the right upper lobe      Electronically signed by: Freddy Basurto  Date:    10/14/2020  Time:    19:57             Narrative:    EXAMINATION:  XR CHEST AP PORTABLE    CLINICAL HISTORY:  Dyspnea, unspecified    TECHNIQUE:  Single frontal view of the chest was performed.    COMPARISON:  Prior    FINDINGS:  Large right-sided pleural effusion with associated atelectasis/consolidation slightly increased compared to prior exam.  Left lung is clear.  Small portion of aerated right upper lobe remains.  Cardiac silhouette is not enlarged                                            The Emergency Provider reviewed the vital signs and test results, which are outlined above.     ED Discussion     5:45 PM: Discussed pt's case with Dr. Hoang (Pulmonary Disease) who recommends admit to observation as he is not able to see pt today.    8:03 PM: Discussed case with Sarah Thao NP (Hospital Medicine). Dr. Faulkner agrees with current care and management of pt and accepts admission.   Admitting Service: Hospital Medicine  Admitting Physician: Dr. Faulkner  Admit to: Obs, tele    8:05 PM: Re-evaluated pt. I have discussed test results, shared treatment plan, and the need for admission with patient and family at bedside. Pt and family express understanding at this time and agree with all information. All questions answered. Pt and family have no further questions or concerns at this time. Pt is ready for admit.             Medical Decision Making:   Clinical Tests:   Lab Tests: Ordered and Reviewed  Radiological Study: Ordered and Reviewed           ED Medication(s):  Medications - No data to display    New Prescriptions    No medications on file               Scribe Attestation:   Scribe #1: I performed the above scribed service and the  documentation accurately describes the services I performed. I attest to the accuracy of the note.     Attending:   Physician Attestation Statement for Scribe #1: I, Joleen Grayson DO, personally performed the services described in this documentation, as scribed by Glenis Augustine, in my presence, and it is both accurate and complete.           Clinical Impression       ICD-10-CM ICD-9-CM   1. Pleural effusion  J90 511.9   2. Dyspnea  R06.00 786.09   3. Hepatic cirrhosis, unspecified hepatic cirrhosis type, unspecified whether ascites present  K74.60 571.5   4. Hyponatremia  E87.1 276.1   5. Elevated transaminase level  R74.01 790.4       Disposition:   Disposition: Placed in Observation  Condition: Fair         Joleen Grayson DO  10/14/20 2014

## 2020-10-14 NOTE — TELEPHONE ENCOUNTER
Called patient to assess her current condition and to f/u on MD's recommendations.  Patient reports that she has SOB and more fatigued than usual.  She also reports that she works FT, 12 hr shifts, which contributes to above.  Informed her of recommendation to schedule thoracentesis for first available.  She verbalized understanding and is in agreement with this plan.  Informed patient that it will be scheduled at [facility].  Also discussed lab results and current MELD score of 27.  Na now 129.  Kidney function and other chemistries stable.  Discussed recent hospital admission prior to transplant eval.  Informed her that Pulmonary consult and CT chest reviewed and will review w/ MD to see if any interventions warranted.  In addition, she c/o of upper left sided back pain. Reports that it is new, and was initially very mild,  but now more severe (now a 6 on scale of 1-10). She describes it as intermittent and can last 30 minutes to a few hours. Patient has been using heating pad on it at night. Asking what she can take.  Informed patient ok to take tylenol, no more than 2000 mg/ day, but will review w/ MD for any further recommendations.  Patient also c/o hemorrhoids which bleed at times.  Not much bleeding, but sometimes notes blood drops in the toilet.  Wanted to make MD aware.  ==============================================  Called radiology department at [facility].  Requested to schedule thoracentesis for first available.  No appts available prior to 10/20.  Patient scheduled as requested.  Will call back to see if any cancellations prior to then.

## 2020-10-14 NOTE — PROGRESS NOTES
Consent to undergo liver transplant evaluation reviewed.  Confirmed that consent has been signed and dated appropriately.  Submitted to GAGAN Lennon MA for scanning.  Epic reviewed.  Consent can be found under the Media tab.  Sent copy to HIM for storage.

## 2020-10-15 PROBLEM — E72.20 HYPERAMMONEMIA: Status: ACTIVE | Noted: 2020-01-01

## 2020-10-15 NOTE — ASSESSMENT & PLAN NOTE
--Likely r/t liver cirrhosis  --Monitor renal fxn and Na+ levels  --Monitor I&Os  --Fluid restriction for now  --Neuro checks with VS

## 2020-10-15 NOTE — PROGRESS NOTES
"[facility] Hospital Medicine  Progress Note    Patient Name: Madhavi Bell  MRN: [id number]  Patient Class: IP- Inpatient   Admission Date: 10/14/2020  Length of Stay: 0 days  Attending Physician: Portia Ladd MD  Primary Care Provider: Anurag Knutson MD        Subjective:     Principal Problem:Pleural effusion        HPI:  43 y/o WF with hx of schazki's ring with dilation, alpha-one antitrypsin deficiency, HLD, cirrhosis, paracentesis (08/2020) and thoracentesis (10/05/2020) to ED with c/o gradually worsening SOB, worse with exertion over the previous 2 days, associated with a non-productive cough and abdominal "tightness". Symptoms are persistent and of moderate severity and without alleviating factors. Denies chest pain, edema, palpitations, wheezing, orthopnea, abdominal pain, N/V/D, constipation, dysuria, flank pain, HA, dizziness, weakness, fever, cough, chills, falls/blurred vision, focal deficits. Of note, the pt's case is reportedly going in front of the transplant committed next week in [place]. Pt was found in the ED to have elevated INR 1.7, PTT 33.5, Alk phos 642, ,  and low Na 126; COVID-19 test was negative, and cxray showed an increasing large right-sided pleural effusion with compression of the right lung parencyma and a small aerated portion of the lung in the right upper lobe. Pulmonology was consulted and hospital medicine called with the pt placed in observation on telemetry. Pt is a full code - surrogate decision maker is her , Alli Bell.     Overview/Hospital Course:  Patient admitted to telemetry for R sided pleural effusion under the care of hospital medicine. Pulmonology consulted for thoracentesis.    Interval History: Pt is SOB with conversation, abd distended. Discussed with Dr. Hoang, who plans on IR performing thoracentesis today. Ammonia level 63, added TID lactulose. Discussed importance of 2-3 BM's daily. Pt verbalized " understanding.    Review of Systems   Constitutional: Negative for activity change, chills, diaphoresis, fatigue and fever.   HENT: Negative for congestion, trouble swallowing and voice change.    Eyes: Negative for photophobia and discharge.   Respiratory: Positive for cough and shortness of breath (worse on exertion). Negative for chest tightness and wheezing.    Cardiovascular: Negative for chest pain, palpitations and leg swelling.   Gastrointestinal: Positive for abdominal distention. Negative for abdominal pain, blood in stool, constipation, diarrhea, nausea and vomiting.   Endocrine: Negative for cold intolerance, heat intolerance, polydipsia, polyphagia and polyuria.   Genitourinary: Negative for difficulty urinating, dysuria, flank pain, frequency and urgency.   Musculoskeletal: Negative for back pain, joint swelling and myalgias.   Skin: Positive for color change. Negative for rash and wound.   Neurological: Negative for dizziness, seizures, syncope, facial asymmetry, weakness, light-headedness, numbness and headaches.   Psychiatric/Behavioral: Negative for agitation, behavioral problems, confusion and hallucinations.     Objective:     Vital Signs (Most Recent):  Temp: 98.1 °F (36.7 °C) (10/15/20 1129)  Pulse: 89 (10/15/20 1303)  Resp: 18 (10/15/20 1129)  BP: (!) 93/49(MD informed) (10/15/20 1129)  SpO2: (!) 92 % (10/15/20 1129) Vital Signs (24h Range):  Temp:  [97.6 °F (36.4 °C)-98.4 °F (36.9 °C)] 98.1 °F (36.7 °C)  Pulse:  [] 89  Resp:  [16-22] 18  SpO2:  [92 %-96 %] 92 %  BP: ()/(49-65) 93/49     Weight: 75.1 kg (165 lb 9.1 oz)  Body mass index is 26.72 kg/m².  No intake or output data in the 24 hours ending 10/15/20 1327   Physical Exam  Vitals signs and nursing note reviewed.   Constitutional:       General: She is not in acute distress.     Appearance: Normal appearance. She is normal weight. She is not toxic-appearing or diaphoretic.   HENT:      Head: Normocephalic and atraumatic.       Nose: Nose normal. No congestion.      Mouth/Throat:      Mouth: Mucous membranes are moist.   Eyes:      General: Scleral icterus present.      Pupils: Pupils are equal, round, and reactive to light.   Neck:      Musculoskeletal: Normal range of motion and neck supple. No muscular tenderness.   Cardiovascular:      Rate and Rhythm: Regular rhythm. Tachycardia present.      Pulses: Normal pulses.      Heart sounds: Normal heart sounds.   Pulmonary:      Effort: Pulmonary effort is normal. No respiratory distress.      Breath sounds: Normal breath sounds. No wheezing or rhonchi.   Abdominal:      General: Abdomen is flat. Bowel sounds are normal. There is distension (ascites).      Palpations: Abdomen is soft.      Tenderness: There is no guarding or rebound.      Hernia: No hernia is present.   Genitourinary:     Comments: deferred  Musculoskeletal: Normal range of motion.         General: No swelling, deformity or signs of injury.   Skin:     General: Skin is warm and dry.      Capillary Refill: Capillary refill takes less than 2 seconds.      Coloration: Skin is jaundiced.      Findings: No bruising or rash.   Neurological:      General: No focal deficit present.      Mental Status: She is alert and oriented to person, place, and time.      Cranial Nerves: No cranial nerve deficit.      Motor: No weakness.      Coordination: Coordination normal.   Psychiatric:         Mood and Affect: Mood normal.         Behavior: Behavior normal.         Thought Content: Thought content normal.         Judgment: Judgment normal.         Significant Labs:   Recent Lab Results       10/15/20  0820   10/15/20  0623   10/14/20  1909   10/14/20  1752        Albumin   1.6 1.7       Alkaline Phosphatase   582 642       ALT   112 123       Ammonia   63         Anion Gap   6 9       aPTT   34.5  Comment:  aPTT therapeutic range = 39-69 seconds 33.5  Comment:  aPTT therapeutic range = 39-69 seconds       AST   187 207       Baso #   0.04    0.04     Basophil%   0.7   0.5     BILIRUBIN TOTAL   17.7  Comment:  For infants and newborns, interpretation of results should be based  on gestational age, weight and in agreement with clinical  observations.  Premature Infant recommended reference ranges:  Up to 24 hours.............<8.0 mg/dL  Up to 48 hours............<12.0 mg/dL  3-5 days..................<15.0 mg/dL  6-29 days.................<15.0 mg/dL   19.3  Comment:  For infants and newborns, interpretation of results should be based  on gestational age, weight and in agreement with clinical  observations.  Premature Infant recommended reference ranges:  Up to 24 hours.............<8.0 mg/dL  Up to 48 hours............<12.0 mg/dL  3-5 days..................<15.0 mg/dL  6-29 days.................<15.0 mg/dL         BUN, Bld   21 22       Calcium   7.8 7.9       Chloride   96 93       CO2   24 24       Creatinine   1.1 1.2       Differential Method   Automated   Automated     eGFR if    >60 >60       eGFR if non    >60  Comment:  Calculation used to obtain the estimated glomerular filtration  rate (eGFR) is the CKD-EPI equation.    55  Comment:  Calculation used to obtain the estimated glomerular filtration  rate (eGFR) is the CKD-EPI equation.          Eos #   0.2   0.2     Eosinophil%   3.2   2.8     Glucose   63 94       Gran # (ANC)   3.3   4.9     Gran%   58.6   64.6     Hematocrit   30.6   34.7     Hemoglobin   10.8   12.2     Immature Grans (Abs)   0.02  Comment:  Mild elevation in immature granulocytes is non specific and   can be seen in a variety of conditions including stress response,   acute inflammation, trauma and pregnancy. Correlation with other   laboratory and clinical findings is essential.     0.03  Comment:  Mild elevation in immature granulocytes is non specific and   can be seen in a variety of conditions including stress response,   acute inflammation, trauma and pregnancy. Correlation with other    laboratory and clinical findings is essential.       Immature Granulocytes   0.4   0.4     INR   1.6  Comment:  Coumadin Therapy:  2.0 - 3.0 for INR for all indicators except mechanical heart valves  and antiphospholipid syndromes which should use 2.5 - 3.5.   1.7  Comment:  Coumadin Therapy:  2.0 - 3.0 for INR for all indicators except mechanical heart valves  and antiphospholipid syndromes which should use 2.5 - 3.5.         Lymph #   1.5   1.7     Lymph%   27.5   22.2     Magnesium   2.1         MCH   32.0   31.8     MCHC   35.3   35.2     MCV   91   90     Mono #   0.5   0.7     Mono%   9.6   9.5     MPV   11.3   10.7     nRBC   0   0     Platelet Estimate       Appears normal     Platelets   114   171  Comment:  Results confirmed, test repeated     POCT Glucose 85           Potassium   3.5 4.1       PROTEIN TOTAL   5.7 6.2       Protime   16.7 17.7       RBC   3.37   3.84     RDW   18.3   18.4     SARS-CoV-2 RNA, Amplification, Qual       Negative  Comment:  This test utilizes isothermal nucleic acid amplification   technology to detect the SARS-CoV-2 RdRp nucleic acid segment.   The analytical sensitivity (limit of detection) is 125 genome   equivalents/mL.   A POSITIVE result implies infection with the SARS-CoV-2 virus;  the patient is presumed to be contagious.    A NEGATIVE result means that SARS-CoV-2 nucleic acids are not  present above the limit of detection. A NEGATIVE result should be   treated as presumptive. It does not rule out the possibility of   COVID-19 and should not be the sole basis for treatment decisions.   If COVID-19 is strongly suspected based on clinical and exposure   history, re-testing using an alternate molecular assay should be   considered.   This test is only for use under the Food and Drug   Administration s Emergency Use Authorization (EUA).   Commercial kits are provided by [employer].   Performance characteristics of the EUA have been independently  verified by  [facility] Department of  Pathology and Laboratory Medicine.   _________________________________________________________________  The ID NOW COVID-19 Letter of Authorization, along with the   authorized Fact Sheet for Healthcare Providers, the authorized Fact  Sheet for Patients, and authorized labeling are available on the FDA   website:  www.fda.gov/MedicalDevices/Safety/EmergencySituations/ozt766881.htm       Sodium   126 126       WBC   5.60   7.56         All pertinent labs within the past 24 hours have been reviewed.    Significant Imaging: I have reviewed all pertinent imaging results/findings within the past 24 hours.      Assessment/Plan:      * Right-sided pleural effusion, recurrent  --Likely r/t advanced liver cirrhosis - last thoracentesis on 10/05/2020 (2200ml)  --IR consulted for thoracentesis  --Supplemental O2 prn  --Duonebs prn        Hyperammonemia  --not confused at present  --added PO lactulose TID  --daily ammonia level      Liver cirrhosis with transaminitis  Per pt, r/t alph-1 antitrypsin deficiency, denies ETOH  Appears stable at baseline at this time  Trend liver function  Avoid hepatotoxins  Pt to f/u with transplant center after d/c (has appointment next week)          Hyponatremia  --Likely r/t liver cirrhosis  --Monitor renal fxn and Na+ levels  --Monitor I&Os  --Fluid restriction for now  --Neuro checks with VS      Alpha-1-antitrypsin deficiency  Awaiting decision on liver transplant per transplant center  Supplemental O2 prn  Duonebs prn        VTE Risk Mitigation (From admission, onward)         Ordered     Place sequential compression device  Until discontinued      10/14/20 2136                Discharge Planning   WILL:      Code Status: Prior   Is the patient medically ready for discharge?:     Reason for patient still in hospital (select all that apply): Patient trending condition  Discharge Plan A: Home with family                  Anitra Contreras,  FNP-C  Department of Hospital Medicine   [facility]

## 2020-10-15 NOTE — HPI
44 year old female who  has a past medical history of Alpha-1-antitrypsin deficiency, Eosinophilic esophagitis, Hyperlipidemia, and Schatzki's ring  admitted with chronic recurrent right pleural effusion    Pulmonary has been consulted to assist with management.     Thoracentesis on 10/10/20     TRANSUDATE:    Total serum Protein . 6.2   Pleural fluid Protein <1   Serum    Pleural fluid LDH 92

## 2020-10-15 NOTE — HOSPITAL COURSE
Patient admitted to telemetry for R sided pleural effusion under the care of [facility] medicine. Pulmonology consulted for thoracentesis.  Successful right-sided thoracentesis removing 1.5 L of straw colored fluid. Gastroenterology evaluated the patient and assisted with management. She had persistent hypotension. Started Midodrine with with good response and increased ability to tolerate diuretics.  Repeat thoracentesis with additional fluid removed. Chest x-ray showed significant clearing. Patients shortness of breath improved substantially. She did have some residual soreness around thoracentesis site. No other distress noted. Optimize diuretic plan. Discharge plan to return home and continue medication regimen. Follow up soon with transplant service for further evaluation.

## 2020-10-15 NOTE — ASSESSMENT & PLAN NOTE
Likely r/t advanced liver cirrhosis - last thoracentesis on 10/05/2020 (2200ml)  Pulmonology consulted for thoracentesis in AM  NPO after MN for AM procedure  Supplemental O2 prn  Duonebs prn

## 2020-10-15 NOTE — SUBJECTIVE & OBJECTIVE
Past Medical History:   Diagnosis Date    Alpha-1-antitrypsin deficiency     ZZ phenotype    Eosinophilic esophagitis     h/o pill impaction, bx proven    Hyperlipidemia     may be related to liver enzymes, 2ary to high HDL    Schatzki's ring     s/p dilatation       Past Surgical History:   Procedure Laterality Date    APPENDECTOMY       SECTION      CHOLECYSTECTOMY      DILATION AND CURETTAGE OF UTERUS      ESOPHAGOGASTRODUODENOSCOPY N/A 2020    Procedure: EGD (ESOPHAGOGASTRODUODENOSCOPY)-need rapid covid;  Surgeon: Jose Smith MD;  Location: [facility];  Service: Endoscopy;  Laterality: N/A;       Review of patient's allergies indicates:  No Known Allergies    No current facility-administered medications on file prior to encounter.      Current Outpatient Medications on File Prior to Encounter   Medication Sig    ergocalciferol (ERGOCALCIFEROL) 50,000 unit Cap Take 1 capsule (50,000 Units total) by mouth every 7 days.    furosemide (LASIX) 20 MG tablet Take 40 mg by mouth once daily.     lactulose (CHRONULAC) 10 gram/15 mL solution Take 20 g by mouth daily as needed.     multivitamin Tab Take 1 tablet by mouth once daily.    spironolactone (ALDACTONE) 50 MG tablet Take 100 mg by mouth once daily.     zinc gluconate 50 mg tablet Take 50 mg by mouth once daily.     Family History     Reviewed and not pertinent.         Tobacco Use    Smoking status: Never Smoker   Substance and Sexual Activity    Alcohol use: Not Currently     Comment: patient reports she has not had any alcohol in past few months    Drug use: No    Sexual activity: Yes     Partners: Male     Birth control/protection: OCP     Review of Systems   Constitutional: Negative for activity change, chills, diaphoresis, fatigue and fever.   HENT: Negative for congestion, trouble swallowing and voice change.    Eyes: Negative for photophobia and discharge.   Respiratory: Positive for cough and shortness of breath  (worse on exertion). Negative for chest tightness and wheezing.    Cardiovascular: Negative for chest pain, palpitations and leg swelling.   Gastrointestinal: Negative for abdominal pain, blood in stool, constipation, diarrhea, nausea and vomiting.   Endocrine: Negative for cold intolerance, heat intolerance, polydipsia, polyphagia and polyuria.   Genitourinary: Negative for difficulty urinating, dysuria, flank pain, frequency and urgency.   Musculoskeletal: Negative for back pain, joint swelling and myalgias.   Skin: Negative for rash and wound.   Neurological: Negative for dizziness, seizures, syncope, facial asymmetry, weakness, light-headedness, numbness and headaches.   Psychiatric/Behavioral: Negative for confusion and hallucinations.     Objective:     Vital Signs (Most Recent):  Temp: 98.4 °F (36.9 °C) (10/14/20 1729)  Pulse: 85 (10/14/20 2131)  Resp: (!) 21 (10/14/20 2131)  BP: (!) 97/56 (10/14/20 2131)  SpO2: (!) 94 % (10/14/20 2131) Vital Signs (24h Range):  Temp:  [98.4 °F (36.9 °C)] 98.4 °F (36.9 °C)  Pulse:  [79-90] 85  Resp:  [18-21] 21  SpO2:  [93 %-96 %] 94 %  BP: ()/(52-59) 97/56     Weight: 72.8 kg (160 lb 7.9 oz)  Body mass index is 25.9 kg/m².    Physical Exam  Vitals signs reviewed.   Constitutional:       General: She is not in acute distress.     Appearance: Normal appearance. She is normal weight. She is not toxic-appearing or diaphoretic.   HENT:      Head: Normocephalic and atraumatic.      Nose: Nose normal. No congestion.      Mouth/Throat:      Mouth: Mucous membranes are moist.   Eyes:      General: Scleral icterus present.      Pupils: Pupils are equal, round, and reactive to light.   Neck:      Musculoskeletal: Normal range of motion and neck supple. No muscular tenderness.   Cardiovascular:      Rate and Rhythm: Regular rhythm. Tachycardia present.      Pulses: Normal pulses.      Heart sounds: Normal heart sounds.   Pulmonary:      Effort: Pulmonary effort is normal. No  respiratory distress.      Breath sounds: Normal breath sounds. No wheezing or rhonchi.   Abdominal:      General: Abdomen is flat. Bowel sounds are normal. There is distension (ascites).      Palpations: Abdomen is soft.      Tenderness: There is no guarding or rebound.      Hernia: No hernia is present.   Musculoskeletal: Normal range of motion.         General: No swelling, deformity or signs of injury.   Skin:     General: Skin is warm and dry.      Capillary Refill: Capillary refill takes less than 2 seconds.      Coloration: Skin is jaundiced.      Findings: No bruising or rash.   Neurological:      General: No focal deficit present.      Mental Status: She is alert and oriented to person, place, and time.      Cranial Nerves: No cranial nerve deficit.      Motor: No weakness.      Coordination: Coordination normal.   Psychiatric:         Mood and Affect: Mood normal.         Behavior: Behavior normal.         Thought Content: Thought content normal.         Judgment: Judgment normal.           CRANIAL NERVES     CN III, IV, VI   Pupils are equal, round, and reactive to light.       Significant Labs:   Results for orders placed or performed during the hospital encounter of 10/14/20   CBC auto differential   Result Value Ref Range    WBC 7.56 3.90 - 12.70 K/uL    RBC 3.84 (L) 4.00 - 5.40 M/uL    Hemoglobin 12.2 12.0 - 16.0 g/dL    Hematocrit 34.7 (L) 37.0 - 48.5 %    Mean Corpuscular Volume 90 82 - 98 fL    Mean Corpuscular Hemoglobin 31.8 (H) 27.0 - 31.0 pg    Mean Corpuscular Hemoglobin Conc 35.2 32.0 - 36.0 g/dL    RDW 18.4 (H) 11.5 - 14.5 %    Platelets 171 150 - 350 K/uL    MPV 10.7 9.2 - 12.9 fL    Immature Granulocytes 0.4 0.0 - 0.5 %    Gran # (ANC) 4.9 1.8 - 7.7 K/uL    Immature Grans (Abs) 0.03 0.00 - 0.04 K/uL    Lymph # 1.7 1.0 - 4.8 K/uL    Mono # 0.7 0.3 - 1.0 K/uL    Eos # 0.2 0.0 - 0.5 K/uL    Baso # 0.04 0.00 - 0.20 K/uL    nRBC 0 0 /100 WBC    Gran% 64.6 38.0 - 73.0 %    Lymph% 22.2 18.0 -  48.0 %    Mono% 9.5 4.0 - 15.0 %    Eosinophil% 2.8 0.0 - 8.0 %    Basophil% 0.5 0.0 - 1.9 %    Platelet Estimate Appears normal     Differential Method Automated    COVID-19 Rapid Screening   Result Value Ref Range    SARS-CoV-2 RNA, Amplification, Qual Negative Negative   APTT   Result Value Ref Range    aPTT 33.5 (H) 21.0 - 32.0 sec   Protime-INR   Result Value Ref Range    Prothrombin Time 17.7 (H) 9.0 - 12.5 sec    INR 1.7 (H) 0.8 - 1.2   Comprehensive metabolic panel   Result Value Ref Range    Sodium 126 (L) 136 - 145 mmol/L    Potassium 4.1 3.5 - 5.1 mmol/L    Chloride 93 (L) 95 - 110 mmol/L    CO2 24 23 - 29 mmol/L    Glucose 94 70 - 110 mg/dL    BUN, Bld 22 (H) 6 - 20 mg/dL    Creatinine 1.2 0.5 - 1.4 mg/dL    Calcium 7.9 (L) 8.7 - 10.5 mg/dL    Total Protein 6.2 6.0 - 8.4 g/dL    Albumin 1.7 (L) 3.5 - 5.2 g/dL    Total Bilirubin 19.3 (H) 0.1 - 1.0 mg/dL    Alkaline Phosphatase 642 (H) 55 - 135 U/L     (H) 10 - 40 U/L     (H) 10 - 44 U/L    Anion Gap 9 8 - 16 mmol/L    eGFR if African American >60 >60 mL/min/1.73 m^2    eGFR if non African American 55 (A) >60 mL/min/1.73 m^2         Significant Imaging:   Imaging Results          X-Ray Chest AP Portable (Final result)  Result time 10/14/20 19:57:08    Final result by Sherine Hayes MD (10/14/20 19:57:08)                 Impression:      Increasing large right-sided pleural effusion with compression of the right lung parenchyma with small aerated portion of the lung in the right upper lobe      Electronically signed by: Freddy Basurto  Date:    10/14/2020  Time:    19:57             Narrative:    EXAMINATION:  XR CHEST AP PORTABLE    CLINICAL HISTORY:  Dyspnea, unspecified    TECHNIQUE:  Single frontal view of the chest was performed.    COMPARISON:  Prior    FINDINGS:  Large right-sided pleural effusion with associated atelectasis/consolidation slightly increased compared to prior exam.  Left lung is clear.  Small portion of aerated right  upper lobe remains.  Cardiac silhouette is not enlarged

## 2020-10-15 NOTE — ASSESSMENT & PLAN NOTE
--Likely r/t advanced liver cirrhosis - last thoracentesis on 10/05/2020 (2200ml)  --IR consulted for thoracentesis  --Supplemental O2 prn  --Cate kaufmann

## 2020-10-15 NOTE — SUBJECTIVE & OBJECTIVE
Interval History: Pt is SOB with conversation, abd distended. Discussed with Dr. Hoang, who plans on IR performing thoracentesis today. Ammonia level 63, added TID lactulose. Discussed importance of 2-3 BM's daily. Pt verbalized understanding.    Review of Systems   Constitutional: Negative for activity change, chills, diaphoresis, fatigue and fever.   HENT: Negative for congestion, trouble swallowing and voice change.    Eyes: Negative for photophobia and discharge.   Respiratory: Positive for cough and shortness of breath (worse on exertion). Negative for chest tightness and wheezing.    Cardiovascular: Negative for chest pain, palpitations and leg swelling.   Gastrointestinal: Positive for abdominal distention. Negative for abdominal pain, blood in stool, constipation, diarrhea, nausea and vomiting.   Endocrine: Negative for cold intolerance, heat intolerance, polydipsia, polyphagia and polyuria.   Genitourinary: Negative for difficulty urinating, dysuria, flank pain, frequency and urgency.   Musculoskeletal: Negative for back pain, joint swelling and myalgias.   Skin: Positive for color change. Negative for rash and wound.   Neurological: Negative for dizziness, seizures, syncope, facial asymmetry, weakness, light-headedness, numbness and headaches.   Psychiatric/Behavioral: Negative for agitation, behavioral problems, confusion and hallucinations.     Objective:     Vital Signs (Most Recent):  Temp: 98.1 °F (36.7 °C) (10/15/20 1129)  Pulse: 89 (10/15/20 1303)  Resp: 18 (10/15/20 1129)  BP: (!) 93/49(MD informed) (10/15/20 1129)  SpO2: (!) 92 % (10/15/20 1129) Vital Signs (24h Range):  Temp:  [97.6 °F (36.4 °C)-98.4 °F (36.9 °C)] 98.1 °F (36.7 °C)  Pulse:  [] 89  Resp:  [16-22] 18  SpO2:  [92 %-96 %] 92 %  BP: ()/(49-65) 93/49     Weight: 75.1 kg (165 lb 9.1 oz)  Body mass index is 26.72 kg/m².  No intake or output data in the 24 hours ending 10/15/20 1327   Physical Exam  Vitals signs and nursing  note reviewed.   Constitutional:       General: She is not in acute distress.     Appearance: Normal appearance. She is normal weight. She is not toxic-appearing or diaphoretic.   HENT:      Head: Normocephalic and atraumatic.      Nose: Nose normal. No congestion.      Mouth/Throat:      Mouth: Mucous membranes are moist.   Eyes:      General: Scleral icterus present.      Pupils: Pupils are equal, round, and reactive to light.   Neck:      Musculoskeletal: Normal range of motion and neck supple. No muscular tenderness.   Cardiovascular:      Rate and Rhythm: Regular rhythm. Tachycardia present.      Pulses: Normal pulses.      Heart sounds: Normal heart sounds.   Pulmonary:      Effort: Pulmonary effort is normal. No respiratory distress.      Breath sounds: Normal breath sounds. No wheezing or rhonchi.   Abdominal:      General: Abdomen is flat. Bowel sounds are normal. There is distension (ascites).      Palpations: Abdomen is soft.      Tenderness: There is no guarding or rebound.      Hernia: No hernia is present.   Genitourinary:     Comments: deferred  Musculoskeletal: Normal range of motion.         General: No swelling, deformity or signs of injury.   Skin:     General: Skin is warm and dry.      Capillary Refill: Capillary refill takes less than 2 seconds.      Coloration: Skin is jaundiced.      Findings: No bruising or rash.   Neurological:      General: No focal deficit present.      Mental Status: She is alert and oriented to person, place, and time.      Cranial Nerves: No cranial nerve deficit.      Motor: No weakness.      Coordination: Coordination normal.   Psychiatric:         Mood and Affect: Mood normal.         Behavior: Behavior normal.         Thought Content: Thought content normal.         Judgment: Judgment normal.         Significant Labs:   Recent Lab Results       10/15/20  0820   10/15/20  0623   10/14/20  1909   10/14/20  1752        Albumin   1.6 1.7       Alkaline Phosphatase    582 642       ALT   112 123       Ammonia   63         Anion Gap   6 9       aPTT   34.5  Comment:  aPTT therapeutic range = 39-69 seconds 33.5  Comment:  aPTT therapeutic range = 39-69 seconds       AST   187 207       Baso #   0.04   0.04     Basophil%   0.7   0.5     BILIRUBIN TOTAL   17.7  Comment:  For infants and newborns, interpretation of results should be based  on gestational age, weight and in agreement with clinical  observations.  Premature Infant recommended reference ranges:  Up to 24 hours.............<8.0 mg/dL  Up to 48 hours............<12.0 mg/dL  3-5 days..................<15.0 mg/dL  6-29 days.................<15.0 mg/dL   19.3  Comment:  For infants and newborns, interpretation of results should be based  on gestational age, weight and in agreement with clinical  observations.  Premature Infant recommended reference ranges:  Up to 24 hours.............<8.0 mg/dL  Up to 48 hours............<12.0 mg/dL  3-5 days..................<15.0 mg/dL  6-29 days.................<15.0 mg/dL         BUN, Bld   21 22       Calcium   7.8 7.9       Chloride   96 93       CO2   24 24       Creatinine   1.1 1.2       Differential Method   Automated   Automated     eGFR if    >60 >60       eGFR if non    >60  Comment:  Calculation used to obtain the estimated glomerular filtration  rate (eGFR) is the CKD-EPI equation.    55  Comment:  Calculation used to obtain the estimated glomerular filtration  rate (eGFR) is the CKD-EPI equation.          Eos #   0.2   0.2     Eosinophil%   3.2   2.8     Glucose   63 94       Gran # (ANC)   3.3   4.9     Gran%   58.6   64.6     Hematocrit   30.6   34.7     Hemoglobin   10.8   12.2     Immature Grans (Abs)   0.02  Comment:  Mild elevation in immature granulocytes is non specific and   can be seen in a variety of conditions including stress response,   acute inflammation, trauma and pregnancy. Correlation with other   laboratory and clinical  findings is essential.     0.03  Comment:  Mild elevation in immature granulocytes is non specific and   can be seen in a variety of conditions including stress response,   acute inflammation, trauma and pregnancy. Correlation with other   laboratory and clinical findings is essential.       Immature Granulocytes   0.4   0.4     INR   1.6  Comment:  Coumadin Therapy:  2.0 - 3.0 for INR for all indicators except mechanical heart valves  and antiphospholipid syndromes which should use 2.5 - 3.5.   1.7  Comment:  Coumadin Therapy:  2.0 - 3.0 for INR for all indicators except mechanical heart valves  and antiphospholipid syndromes which should use 2.5 - 3.5.         Lymph #   1.5   1.7     Lymph%   27.5   22.2     Magnesium   2.1         MCH   32.0   31.8     MCHC   35.3   35.2     MCV   91   90     Mono #   0.5   0.7     Mono%   9.6   9.5     MPV   11.3   10.7     nRBC   0   0     Platelet Estimate       Appears normal     Platelets   114   171  Comment:  Results confirmed, test repeated     POCT Glucose 85           Potassium   3.5 4.1       PROTEIN TOTAL   5.7 6.2       Protime   16.7 17.7       RBC   3.37   3.84     RDW   18.3   18.4     SARS-CoV-2 RNA, Amplification, Qual       Negative  Comment:  This test utilizes isothermal nucleic acid amplification   technology to detect the SARS-CoV-2 RdRp nucleic acid segment.   The analytical sensitivity (limit of detection) is 125 genome   equivalents/mL.   A POSITIVE result implies infection with the SARS-CoV-2 virus;  the patient is presumed to be contagious.    A NEGATIVE result means that SARS-CoV-2 nucleic acids are not  present above the limit of detection. A NEGATIVE result should be   treated as presumptive. It does not rule out the possibility of   COVID-19 and should not be the sole basis for treatment decisions.   If COVID-19 is strongly suspected based on clinical and exposure   history, re-testing using an alternate molecular assay should be   considered.    This test is only for use under the Food and Drug   Administration s Emergency Use Authorization (EUA).   Commercial kits are provided by [employer].   Performance characteristics of the EUA have been independently  verified by [facility] Department of  Pathology and Laboratory Medicine.   _________________________________________________________________  The ID NOW COVID-19 Letter of Authorization, along with the   authorized Fact Sheet for Healthcare Providers, the authorized Fact  Sheet for Patients, and authorized labeling are available on the FDA   website:  www.fda.gov/MedicalDevices/Safety/EmergencySituations/yob644688.htm       Sodium   126 126       WBC   5.60   7.56         All pertinent labs within the past 24 hours have been reviewed.    Significant Imaging: I have reviewed all pertinent imaging results/findings within the past 24 hours.

## 2020-10-15 NOTE — HOSPITAL COURSE
10/15: Seen and examined at bedside. Hospital chart reviewed. S/P thoracentesis by IR. 1500mls removed.  No acute interval detrimental events noted. She reports that she  has moderately improved. Post procedure reveals interval improvement in pleural effusion    10/16: Seen and examined at bedside. Hospital chart reviewed. No acute interval detrimental events noted. she reports that she  has moderately improved

## 2020-10-15 NOTE — H&P
"[facility] Hospital Medicine  History & Physical    Patient Name: Madhavi Bell  MRN: [id number]  Admission Date: 10/14/2020  Attending Physician: Portia Ladd MD   Primary Care Provider: Anurag Knutson MD         Patient information was obtained from patient, past medical records and ER records.     Subjective:     Principal Problem:Pleural effusion    Chief Complaint:   Chief Complaint   Patient presents with    Shortness of Breath     States sent over for thoracentesis and chest Xray.  States had one scheduled for next week, but having increased SOB        HPI: 45 y/o WF with hx of schazki's ring with dilation, alpha-one antitrypsin deficiency, HLD, cirrhosis, paracentesis (08/2020) and thoracentesis (10/05/2020) to ED with c/o gradually worsening SOB, worse with exertion over the previous 2 days, associated with a non-productive cough and abdominal "tightness". Symptoms are persistent and of moderate severity and without alleviating factors. Denies chest pain, edema, palpitations, wheezing, orthopnea, abdominal pain, N/V/D, constipation, dysuria, flank pain, HA, dizziness, weakness, fever, cough, chills, falls/blurred vision, focal deficits. Of note, the pt's case is reportedly going in front of the transplant committed next week in [place]. Pt was found in the ED to have elevated INR 1.7, PTT 33.5, Alk phos 642, ,  and low Na 126; COVID-19 test was negative, and cxray showed an increasing large right-sided pleural effusion with compression of the right lung parencyma and a small aerated portion of the lung in the right upper lobe. Pulmonology was consulted and hospital medicine called with the pt placed in observation on telemetry. Pt is a full code - surrogate decision maker is her , Alli Bell.     Past Medical History:   Diagnosis Date    Alpha-1-antitrypsin deficiency     ZZ phenotype    Eosinophilic esophagitis     h/o pill impaction, bx proven    " Hyperlipidemia     may be related to liver enzymes, 2ary to high HDL    Schatzki's ring     s/p dilatation       Past Surgical History:   Procedure Laterality Date    APPENDECTOMY       SECTION      CHOLECYSTECTOMY      DILATION AND CURETTAGE OF UTERUS      ESOPHAGOGASTRODUODENOSCOPY N/A 2020    Procedure: EGD (ESOPHAGOGASTRODUODENOSCOPY)-need rapid covid;  Surgeon: Jose Smith MD;  Location: [facility];  Service: Endoscopy;  Laterality: N/A;       Review of patient's allergies indicates:  No Known Allergies    No current facility-administered medications on file prior to encounter.      Current Outpatient Medications on File Prior to Encounter   Medication Sig    ergocalciferol (ERGOCALCIFEROL) 50,000 unit Cap Take 1 capsule (50,000 Units total) by mouth every 7 days.    furosemide (LASIX) 20 MG tablet Take 40 mg by mouth once daily.     lactulose (CHRONULAC) 10 gram/15 mL solution Take 20 g by mouth daily as needed.     multivitamin Tab Take 1 tablet by mouth once daily.    spironolactone (ALDACTONE) 50 MG tablet Take 100 mg by mouth once daily.     zinc gluconate 50 mg tablet Take 50 mg by mouth once daily.     Family History     Reviewed and not pertinent.         Tobacco Use    Smoking status: Never Smoker   Substance and Sexual Activity    Alcohol use: Not Currently     Comment: patient reports she has not had any alcohol in past few months    Drug use: No    Sexual activity: Yes     Partners: Male     Birth control/protection: OCP     Review of Systems   Constitutional: Negative for activity change, chills, diaphoresis, fatigue and fever.   HENT: Negative for congestion, trouble swallowing and voice change.    Eyes: Negative for photophobia and discharge.   Respiratory: Positive for cough and shortness of breath (worse on exertion). Negative for chest tightness and wheezing.    Cardiovascular: Negative for chest pain, palpitations and leg swelling.   Gastrointestinal:  Negative for abdominal pain, blood in stool, constipation, diarrhea, nausea and vomiting.   Endocrine: Negative for cold intolerance, heat intolerance, polydipsia, polyphagia and polyuria.   Genitourinary: Negative for difficulty urinating, dysuria, flank pain, frequency and urgency.   Musculoskeletal: Negative for back pain, joint swelling and myalgias.   Skin: Negative for rash and wound.   Neurological: Negative for dizziness, seizures, syncope, facial asymmetry, weakness, light-headedness, numbness and headaches.   Psychiatric/Behavioral: Negative for confusion and hallucinations.     Objective:     Vital Signs (Most Recent):  Temp: 98.4 °F (36.9 °C) (10/14/20 1729)  Pulse: 85 (10/14/20 2131)  Resp: (!) 21 (10/14/20 2131)  BP: (!) 97/56 (10/14/20 2131)  SpO2: (!) 94 % (10/14/20 2131) Vital Signs (24h Range):  Temp:  [98.4 °F (36.9 °C)] 98.4 °F (36.9 °C)  Pulse:  [79-90] 85  Resp:  [18-21] 21  SpO2:  [93 %-96 %] 94 %  BP: ()/(52-59) 97/56     Weight: 72.8 kg (160 lb 7.9 oz)  Body mass index is 25.9 kg/m².    Physical Exam  Vitals signs reviewed.   Constitutional:       General: She is not in acute distress.     Appearance: Normal appearance. She is normal weight. She is not toxic-appearing or diaphoretic.   HENT:      Head: Normocephalic and atraumatic.      Nose: Nose normal. No congestion.      Mouth/Throat:      Mouth: Mucous membranes are moist.   Eyes:      General: Scleral icterus present.      Pupils: Pupils are equal, round, and reactive to light.   Neck:      Musculoskeletal: Normal range of motion and neck supple. No muscular tenderness.   Cardiovascular:      Rate and Rhythm: Regular rhythm. Tachycardia present.      Pulses: Normal pulses.      Heart sounds: Normal heart sounds.   Pulmonary:      Effort: Pulmonary effort is normal. No respiratory distress.      Breath sounds: Normal breath sounds. No wheezing or rhonchi.   Abdominal:      General: Abdomen is flat. Bowel sounds are normal. There  is distension (ascites).      Palpations: Abdomen is soft.      Tenderness: There is no guarding or rebound.      Hernia: No hernia is present.   Musculoskeletal: Normal range of motion.         General: No swelling, deformity or signs of injury.   Skin:     General: Skin is warm and dry.      Capillary Refill: Capillary refill takes less than 2 seconds.      Coloration: Skin is jaundiced.      Findings: No bruising or rash.   Neurological:      General: No focal deficit present.      Mental Status: She is alert and oriented to person, place, and time.      Cranial Nerves: No cranial nerve deficit.      Motor: No weakness.      Coordination: Coordination normal.   Psychiatric:         Mood and Affect: Mood normal.         Behavior: Behavior normal.         Thought Content: Thought content normal.         Judgment: Judgment normal.           CRANIAL NERVES     CN III, IV, VI   Pupils are equal, round, and reactive to light.       Significant Labs:   Results for orders placed or performed during the hospital encounter of 10/14/20   CBC auto differential   Result Value Ref Range    WBC 7.56 3.90 - 12.70 K/uL    RBC 3.84 (L) 4.00 - 5.40 M/uL    Hemoglobin 12.2 12.0 - 16.0 g/dL    Hematocrit 34.7 (L) 37.0 - 48.5 %    Mean Corpuscular Volume 90 82 - 98 fL    Mean Corpuscular Hemoglobin 31.8 (H) 27.0 - 31.0 pg    Mean Corpuscular Hemoglobin Conc 35.2 32.0 - 36.0 g/dL    RDW 18.4 (H) 11.5 - 14.5 %    Platelets 171 150 - 350 K/uL    MPV 10.7 9.2 - 12.9 fL    Immature Granulocytes 0.4 0.0 - 0.5 %    Gran # (ANC) 4.9 1.8 - 7.7 K/uL    Immature Grans (Abs) 0.03 0.00 - 0.04 K/uL    Lymph # 1.7 1.0 - 4.8 K/uL    Mono # 0.7 0.3 - 1.0 K/uL    Eos # 0.2 0.0 - 0.5 K/uL    Baso # 0.04 0.00 - 0.20 K/uL    nRBC 0 0 /100 WBC    Gran% 64.6 38.0 - 73.0 %    Lymph% 22.2 18.0 - 48.0 %    Mono% 9.5 4.0 - 15.0 %    Eosinophil% 2.8 0.0 - 8.0 %    Basophil% 0.5 0.0 - 1.9 %    Platelet Estimate Appears normal     Differential Method Automated     COVID-19 Rapid Screening   Result Value Ref Range    SARS-CoV-2 RNA, Amplification, Qual Negative Negative   APTT   Result Value Ref Range    aPTT 33.5 (H) 21.0 - 32.0 sec   Protime-INR   Result Value Ref Range    Prothrombin Time 17.7 (H) 9.0 - 12.5 sec    INR 1.7 (H) 0.8 - 1.2   Comprehensive metabolic panel   Result Value Ref Range    Sodium 126 (L) 136 - 145 mmol/L    Potassium 4.1 3.5 - 5.1 mmol/L    Chloride 93 (L) 95 - 110 mmol/L    CO2 24 23 - 29 mmol/L    Glucose 94 70 - 110 mg/dL    BUN, Bld 22 (H) 6 - 20 mg/dL    Creatinine 1.2 0.5 - 1.4 mg/dL    Calcium 7.9 (L) 8.7 - 10.5 mg/dL    Total Protein 6.2 6.0 - 8.4 g/dL    Albumin 1.7 (L) 3.5 - 5.2 g/dL    Total Bilirubin 19.3 (H) 0.1 - 1.0 mg/dL    Alkaline Phosphatase 642 (H) 55 - 135 U/L     (H) 10 - 40 U/L     (H) 10 - 44 U/L    Anion Gap 9 8 - 16 mmol/L    eGFR if African American >60 >60 mL/min/1.73 m^2    eGFR if non African American 55 (A) >60 mL/min/1.73 m^2         Significant Imaging:   Imaging Results          X-Ray Chest AP Portable (Final result)  Result time 10/14/20 19:57:08    Final result by Sherine Hayes MD (10/14/20 19:57:08)                 Impression:      Increasing large right-sided pleural effusion with compression of the right lung parenchyma with small aerated portion of the lung in the right upper lobe      Electronically signed by: Freddy Basurto  Date:    10/14/2020  Time:    19:57             Narrative:    EXAMINATION:  XR CHEST AP PORTABLE    CLINICAL HISTORY:  Dyspnea, unspecified    TECHNIQUE:  Single frontal view of the chest was performed.    COMPARISON:  Prior    FINDINGS:  Large right-sided pleural effusion with associated atelectasis/consolidation slightly increased compared to prior exam.  Left lung is clear.  Small portion of aerated right upper lobe remains.  Cardiac silhouette is not enlarged                                    Assessment/Plan:     * Right-sided pleural effusion, recurrent  Likely r/t  advanced liver cirrhosis - last thoracentesis on 10/05/2020 (2200ml)  Pulmonology consulted for thoracentesis in AM  NPO after MN for AM procedure  Supplemental O2 prn  Duonebs prn        Liver cirrhosis with transaminitis  Per pt, r/t alph-1 antitrypsin deficiency, denies ETOH  Appears stable at baseline at this time  Trend liver function  Avoid hepatotoxins  Pt to f/u with transplant center after d/c (has appointment next week)          Hyponatremia  Likely r/t liver cirrhosis  Monitor renal fxn and Na+ levels  Monitor I&Os  Fluid restriction for now  Neuro checks with VS      Alpha-1-antitrypsin deficiency  Awaiting decision on liver transplant per transplant center  Supplemental O2 prn  Duonebs prn        VTE Risk Mitigation (From admission, onward)         Ordered     Place sequential compression device  Until discontinued      10/14/20 213                   Sarah Thao NP  Department of Hospital Medicine   [facility]

## 2020-10-15 NOTE — PLAN OF CARE
POC reviewed, verbalized understanding. AAOx4. Room air. Going for procedure in Am. Pt remained free from falls, fall precautions in place. Pt is SR-ST on monitor. Pt clearly jaundiced with distended abdomen. VSS. No other c/o at this time. PIV intact. Call bell and personal belongings within reach. Hourly rounding complete. Reminded to call for assistance. Will continue to monitor.

## 2020-10-15 NOTE — PLAN OF CARE
Care plan reviewed with patient. Thoracentesis done--no complications noted.  Free from falls. No complaints made.

## 2020-10-15 NOTE — HPI
"45 y/o WF with hx of schazki's ring with dilation, alpha-one antitrypsin deficiency, HLD, cirrhosis, paracentesis (08/2020) and thoracentesis (10/05/2020) to ED with c/o gradually worsening SOB, worse with exertion over the previous 2 days, associated with a non-productive cough and abdominal "tightness". Symptoms are persistent and of moderate severity and without alleviating factors. Denies chest pain, edema, palpitations, wheezing, orthopnea, abdominal pain, N/V/D, constipation, dysuria, flank pain, HA, dizziness, weakness, fever, cough, chills, falls/blurred vision, focal deficits. Of note, the pt's case is reportedly going in front of the transplant committed next week in [place]. Pt was found in the ED to have elevated INR 1.7, PTT 33.5, Alk phos 642, ,  and low Na 126; COVID-19 test was negative, and cxray showed an increasing large right-sided pleural effusion with compression of the right lung parencyma and a small aerated portion of the lung in the right upper lobe. Pulmonology was consulted and hospital medicine called with the pt placed in observation on telemetry. Pt is a full code - surrogate decision maker is her , Alli Bell.   "

## 2020-10-15 NOTE — ASSESSMENT & PLAN NOTE
Likely r/t liver cirrhosis  Monitor renal fxn and Na+ levels  Monitor I&Os  Fluid restriction for now  Neuro checks with VS

## 2020-10-15 NOTE — SUBJECTIVE & OBJECTIVE
Past Medical History:   Diagnosis Date    Alpha-1-antitrypsin deficiency     ZZ phenotype    Eosinophilic esophagitis     h/o pill impaction, bx proven    Hyperlipidemia     may be related to liver enzymes, 2ary to high HDL    Schatzki's ring     s/p dilatation       Past Surgical History:   Procedure Laterality Date    APPENDECTOMY       SECTION      CHOLECYSTECTOMY      DILATION AND CURETTAGE OF UTERUS      ESOPHAGOGASTRODUODENOSCOPY N/A 2020    Procedure: EGD (ESOPHAGOGASTRODUODENOSCOPY)-need rapid covid;  Surgeon: Jose Smith MD;  Location: [facility];  Service: Endoscopy;  Laterality: N/A;       Review of patient's allergies indicates:  No Known Allergies        Medications:  Continuous Infusions:  Scheduled Meds:   furosemide  40 mg Oral Daily    lactulose  20 g Oral TID    spironolactone  100 mg Oral Daily     PRN Meds:albuterol-ipratropium, hydrocortisone, influenza     Family History     None        Tobacco Use    Smoking status: Never Smoker   Substance and Sexual Activity    Alcohol use: Not Currently     Comment: patient reports she has not had any alcohol in past few months    Drug use: No    Sexual activity: Yes     Partners: Male     Birth control/protection: OCP         Review of Systems   Respiratory: Positive for shortness of breath.    All other systems reviewed and are negative.    Objective:     Vital Signs (Most Recent):  Temp: 98.1 °F (36.7 °C) (10/15/20 1129)  Pulse: 92 (10/15/20 1505)  Resp: 18 (10/15/20 1129)  BP: (!) 93/49(MD informed) (10/15/20 1129)  SpO2: (!) 92 % (10/15/20 1129) Vital Signs (24h Range):  Temp:  [97.6 °F (36.4 °C)-98.4 °F (36.9 °C)] 98.1 °F (36.7 °C)  Pulse:  [] 92  Resp:  [16-22] 18  SpO2:  [92 %-96 %] 92 %  BP: ()/(49-65) 93/49     Weight: 75.1 kg (165 lb 9.1 oz)  Body mass index is 26.72 kg/m².    No intake or output data in the 24 hours ending 10/15/20 1535    Physical Exam  Vitals signs and nursing note reviewed.    Constitutional:       Appearance: Normal appearance. She is normal weight.   HENT:      Head: Normocephalic and atraumatic.      Nose: Nose normal.      Mouth/Throat:      Mouth: Mucous membranes are dry.      Pharynx: Oropharynx is clear.   Eyes:      Extraocular Movements: Extraocular movements intact.      Conjunctiva/sclera: Conjunctivae normal.      Pupils: Pupils are equal, round, and reactive to light.   Neck:      Musculoskeletal: Normal range of motion and neck supple.   Cardiovascular:      Rate and Rhythm: Normal rate and regular rhythm.      Pulses: Normal pulses.   Pulmonary:      Effort: Pulmonary effort is normal.      Breath sounds: Decreased air movement present. Examination of the right-middle field reveals decreased breath sounds. Examination of the right-lower field reveals decreased breath sounds. Decreased breath sounds and rales present. No wheezing or rhonchi.       Abdominal:      General: Bowel sounds are normal.      Palpations: Abdomen is soft.   Musculoskeletal: Normal range of motion.   Skin:     General: Skin is warm and dry.      Capillary Refill: Capillary refill takes 2 to 3 seconds.   Neurological:      General: No focal deficit present.      Mental Status: She is alert and oriented to person, place, and time.   Psychiatric:         Mood and Affect: Mood normal.         Vents:       Lines/Drains/Airways     Peripheral Intravenous Line                 Peripheral IV - Single Lumen 10/14/20 1751 20 G Right Wrist less than 1 day                Significant Labs:    CBC/Anemia Profile:  Recent Labs   Lab 10/14/20  1752 10/15/20  0623   WBC 7.56 5.60   HGB 12.2 10.8*   HCT 34.7* 30.6*    114*   MCV 90 91   RDW 18.4* 18.3*        Chemistries:  Recent Labs   Lab 10/14/20  1909 10/15/20  0623   * 126*   K 4.1 3.5   CL 93* 96   CO2 24 24   BUN 22* 21*   CREATININE 1.2 1.1   CALCIUM 7.9* 7.8*   ALBUMIN 1.7* 1.6*   PROT 6.2 5.7*   BILITOT 19.3* 17.7*   ALKPHOS 642* 582*   ALT  123* 112*   * 187*   MG  --  2.1       Bilirubin:   Recent Labs   Lab 10/05/20  0818 10/08/20  0747 10/12/20  1517 10/14/20  1909 10/15/20  0623   BILIDIR  --  13.5*  --   --   --    BILITOT 15.9* 18.2* 20.0* 19.3* 17.7*     Coagulation:   Recent Labs   Lab 10/15/20  0623   INR 1.6*   APTT 34.5*     POCT Glucose:   Recent Labs   Lab 10/15/20  0820   POCTGLUCOSE 85       Significant Imaging:     CXR: 10/15/20:    Interval improvement without complete resolution of right pleural effusion with continued dense opacity involving the mid lower right lung.  Negative for pneumothorax.  Interstitial changes are again seen throughout the left lung.  The hilar and mediastinal contours and osseous structures are otherwise unchanged.  EKG leads overlie the chest

## 2020-10-15 NOTE — TELEPHONE ENCOUNTER
Late Entry---10/14/2020  Received return phone call from patient.  Informed her of the need to report to [facility]  ER for thoracentesis and repeat cxr.  She verbalized understanding and agrees to report there as recommended once her  returns home.  ------------------------------------------------------  Called [facility]  Spoke w/ charge nurse in ED.  Informed her of above.

## 2020-10-15 NOTE — INTERVAL H&P NOTE
The patient has been examined and the H&P has been reviewed:    I concur with the findings and no changes have occurred since H&P was written.        Active Hospital Problems    Diagnosis  POA    *Right-sided pleural effusion, recurrent [J90]  Yes    Hyperammonemia [E72.20]  Yes    Liver cirrhosis with transaminitis [R74.01]  Yes    Hyponatremia [E87.1]  Yes    Alpha-1-antitrypsin deficiency [E88.01]  Yes      Resolved Hospital Problems   No resolved problems to display.

## 2020-10-15 NOTE — PLAN OF CARE
Swer met with pt at bedside for initial assessment. Pt lives with spouse and was independent with ADLs prior to admission. Pt's spouse will provide transportation at discharge. Pt denied using any medical equipment or having home health in the past. Pt does not have a problem obtaining medication and drives self to medical appointments. Pt does not have an advanced directive at this time. SWer provided a transitional care folder, information on advanced directives, information on pharmacy bedside delivery, and discharge planning begins on admission with contact information for any needs/questions.    PCP; Anurag Knutson MD    Pharm;   [facility] [place] [facility] [address] [address]  [address] [address] [facility] [address]  [facility] [address]  Phone: [phone] Fax: [phone]    [id number]  [facility] [address] [address] [address]  [address] [address]  [facility] [address]  Phone: [phone] Fax: [phone]  [facility]; Active  Bedside Delivery; Yes       10/15/20 1052   Discharge Assessment   Assessment Type Discharge Planning Assessment   Confirmed/corrected address and phone number on facesheet? Yes   Assessment information obtained from? Patient   Communicated expected length of stay with patient/caregiver yes   Prior to hospitilization cognitive status: Alert/Oriented   Prior to hospitalization functional status: Independent   Current cognitive status: Alert/Oriented   Current Functional Status: Independent   Facility Arrived From: home   Lives With spouse   Able to Return to Prior Arrangements yes   Is patient able to care for self after discharge? Yes   Who are your caregiver(s) and their phone number(s)? Alli Jensen (Spouse) [phone]   Patient's perception of discharge disposition home or selfcare   Readmission Within the Last 30 Days no previous admission in last 30 days   Patient currently being followed by outpatient case management? No   Patient currently receives any other outside  agency services? No   Equipment Currently Used at Home none   Do you have any problems affording any of your prescribed medications? No   Is the patient taking medications as prescribed? yes   Does the patient have transportation home? Yes   Transportation Anticipated family or friend will provide   Discharge Plan A Home with family   DME Needed Upon Discharge  none   Patient/Family in Agreement with Plan yes

## 2020-10-15 NOTE — ASSESSMENT & PLAN NOTE
Per pt, r/t alph-1 antitrypsin deficiency, denies ETOH  Appears stable at baseline at this time  Trend liver function  Avoid hepatotoxins  Pt to f/u with transplant center after d/c (has appointment next week)

## 2020-10-15 NOTE — PROVIDER PROGRESS NOTES - EMERGENCY DEPT.
Encounter Date: 10/14/2020    ED Physician Progress Notes       SCRIBE NOTE: I, Chary Dunn, am scribing for, and in the presence of,  Mary Grace Regalado MD.  Physician Statement: I, Mary Grace Regalado MD, personally performed the services described in this documentation as scribed by Chary Dunn in my presence, and it is both accurate and complete.     Physician Note:   The EKG was ordered, reviewed, and independently interpreted by the ED provider.   Interpretation time: 22:08  Rate: 85 BPM  Rhythm: Accelerated junctional rhythm  Interpretation: Prolonged QT. No STEMI.

## 2020-10-16 PROBLEM — I95.9 HYPOTENSION: Status: ACTIVE | Noted: 2020-01-01

## 2020-10-16 NOTE — NURSING
Crying in pain on the lower back--not the Thora site. Does not have anything for pain--MD informed.

## 2020-10-16 NOTE — ASSESSMENT & PLAN NOTE
HEPATIC HYDROTHORAX.    [x]        Serial thoracentesis:  [x]        Sodium restriction  [x]        Diuresis (Furosemide + Aldactone)  []        Consider TIPS  []        Consider LiverTransplantation.       REPEAT THORACENTESIS IN AM.    Complications  of the procedure discussed in detail with patient. Complications including but not limited to infection that may require hospital  admission, bleeding that may require blood transfusion and or hospital  admission, perforation of the lung which may require surgery. Patient expressed and verbalized understanding.  Alternate treatments and material risks associated with such alternatives were  discussed with pateint. These include radiologic surveillance  with  minimal risk and sugery with an indeterminate risk. The material risks  of refusing the procedure was discussed in detail. This includes no  diagnosis or confirmation of diagnisis and rendering of appropriate treatment the risk of which depends on the nature of the diagnosed illness. Patient expressed and verbalized understanding. Pleural fluid will be sent for chenistry, microbiology and cytology.

## 2020-10-16 NOTE — ASSESSMENT & PLAN NOTE
MELD up some from baseline. Low threshold for transfer to [facility].   Check INR and CMP daily.   No encephalopathy.   Rectal bleeding seems consistent with hemorrhoids. Need to monitor.     MELD-Na score: 28 at 10/16/2020  6:21 AM  MELD score: 24 at 10/16/2020  6:21 AM  Calculated from:  Serum Creatinine: 1.0 mg/dL at 10/16/2020  6:21 AM  Serum Sodium: 130 mmol/L at 10/16/2020  6:21 AM  Total Bilirubin: 16.6 mg/dL at 10/16/2020  6:21 AM  INR(ratio): 1.9 at 10/16/2020  6:21 AM  Age: 44 years 11 months

## 2020-10-16 NOTE — PROCEDURES
"Madhavi Bell is a 44 y.o. female patient.    Temp: 98.3 °F (36.8 °C) (10/16/20 1226)  Pulse: 89 (10/16/20 1331)  Resp: 18 (10/16/20 1226)  BP: (!) 83/43 (10/16/20 1226)  SpO2: 98 % (10/16/20 1226)  Weight: 72.7 kg (160 lb 4.4 oz) (10/16/20 0022)  Height: 5' 6" (167.6 cm) (10/14/20 1729)       Thoracentesis    Date/Time: 10/16/2020 11:00 AM  Location procedure was performed: [facility] TELEMETRY  Performed by: Chacorta Hoang MD  Authorized by: Chacorta Hoang MD   Pre-operative diagnosis: Hepatic Hydrothorax  Post-operative diagnosis: Hepatic Hydrothorax  Consent Done: Yes  Consent: Verbal consent obtained. Written consent obtained.  Consent given by: patient  Patient understanding: patient states understanding of the procedure being performed  Patient consent: the patient's understanding of the procedure matches consent given  Procedure consent: procedure consent matches procedure scheduled  Relevant documents: relevant documents present and verified  Test results: test results available and properly labeled  Site marked: the operative site was marked  Imaging studies: imaging studies available  Patient identity confirmed: , MRN, name, verbally with patient and provided demographic data  Time out: Immediately prior to procedure a "time out" was called to verify the correct patient, procedure, equipment, support staff and site/side marked as required.  Procedure purpose: diagnostic and therapeutic  Indications: pleural effusion  Preparation: Patient was prepped and draped in the usual sterile fashion.  Local anesthesia used: yes    Anesthesia:  Local anesthesia used: yes  Local Anesthetic: lidocaine 1% without epinephrine  Anesthetic total: 20 mL  Patient sedated: no  Preparation: skin prepped with ChloraPrep  Patient position: sitting  Ultrasound guidance: yes  Location: right posterior  Intercostal space: 8th  Puncture method: over-the-needle catheter  Needle size: 18  Catheter size: 8 Greek  Number of " attempts: 1  Drainage amount: 1800 ml  Drainage characteristics: clear  Patient tolerance: Patient tolerated the procedure well with no immediate complications  Chest x-ray performed: no  Complications: No  Estimated blood loss (mL): 10  Specimens: No  Implants: No  Comments: No pneumothorax.  Specimens:   Pleural fluid cell count  Pleural fluid LDH  Pleural fluid Protein  Pleural fluid Microbiology - Bacterial, fungal and AFB  Pleural fluid cytology          Chacorta Hoang  10/16/2020

## 2020-10-16 NOTE — SUBJECTIVE & OBJECTIVE
Past Medical History:   Diagnosis Date    Alpha-1-antitrypsin deficiency     ZZ phenotype    Eosinophilic esophagitis     h/o pill impaction, bx proven    Hyperlipidemia     may be related to liver enzymes, 2ary to high HDL    Schatzki's ring     s/p dilatation       Past Surgical History:   Procedure Laterality Date    APPENDECTOMY       SECTION      CHOLECYSTECTOMY      DILATION AND CURETTAGE OF UTERUS      ESOPHAGOGASTRODUODENOSCOPY N/A 2020    Procedure: EGD (ESOPHAGOGASTRODUODENOSCOPY)-need rapid covid;  Surgeon: Jose Smith MD;  Location: [facility];  Service: Endoscopy;  Laterality: N/A;       Review of patient's allergies indicates:  No Known Allergies  Family History     None        Tobacco Use    Smoking status: Never Smoker   Substance and Sexual Activity    Alcohol use: Not Currently     Comment: patient reports she has not had any alcohol in past few months    Drug use: No    Sexual activity: Yes     Partners: Male     Birth control/protection: OCP     Review of Systems   Constitutional: Negative for fever.   HENT: Negative for hearing loss.    Eyes: Negative for visual disturbance.   Respiratory: Negative for cough and shortness of breath.    Cardiovascular: Negative for chest pain.   Gastrointestinal:        As per HPI.   Genitourinary: Negative for dysuria, frequency and hematuria.   Musculoskeletal: Negative for arthralgias and back pain.   Skin: Negative for rash.   Neurological: Positive for weakness. Negative for seizures, syncope, numbness and headaches.   Hematological: Does not bruise/bleed easily.   Psychiatric/Behavioral: The patient is not nervous/anxious.      Objective:     Vital Signs (Most Recent):  Temp: 98.3 °F (36.8 °C) (10/16/20 07)  Pulse: 87 (10/16/20 0939)  Resp: 18 (10/16/20 07)  BP: (!) 97/53(MD aware) (10/16/20 0831)  SpO2: (!) 93 % (10/16/20 07) Vital Signs (24h Range):  Temp:  [97.9 °F (36.6 °C)-98.8 °F (37.1 °C)] 98.3 °F (36.8  °C)  Pulse:  [68-92] 87  Resp:  [16-18] 18  SpO2:  [92 %-94 %] 93 %  BP: ()/(47-56) 97/53     Weight: 72.7 kg (160 lb 4.4 oz) (10/16/20 0022)  Body mass index is 25.87 kg/m².      Intake/Output Summary (Last 24 hours) at 10/16/2020 1038  Last data filed at 10/16/2020 0200  Gross per 24 hour   Intake 850 ml   Output --   Net 850 ml       Lines/Drains/Airways     Peripheral Intravenous Line                 Peripheral IV - Single Lumen 10/14/20 1751 20 G Right Wrist 1 day                Physical Exam  Constitutional:       Appearance: Normal appearance. She is well-developed.   HENT:      Head: Normocephalic and atraumatic.   Eyes:      General: Scleral icterus present.      Extraocular Movements: Extraocular movements intact.   Neck:      Musculoskeletal: Normal range of motion and neck supple.      Vascular: No carotid bruit.   Cardiovascular:      Rate and Rhythm: Normal rate and regular rhythm.      Heart sounds: No murmur.   Pulmonary:      Effort: Pulmonary effort is normal. No respiratory distress.      Breath sounds: No wheezing.      Comments: Crackles on the right side; clear on the left  Abdominal:      General: Bowel sounds are normal. There is distension.      Palpations: Abdomen is soft. There is no mass.      Tenderness: There is no abdominal tenderness.   Musculoskeletal:      Right lower leg: No edema.      Left lower leg: No edema.   Skin:     General: Skin is warm and dry.      Coloration: Skin is jaundiced.      Findings: No rash.   Neurological:      Mental Status: She is alert and oriented to person, place, and time.      Cranial Nerves: No cranial nerve deficit.   Psychiatric:         Behavior: Behavior normal.         Significant Labs:  CBC:   Recent Labs   Lab 10/14/20  1752 10/15/20  0623 10/16/20  0622   WBC 7.56 5.60 5.16   HGB 12.2 10.8* 11.0*   HCT 34.7* 30.6* 31.3*    114* 104*     CMP:   Recent Labs   Lab 10/16/20  0621   GLU 83   CALCIUM 7.9*   ALBUMIN 1.5*   PROT 5.6*   NA  130*   K 4.0   CO2 25   CL 94*   BUN 19   CREATININE 1.0   ALKPHOS 546*   *   *   BILITOT 16.6*     Coagulation:   Recent Labs   Lab 10/16/20  0621 10/16/20  1049   INR 1.9*  --    APTT  --  33.6*       Significant Imaging:  Imaging results within the past 24 hours have been reviewed.

## 2020-10-16 NOTE — HPI
The patient presented to the ER for SOB. She has a history of decompensated liver cirrhosis related to Alpha-1 antitrypsin def and alcohol. She is followed by Dr. Gray and has had issues with pleural effusions. She was seen in [place] 10/08 for liver transplant evaluation. This admit, she was found to have a large right-sided pleural effusion with compression of the right lung parenchyma. The Pulmonary team was consulted for thoracentesis. 1.5 L was removed yesterday but there are plans to remove more today. We were consulted to help participate in management. The patient is currently on Lasix 40 mg daily and Aldactone 100 mg daily. Increases were recommended a week ago, but not done. Here she has been limited by hypotension. MELD has increased some over the last week. The patient is feeling a little weak today. She had mild nausea with eating but denies vomiting or abdominal pain. She had rectal bleeding yesterday and three days ago. She thinks it is from hemorrhoids. It is mostly with wiping and streaked on the stool. She has mild anal discomfort.

## 2020-10-16 NOTE — PLAN OF CARE
Problem: Adult Inpatient Plan of Care  Goal: Plan of Care Review  Outcome: Ongoing, Progressing  Goal: Patient-Specific Goal (Individualization)  Outcome: Ongoing, Progressing  Goal: Absence of Hospital-Acquired Illness or Injury  Outcome: Ongoing, Progressing  Goal: Optimal Comfort and Wellbeing  Outcome: Ongoing, Progressing  Goal: Readiness for Transition of Care  Outcome: Ongoing, Progressing  Goal: Rounds/Family Conference  Outcome: Ongoing, Progressing     Problem: Fluid Imbalance (Pneumonia)  Goal: Fluid Balance  Outcome: Ongoing, Progressing     Problem: Infection (Pneumonia)  Goal: Resolution of Infection Signs/Symptoms  Outcome: Ongoing, Progressing     Problem: Respiratory Compromise (Pneumonia)  Goal: Effective Oxygenation and Ventilation  Outcome: Ongoing, Progressing     Problem: Fall Injury Risk  Goal: Absence of Fall and Fall-Related Injury  Outcome: Ongoing, Progressing

## 2020-10-16 NOTE — ASSESSMENT & PLAN NOTE
Secondary to cirrhosis per Pulmonary team. Thoracentesis planned today.   Diuretic increase has been limited by hypotension. Discussed with Dr. Gray who recommends starting Midodrine 5 mg tid and then trying to increase diuretics to bid dosing. Depending on response will consider transfer to [place].

## 2020-10-16 NOTE — PROGRESS NOTES
[facility]  Pulmonology  Progress Note    Patient Name: Madhavi Bell  MRN: [id number]  Admission Date: 10/14/2020  Hospital Length of Stay: 1 days  Code Status: Full Code  Attending Provider: Brian Madison MD  Primary Care Provider: Anurag Knutson MD   Principal Problem: Pleural effusion    Subjective:         Interval History:   Seen and examined at bedside. Hospital chart reviewed. No acute interval detrimental events noted. she reports that she  has moderately improved    Review of Systems   Respiratory: Positive for shortness of breath.    All other systems reviewed and are negative.      Scheduled Meds:   furosemide  40 mg Oral Daily    lactulose  20 g Oral TID    spironolactone  100 mg Oral Daily     Continuous Infusions:  PRN Meds:.albuterol-ipratropium, hydrocortisone, influenza    Objective:     Vital Signs (Most Recent):  Temp: 98.3 °F (36.8 °C) (10/16/20 0732)  Pulse: 87 (10/16/20 0939)  Resp: 18 (10/16/20 0732)  BP: (!) 97/53(MD aware) (10/16/20 0831)  SpO2: (!) 93 % (10/16/20 0732) Vital Signs (24h Range):  Temp:  [97.9 °F (36.6 °C)-98.8 °F (37.1 °C)] 98.3 °F (36.8 °C)  Pulse:  [68-92] 87  Resp:  [16-18] 18  SpO2:  [92 %-94 %] 93 %  BP: ()/(47-56) 97/53     Weight: 72.7 kg (160 lb 4.4 oz)  Body mass index is 25.87 kg/m².      Intake/Output Summary (Last 24 hours) at 10/16/2020 0959  Last data filed at 10/16/2020 0200  Gross per 24 hour   Intake 850 ml   Output --   Net 850 ml       Physical Exam    Vents:       Lines/Drains/Airways     Peripheral Intravenous Line                 Peripheral IV - Single Lumen 10/14/20 1751 20 G Right Wrist 1 day                Significant Labs:    CBC/Anemia Profile:  Recent Labs   Lab 10/14/20  1752 10/15/20  0623 10/16/20  0622   WBC 7.56 5.60 5.16   HGB 12.2 10.8* 11.0*   HCT 34.7* 30.6* 31.3*    114* 104*   MCV 90 91 91   RDW 18.4* 18.3* 18.6*        Chemistries:  Recent Labs   Lab 10/14/20  1909 10/15/20  0623 10/16/20  0621    * 126* 130*   K 4.1 3.5 4.0   CL 93* 96 94*   CO2 24 24 25   BUN 22* 21* 19   CREATININE 1.2 1.1 1.0   CALCIUM 7.9* 7.8* 7.9*   ALBUMIN 1.7* 1.6* 1.5*   PROT 6.2 5.7* 5.6*   BILITOT 19.3* 17.7* 16.6*   ALKPHOS 642* 582* 546*   * 112* 113*   * 187* 180*   MG  --  2.1 2.1           Significant Imaging:    XR CHEST AP PORTABLE: 10/15/20:  Interval improvement without complete resolution of right pleural effusion with continued dense opacity involving the mid lower right lung.  Negative for pneumothorax.  Interstitial changes are again seen throughout the left lung.  The hilar and mediastinal contours and osseous structures are otherwise unchanged.  EKG leads overlie the chest.             Assessment/Plan:     * Right-sided pleural effusion, recurrent  HEPATIC HYDROTHORAX.    [x]        Serial thoracentesis:  [x]        Sodium restriction  [x]        Diuresis (Furosemide + Aldactone)  [x]        Consider TIPS  [x]        Liver Transplantation work up in progress.   [x]        Consider GI consult.      REPEAT THORACENTESIS  TODAY    Complications  of the procedure discussed in detail with patient. Complications including but not limited to infection that may require hospital  admission, bleeding that may require blood transfusion and or hospital  admission, perforation of the lung which may require surgery. Patient expressed and verbalized understanding.  Alternate treatments and material risks associated with such alternatives were  discussed with pateint. These include radiologic surveillance  with  minimal risk and sugery with an indeterminate risk. The material risks  of refusing the procedure was discussed in detail. This includes no  diagnosis or confirmation of diagnisis and rendering of appropriate treatment the risk of which depends on the nature of the diagnosed illness. Patient expressed and verbalized understanding. Pleural fluid will be sent for labs.          Counseling/Consultation:I  discussed the case with primary care team.  Thank you for allowing me to participate in this patients care  We will continue to follow with you.     Chacorta Hoang MD  Pulmonology  [facility]

## 2020-10-16 NOTE — ASSESSMENT & PLAN NOTE
HEPATIC HYDROTHORAX.    [x]        Serial thoracentesis:  [x]        Sodium restriction  [x]        Diuresis (Furosemide + Aldactone)  [x]        Consider TIPS  [x]        Liver Transplantation work up in progress.   [x]        Consider GI consult.      REPEAT THORACENTESIS  TODAY    Complications  of the procedure discussed in detail with patient. Complications including but not limited to infection that may require hospital  admission, bleeding that may require blood transfusion and or hospital  admission, perforation of the lung which may require surgery. Patient expressed and verbalized understanding.  Alternate treatments and material risks associated with such alternatives were  discussed with pateint. These include radiologic surveillance  with  minimal risk and sugery with an indeterminate risk. The material risks  of refusing the procedure was discussed in detail. This includes no  diagnosis or confirmation of diagnisis and rendering of appropriate treatment the risk of which depends on the nature of the diagnosed illness. Patient expressed and verbalized understanding. Pleural fluid will be sent for labs.

## 2020-10-16 NOTE — SUBJECTIVE & OBJECTIVE
Interval History:   Seen and examined at bedside. Hospital chart reviewed. No acute interval detrimental events noted. she reports that she  has moderately improved    Review of Systems   Respiratory: Positive for shortness of breath.    All other systems reviewed and are negative.      Scheduled Meds:   furosemide  40 mg Oral Daily    lactulose  20 g Oral TID    spironolactone  100 mg Oral Daily     Continuous Infusions:  PRN Meds:.albuterol-ipratropium, hydrocortisone, influenza    Objective:     Vital Signs (Most Recent):  Temp: 98.3 °F (36.8 °C) (10/16/20 0732)  Pulse: 87 (10/16/20 0939)  Resp: 18 (10/16/20 0732)  BP: (!) 97/53(MD aware) (10/16/20 0831)  SpO2: (!) 93 % (10/16/20 0732) Vital Signs (24h Range):  Temp:  [97.9 °F (36.6 °C)-98.8 °F (37.1 °C)] 98.3 °F (36.8 °C)  Pulse:  [68-92] 87  Resp:  [16-18] 18  SpO2:  [92 %-94 %] 93 %  BP: ()/(47-56) 97/53     Weight: 72.7 kg (160 lb 4.4 oz)  Body mass index is 25.87 kg/m².      Intake/Output Summary (Last 24 hours) at 10/16/2020 0959  Last data filed at 10/16/2020 0200  Gross per 24 hour   Intake 850 ml   Output --   Net 850 ml       Physical Exam    Vents:       Lines/Drains/Airways     Peripheral Intravenous Line                 Peripheral IV - Single Lumen 10/14/20 1751 20 G Right Wrist 1 day                Significant Labs:    CBC/Anemia Profile:  Recent Labs   Lab 10/14/20  1752 10/15/20  0623 10/16/20  0622   WBC 7.56 5.60 5.16   HGB 12.2 10.8* 11.0*   HCT 34.7* 30.6* 31.3*    114* 104*   MCV 90 91 91   RDW 18.4* 18.3* 18.6*        Chemistries:  Recent Labs   Lab 10/14/20  1909 10/15/20  0623 10/16/20  0621   * 126* 130*   K 4.1 3.5 4.0   CL 93* 96 94*   CO2 24 24 25   BUN 22* 21* 19   CREATININE 1.2 1.1 1.0   CALCIUM 7.9* 7.8* 7.9*   ALBUMIN 1.7* 1.6* 1.5*   PROT 6.2 5.7* 5.6*   BILITOT 19.3* 17.7* 16.6*   ALKPHOS 642* 582* 546*   * 112* 113*   * 187* 180*   MG  --  2.1 2.1           Significant Imaging:    XR  CHEST AP PORTABLE: 10/15/20:  Interval improvement without complete resolution of right pleural effusion with continued dense opacity involving the mid lower right lung.  Negative for pneumothorax.  Interstitial changes are again seen throughout the left lung.  The hilar and mediastinal contours and osseous structures are otherwise unchanged.  EKG leads overlie the chest.

## 2020-10-16 NOTE — CONSULTS
[facility]   Gastroenterology  Consult Note    Patient Name: Madhavi Bell  MRN: [id number]  Admission Date: 10/14/2020  Hospital Length of Stay: 1 days  Code Status: Full Code   Attending Provider: Brian Madison MD   Consulting Provider: Junior Segura PA-C  Primary Care Physician: Anurag Knutson MD  Principal Problem:Pleural effusion    Inpatient consult to Gastroenterology  Consult performed by: Junior Segura PA-C  Consult ordered by: Brian Madison MD  Reason for consult: Cirrhosis with hydrothorax        Subjective:     HPI:  The patient presented to the ER for SOB. She has a history of decompensated liver cirrhosis related to Alpha-1 antitrypsin def and alcohol. She is followed by Dr. Gray and has had issues with pleural effusions. She was seen in [place] 10/08 for liver transplant evaluation. This admit, she was found to have a large right-sided pleural effusion with compression of the right lung parenchyma. The Pulmonary team was consulted for thoracentesis. 1.5 L was removed yesterday but there are plans to remove more today. We were consulted to help participate in management. The patient is currently on Lasix 40 mg daily and Aldactone 100 mg daily. Increases were recommended a week ago, but not done. Here she has been limited by hypotension. MELD has increased some over the last week. The patient is feeling a little weak today. She had mild nausea with eating but denies vomiting or abdominal pain. She had rectal bleeding yesterday and three days ago. She thinks it is from hemorrhoids. It is mostly with wiping and streaked on the stool. She has mild anal discomfort.         Past Medical History:   Diagnosis Date    Alpha-1-antitrypsin deficiency     ZZ phenotype    Eosinophilic esophagitis     h/o pill impaction, bx proven    Hyperlipidemia     may be related to liver enzymes, 2ary to high HDL    Schatzki's ring     s/p dilatation       Past Surgical History:    Procedure Laterality Date    APPENDECTOMY       SECTION      CHOLECYSTECTOMY      DILATION AND CURETTAGE OF UTERUS      ESOPHAGOGASTRODUODENOSCOPY N/A 2020    Procedure: EGD (ESOPHAGOGASTRODUODENOSCOPY)-need rapid covid;  Surgeon: oJse Smith MD;  Location: [facility];  Service: Endoscopy;  Laterality: N/A;       Review of patient's allergies indicates:  No Known Allergies  Family History     None        Tobacco Use    Smoking status: Never Smoker   Substance and Sexual Activity    Alcohol use: Not Currently     Comment: patient reports she has not had any alcohol in past few months    Drug use: No    Sexual activity: Yes     Partners: Male     Birth control/protection: OCP     Review of Systems   Constitutional: Negative for fever.   HENT: Negative for hearing loss.    Eyes: Negative for visual disturbance.   Respiratory: Negative for cough and shortness of breath.    Cardiovascular: Negative for chest pain.   Gastrointestinal:        As per HPI.   Genitourinary: Negative for dysuria, frequency and hematuria.   Musculoskeletal: Negative for arthralgias and back pain.   Skin: Negative for rash.   Neurological: Positive for weakness. Negative for seizures, syncope, numbness and headaches.   Hematological: Does not bruise/bleed easily.   Psychiatric/Behavioral: The patient is not nervous/anxious.      Objective:     Vital Signs (Most Recent):  Temp: 98.3 °F (36.8 °C) (10/16/20 07)  Pulse: 87 (10/16/20 0939)  Resp: 18 (10/16/20 07)  BP: (!) 97/53(MD aware) (10/16/20 0831)  SpO2: (!) 93 % (10/16/20 07) Vital Signs (24h Range):  Temp:  [97.9 °F (36.6 °C)-98.8 °F (37.1 °C)] 98.3 °F (36.8 °C)  Pulse:  [68-92] 87  Resp:  [16-18] 18  SpO2:  [92 %-94 %] 93 %  BP: ()/(47-56) 97/53     Weight: 72.7 kg (160 lb 4.4 oz) (10/16/20 0022)  Body mass index is 25.87 kg/m².      Intake/Output Summary (Last 24 hours) at 10/16/2020 1038  Last data filed at 10/16/2020 0200  Gross per 24 hour    Intake 850 ml   Output --   Net 850 ml       Lines/Drains/Airways     Peripheral Intravenous Line                 Peripheral IV - Single Lumen 10/14/20 1751 20 G Right Wrist 1 day                Physical Exam  Constitutional:       Appearance: Normal appearance. She is well-developed.   HENT:      Head: Normocephalic and atraumatic.   Eyes:      General: Scleral icterus present.      Extraocular Movements: Extraocular movements intact.   Neck:      Musculoskeletal: Normal range of motion and neck supple.      Vascular: No carotid bruit.   Cardiovascular:      Rate and Rhythm: Normal rate and regular rhythm.      Heart sounds: No murmur.   Pulmonary:      Effort: Pulmonary effort is normal. No respiratory distress.      Breath sounds: No wheezing.      Comments: Crackles on the right side; clear on the left  Abdominal:      General: Bowel sounds are normal. There is distension.      Palpations: Abdomen is soft. There is no mass.      Tenderness: There is no abdominal tenderness.   Musculoskeletal:      Right lower leg: No edema.      Left lower leg: No edema.   Skin:     General: Skin is warm and dry.      Coloration: Skin is jaundiced.      Findings: No rash.   Neurological:      Mental Status: She is alert and oriented to person, place, and time.      Cranial Nerves: No cranial nerve deficit.   Psychiatric:         Behavior: Behavior normal.         Significant Labs:  CBC:   Recent Labs   Lab 10/14/20  1752 10/15/20  0623 10/16/20  0622   WBC 7.56 5.60 5.16   HGB 12.2 10.8* 11.0*   HCT 34.7* 30.6* 31.3*    114* 104*     CMP:   Recent Labs   Lab 10/16/20  0621   GLU 83   CALCIUM 7.9*   ALBUMIN 1.5*   PROT 5.6*   *   K 4.0   CO2 25   CL 94*   BUN 19   CREATININE 1.0   ALKPHOS 546*   *   *   BILITOT 16.6*     Coagulation:   Recent Labs   Lab 10/16/20  0621 10/16/20  1049   INR 1.9*  --    APTT  --  33.6*       Significant Imaging:  Imaging results within the past 24 hours have been  reviewed.    Assessment/Plan:     * Right-sided pleural effusion, recurrent  Secondary to cirrhosis per Pulmonary team. Thoracentesis planned today.   Diuretic increase has been limited by hypotension. Discussed with Dr. Gray who recommends starting Midodrine 5 mg tid and then trying to increase diuretics to bid dosing. Depending on response will consider transfer to [place].       Liver cirrhosis with transaminitis  MELD up some from baseline. Low threshold for transfer to [place].   Check INR and CMP daily.   No encephalopathy.   Rectal bleeding seems consistent with hemorrhoids. Need to monitor.     MELD-Na score: 28 at 10/16/2020  6:21 AM  MELD score: 24 at 10/16/2020  6:21 AM  Calculated from:  Serum Creatinine: 1.0 mg/dL at 10/16/2020  6:21 AM  Serum Sodium: 130 mmol/L at 10/16/2020  6:21 AM  Total Bilirubin: 16.6 mg/dL at 10/16/2020  6:21 AM  INR(ratio): 1.9 at 10/16/2020  6:21 AM  Age: 44 years 11 months      Hyperammonemia  No signs of encephalopathy. Continue Lactulose.         Thank you for your consult. I will follow-up with patient. Please contact us if you have any additional questions.    Junior Segura PA-C  Gastroenterology  [facility]

## 2020-10-17 NOTE — PLAN OF CARE
No d/c needs noted.       10/17/20 1223   Final Note   Assessment Type Final Discharge Note   Anticipated Discharge Disposition Home   Right Care Referral Info   Post Acute Recommendation No Care

## 2020-10-17 NOTE — SUBJECTIVE & OBJECTIVE
Interval History:  Breathing easier after thoracentesis yesterday.  Blood pressures consistently low.  Repeat thoracentesis planned for today.  Will discuss with Hepatology.  Patient under review by transplant team.    Review of Systems   Constitutional: Negative for activity change, chills, diaphoresis, fatigue and fever.   HENT: Negative for congestion, trouble swallowing and voice change.    Eyes: Negative for photophobia and discharge.   Respiratory: Positive for cough and shortness of breath (worse on exertion). Negative for chest tightness and wheezing.    Cardiovascular: Negative for chest pain, palpitations and leg swelling.   Gastrointestinal: Positive for abdominal distention. Negative for abdominal pain, blood in stool, constipation, diarrhea, nausea and vomiting.   Endocrine: Negative for cold intolerance, heat intolerance, polydipsia, polyphagia and polyuria.   Genitourinary: Negative for difficulty urinating, dysuria, flank pain, frequency and urgency.   Musculoskeletal: Negative for back pain, joint swelling and myalgias.   Skin: Positive for color change. Negative for rash and wound.   Neurological: Negative for dizziness, seizures, syncope, facial asymmetry, weakness, light-headedness, numbness and headaches.   Psychiatric/Behavioral: Negative for agitation, behavioral problems, confusion and hallucinations.     Objective:     Vital Signs (Most Recent):  Temp: 99.2 °F (37.3 °C) (10/16/20 2046)  Pulse: 93 (10/16/20 2046)  Resp: 16 (10/16/20 2046)  BP: (!) 91/48 (10/16/20 2046)  SpO2: 97 % (10/16/20 2046) Vital Signs (24h Range):  Temp:  [96.3 °F (35.7 °C)-99.2 °F (37.3 °C)] 99.2 °F (37.3 °C)  Pulse:  [68-95] 93  Resp:  [16-18] 16  SpO2:  [92 %-98 %] 97 %  BP: (78-98)/(43-56) 91/48     Weight: 72.7 kg (160 lb 4.4 oz)  Body mass index is 25.87 kg/m².    Intake/Output Summary (Last 24 hours) at 10/16/2020 2105  Last data filed at 10/16/2020 1500  Gross per 24 hour   Intake 950 ml   Output 200 ml   Net  750 ml      Physical Exam  Vitals signs and nursing note reviewed.   Constitutional:       General: She is not in acute distress.     Appearance: Normal appearance. She is normal weight. She is not toxic-appearing or diaphoretic.   HENT:      Head: Normocephalic and atraumatic.      Nose: Nose normal. No congestion.      Mouth/Throat:      Mouth: Mucous membranes are moist.   Eyes:      General: Scleral icterus present.      Pupils: Pupils are equal, round, and reactive to light.   Neck:      Musculoskeletal: Normal range of motion and neck supple. No muscular tenderness.   Cardiovascular:      Rate and Rhythm: Regular rhythm. Tachycardia present.      Pulses: Normal pulses.      Heart sounds: Normal heart sounds.   Pulmonary:      Effort: Pulmonary effort is normal. No respiratory distress.      Breath sounds: Normal breath sounds. No wheezing or rhonchi.   Abdominal:      General: Abdomen is flat. Bowel sounds are normal. There is distension (ascites).      Palpations: Abdomen is soft.      Tenderness: There is no guarding or rebound.      Hernia: No hernia is present.   Genitourinary:     Comments: deferred  Musculoskeletal: Normal range of motion.         General: No swelling, deformity or signs of injury.   Skin:     General: Skin is warm and dry.      Capillary Refill: Capillary refill takes less than 2 seconds.      Coloration: Skin is jaundiced.      Findings: No bruising or rash.   Neurological:      General: No focal deficit present.      Mental Status: She is alert and oriented to person, place, and time.      Cranial Nerves: No cranial nerve deficit.      Motor: No weakness.      Coordination: Coordination normal.   Psychiatric:         Mood and Affect: Mood normal.         Behavior: Behavior normal.         Thought Content: Thought content normal.         Judgment: Judgment normal.         Significant Labs:   Recent Lab Results       10/16/20  1214   10/16/20  1210   10/16/20  1049   10/16/20  0622    10/16/20  0621        WBC, Body Fluid 105  Comment:  Reference ranges for body fluids not established.   Correlate clinically.               Lymphs, Fluid 19             Segs, Fluid 16             Body Fluid Type Thoracentesis Fluid             Monocytes/Macrophages, Fluid 65             Body Fluid Source, Refrigerated   Pleural Fluid, Right           Albumin         1.5     Alkaline Phosphatase         546     ALT         113     Ammonia       37       Anion Gap         11     aPTT     33.6  Comment:  aPTT therapeutic range = 39-69 seconds         AST         180     Baso #       0.04       Basophil%       0.8       BILIRUBIN TOTAL         16.6  Comment:  For infants and newborns, interpretation of results should be based  on gestational age, weight and in agreement with clinical  observations.  Premature Infant recommended reference ranges:  Up to 24 hours.............<8.0 mg/dL  Up to 48 hours............<12.0 mg/dL  3-5 days..................<15.0 mg/dL  6-29 days.................<15.0 mg/dL       BUN, Bld         19     Calcium         7.9     Chloride         94     CO2         25     Creatinine         1.0     Differential Method       Automated       eGFR if          >60     eGFR if non          >60  Comment:  Calculation used to obtain the estimated glomerular filtration  rate (eGFR) is the CKD-EPI equation.        Eos #       0.2       Eosinophil%       3.1       Fluid Appearance Clear             Fluid Color Yellow             Glucose     113   83     Gran # (ANC)       3.0       Gran%       57.2       Hematocrit       31.3       Hemoglobin       11.0       Immature Grans (Abs)       0.03  Comment:  Mild elevation in immature granulocytes is non specific and   can be seen in a variety of conditions including stress response,   acute inflammation, trauma and pregnancy. Correlation with other   laboratory and clinical findings is essential.         Immature Granulocytes        0.6       INR         1.9  Comment:  Coumadin Therapy:  2.0 - 3.0 for INR for all indicators except mechanical heart valves  and antiphospholipid syndromes which should use 2.5 - 3.5.       LD     289  Comment:  Results are increased in hemolyzed samples.         Lymph #       1.6       Lymph%       30.2       Magnesium         2.1     MCH       32.0       MCHC       35.1       MCV       91       Mono #       0.4       Mono%       8.1       MPV       10.5       nRBC       0       Platelets       104       Potassium         4.0     PROTEIN TOTAL     6.5   5.6     Protime         19.8     RBC       3.44       RDW       18.6       Sodium         130     WBC       5.16                          All pertinent labs within the past 24 hours have been reviewed.    Significant Imaging: I have reviewed all pertinent imaging results/findings within the past 24 hours.

## 2020-10-17 NOTE — PROGRESS NOTES
[facility] -   Gastroenterology  Progress Note    Patient Name: Madhavi Bell  MRN: [id number]  Admission Date: 10/14/2020  Hospital Length of Stay: 2 days  Code Status: Full Code   Attending Provider: Brian Madison MD  Consulting Provider: Ce Lorenzo MD  Primary Care Physician: Anurag Knutson MD  Principal Problem: Pleural effusion    Subjective:     Interval History: feels much better today. Tolerating midodrine. BP readings have improved some. Also tolerating diuretic therapy. She is inquiring about discharge. Discussed management of her hypotension and diuresis therapy this far. Multiple questions answered. Her  is at the bedside and his questions were answered as well.     Review of Systems  Objective:     Vital Signs (Most Recent):  Temp: 98.2 °F (36.8 °C) (10/17/20 0812)  Pulse: 87 (10/17/20 0900)  Resp: 16 (10/17/20 0812)  BP: (!) 94/51 (10/17/20 0812)  SpO2: (!) 94 % (10/17/20 0812) Vital Signs (24h Range):  Temp:  [96.3 °F (35.7 °C)-99.2 °F (37.3 °C)] 98.2 °F (36.8 °C)  Pulse:  [87-96] 87  Resp:  [16-20] 16  SpO2:  [92 %-98 %] 94 %  BP: (78-94)/(43-53) 94/51     Weight: 69.3 kg (152 lb 12.5 oz) (10/17/20 0403)  Body mass index is 24.66 kg/m².      Intake/Output Summary (Last 24 hours) at 10/17/2020 1108  Last data filed at 10/16/2020 2200  Gross per 24 hour   Intake 550 ml   Output 500 ml   Net 50 ml       Lines/Drains/Airways     Peripheral Intravenous Line                 Peripheral IV - Single Lumen 10/14/20 1751 20 G Right Wrist 2 days                Physical Exam  Cardiovascular:      Rate and Rhythm: Normal rate and regular rhythm.      Heart sounds: Normal heart sounds.   Pulmonary:      Effort: Pulmonary effort is normal.      Breath sounds: Examination of the right-lower field reveals decreased breath sounds. Decreased breath sounds present.   Abdominal:      General: There is distension.      Palpations: Abdomen is soft.      Tenderness: There is no abdominal  tenderness.   Musculoskeletal:      Right lower leg: No edema.      Left lower leg: No edema.   Neurological:      Mental Status: She is alert.   Psychiatric:         Attention and Perception: Attention normal.         Mood and Affect: Mood normal.         Speech: Speech normal.         Behavior: Behavior normal. Behavior is cooperative.         Thought Content: Thought content normal.         Cognition and Memory: Cognition normal.         Judgment: Judgment normal.         Significant Labs:  CBC:   Recent Labs   Lab 10/16/20  0622 10/17/20  0710   WBC 5.16 5.96   HGB 11.0* 10.3*   HCT 31.3* 29.2*   * 108*     CMP:   Recent Labs   Lab 10/17/20  0710   GLU 94   CALCIUM 7.8*   ALBUMIN 1.4*   PROT 5.4*   *   K 3.8   CO2 23   CL 95   BUN 20   CREATININE 1.2   ALKPHOS 580*   *   *   BILITOT 17.1*     Coagulation:   Recent Labs   Lab 10/16/20  1049 10/17/20  0710   INR  --  2.3*   APTT 33.6*  --          Significant Imaging:  Imaging results within the past 24 hours have been reviewed.    Assessment/Plan:     Active Diagnoses:    Diagnosis Date Noted POA    PRINCIPAL PROBLEM:  Right-sided pleural effusion, recurrent [J90] 10/14/2020 Yes    Hypotension [I95.9] 10/16/2020 Yes    Hyperammonemia [E72.20] 10/15/2020 Yes    Liver cirrhosis with transaminitis [R74.01] 10/14/2020 Yes    Hyponatremia [E87.1] 10/04/2020 Yes    Alpha-1-antitrypsin deficiency [E88.01] 08/26/2020 Yes      Problems Resolved During this Admission:       A: 44 y.o female with alpha one antitrypsin deficiency with cirrhosis and recurrent hepatic hydrothorax      1. Hepatic hydrothorax, recurrent  Secondary to cirrhosis.  Thoracentesis 10/16 - 1.8L removed  No LE edema or little to no ascites on recent U/S 10/5  On diuretic therapy: Lasix 40mg IV daily and Spironolactone 100mg daily  Midodrine 7.5mg TID for hypotension        2. Liver cirrhosis with transaminitis  Undergoing liver transplant evaluation  To be discussed in  selection committee this upcoming week    MELD-Na score: 32 at 10/17/2020  7:10 AM  MELD score: 28 at 10/17/2020  7:10 AM  Calculated from:  Serum Creatinine: 1.2 mg/dL at 10/17/2020  7:10 AM  Serum Sodium: 127 mmol/L at 10/17/2020  7:10 AM  Total Bilirubin: 17.1 mg/dL at 10/17/2020  7:10 AM  INR(ratio): 2.3 at 10/17/2020  7:10 AM  Age: 44 years 11 months     Hyperammonemia  No signs of encephalopathy  Continue Lactulose.        Recommendations:  1. Ok to discharge home  2. Lasix 40mg BID and Spironolactone 100mg daily  3. Patient to be contacted by transplant coordinator for follow up.      Discussed with Dr. Gray of transplant hepatology and Dr. Elaine of hospital medicine. I will sign off. Please contact us if you have any additional questions.    Ce Lorenzo MD  Gastroenterology  [facility]

## 2020-10-17 NOTE — PLAN OF CARE
"Patient continues to be monitored and treated. Patient continues to complain of "dull, aching pain" located below thoracentesis site; pain occurs with movement. Patient's blood pressure remains low. Safety precautions in place. IV lasix administered this a.m. Oxygen administered via nasal cannula. Patient turns self during night but remains primarily on left side. Will continue to monitor and treat.   "

## 2020-10-17 NOTE — ASSESSMENT & PLAN NOTE
Add Midodrine.  Needs to continue diuretics.  Low blood pressures may be related to advanced liver disease.

## 2020-10-17 NOTE — PROGRESS NOTES
"[facility] Hospital Medicine  Progress Note    Patient Name: Madhavi Bell  MRN: [id number]  Patient Class: IP- Inpatient   Admission Date: 10/14/2020  Length of Stay: 1 days  Attending Physician: Brian Madison MD  Primary Care Provider: Anurag Knutson MD        Subjective:     Principal Problem:Pleural effusion        HPI:  43 y/o WF with hx of schazki's ring with dilation, alpha-one antitrypsin deficiency, HLD, cirrhosis, paracentesis (08/2020) and thoracentesis (10/05/2020) to ED with c/o gradually worsening SOB, worse with exertion over the previous 2 days, associated with a non-productive cough and abdominal "tightness". Symptoms are persistent and of moderate severity and without alleviating factors. Denies chest pain, edema, palpitations, wheezing, orthopnea, abdominal pain, N/V/D, constipation, dysuria, flank pain, HA, dizziness, weakness, fever, cough, chills, falls/blurred vision, focal deficits. Of note, the pt's case is reportedly going in front of the transplant committed next week in [place]. Pt was found in the ED to have elevated INR 1.7, PTT 33.5, Alk phos 642, ,  and low Na 126; COVID-19 test was negative, and cxray showed an increasing large right-sided pleural effusion with compression of the right lung parencyma and a small aerated portion of the lung in the right upper lobe. Pulmonology was consulted and hospital medicine called with the pt placed in observation on telemetry. Pt is a full code - surrogate decision maker is her , Alli Bell.     Overview/Hospital Course:  Patient admitted to telemetry for R sided pleural effusion under the care of hospital medicine. Pulmonology consulted for thoracentesis.    Interval History:  Breathing easier after thoracentesis yesterday.  Blood pressures consistently low.  Repeat thoracentesis planned for today.  Will discuss with Hepatology.  Patient under review by transplant team.    Review of Systems "   Constitutional: Negative for activity change, chills, diaphoresis, fatigue and fever.   HENT: Negative for congestion, trouble swallowing and voice change.    Eyes: Negative for photophobia and discharge.   Respiratory: Positive for cough and shortness of breath (worse on exertion). Negative for chest tightness and wheezing.    Cardiovascular: Negative for chest pain, palpitations and leg swelling.   Gastrointestinal: Positive for abdominal distention. Negative for abdominal pain, blood in stool, constipation, diarrhea, nausea and vomiting.   Endocrine: Negative for cold intolerance, heat intolerance, polydipsia, polyphagia and polyuria.   Genitourinary: Negative for difficulty urinating, dysuria, flank pain, frequency and urgency.   Musculoskeletal: Negative for back pain, joint swelling and myalgias.   Skin: Positive for color change. Negative for rash and wound.   Neurological: Negative for dizziness, seizures, syncope, facial asymmetry, weakness, light-headedness, numbness and headaches.   Psychiatric/Behavioral: Negative for agitation, behavioral problems, confusion and hallucinations.     Objective:     Vital Signs (Most Recent):  Temp: 99.2 °F (37.3 °C) (10/16/20 2046)  Pulse: 93 (10/16/20 2046)  Resp: 16 (10/16/20 2046)  BP: (!) 91/48 (10/16/20 2046)  SpO2: 97 % (10/16/20 2046) Vital Signs (24h Range):  Temp:  [96.3 °F (35.7 °C)-99.2 °F (37.3 °C)] 99.2 °F (37.3 °C)  Pulse:  [68-95] 93  Resp:  [16-18] 16  SpO2:  [92 %-98 %] 97 %  BP: (78-98)/(43-56) 91/48     Weight: 72.7 kg (160 lb 4.4 oz)  Body mass index is 25.87 kg/m².    Intake/Output Summary (Last 24 hours) at 10/16/2020 2105  Last data filed at 10/16/2020 1500  Gross per 24 hour   Intake 950 ml   Output 200 ml   Net 750 ml      Physical Exam  Vitals signs and nursing note reviewed.   Constitutional:       General: She is not in acute distress.     Appearance: Normal appearance. She is normal weight. She is not toxic-appearing or diaphoretic.   HENT:       Head: Normocephalic and atraumatic.      Nose: Nose normal. No congestion.      Mouth/Throat:      Mouth: Mucous membranes are moist.   Eyes:      General: Scleral icterus present.      Pupils: Pupils are equal, round, and reactive to light.   Neck:      Musculoskeletal: Normal range of motion and neck supple. No muscular tenderness.   Cardiovascular:      Rate and Rhythm: Regular rhythm. Tachycardia present.      Pulses: Normal pulses.      Heart sounds: Normal heart sounds.   Pulmonary:      Effort: Pulmonary effort is normal. No respiratory distress.      Breath sounds: Normal breath sounds. No wheezing or rhonchi.   Abdominal:      General: Abdomen is flat. Bowel sounds are normal. There is distension (ascites).      Palpations: Abdomen is soft.      Tenderness: There is no guarding or rebound.      Hernia: No hernia is present.   Genitourinary:     Comments: deferred  Musculoskeletal: Normal range of motion.         General: No swelling, deformity or signs of injury.   Skin:     General: Skin is warm and dry.      Capillary Refill: Capillary refill takes less than 2 seconds.      Coloration: Skin is jaundiced.      Findings: No bruising or rash.   Neurological:      General: No focal deficit present.      Mental Status: She is alert and oriented to person, place, and time.      Cranial Nerves: No cranial nerve deficit.      Motor: No weakness.      Coordination: Coordination normal.   Psychiatric:         Mood and Affect: Mood normal.         Behavior: Behavior normal.         Thought Content: Thought content normal.         Judgment: Judgment normal.         Significant Labs:   Recent Lab Results       10/16/20  1214   10/16/20  1210   10/16/20  1049   10/16/20  0622   10/16/20  0621        WBC, Body Fluid 105  Comment:  Reference ranges for body fluids not established.   Correlate clinically.               Lymphs, Fluid 19             Segs, Fluid 16             Body Fluid Type Thoracentesis Fluid              Monocytes/Macrophages, Fluid 65             Body Fluid Source, Refrigerated   Pleural Fluid, Right           Albumin         1.5     Alkaline Phosphatase         546     ALT         113     Ammonia       37       Anion Gap         11     aPTT     33.6  Comment:  aPTT therapeutic range = 39-69 seconds         AST         180     Baso #       0.04       Basophil%       0.8       BILIRUBIN TOTAL         16.6  Comment:  For infants and newborns, interpretation of results should be based  on gestational age, weight and in agreement with clinical  observations.  Premature Infant recommended reference ranges:  Up to 24 hours.............<8.0 mg/dL  Up to 48 hours............<12.0 mg/dL  3-5 days..................<15.0 mg/dL  6-29 days.................<15.0 mg/dL       BUN, Bld         19     Calcium         7.9     Chloride         94     CO2         25     Creatinine         1.0     Differential Method       Automated       eGFR if          >60     eGFR if non          >60  Comment:  Calculation used to obtain the estimated glomerular filtration  rate (eGFR) is the CKD-EPI equation.        Eos #       0.2       Eosinophil%       3.1       Fluid Appearance Clear             Fluid Color Yellow             Glucose     113   83     Gran # (ANC)       3.0       Gran%       57.2       Hematocrit       31.3       Hemoglobin       11.0       Immature Grans (Abs)       0.03  Comment:  Mild elevation in immature granulocytes is non specific and   can be seen in a variety of conditions including stress response,   acute inflammation, trauma and pregnancy. Correlation with other   laboratory and clinical findings is essential.         Immature Granulocytes       0.6       INR         1.9  Comment:  Coumadin Therapy:  2.0 - 3.0 for INR for all indicators except mechanical heart valves  and antiphospholipid syndromes which should use 2.5 - 3.5.       LD     289  Comment:  Results are increased in  hemolyzed samples.         Lymph #       1.6       Lymph%       30.2       Magnesium         2.1     MCH       32.0       MCHC       35.1       MCV       91       Mono #       0.4       Mono%       8.1       MPV       10.5       nRBC       0       Platelets       104       Potassium         4.0     PROTEIN TOTAL     6.5   5.6     Protime         19.8     RBC       3.44       RDW       18.6       Sodium         130     WBC       5.16                          All pertinent labs within the past 24 hours have been reviewed.    Significant Imaging: I have reviewed all pertinent imaging results/findings within the past 24 hours.      Assessment/Plan:      * Right-sided pleural effusion, recurrent  --Likely r/t advanced liver cirrhosis - last thoracentesis on 10/05/2020 (2200ml)  --Successful thoracentesis on 15 Oct.  Repeat Thoracentesis planned 16 Oct.  --Supplemental O2 prn  --Duonebs prn        Hypotension  Add Midodrine.  Needs to continue diuretics.  Low blood pressures may be related to advanced liver disease.    Hyperammonemia  --not confused at present  --added PO lactulose TID  --daily ammonia level      Liver cirrhosis with transaminitis  Per pt, r/t alph-1 antitrypsin deficiency, denies ETOH  Appears stable at baseline at this time  Trend liver function  Avoid hepatotoxins  Pt to f/u with transplant center after d/c (has appointment next week)          Hyponatremia  --Likely r/t liver cirrhosis  --Monitor renal fxn and Na+ levels  --Monitor I&Os  --Fluid restriction for now  --Neuro checks with VS      Alpha-1-antitrypsin deficiency  Awaiting decision on liver transplant per transplant center  Supplemental O2 prn  Duonebs prn        VTE Risk Mitigation (From admission, onward)         Ordered     Place sequential compression device  Until discontinued      10/14/20 2136                Discharge Planning   WILL:      Code Status: Full Code   Is the patient medically ready for discharge?:     Reason for patient  still in hospital (select all that apply): Treatment and Consult recommendations  Discharge Plan A: Home with family                  Brian Madison MD  Department of Hospital Medicine   [facility]

## 2020-10-17 NOTE — PLAN OF CARE
Pt remains free from falls/injuries this shift. Safety precautions maintained. Telemetry monitoring per orders. BP monitored q4 hours. Scheduled mididrine given. No s/s of acute distress noted. Will continue to monitor. Chart check completed.

## 2020-10-17 NOTE — ASSESSMENT & PLAN NOTE
--Likely r/t advanced liver cirrhosis - last thoracentesis on 10/05/2020 (2200ml)  --Successful thoracentesis on 15 Oct.  Repeat Thoracentesis planned 16 Oct.  --Supplemental O2 prn  --Cate kaufmann

## 2020-10-18 NOTE — DISCHARGE SUMMARY
"[facility] Hospital Medicine  Discharge Summary      Patient Name: Madhavi Bell  MRN: [id number]  Admission Date: 10/14/2020  Hospital Length of Stay: 2 days  Discharge Date and Time: 10/17/2020 12:45 PM  Attending Physician:  Brian Madison MD  Discharging Provider: Brian Madison MD  Primary Care Provider: Anurag Knutson MD      HPI:   45 y/o WF with hx of schazki's ring with dilation, alpha-one antitrypsin deficiency, HLD, cirrhosis, paracentesis (08/2020) and thoracentesis (10/05/2020) to ED with c/o gradually worsening SOB, worse with exertion over the previous 2 days, associated with a non-productive cough and abdominal "tightness". Symptoms are persistent and of moderate severity and without alleviating factors. Denies chest pain, edema, palpitations, wheezing, orthopnea, abdominal pain, N/V/D, constipation, dysuria, flank pain, HA, dizziness, weakness, fever, cough, chills, falls/blurred vision, focal deficits. Of note, the pt's case is reportedly going in front of the transplant committed next week in [place]. Pt was found in the ED to have elevated INR 1.7, PTT 33.5, Alk phos 642, ,  and low Na 126; COVID-19 test was negative, and cxray showed an increasing large right-sided pleural effusion with compression of the right lung parencyma and a small aerated portion of the lung in the right upper lobe. Pulmonology was consulted and hospital medicine called with the pt placed in observation on telemetry. Pt is a full code - surrogate decision maker is her , Alli Bell.     * No surgery found *      Hospital Course:   Patient admitted to telemetry for R sided pleural effusion under the care of hospital medicine. Pulmonology consulted for thoracentesis.  Successful right-sided thoracentesis removing 1.5 L of straw colored fluid. Gastroenterology evaluated the patient and assisted with management. She had persistent hypotension. Started Midodrine with with " good response and increased ability to tolerate diuretics.  Repeat thoracentesis with additional fluid removed. Chest x-ray showed significant clearing. Patients shortness of breath improved substantially. She did have some residual soreness around thoracentesis site. No other distress noted. Optimize diuretic plan. Discharge plan to return home and continue medication regimen. Follow up soon with transplant service for further evaluation.     Consults:   Consults (From admission, onward)        Status Ordering Provider     Inpatient consult to Gastroenterology  Once     Provider:  Ce Lorenzo MD    Completed MEME MARTE     Inpatient consult to Pulmonology  Once     Provider:  (Not yet assigned)    JOSE Woods          No new Assessment & Plan notes have been filed under this hospital service since the last note was generated.  Service: Hospital Medicine    Final Active Diagnoses:    Diagnosis Date Noted POA    PRINCIPAL PROBLEM:  Right-sided pleural effusion, recurrent [J90] 10/14/2020 Yes    Hypotension [I95.9] 10/16/2020 Yes    Hyperammonemia [E72.20] 10/15/2020 Yes    Liver cirrhosis with transaminitis [R74.01] 10/14/2020 Yes    Hyponatremia [E87.1] 10/04/2020 Yes    Alpha-1-antitrypsin deficiency [E88.01] 08/26/2020 Yes      Problems Resolved During this Admission:       Discharged Condition: good    Disposition: Home or Self Care    Follow Up:  Follow-up Information     Suad Linares RN In 1 week.    Why: Follow up for ongoing management of cirrhosis and evaluation for transplant.               Patient Instructions:      Diet Adult Regular     Order Specific Question Answer Comments   Na restriction, if any: 3gNa      Activity as tolerated       Significant Diagnostic Studies: Labs: All labs within the past 24 hours have been reviewed    Pending Diagnostic Studies:     Procedure Component Value Units Date/Time    Cytology, Pulmonary [[id number]] Collected: 10/16/20  1207    Order Status: Sent Lab Status: In process Updated: 10/16/20 7143         Medications:  Reconciled Home Medications:      Medication List      START taking these medications    midodrine 2.5 MG Tab  Commonly known as: PROAMATINE  Take 3 tablets (7.5 mg total) by mouth 3 (three) times daily with meals.        CHANGE how you take these medications    furosemide 20 MG tablet  Commonly known as: LASIX  Take 2 tablets (40 mg total) by mouth every 12 (twelve) hours.  What changed: when to take this        CONTINUE taking these medications    ergocalciferol 50,000 unit Cap  Commonly known as: ERGOCALCIFEROL  Take 1 capsule (50,000 Units total) by mouth every 7 days.     lactulose 10 gram/15 mL solution  Commonly known as: CHRONULAC  Take 20 g by mouth daily as needed.     multivitamin Tab  Take 1 tablet by mouth once daily.     spironolactone 50 MG tablet  Commonly known as: ALDACTONE  Take 100 mg by mouth once daily.     zinc gluconate 50 mg tablet  Take 50 mg by mouth once daily.            Indwelling Lines/Drains at time of discharge:   Lines/Drains/Airways     None                 Time spent on the discharge of patient: 30 minutes  Patient was seen and examined on the date of discharge and determined to be suitable for discharge.         Brian Madison MD  Department of Hospital Medicine  [facility]

## 2020-10-20 NOTE — TELEPHONE ENCOUNTER
JAIMEE attempted to reach patient  Alli Bell ([phone]) in regards to speaking with secondary caregivers about commitment and provide them with education.   SW unable to reach patient  to confirm verbal permission to speak with potential secondary caregivers, SW left voice mail for return call.    SW remains available.

## 2020-10-20 NOTE — TELEPHONE ENCOUNTER
JAIMEE returned call to patient's  Alli Bell ([phone])  this afternoon.  Pt  reports he had spoken with his brother, who will be expecting a call from transplant  to confirm availability, provide education and answer any psychosocial questions he may have.   JAIMEE will reach out to patient brother in law Angelo Bell tomorrow.  Patient  with no further psychosocial concerns. SW remains available.    Name: Shell Bell  Age: unknown  City: [facility] State: [place]  Relationship: Brother and Sister In Law  Does person drive? yes   Phone: [phone]    JAIMEE confirmed with secondary caregiver his availability to fulfill secondary caregiver role.  Caregiver Education provided at this time to patient secondary caregiver.

## 2020-10-21 NOTE — PROGRESS NOTES
Subjective:     Madhavi Bell is here for follow up of cirrhosis.    HPI  Since Madhavi Bell's last visit the main issues have been:  Recent admission for hepatic hydrothorax.  She has required more frequent thoracentesis.  She also had issues with hypotension during her visit for which she was put on midodrine.  She denies any significant issues with dizziness.  Overall she does feels malaise and fatigue.    Ascites-denies any significant abdominal distention.  Encephalopathy-none, taking lactulose reports worsening of hemorrhoids.  There causing moderate discomfort.  GI bleeding-having some bleeding from hemorrhoid    Review of Systems   Constitutional: Positive for activity change, appetite change and fatigue.   HENT: Negative.    Eyes: Negative.    Respiratory: Positive for shortness of breath.    Cardiovascular: Negative.    Gastrointestinal: Positive for rectal pain.        Hemorrhoids   Genitourinary: Negative.    Musculoskeletal: Negative.    Skin: Negative.    Neurological: Negative.    Psychiatric/Behavioral: Negative.        Objective:     Physical Exam  Vitals signs reviewed.   Constitutional:       General: She is not in acute distress.     Appearance: She is well-developed.      Comments: Jaundice, appears fatigued   HENT:      Head: Normocephalic and atraumatic.      Mouth/Throat:      Pharynx: No oropharyngeal exudate.   Eyes:      General: Scleral icterus present.         Right eye: No discharge.         Left eye: No discharge.      Pupils: Pupils are equal, round, and reactive to light.   Pulmonary:      Effort: Pulmonary effort is normal. No respiratory distress.      Breath sounds: No wheezing.      Comments: Decreased breath sounds at bases bilaterally  Abdominal:      General: There is distension.      Palpations: Abdomen is soft.      Tenderness: There is no abdominal tenderness.   Neurological:      Mental Status: She is alert and oriented to person, place, and time.    Psychiatric:         Behavior: Behavior normal.         MELD-Na score: 32 at 10/17/2020  7:10 AM  MELD score: 28 at 10/17/2020  7:10 AM  Calculated from:  Serum Creatinine: 1.2 mg/dL at 10/17/2020  7:10 AM  Serum Sodium: 127 mmol/L at 10/17/2020  7:10 AM  Total Bilirubin: 17.1 mg/dL at 10/17/2020  7:10 AM  INR(ratio): 2.3 at 10/17/2020  7:10 AM  Age: 44 years 11 months    WBC   Date Value Ref Range Status   10/17/2020 5.96 3.90 - 12.70 K/uL Final     Hemoglobin   Date Value Ref Range Status   10/17/2020 10.3 (L) 12.0 - 16.0 g/dL Final     Hematocrit   Date Value Ref Range Status   10/17/2020 29.2 (L) 37.0 - 48.5 % Final     Platelets   Date Value Ref Range Status   10/17/2020 108 (L) 150 - 350 K/uL Final     BUN, Bld   Date Value Ref Range Status   10/17/2020 20 6 - 20 mg/dL Final     Creatinine   Date Value Ref Range Status   10/17/2020 1.2 0.5 - 1.4 mg/dL Final     Glucose   Date Value Ref Range Status   10/17/2020 94 70 - 110 mg/dL Final     Calcium   Date Value Ref Range Status   10/17/2020 7.8 (L) 8.7 - 10.5 mg/dL Final     Sodium   Date Value Ref Range Status   10/17/2020 127 (L) 136 - 145 mmol/L Final     Potassium   Date Value Ref Range Status   10/17/2020 3.8 3.5 - 5.1 mmol/L Final     Chloride   Date Value Ref Range Status   10/17/2020 95 95 - 110 mmol/L Final     Magnesium   Date Value Ref Range Status   10/17/2020 2.1 1.6 - 2.6 mg/dL Final     AST   Date Value Ref Range Status   10/17/2020 179 (H) 10 - 40 U/L Final     ALT   Date Value Ref Range Status   10/17/2020 112 (H) 10 - 44 U/L Final     Alkaline Phosphatase   Date Value Ref Range Status   10/17/2020 580 (H) 55 - 135 U/L Final     Total Bilirubin   Date Value Ref Range Status   10/17/2020 17.1 (H) 0.1 - 1.0 mg/dL Final     Comment:     For infants and newborns, interpretation of results should be based  on gestational age, weight and in agreement with clinical  observations.  Premature Infant recommended reference ranges:  Up to 24  hours.............<8.0 mg/dL  Up to 48 hours............<12.0 mg/dL  3-5 days..................<15.0 mg/dL  6-29 days.................<15.0 mg/dL       Albumin   Date Value Ref Range Status   10/17/2020 1.4 (L) 3.5 - 5.2 g/dL Final     INR   Date Value Ref Range Status   10/17/2020 2.3 (H) 0.8 - 1.2 Final     Comment:     Coumadin Therapy:  2.0 - 3.0 for INR for all indicators except mechanical heart valves  and antiphospholipid syndromes which should use 2.5 - 3.5.           Assessment/Plan:     1. HCC (hepatocellular carcinoma)    2. Other cirrhosis of liver    3. Hydrothorax    4. External hemorrhoids      Madhavi Bell is a 44 y.o. female withCirrhosis    HCC (hepatocellular carcinoma)-diagnose on recent imaging, presented at IR committee.  There is 1 lesion that is diagnostic of HCC but there other multiple indeterminate lesions that need further evaluation.  -extensive discussion with patient and caregiver about HCC diagnosis, concern for other lesions, process for transplant and HCC along with staging.  -proceed with liver biopsy of 1 of the indeterminate lesions as discussed at IR conference as soon as possible    Other cirrhosis of liver-significantly decompensated elevated meld score, patient is still undergoing transplant evaluation for listing.  Need to clarify HCC staging before patient can be presented.  -continue to monitor meld score closely, repeat meld labs in 4 weeks  -     Comprehensive Metabolic Panel; Future; Expected date: 10/21/2020  -     CBC auto differential; Future; Expected date: 10/21/2020  -     Protime-INR; Future; Expected date: 10/21/2020    Hydrothorax-improved  -continue with Lasix 40 mg twice a day and will increase Aldactone to 100 mg twice a day  -check BMP next week to monitor electrolytes and renal function  -     Basic Metabolic Panel; Future; Expected date: 10/21/2020  -     spironolactone (ALDACTONE) 50 MG tablet; Take 2 tablets (100 mg total) by mouth 2 (two) times  daily.  Dispense: 60 tablet; Refill: 5    External hemorrhoids  -try Sitz baths, topical and suppository therapy  -     hydrocortisone (ANUSOL-HC) 25 mg suppository; Place 1 suppository (25 mg total) rectally 2 (two) times daily. for 10 days  Dispense: 20 suppository; Refill: 0  -     pramoxine-hydrocortisone (PROCTOCREAM-HC) 1-1 % rectal cream; Place rectally 3 (three) times daily.  Dispense: 1 Tube; Refill: 1    Return to clinic in 4 weeks with preclinic labs    Patient was seen in the liver transplant department at [facility].        Maria De Jesus Gray MD

## 2020-10-23 NOTE — TELEPHONE ENCOUNTER
IR consult for biopsy of liver foci scheduled as recommended...first available on 10/28.  IR dept has been notified to place patient on waiting list if there is a cancellation.  Patient aware of appt date and time.

## 2020-10-23 NOTE — TELEPHONE ENCOUNTER
Called [facility] and requested most most recent pap smear and mammogram results.

## 2020-10-23 NOTE — TELEPHONE ENCOUNTER
Patient: Madhavi Carr Adcox       MRN: [id number]      : 1975     Age: 44 y.o.  [address] [place] [address]    Provider: Hepatologist - Maria De Jesus Gray MD    Priority of review: Multiple liver lesions noted on u/s of abdomen    Patient Transplant Status: In Evaluation    Reason for presentation: Other liver lesions    Clinical Summary: 43 y/o female with Diagnoses of Other cirrhosis of liver, Alpha-1-antitrypsin deficiency, Alcoholic liver disease, and Liver lesion    Imaging to be reviewed: Ultrasound of abdomen---10/18/2020 and outside MRI of abdomen    HCC Treatment History: N/A    ABO: B NEG    Platelets:   Lab Results   Component Value Date/Time     (L) 10/17/2020 07:10 AM     Creatinine:   Lab Results   Component Value Date/Time    CREATININE 1.2 10/17/2020 07:10 AM     Bilirubin:   Lab Results   Component Value Date/Time    BILITOT 17.1 (H) 10/17/2020 07:10 AM     AFP Last 3 each if available:   Lab Results   Component Value Date/Time    AFP 9.8 (H) 10/08/2020 07:47 AM    AFP 8.0 2020 02:11 PM       MELD: MELD-Na score: 32 at 10/17/2020  7:10 AM  MELD score: 28 at 10/17/2020  7:10 AM  Calculated from:  Serum Creatinine: 1.2 mg/dL at 10/17/2020  7:10 AM  Serum Sodium: 127 mmol/L at 10/17/2020  7:10 AM  Total Bilirubin: 17.1 mg/dL at 10/17/2020  7:10 AM  INR(ratio): 2.3 at 10/17/2020  7:10 AM  Age: 44 years 11 months    Plan: 3.9 cm HCC lesion in the hepatic dome. Very cirrhotic appearing liver, with no other definitive foci of arterial enhancement and washout, however there are several foci of washout in both lobes that are concerning for HCC. Also, multiple lesions noted on US.   Recommendations. Repeat MRI to confirm multifocal disease, which would push patient out of [facility]. Patients Bilirubin elevated, will be unable to offer any treatment from IR perspective.      COMMITTEE DISCUSSION: 3.9 cm dome lesion demonstrating arterial enhancement and washout Multiple bilobar foci of washout  without enhancement, indeterminate but c/f HCC. IR for bx of one of the non-enhancing foci. Hold off on presentation until path reviewed.      Note forwarded to Suad Linares RN to coordinate.  Message forwarded to IR Schedulers requesting procedure date    Follow-up Provider: Maria De Jesus Gray MD

## 2020-10-26 NOTE — PROGRESS NOTES
Subjective:       Patient ID: Madhavi Bell is a 44 y.o. female.    Chief Complaint: No chief complaint on file.    43 y/o female seen at the request of Dr. Gray to discuss liver lesion.      Review of Systems      Objective:      Physical Exam      Interdisciplinary Liver Conference notes 10/20/20:  3.9 cm dome lesion demonstrating arterial enhancement and washout Multiple bilobar foci of washout without enhancement, indeterminate but c/f HCC. IR for bx of one of the non-enhancing foci.   Assessment:       No diagnosis found.    Plan:       Case discussed in Liver conference 10/20/20 (María Elena).  Plan:  Biopsy 18.3mm target marked on CT and US.  Book in CT with US available.

## 2020-10-28 NOTE — PROGRESS NOTES
Subjective:       Patient ID: Madhavi Bell is a 45 y.o. female.    Chief Complaint: Lesion    Video visit with patient referred by Dr. Gray for evaluation of a liver lesion. She has a history of cirrhosis and hepatic hydrothorax. MRI obtained on 8/18/2020 noted a right lobe liver lesion measuring 3.4cm x 3.1cm x 2.9cm. She has complaints of fatigue and dyspnea with fluid accumulation. She was reviewed in liver conference.     Review of Systems   Constitutional: Negative for activity change, appetite change, chills, fatigue and fever.   Respiratory: Positive for shortness of breath. Negative for cough, wheezing and stridor.    Cardiovascular: Negative for chest pain, palpitations and leg swelling.   Gastrointestinal: Positive for abdominal distention, constipation and diarrhea. Negative for abdominal pain, nausea and vomiting.   Psychiatric/Behavioral: Positive for sleep disturbance.         Objective:      Physical Exam  Constitutional:       General: She is not in acute distress.     Appearance: She is well-developed. She is not diaphoretic.   HENT:      Head: Normocephalic and atraumatic.   Pulmonary:      Effort: Pulmonary effort is normal. No respiratory distress.   Neurological:      Mental Status: She is alert and oriented to person, place, and time.   Psychiatric:         Behavior: Behavior normal.         Thought Content: Thought content normal.         Judgment: Judgment normal.         Assessment:       1. Liver mass        Plan:         The patient location is: [place]  The chief complaint leading to consultation is: liver lesion    Visit type: audiovisual    Face to Face time with patient: 20 minutes  25 minutes of total time spent on the encounter, which includes face to face time and non-face to face time preparing to see the patient (eg, review of tests), Obtaining and/or reviewing separately obtained history, Documenting clinical information in the electronic or other health record,  Independently interpreting results (not separately reported) and communicating results to the patient/family/caregiver, or Care coordination (not separately reported).         Each patient to whom he or she provides medical services by telemedicine is:  (1) informed of the relationship between the physician and patient and the respective role of any other health care provider with respect to management of the patient; and (2) notified that he or she may decline to receive medical services by telemedicine and may withdraw from such care at any time.    Notes:   Explained can offer biopsy of liver lesion. Discussed how the procedure will be performed, risks (including, but not limited to, pain, bleeding, infection, damage to nearby structures, and the need for additional procedures), benefits, possible complications, pre-post procedure expectations, and alternatives. The patient voices understanding and all questions have been answered.  The patient agrees to proceed as planned. Patient scheduled for 11/12/2020. Clinic phone number provided: [phone].

## 2020-10-28 NOTE — Clinical Note
"Thank you for referring Ms. Bell to Interventional Radiology at the [facility]. Please don't hesitate to contact us if there are any questions regarding this evaluation at [phone]. If you have any other patients for whom you would like a consultation, please place an order for "[id number]", and we will be happy to review their case.    Sincerely,  SHELL Rivera, FNP  Interventional Radiology"

## 2020-10-28 NOTE — TELEPHONE ENCOUNTER
Discussed patient with coordinator.  Patient's  concerned that she has seeming sicker.  We are trying to get liver biopsy done of indeterminate lesion as soon as possible so we can see if she has within [place] criteria and him listed for transplant.  Seems she is having trouble with sleep.  Advised that she try melatonin 1st over-the-counter.  If that is not working then we can prescribe something else.  Also like her to start on magnesium oxide 40 mg twice a day with sounds like muscle cramps or spasms.  This can be seen with cirrhosis and her level of diuretics.  Would like patient have a full set of labs including magnesium and phosphorus next week along with an appointment with nurse practitioner Kenneth. Will check with IR here in [facility] to see if bx can be sooner given patient's elevated MELD score.  Agree with proceeding with thoracentesis.  Will need chest x-ray 1st.

## 2020-10-29 NOTE — ED PROVIDER NOTES
"SCRIBE #1 NOTE: I, Antonieta Ann, am scribing for, and in the presence of, Bandar Coronado MD. I have scribed the entire note.      History      Chief Complaint   Patient presents with    Shortness of Breath     sent by Dr. Gray for "fluid on the lungs" hx of alcoholic cirrhosis       Review of patient's allergies indicates:  No Known Allergies     HPI   HPI    10/29/2020, 4:35 PM   History obtained from the  and patient      History of Present Illness: Madhavi Bell is a 45 y.o. female patient with a PMHx of Alpha 1 antitrypsin deficiency, Cirrhosis, HCC, andhepatic hydrothorax who presents to the Emergency Department for evaluation of shortness of breath. Symptoms are constant and moderate in severity. There is exacerbation with exertion. Patient is currently wait listed for liver transplant and established under the care of Dr. Gray (Gastroenterology) who recommended she present here for further evaluation of recurrent fluid build up. Per , patient's condition has seemingly been declining despite inpatient treatment and multiple thoracentesis. Patient was scheduled and planned to have an outpatient thoracentesis and liver biopsy for potential transplant next week. Associated sxs include abdominal distention. Patient denies any fever, chills, N/V, abdominal pain, cough, chest pain, palpitations, leg swelling, and all other sxs at this time. No further complaints or concerns at this time.       Arrival mode: Personal vehicle     PCP: Anurag Knutson MD       Past Medical History:  Past Medical History:   Diagnosis Date    Alpha-1-antitrypsin deficiency     ZZ phenotype    Eosinophilic esophagitis     h/o pill impaction, bx proven    Hyperlipidemia     may be related to liver enzymes, 2ary to high HDL    Schatzki's ring     s/p dilatation       Past Surgical History:  Past Surgical History:   Procedure Laterality Date     SECTION      CHOLECYSTECTOMY      DILATION AND CURETTAGE OF " UTERUS      ESOPHAGOGASTRODUODENOSCOPY N/A 9/9/2020    Procedure: EGD (ESOPHAGOGASTRODUODENOSCOPY)-need rapid covid;  Surgeon: Jose Smith MD;  Location: [facility];  Service: Endoscopy;  Laterality: N/A;         Family History:  Family History   Problem Relation Age of Onset    Breast cancer Neg Hx     Colon cancer Neg Hx     Ovarian cancer Neg Hx        Social History:  Social History     Tobacco Use    Smoking status: Never Smoker    Smokeless tobacco: Never Used   Substance and Sexual Activity    Alcohol use: Not Currently     Comment: patient reports she has not had any alcohol in past few months    Drug use: No    Sexual activity: Yes     Partners: Male     Birth control/protection: OCP       ROS   Review of Systems   Constitutional: Negative for fever.   HENT: Negative for sore throat.    Respiratory: Positive for shortness of breath. Negative for cough.    Cardiovascular: Negative for chest pain, palpitations and leg swelling.   Gastrointestinal: Positive for abdominal distention. Negative for abdominal pain, nausea and vomiting.   Genitourinary: Negative for dysuria.   Musculoskeletal: Negative for back pain.   Skin: Negative for rash.   Neurological: Negative for weakness.   Hematological: Does not bruise/bleed easily.   All other systems reviewed and are negative.     Physical Exam      Initial Vitals [10/29/20 1621]   BP Pulse Resp Temp SpO2   (!) 96/48 75 20 97.9 °F (36.6 °C) (!) 93 %      MAP       --          Physical Exam  Nursing Notes and Vital Signs Reviewed.  Constitutional: Patient is in no acute distress. Well-developed and well-nourished.  Head: Atraumatic. Normocephalic.  Eyes: Positive scleral icterus. PERRL. EOM intact. Conjunctivae are not pale.   ENT: Mucous membranes are moist.   Neck: Supple. Full ROM. No lymphadenopathy.  Cardiovascular: Regular rate. Regular rhythm. No murmurs, rubs, or gallops. Distal pulses are 2+ and symmetric.  Pulmonary/Chest: No respiratory  distress. No breath sounds noted on the Right side. No wheezing or rales.  Abdominal:  The abdomen is distended. There is no tenderness.  No rebound, guarding, or rigidity. Good bowel sounds.  Musculoskeletal: Moves all extremities. No obvious deformities. No edema.  Skin: Jaundice. Warm and dry.  Neurological:  Alert, awake, and appropriate.  Normal speech.  No acute focal neurological deficits are appreciated.  Psychiatric: Normal affect. Good eye contact. Appropriate in content.    ED Course    Procedures  ED Vital Signs:  Vitals:    10/29/20 1700 10/29/20 1715 10/29/20 1730 10/29/20 1745   BP: (!) 99/52  (!) 100/52    Pulse: 72 71 70 69   Resp:  (!) 28 (!) 28 (!) 22   Temp:       TempSrc:       SpO2: 96% 96% 95% 95%   Weight:       Height:        10/29/20 1800 10/29/20 1815 10/29/20 1830 10/29/20 1910   BP: (!) 103/56  (!) 96/54    Pulse: 75 73 72    Resp: (!) 28 20 18 (!) 21   Temp:       TempSrc:       SpO2: (!) 94% 95% (!) 93%    Weight:       Height:        10/29/20 1915 10/29/20 2010 10/29/20 2205 10/29/20 2230   BP: (!) 103/52 (!) 111/55 (!) 98/49 (!) 91/51   Pulse: 75 78 75 75   Resp: (!) 21 19 20 20   Temp:       TempSrc:       SpO2: 95% 95% 95% 95%   Weight:       Height:        10/30/20 0000 10/30/20 0100 10/30/20 0300   BP: (!) 90/55 (!) 89/45 (!) 92/54   Pulse: 75 69 75   Resp: 20 20 17   Temp:      TempSrc:      SpO2: 96% 95% (!) 94%   Weight:      Height:          Abnormal Lab Results:  Labs Reviewed   CBC W/ AUTO DIFFERENTIAL - Abnormal; Notable for the following components:       Result Value    RBC 3.77 (*)     Hemoglobin 11.9 (*)     Hematocrit 33.7 (*)     MCH 31.6 (*)     RDW 18.2 (*)     Immature Granulocytes 1.1 (*)     Gran # (ANC) 8.5 (*)     Immature Grans (Abs) 0.13 (*)     Mono # 1.2 (*)     Lymph % 17.5 (*)     All other components within normal limits   BASIC METABOLIC PANEL - Abnormal; Notable for the following components:    Sodium 123 (*)     Chloride 92 (*)     Glucose 123 (*)      BUN 48 (*)     Creatinine 2.7 (*)     Calcium 7.6 (*)     eGFR if  23 (*)     eGFR if non  20 (*)     All other components within normal limits   HEPATIC FUNCTION PANEL - Abnormal; Notable for the following components:    Albumin 1.5 (*)     Total Bilirubin 24.9 (*)     Bilirubin, Direct >14.0 (*)      (*)     ALT 91 (*)     Alkaline Phosphatase 761 (*)     All other components within normal limits   MAGNESIUM - Abnormal; Notable for the following components:    Magnesium 3.6 (*)     All other components within normal limits   PROTIME-INR - Abnormal; Notable for the following components:    Prothrombin Time 26.6 (*)     INR 2.6 (*)     All other components within normal limits   APTT - Abnormal; Notable for the following components:    aPTT 39.6 (*)     All other components within normal limits   LACTIC ACID, PLASMA   LIPASE   TROPONIN I   SARS-COV-2 RNA AMPLIFICATION, QUAL        All Lab Results:  Results for orders placed or performed during the hospital encounter of 10/29/20   CBC auto differential   Result Value Ref Range    WBC 12.24 3.90 - 12.70 K/uL    RBC 3.77 (L) 4.00 - 5.40 M/uL    Hemoglobin 11.9 (L) 12.0 - 16.0 g/dL    Hematocrit 33.7 (L) 37.0 - 48.5 %    MCV 89 82 - 98 fL    MCH 31.6 (H) 27.0 - 31.0 pg    MCHC 35.3 32.0 - 36.0 g/dL    RDW 18.2 (H) 11.5 - 14.5 %    Platelets 176 150 - 350 K/uL    MPV 10.2 9.2 - 12.9 fL    Immature Granulocytes 1.1 (H) 0.0 - 0.5 %    Gran # (ANC) 8.5 (H) 1.8 - 7.7 K/uL    Immature Grans (Abs) 0.13 (H) 0.00 - 0.04 K/uL    Lymph # 2.1 1.0 - 4.8 K/uL    Mono # 1.2 (H) 0.3 - 1.0 K/uL    Eos # 0.2 0.0 - 0.5 K/uL    Baso # 0.05 0.00 - 0.20 K/uL    nRBC 0 0 /100 WBC    Gran % 69.4 38.0 - 73.0 %    Lymph % 17.5 (L) 18.0 - 48.0 %    Mono % 9.7 4.0 - 15.0 %    Eosinophil % 1.9 0.0 - 8.0 %    Basophil % 0.4 0.0 - 1.9 %    Differential Method Automated    Basic metabolic panel   Result Value Ref Range    Sodium 123 (L) 136 - 145 mmol/L     Potassium 4.6 3.5 - 5.1 mmol/L    Chloride 92 (L) 95 - 110 mmol/L    CO2 23 23 - 29 mmol/L    Glucose 123 (H) 70 - 110 mg/dL    BUN 48 (H) 6 - 20 mg/dL    Creatinine 2.7 (H) 0.5 - 1.4 mg/dL    Calcium 7.6 (L) 8.7 - 10.5 mg/dL    Anion Gap 8 8 - 16 mmol/L    eGFR if African American 23 (A) >60 mL/min/1.73 m^2    eGFR if non African American 20 (A) >60 mL/min/1.73 m^2   Hepatic function panel   Result Value Ref Range    Total Protein 6.3 6.0 - 8.4 g/dL    Albumin 1.5 (L) 3.5 - 5.2 g/dL    Total Bilirubin 24.9 (H) 0.1 - 1.0 mg/dL    Bilirubin, Direct >14.0 (H) 0.1 - 0.3 mg/dL     (H) 10 - 40 U/L    ALT 91 (H) 10 - 44 U/L    Alkaline Phosphatase 761 (H) 55 - 135 U/L   Lactic acid, plasma   Result Value Ref Range    Lactate (Lactic Acid) 1.7 0.5 - 2.2 mmol/L   Lipase   Result Value Ref Range    Lipase 45 4 - 60 U/L   Magnesium   Result Value Ref Range    Magnesium 3.6 (H) 1.6 - 2.6 mg/dL   Protime-INR   Result Value Ref Range    Prothrombin Time 26.6 (H) 9.0 - 12.5 sec    INR 2.6 (H) 0.8 - 1.2   APTT   Result Value Ref Range    aPTT 39.6 (H) 21.0 - 32.0 sec   Troponin I   Result Value Ref Range    Troponin I 0.009 0.000 - 0.026 ng/mL   COVID-19 Rapid Screening   Result Value Ref Range    SARS-CoV-2 RNA, Amplification, Qual Negative Negative         Imaging Results:  Imaging Results          X-Ray Chest AP Portable (Final result)  Result time 10/29/20 18:12:41    Final result by Sherine Hayes MD (10/29/20 18:12:41)                 Impression:      Complete opacification of the right lung unchanged since the prior exam.  Overall no definite change      Electronically signed by: Freddy Basurto  Date:    10/29/2020  Time:    18:12             Narrative:    EXAMINATION:  XR CHEST AP PORTABLE    CLINICAL HISTORY:  SOB;    TECHNIQUE:  Single frontal view of the chest was performed.    COMPARISON:  Prior    FINDINGS:  Complete opacification of the right lung remains unchanged since the prior examination obtained 1 day  earlier.  Left lung is clear.  Questionable slight shift to the left                                    The EKG was ordered, reviewed, and independently interpreted by the ED provider.  Interpretation time: 16:57  Rate: 71 BPM  Rhythm: normal sinus rhythm  Interpretation: No acute ST changes. No STEMI.      The Emergency Provider reviewed the vital signs and test results, which are outlined above.    ED Discussion      7:57 PM: Consult with Dr. Ezio Pastrana (Gastroenterology) and Dr. Lisa Luu at [facility] concerning pt. There are no Higher Level of Care Hepatology services, which the patient requires, offered at [facility] at this time. Dr. Pastrana expresses understanding and will accept transfer for ESLD with hepatic hydrothorax.  Accepting Facility: [facility]  Accepting Physicians: Ezio Pastrana MD, Lisa Luu MD    8:00 PM: Re-evaluated pt. Informed pt and family that there are no Hepatology services available at this time. I have discussed test results, shared treatment plan, and the need for transfer with patient and family at bedside. All historical, clinical, radiographic, and laboratory findings were reviewed with the patient/family in detail. Patient will be transferred by [facility] services with BLS care required en route. Patient understands that there could be unforeseen motor vehicle accidents or loss of vital signs that could result in potential death or permanent disability. Pt and family express understanding at this time and agree with all information. All questions answered. Pt and family have no further questions or concerns at this time. Pt is ready for transfer.          ED Medication(s):   Medications - No data to display          Medical Decision Making    Medical Decision Making:   Independently Interpreted Test(s):   I have ordered and independently interpreted X-rays - see summary below.       <> Summary of X-Ray Reading(s): Right sided Pleural  effusion  I have ordered and independently interpreted EKG Reading(s) - see prior notes  Clinical Tests:   Lab Tests: Ordered and Reviewed  Radiological Study: Ordered and Reviewed  Medical Tests: Ordered and Reviewed  ED Management:  This is a 45-year-old female seen by Dr. Gray with alpha-1 anti trypsin deficiency, cirrhosis, and HCC.  Came in today due to shortness of breath.  Stable on 2 L. Patient has had history of hepatic hydrothorax.  Appears to have a large right-sided pleural effusion on today's imaging as well.  Renal functioning and LFTs worsening.  Also hypermagnesemic. From med records, it appears they were planning on outpatient thoracentesis and liver biopsy for potential transplant. Based off of labwork today, I have concern for her worsening Hepatic function and possible developing hepatorenal syndrome. Consulted Medicine and GI, and they agree that pt should be transferred to [facility] for further evaluation of possible transplant. Hepatology and Medicine services consulted and they both accept transfer.            Scribe Attestation:   Scribe #1: I performed the above scribed service and the documentation accurately describes the services I performed. I attest to the accuracy of the note.    Attending:   Physician Attestation Statement for Scribe #1: I, Bandar Coronado MD, personally performed the services described in this documentation, as scribed by Antonieta Ann, in my presence, and it is both accurate and complete.          Clinical Impression       ICD-10-CM ICD-9-CM   1. Right-sided pleural effusion, recurrent  J90 511.9   2. SOB (shortness of breath)  R06.02 786.05   3. Acute renal failure, unspecified acute renal failure type  N17.9 584.9   4. End stage liver disease  K72.90 572.8       Disposition:   Disposition: Transferred  Condition: Stable       Bandar Coronado MD  10/30/20 0417

## 2020-10-29 NOTE — TELEPHONE ENCOUNTER
Interventional Radiology:  Dr. Gray chatted Dr. Myrick, pt needs a Liver Bx.  I scheduled pt for 11/4 at 8, pt to be here at 0730, NPO after midnight, must have a , patient denies blood thinner use.  Patient verbalizes understanding.

## 2020-10-29 NOTE — ED NOTES
Pt c/o restless legs, requests magnesium supplement.  Dr. Coronado notified, pt cannot have magnesium supplement, mag is high on labs, no new orders.

## 2020-10-30 PROBLEM — K72.90 DECOMPENSATED HEPATIC CIRRHOSIS: Status: ACTIVE | Noted: 2020-01-01

## 2020-10-30 PROBLEM — K74.60 DECOMPENSATED HEPATIC CIRRHOSIS: Status: ACTIVE | Noted: 2020-01-01

## 2020-10-30 PROBLEM — A41.9 SEPSIS: Status: ACTIVE | Noted: 2020-01-01

## 2020-10-30 PROBLEM — J91.8 PLEURAL EFFUSION ASSOCIATED WITH HEPATIC DISORDER: Status: ACTIVE | Noted: 2020-01-01

## 2020-10-30 PROBLEM — K76.9 PLEURAL EFFUSION ASSOCIATED WITH HEPATIC DISORDER: Status: ACTIVE | Noted: 2020-01-01

## 2020-10-30 NOTE — ASSESSMENT & PLAN NOTE
Bed now available in [place] and will be transferred soon.   Prior to finalization of that plan, orders placed included BCx x 2, UA, IV albumin, urine sodium, IR consult for thoracentesis and empiric antibiotics discussed with Dr. Page. I believe labs done but other not done yet. Will get additional work up once transfer to [place] complete. Case discussed with Dr. Gray who has been actively involved.

## 2020-10-30 NOTE — H&P
Inpatient Radiology Pre-procedure Note    History of Present Illness:  Madhavi Bell is a 45 y.o. female with decompensated cirrhosis (2/2 AAT, ETOH) and 3.9 cm enhancing liver lesion (with additional indeterminate liver lesions) who was transferred from [place] for hypotension/higher level of care. She has been following with HEPATOLOGY as outpatient and has required thoracentesis for hepatic hydrothorax.    IR consulted for liver mass biopsy and R thoracentesis    Admission H&P reviewed.  Past Medical History:   Diagnosis Date    Alpha-1-antitrypsin deficiency     ZZ phenotype    Eosinophilic esophagitis     h/o pill impaction, bx proven    Hyperlipidemia     may be related to liver enzymes, 2ary to high HDL    Schatzki's ring     s/p dilatation     Past Surgical History:   Procedure Laterality Date     SECTION      CHOLECYSTECTOMY      DILATION AND CURETTAGE OF UTERUS      ESOPHAGOGASTRODUODENOSCOPY N/A 2020    Procedure: EGD (ESOPHAGOGASTRODUODENOSCOPY)-need rapid covid;  Surgeon: Jose Smith MD;  Location: [facility];  Service: Endoscopy;  Laterality: N/A;       Review of Systems:   As documented in primary team H&P    Home Meds:   Prior to Admission medications    Medication Sig Start Date End Date Taking? Authorizing Provider   ergocalciferol (ERGOCALCIFEROL) 50,000 unit Cap Take 1 capsule (50,000 Units total) by mouth every 7 days. 20   Maria De Jesus Gray MD   furosemide (LASIX) 20 MG tablet Take 2 tablets (40 mg total) by mouth every 12 (twelve) hours.  Patient taking differently: Take 40 mg by mouth once daily.  10/17/20 1/15/21  Brian Madison MD   hydrocortisone (ANUSOL-HC) 25 mg suppository Place 1 suppository (25 mg total) rectally 2 (two) times daily. for 10 days 10/21/20 10/31/20  Maria De Jesus Gray MD   lactulose (CHRONULAC) 10 gram/15 mL solution Take 20 g by mouth daily as needed.  20   Historical Provider   magnesium oxide (MAG-OX) 400 mg (241.3 mg  magnesium) tablet Take 1 tablet (400 mg total) by mouth 2 (two) times daily. 10/28/20   Maria De Jesus Gray MD   midodrine (PROAMATINE) 2.5 MG Tab Take 3 tablets (7.5 mg total) by mouth 3 (three) times daily with meals. 10/17/20 1/15/21  Brian Madison MD   multivitamin Tab Take 1 tablet by mouth once daily. 10/6/20   Soniya Coe, JUSTIN   pramoxine-hydrocortisone (PROCTOCREAM-HC) 1-1 % rectal cream Place rectally 3 (three) times daily. 10/21/20   Maria De Jesus Gray MD   spironolactone (ALDACTONE) 50 MG tablet Take 2 tablets (100 mg total) by mouth 2 (two) times daily.  Patient taking differently: Take 100 mg by mouth once daily.  10/21/20   Maria De Jesus Gray MD   zinc gluconate 50 mg tablet Take 50 mg by mouth once daily.    Historical Provider     Scheduled Meds:    ergocalciferol  50,000 Units Oral Q7 Days    furosemide  40 mg Oral Daily    hydrocortisone  25 mg Rectal BID    midodrine  10 mg Oral TID WM    multivitamin  1 tablet Oral Daily    pramoxine-hydrocortisone   Rectal TID    zinc sulfate  220 mg Oral Daily     Continuous Infusions:    norepinephrine bitartrate-D5W       PRN Meds:sodium chloride, ondansetron, promethazine (PHENERGAN) IVPB, sodium chloride 0.9%  Anticoagulants/Antiplatelets: no anticoagulation    Allergies: Review of patient's allergies indicates:  No Known Allergies  Sedation Hx: have not been any systemic reactions    Labs:  Recent Labs   Lab 10/30/20  1340   INR 2.4*       Recent Labs   Lab 10/30/20  1340   WBC 19.03*   HGB 11.2*   HCT 30.8*   MCV 90         Recent Labs   Lab 10/29/20  1714   *   *   K 4.6   CL 92*   CO2 23   BUN 48*   CREATININE 2.7*   CALCIUM 7.6*   MG 3.6*   ALT 91*   *   ALBUMIN 1.5*   BILITOT 24.9*   BILIDIR >14.0*     Vitals:  Temp: 98.4 °F (36.9 °C) (10/30/20 1315)  Pulse: 78 (10/30/20 1330)  Resp: 20 (10/30/20 1330)  BP: (!) 96/49 (10/30/20 1315)  SpO2: 95 % (10/30/20 1330)     Physical Exam:  ASA: 4  Mallampati: 2    General: no acute  distress, jaundiced  Mental Status: alert and oriented to person, place and time  HEENT: normocephalic, atraumatic  Chest: unlabored breathing on NC  Heart: regular heart rate  Abdomen: nondistended  Extremity: moves all extremities    Plan: Plan liver lesion biopsy and R thoracentesis. INR 2.4 - will give FFP during procedure - patient and primary team aware she is at higher risk of bleeding complications and are agreeable to proceed.  Sedation Plan: up to moderate    Ravi Peña MD  PGY-2  RADIOLOGY

## 2020-10-30 NOTE — CONSULTS
Full H&P to follow.    Plan liver biopsy and R thoracentesis today. Please have FFP available to be given during procedure.

## 2020-10-30 NOTE — SUBJECTIVE & OBJECTIVE
Past Medical History:   Diagnosis Date    Alpha-1-antitrypsin deficiency     ZZ phenotype    Eosinophilic esophagitis     h/o pill impaction, bx proven    Hyperlipidemia     may be related to liver enzymes, 2ary to high HDL    Schatzki's ring     s/p dilatation       Past Surgical History:   Procedure Laterality Date     SECTION      CHOLECYSTECTOMY      DILATION AND CURETTAGE OF UTERUS      ESOPHAGOGASTRODUODENOSCOPY N/A 2020    Procedure: EGD (ESOPHAGOGASTRODUODENOSCOPY)-need rapid covid;  Surgeon: Jose Smith MD;  Location: [facility];  Service: Endoscopy;  Laterality: N/A;       Review of patient's allergies indicates:  No Known Allergies    Family History     None        Tobacco Use    Smoking status: Never Smoker    Smokeless tobacco: Never Used   Substance and Sexual Activity    Alcohol use: Not Currently     Comment: patient reports she has not had any alcohol in past few months    Drug use: No    Sexual activity: Yes     Partners: Male     Birth control/protection: OCP      Review of Systems   Constitutional: Negative for chills and fever.   HENT: Negative for sinus pressure and trouble swallowing.    Eyes: Negative for photophobia and visual disturbance.   Respiratory: Positive for cough (dry) and shortness of breath. Negative for wheezing.    Cardiovascular: Negative for chest pain, palpitations and leg swelling.   Gastrointestinal: Positive for nausea and vomiting. Negative for abdominal pain.   Genitourinary: Positive for decreased urine volume. Negative for difficulty urinating.   Musculoskeletal: Negative for back pain and joint swelling.   Neurological: Negative for light-headedness and headaches.   Psychiatric/Behavioral: Negative for agitation and confusion.     Objective:     Vital Signs (Most Recent):  Temp: 98.1 °F (36.7 °C) (10/30/20 1545)  Pulse: 80 (10/30/20 171)  Resp: 16 (10/30/20 1716)  BP: 95/78 (10/30/20 1716)  SpO2: (!) 94 % (10/30/20 1716) Vital  Signs (24h Range):  Temp:  [97.8 °F (36.6 °C)-98.4 °F (36.9 °C)] 98.1 °F (36.7 °C)  Pulse:  [69-85] 80  Resp:  [16-30] 16  SpO2:  [91 %-98 %] 94 %  BP: ()/(37-78) 95/78   Weight: 71.2 kg (157 lb 1.2 oz)  Body mass index is 25.35 kg/m².      Intake/Output Summary (Last 24 hours) at 10/30/2020 1756  Last data filed at 10/30/2020 1630  Gross per 24 hour   Intake 250 ml   Output 200 ml   Net 50 ml       Physical Exam  Vitals signs reviewed.   Constitutional:       Appearance: She is well-developed. She is ill-appearing.      Interventions: Nasal cannula in place.   HENT:      Head: Normocephalic and atraumatic.   Eyes:      General: Scleral icterus present.      Pupils: Pupils are equal, round, and reactive to light.   Neck:      Thyroid: No thyromegaly.      Trachea: No tracheal deviation.      Comments: [employer] TLC in place.   Cardiovascular:      Rate and Rhythm: Normal rate and regular rhythm.      Heart sounds: Normal heart sounds. No murmur. No friction rub. No gallop.    Pulmonary:      Effort: Pulmonary effort is normal. No tachypnea or accessory muscle usage.      Breath sounds: Examination of the right-upper field reveals decreased breath sounds. Examination of the right-middle field reveals decreased breath sounds. Examination of the right-lower field reveals decreased breath sounds. Decreased breath sounds present. No wheezing, rhonchi or rales.   Abdominal:      General: Bowel sounds are normal. There is distension.      Palpations: Abdomen is soft.      Tenderness: There is generalized abdominal tenderness.   Genitourinary:     Comments: Garcia in place.   Musculoskeletal: Normal range of motion.   Skin:     General: Skin is warm and dry.   Neurological:      Mental Status: She is alert.      Sensory: No sensory deficit.   Psychiatric:         Behavior: Behavior is cooperative.            Lines/Drains/Airways     Central Venous Catheter Line            Percutaneous Central Line Insertion/Assessment -  Triple Lumen  10/30/20 0855 left internal jugular less than 1 day          Drain                 Urethral Catheter 10/30/20 1615 16 Fr. less than 1 day          Peripheral Intravenous Line                 Peripheral IV - Single Lumen 10/29/20 1714 20 G Left Antecubital 1 day              Significant Labs:    CBC/Anemia Profile:  Recent Labs   Lab 10/29/20  1714 10/30/20  1340   WBC 12.24 19.03*   HGB 11.9* 11.2*   HCT 33.7* 30.8*    150   MCV 89 90   RDW 18.2* 18.2*        Chemistries:  Recent Labs   Lab 10/29/20  1714 10/30/20  1340   * 124*   K 4.6 4.4   CL 92* 92*   CO2 23 20*   BUN 48* 54*   CREATININE 2.7* 3.3*   CALCIUM 7.6* 8.1*   ALBUMIN 1.5* 2.0*   PROT 6.3 5.9*   BILITOT 24.9* 25.6*   ALKPHOS 761* 735*   ALT 91* 81*   * 121*   MG 3.6* 3.7*   PHOS  --  4.6*       All pertinent labs within the past 24 hours have been reviewed.    Significant Imaging: I have reviewed and interpreted all pertinent imaging results/findings within the past 24 hours.

## 2020-10-30 NOTE — ED NOTES
Spoke with ROBI Gilbert, who states pt has been assigned [facility] 60[facility]. Phone number to call report is [phone].

## 2020-10-30 NOTE — PROGRESS NOTES
Pharmacokinetic Initial Assessment: IV Vancomycin    Assessment/Plan:    45 YOF with hepatic hydrothorax/HCC/HE with volume overload and hypotension.  S/p thoracentesis.  Plan for paracentesis via IR.      Initiate intravenous vancomycin with loading dose of 1250 mg once with subsequent doses when random concentrations are less than 20 mcg/mL  Desired empiric serum trough concentration is 15 to 20 mcg/mL  Draw vancomycin random level on 10/31 at approximatly 0500 with am labs.  Pharmacy will continue to follow and monitor vancomycin.      Please contact pharmacy at extension [phone] with any questions regarding this assessment.     Thank you for the consult,   Maximus Bhakta       Patient brief summary:  Madhavi Bell is a 45 y.o. female initiated on antimicrobial therapy with IV Vancomycin for treatment of suspected lower respiratory infection s/p thoracentesis    Drug Allergies:   Review of patient's allergies indicates:  No Known Allergies    Actual Body Weight:   71 kg    Renal Function:   Estimated Creatinine Clearance: 21.6 mL/min (A) (based on SCr of 3.3 mg/dL (H)).,     Dialysis Method (if applicable):  N/A    CBC (last 72 hours):  Recent Labs   Lab Result Units 10/29/20  1714 10/30/20  1340   WBC K/uL 12.24 19.03*   Hemoglobin g/dL 11.9* 11.2*   Hematocrit % 33.7* 30.8*   Platelets K/uL 176 150   Gran % % 69.4 83.7*   Lymph % % 17.5* 7.7*   Mono % % 9.7 6.9   Eosinophil % % 1.9 0.8   Basophil % % 0.4 0.2   Differential Method  Automated Automated       Metabolic Panel (last 72 hours):  Recent Labs   Lab Result Units 10/29/20  1714 10/30/20  1334 10/30/20  1340   Sodium mmol/L 123*  --  124*   Sodium, Urine mmol/L  --  <20*  --    Potassium mmol/L 4.6  --  4.4   Potassium, Urine mmol/L  --  53  --    Chloride mmol/L 92*  --  92*   Chloride, Urine mmol/L  --  <20*  --    CO2 mmol/L 23  --  20*   Glucose mg/dL 123*  --  92   Glucose, UA   --  1+*  --    BUN mg/dL 48*  --  54*   Creatinine mg/dL 2.7*  --   3.3*   Creatinine, Urine mg/dL  --  150.0  --    Albumin g/dL 1.5*  --  2.0*   Total Bilirubin mg/dL 24.9*  --  25.6*   Alkaline Phosphatase U/L 761*  --  735*   AST U/L 142*  --  121*   ALT U/L 91*  --  81*   Magnesium mg/dL 3.6*  --  3.7*   Phosphorus mg/dL  --   --  4.6*       Drug levels (last 3 results):  No results for input(s): VANCOMYCINRA, VANCOMYCINPE, VANCOMYCINTR in the last 72 hours.    Microbiologic Results:  Microbiology Results (last 7 days)     Procedure Component Value Units Date/Time    Culture, Anaerobic [[id number]] Collected: 10/30/20 1716    Order Status: Sent Specimen: Body Fluid from Pleural Fluid Updated: 10/30/20 1717    Culture, Body Fluid (Aerobic) w/ GS [[id number]] Collected: 10/30/20 1716    Order Status: Sent Specimen: Body Fluid from Pleural Fluid Updated: 10/30/20 1717    Urine Culture High Risk [[id number]]     Order Status: No result Specimen: Urine, Clean Catch

## 2020-10-30 NOTE — CONSULTS
[facility]  Hepatology Service  Consult Note    Patient Name: Madhavi Bell  MRN: [id number]  Admission Date: 10/30/2020  Hospital Length of Stay: 0 days  Code Status: Full Code   Attending Provider: Thierry Kong MD   Consulting Provider: Shane Ross MD  Primary Care Physician: Anurag Knutson MD  Principal Problem:<principal problem not specified>    Inpatient consult to Hepatology  Consult performed by: Shane Ross MD  Consult ordered by: Codie Wong PA-C        Subjective:     HPI: Madhavi Bell is a 45 y.o. female with history of decompensated liver cirrhosis due to alpha-1 antitrypsin deficiency and alcohol, recurrent pleural effusion, HCC (with the additional bilobar indeterminate liver lesions) who presented to [place] with shortness of breath and transferred to [facility] for further evaluation.    Patient follows with Dr. Gray in [place].  Patient has required more frequent thoracentesis for recurrent hepatic hydrothorax.  Not much abdominal distention.  She is on Lasix 40 mg twice a day and spironolactone 100 mg daily.  She had recent MRI 8/18 with 3.4 cm right liver lesion with additional indeterminate liver lesions.  This was discussed in conference 10/20 with plan for biopsy of one of the nonenhancing foci.  No encephalopathy.  Last EGD 9/9 with small esophageal varices and no gastric varices.  Last PETH 10/08 negative.   Here, blood pressure borderline low.  Was noted to be systolic 70s prior to transfer.  On 4L nasal cannula.  INR 2.6.  Platelets 150.  Worsening creatinine up to 2.7.  Sodium 123.  T bili 24.9.  Meld today 39.        Past Medical History:   Diagnosis Date    Alpha-1-antitrypsin deficiency     ZZ phenotype    Eosinophilic esophagitis     h/o pill impaction, bx proven    Hyperlipidemia     may be related to liver enzymes, 2ary to high HDL    Schatzki's ring     s/p dilatation       Past Surgical History:   Procedure Laterality  Date     SECTION      CHOLECYSTECTOMY      DILATION AND CURETTAGE OF UTERUS      ESOPHAGOGASTRODUODENOSCOPY N/A 2020    Procedure: EGD (ESOPHAGOGASTRODUODENOSCOPY)-need rapid covid;  Surgeon: Jose Smith MD;  Location: [facility];  Service: Endoscopy;  Laterality: N/A;       Family History   Problem Relation Age of Onset    Breast cancer Neg Hx     Colon cancer Neg Hx     Ovarian cancer Neg Hx        Social History     Socioeconomic History    Marital status:      Spouse name: Not on file    Number of children: Not on file    Years of education: Not on file    Highest education level: Not on file   Occupational History    Not on file   Social Needs    Financial resource strain: Not on file    Food insecurity     Worry: Not on file     Inability: Not on file    Transportation needs     Medical: Not on file     Non-medical: Not on file   Tobacco Use    Smoking status: Never Smoker    Smokeless tobacco: Never Used   Substance and Sexual Activity    Alcohol use: Not Currently     Comment: patient reports she has not had any alcohol in past few months    Drug use: No    Sexual activity: Yes     Partners: Male     Birth control/protection: OCP   Lifestyle    Physical activity     Days per week: Not on file     Minutes per session: Not on file    Stress: Not on file   Relationships    Social connections     Talks on phone: Not on file     Gets together: Not on file     Attends Rest[employer] service: Not on file     Active member of club or organization: Not on file     Attends meetings of clubs or organizations: Not on file     Relationship status: Not on file   Other Topics Concern    Not on file   Social History Narrative    Not on file       Current Facility-Administered Medications on File Prior to Encounter   Medication Dose Route Frequency Provider Last Rate Last Dose    [DISCONTINUED] albumin human 25% bottle 25 g  25 g Intravenous Q8H Junior Segura PA-C   25  g at 10/30/20 1047    [DISCONTINUED] midodrine tablet 7.5 mg  7.5 mg Oral TID WM Josh Page MD        [DISCONTINUED] morphine injection 2 mg  2 mg Intravenous ED 1 Time Josh Page MD         Current Outpatient Medications on File Prior to Encounter   Medication Sig Dispense Refill    ergocalciferol (ERGOCALCIFEROL) 50,000 unit Cap Take 1 capsule (50,000 Units total) by mouth every 7 days. 12 capsule 0    furosemide (LASIX) 20 MG tablet Take 2 tablets (40 mg total) by mouth every 12 (twelve) hours. (Patient taking differently: Take 40 mg by mouth once daily. ) 120 tablet 2    hydrocortisone (ANUSOL-HC) 25 mg suppository Place 1 suppository (25 mg total) rectally 2 (two) times daily. for 10 days 20 suppository 0    lactulose (CHRONULAC) 10 gram/15 mL solution Take 20 g by mouth daily as needed.       magnesium oxide (MAG-OX) 400 mg (241.3 mg magnesium) tablet Take 1 tablet (400 mg total) by mouth 2 (two) times daily. 60 tablet 2    midodrine (PROAMATINE) 2.5 MG Tab Take 3 tablets (7.5 mg total) by mouth 3 (three) times daily with meals. 270 tablet 2    multivitamin Tab Take 1 tablet by mouth once daily.      pramoxine-hydrocortisone (PROCTOCREAM-HC) 1-1 % rectal cream Place rectally 3 (three) times daily. 1 Tube 1    spironolactone (ALDACTONE) 50 MG tablet Take 2 tablets (100 mg total) by mouth 2 (two) times daily. (Patient taking differently: Take 100 mg by mouth once daily. ) 60 tablet 5    zinc gluconate 50 mg tablet Take 50 mg by mouth once daily.         Review of patient's allergies indicates:  No Known Allergies    Review of Systems:   Constitutional: no fever, chills  Eyes: no visual changes   ENT: no sore throat or dysphagia  Respiratory: no cough or shortness of breath   Cardiovascular: no chest pain or palpitations   Gastrointestinal: as per HPI  Hematologic/Lymphatic: no easy bruising or lymphadenopathy   Musculoskeletal: no arthralgias or myalgias   Neurological: no change in  mental status  Behavioral/Psych: no change in mood    Objective:     Vitals:    10/30/20 1330   BP:    Pulse: 78   Resp: 20   Temp:        General: Alert and Oriented, no distress  HEENT: Normocephalic, Atraumatic. + scleral icterus.  Resp:  Decreased breath sounds on the right side  Cardiac: S1 and S2 normal  Abdomen: Normoactive bowel sounds. Non-distended. Normal tympany. Soft. Non-tender. No peritoneal signs.  Extremities: No peripheral edema.   Skin:  Jaundiced  Neurologic: No gross neurological Deficits  Psych: Calm, cooperative. Normal mood and affect.    Significant Labs:  Recent Labs   Lab 10/29/20  1714   HGB 11.9*       Lab Results   Component Value Date    WBC 12.24 10/29/2020    HGB 11.9 (L) 10/29/2020    HCT 33.7 (L) 10/29/2020    MCV 89 10/29/2020     10/29/2020       Lab Results   Component Value Date     (L) 10/29/2020    K 4.6 10/29/2020    CL 92 (L) 10/29/2020    CO2 23 10/29/2020    BUN 48 (H) 10/29/2020    CREATININE 2.7 (H) 10/29/2020    CALCIUM 7.6 (L) 10/29/2020    ANIONGAP 8 10/29/2020    ESTGFRAFRICA 23 (A) 10/29/2020    EGFRNONAA 20 (A) 10/29/2020       Lab Results   Component Value Date    ALT 91 (H) 10/29/2020     (H) 10/29/2020     (H) 10/08/2020    ALKPHOS 761 (H) 10/29/2020    BILITOT 24.9 (H) 10/29/2020       Lab Results   Component Value Date    INR 2.6 (H) 10/29/2020    INR 2.3 (H) 10/17/2020    INR 1.9 (H) 10/16/2020       Significant Imaging:  Reviewed pertinent radiology findings.       Assessment/Plan:     Madhavi Bell is a 45 y.o. female with history of decompensated liver cirrhosis due to alpha-1 antitrypsin deficiency and alcohol, recurrent pleural effusion, HCC (with the additional bilobar indeterminate liver lesions) who presented to [place] with shortness of breath and transferred to [facility] for further evaluation.    Problem List:  1. Decompensated liver cirrhosis due to alpha-1 antitrypsin deficiency and alcohol  2. Hepatocellular  carcinoma - 3.4 cm right lesion with additional indeterminate foci.  Single lesion alone will would make patient a transplant candidate, but discussed that IR conference and will need biopsy of 1 of the other lesions to make sure she is still a transplant candidate  3. Recurrent hepatic hydrothorax  4. Acute renal failure  5. Hyponatremia    Plan:  - HCC:  IR today for biopsy of one of the indeterminate liver lesions.  Will need FFP prior to procedure  - Hepatic hydrothorax: CXR on presentation with right lung opacification.  Thoracentesis with IR today, send cell counts.  2 gram Na restrict.  Hold diuretics given hyponatremia and renal failure.  - Hyponatremia:  Hold diuretics, fluid restrict.  Trial of IV albumin.  - Hypotension: continue midodrine 10 mg t.i.d.  - Esophageal varices: last EGD 9/9 with small esophageal varices  - Hepatic encephalopathy: none  - Transplant:  Candidacy will depend on results liver biopsy.  Has completed remaining workup.  Will present to committee after biopsy results are obtained.      Thank you for involving us in the care of Madhavi Bell. Please call with any additional questions, concerns or changes in the patient's clinical status.    Shane Ross MD  Gastroenterology Fellow PGY IV   [facility]

## 2020-10-30 NOTE — PROVIDER PROGRESS NOTES - EMERGENCY DEPT.
Encounter Date: 10/29/2020    ED Physician Progress Notes       SCRIBE NOTE: Shereen ZARAGOZA, am scribing for, and in the presence of,  Josh Page MD.  Physician Statement: IJosh MD, personally performed the services described in this documentation as scribed by Shereen Oliver in my presence, and it is both accurate and complete.          8:23 AM: Pt's BP dropped to 90/53. States she is on Midodrine three 2.5 mg tables tid.      8:30 AM: Spoke with Dr. Holcomb regarding pt's case. Recommends giving midodrine and monitor pt for one hour. If BP does not improve, start central line. If BP does improve, call back to discuss ER to ER transfer.     9:53 AM: Consult with Dr. Holcomb at [facility] concerning pt. There are no higher care hepatology services, which the patient requires, offered at [facility] at this time. Dr. Holcomb expresses understanding and will accept transfer for further care.  Accepting Facility: [facility]  Accepting Physician: Dr. Holcomb      Central Line    Date/Time: 10/30/2020 9:18 AM  Performed by: Josh Page MD  Authorized by: Josh Page MD     Location procedure was performed:  [facility] EMERGENCY DEPARTMENT  Consent Done ?:  Yes  Time out complete?: Verified correct patient, procedure, equipment, staff, and site/side    Indications:  Med administration  Anesthesia:  Local infiltration  Local anesthetic:  Lidocaine 1% without epinephrine  Preparation:  Skin prepped with ChloraPrep  Skin prep agent dried: Skin prep agent completely dried prior to procedure    Sterile barriers: All five maximal sterile barriers used - gloves, gown, cap, mask and large sterile sheet    Hand hygiene: Hand hygiene performed immediately prior to central venous catheter insertion    Location:  Left internal jugular  Catheter type:  Triple lumen  Catheter size:  7.5 Fr  Ultrasound guidance: Yes    Vessel Caliber:  Medium    patent  Comprressibility:  Normal  Doppler:  Not done  Needle advanced into vessel with real time ultrasound guidance.    Guidewire confirmed in vessel.    Image recorded and saved.    Steril sheath on probe.    Sterile gel used.  Manometry: No    Number of attempts:  1  Securement:  Line sutured, chlorhexidine patch, sterile dressing applied and blood return through all ports  Complications: No    Specimens: No    Implants: No    XRay:  Placement verified by x-ray, no pneumothorax on x-ray and successful placement  Adverse Events:  None        Scribe Attestation:   Scribe #1: I performed the above scribed service and the documentation accurately describes the services I performed. I attest to the accuracy of the note.    Attending Attestation:           Physician Attestation for Scribe:  Physician Attestation Statement for Scribe #1: I, Josh Page MD, reviewed documentation, as scribed by Shereen Oliver in my presence, and it is both accurate and complete.

## 2020-10-30 NOTE — HPI
The patient has a history of decompensated liver cirrhosis related to Alpha-1 antitrypsin def and alcohol. She is followed by Dr. Gray and has had issues with recurrent pleural effusion. She is being evaluated for liver transplant. She came to the ER for SOB. CXR showed complete opacification of the right lung. She was to be transferred to  but it was delayed due to bed availability. We were consulted after BP began to drop. A central line was started by the ER provider and given a dose of Midodrine. She complains of SOB, back pain, LUQ pain and hemorrhoids. She has alternating BMs and hasn't been taking Lactulose due to rectal pain with BMs. ER work up revealed an increase in INR, WBC count and creatinine from her baseline.

## 2020-10-30 NOTE — PLAN OF CARE
I was called on Mrs. Bell this morning.  She was accepted to [facility] last night.  She is still in the emergency department in [place].  This morning, her blood pressure decreased to the 70 systolic.  Additionally, her FiO2 requirements have increased (from 2 L up to 4 L).  She was given her morning dose of midodrine, and her systolic blood pressure most recently was right around 90.  On review of her labs, her MELD score from yesterday was 39. She has near complete opacification of the right hemithorax. Case was discussed with the emergency department physician (Dr. Page).  He placed a central line.  I also spoke with the gastroenterologist in [place] (Dr. Gray).  They feel that she would benefit from a higher level of care.  I spoke with the hepatology Service (Dr. Pastrana).  She is currently undergoing evaluation for liver transplant, but she is not listed at present.  She will transfer to the CMICU at [facility] to the critical care medicine service (case discussed with Dr. Driver).  Hepatology will be consulted for evaluation on arrival.  The ER physician felt she was stable for transfer at this time.  EMS was contacted, and they should arrive shortly to transport the patient.

## 2020-10-30 NOTE — PROCEDURES
Radiology Post-Procedure Note    Pre Op Diagnosis: Liver mass    Post Op Diagnosis: Same    Procedure: Liver mass biopsy    Procedure performed by: Javier Pace MD    Written Informed Consent Obtained: Yes  Specimen Removed: YES 10 x 18 gauge cores  Estimated Blood Loss: Minimal    Findings:   Under US and CT guidance, 17/18 gauge biopsy system was used to sample left liver lobe mass which corresponds to indeterminate lesion on MRI. No complications.    Patient tolerated procedure well.    Javier Pace M.D.  Diagnostic and Interventional Radiologist  Department of Radiology  Pager: [phone]

## 2020-10-30 NOTE — HOSPITAL COURSE
Patient admitted to the CMICU as a transfer for liver transplant evaluation. Patient underwent liver biopsy and thoracentesis (1.5 L removed) on 10/30. Hepatology and nephrology consulted for assistance. Patient with worsening respiratory status on 10/31 and thoracentesis was completed again for symptom management with 2L removed. Vasopressor requirements significantly increased in order to maintain blood pressure. Unfortunately, her respiratory status continued to decline and ultimately required intubation 11/1.  New pulmonary infiltrates developed on the left side more concerning for PNA. Bronchoscopy completed 11/1 with essentially a negative work up. ID consulted for assistance with abx broadened to vancomycin, meropenem, azithromycin, and fluconazole. Patient with worsening renal failure requiring initiation of CRRT on 11/2. Patient presented to liver transplant committee on 11/4. They deemed has no absolute contraindications for liver transplant, and that she will be listed pending medical and clinical improvement and financial approval. On 11/6, patient with drastic increase in vasopressor requirements. IR consulted for right sided thoracentesis to rule out further source of infection. Fluid was serosanguinous and not purulent appearing. Patient with worsening vasopressor requirements and now maximized on 3 vasopressors morning on 11/7.  Patient also with acute blood loss overnight with hemoglogin drop to 5.7 with jamil blood coming from ET tube and melenic stools. Liver enzymes with a drastic increase. Expressed concern with the  that despite maximum forms of life support, the patient is actively dying. Code status changed to DNR at that time.

## 2020-10-30 NOTE — ED NOTES
Pt reports she has her Midodrine Rx at bedside and is requesting to take her own meds. Dr. Page at bedside and states okay to take home med at this time.

## 2020-10-30 NOTE — ED NOTES
Patient c/o left sided flank pain, rated 10/10. Send secure chat to provider regarding pain and home meds.

## 2020-10-30 NOTE — HPI
Madhavi Bell is a 45 year old female with decompensated liver cirrhosis due to alpha-1 antitrypsin deficiency and alcohol use, recurrent right sided hepato-hydrothorax, HCC who presented to [place] ED on 10/29 with shortness of breath and transferred to [facility] for liver transplant evaluation on 10/30. Patient reports worsening SOB over the last two days. Patient scheduled for outpatient thoracentesis next week however was advised by GI doctor to present to the ED due to worsening symptoms. Patient reports associated dry cough with pleural effusions but no worse than baseline. Denies fever, chills, chest pain, abdominal pain, confusion.     Upon arrival, patient with oxygen saturations >94% on 4L NC with borderline BP (SBP 88-92). CXR with significant right sided pleural effusion. Labs significant for new leukocytosis, t bili of 24 and LINN with sCr 2.7.

## 2020-10-30 NOTE — SUBJECTIVE & OBJECTIVE
Past Medical History:   Diagnosis Date    Alpha-1-antitrypsin deficiency     ZZ phenotype    Eosinophilic esophagitis     h/o pill impaction, bx proven    Hyperlipidemia     may be related to liver enzymes, 2ary to high HDL    Schatzki's ring     s/p dilatation       Past Surgical History:   Procedure Laterality Date     SECTION      CHOLECYSTECTOMY      DILATION AND CURETTAGE OF UTERUS      ESOPHAGOGASTRODUODENOSCOPY N/A 2020    Procedure: EGD (ESOPHAGOGASTRODUODENOSCOPY)-need rapid covid;  Surgeon: Jose Smith MD;  Location: [facility];  Service: Endoscopy;  Laterality: N/A;       Review of patient's allergies indicates:  No Known Allergies  Family History     None        Tobacco Use    Smoking status: Never Smoker    Smokeless tobacco: Never Used   Substance and Sexual Activity    Alcohol use: Not Currently     Comment: patient reports she has not had any alcohol in past few months    Drug use: No    Sexual activity: Yes     Partners: Male     Birth control/protection: OCP     Review of Systems   Constitutional: Positive for fatigue. Negative for fever.   HENT: Negative for hearing loss.    Eyes: Negative for visual disturbance.   Respiratory: Positive for cough and shortness of breath.    Cardiovascular: Negative for chest pain.   Gastrointestinal:        As per HPI.   Genitourinary: Negative for dysuria, frequency and hematuria.   Musculoskeletal: Positive for back pain and myalgias. Negative for arthralgias.   Skin: Positive for color change. Negative for rash.   Neurological: Positive for weakness. Negative for seizures, syncope, numbness and headaches.   Hematological: Does not bruise/bleed easily.   Psychiatric/Behavioral: The patient is not nervous/anxious.      Objective:     Vital Signs (Most Recent):  Temp: 97.9 °F (36.6 °C) (10/29/20 1621)  Pulse: 75 (10/30/20 0930)  Resp: (!) 22 (10/30/20 0930)  BP: (!) 92/49 (10/30/20 0930)  SpO2: 96 % (10/30/20 0930) Vital Signs  (24h Range):  Temp:  [97.9 °F (36.6 °C)] 97.9 °F (36.6 °C)  Pulse:  [69-90] 75  Resp:  [17-28] 22  SpO2:  [93 %-97 %] 96 %  BP: ()/(37-56) 92/49     Weight: 71.2 kg (157 lb 1.2 oz) (10/29/20 1621)  Body mass index is 25.35 kg/m².    No intake or output data in the 24 hours ending 10/30/20 1008    Lines/Drains/Airways     Peripheral Intravenous Line                 Peripheral IV - Single Lumen 10/29/20 1714 20 G Left Antecubital less than 1 day                Physical Exam  Constitutional:       Appearance: She is well-developed and underweight. She is ill-appearing.   HENT:      Head: Normocephalic and atraumatic.   Eyes:      General: Scleral icterus present.      Extraocular Movements: Extraocular movements intact.   Neck:      Musculoskeletal: Normal range of motion and neck supple.      Vascular: No carotid bruit.   Cardiovascular:      Rate and Rhythm: Normal rate and regular rhythm.      Heart sounds: No murmur.   Pulmonary:      Effort: Pulmonary effort is normal. No respiratory distress.      Breath sounds: Decreased breath sounds (on the right; left is normal) present. No wheezing.   Abdominal:      General: Bowel sounds are normal. There is no distension.      Palpations: Abdomen is soft. There is no mass.      Tenderness: There is no abdominal tenderness.   Musculoskeletal:      Right lower leg: No edema.      Left lower leg: No edema.   Skin:     General: Skin is warm and dry.      Coloration: Skin is jaundiced.      Findings: No rash.   Neurological:      Mental Status: She is oriented to person, place, and time.      Cranial Nerves: No cranial nerve deficit.      Comments: No asterixis.   Psychiatric:         Behavior: Behavior normal.         Significant Labs:  CBC:   Recent Labs   Lab 10/29/20  1714   WBC 12.24   HGB 11.9*   HCT 33.7*        CMP:   Recent Labs   Lab 10/29/20  1714   *   CALCIUM 7.6*   ALBUMIN 1.5*   PROT 6.3   *   K 4.6   CO2 23   CL 92*   BUN 48*    CREATININE 2.7*   ALKPHOS 761*   ALT 91*   *   BILITOT 24.9*     Coagulation:   Recent Labs   Lab 10/29/20  1714   INR 2.6*   APTT 39.6*       Significant Imaging:  Imaging results within the past 24 hours have been reviewed.

## 2020-10-30 NOTE — PLAN OF CARE
Pt arrived to  for Liver biopsy and Thoracentesis. . Pt oriented to unit and staff. Plan of care reviewed with patient, patient verbalizes understanding. Comfort measures utilized. Pt safely transferred from stretcher to procedural table. Fall risk reviewed with patient, fall risk interventions maintained. Safety strap applied, positioner pillows utilized to minimize pressure points. Blankets applied. Pt prepped and draped utilizing standard sterile technique. Patient placed on continuous monitoring, as required by sedation policy. Timeouts completed utilizing standard universal time-out, per department and facility policy. RN to remain at bedside, continuous monitoring maintained. Pt resting comfortably. Denies pain/discomfort. Will continue to monitor. See flow sheets for monitoring, medication administration, and updates.

## 2020-10-30 NOTE — CONSULTS
[facility]  Gastroenterology  Consult Note    Patient Name: Madhavi Bell  MRN: [id number]  Admission Date: 10/29/2020  Hospital Length of Stay: 0 days  Code Status: Prior   Attending Provider: No att. providers found   Consulting Provider: Junior Segura PA-C  Primary Care Physician: Anurag Knutson MD  Principal Problem:<principal problem not specified>    Inpatient consult to Gastroenterology  Consult performed by: Junior Segura PA-C  Consult ordered by: Josh Page MD  Reason for consult: Liver failure        Subjective:     HPI:  The patient has a history of decompensated liver cirrhosis related to Alpha-1 antitrypsin def and alcohol. She is followed by Dr. Gray and has had issues with recurrent pleural effusion. She is being evaluated for liver transplant. She came to the ER for SOB. CXR showed complete opacification of the right lung. She was to be transferred to  but it was delayed due to bed availability. We were consulted after BP began to drop. A central line was started by the ER provider and given a dose of Midodrine. She complains of SOB, back pain, LUQ pain and hemorrhoids. She has alternating BMs and hasn't been taking Lactulose due to rectal pain with BMs. ER work up revealed an increase in INR, WBC count and creatinine from her baseline.     Past Medical History:   Diagnosis Date    Alpha-1-antitrypsin deficiency     ZZ phenotype    Eosinophilic esophagitis     h/o pill impaction, bx proven    Hyperlipidemia     may be related to liver enzymes, 2ary to high HDL    Schatzki's ring     s/p dilatation       Past Surgical History:   Procedure Laterality Date     SECTION      CHOLECYSTECTOMY      DILATION AND CURETTAGE OF UTERUS      ESOPHAGOGASTRODUODENOSCOPY N/A 2020    Procedure: EGD (ESOPHAGOGASTRODUODENOSCOPY)-need rapid covid;  Surgeon: Jose Smith MD;  Location: [facility];  Service: Endoscopy;  Laterality: N/A;       Review of  patient's allergies indicates:  No Known Allergies  Family History     None        Tobacco Use    Smoking status: Never Smoker    Smokeless tobacco: Never Used   Substance and Sexual Activity    Alcohol use: Not Currently     Comment: patient reports she has not had any alcohol in past few months    Drug use: No    Sexual activity: Yes     Partners: Male     Birth control/protection: OCP     Review of Systems   Constitutional: Positive for fatigue. Negative for fever.   HENT: Negative for hearing loss.    Eyes: Negative for visual disturbance.   Respiratory: Positive for cough and shortness of breath.    Cardiovascular: Negative for chest pain.   Gastrointestinal:        As per HPI.   Genitourinary: Negative for dysuria, frequency and hematuria.   Musculoskeletal: Positive for back pain and myalgias. Negative for arthralgias.   Skin: Positive for color change. Negative for rash.   Neurological: Positive for weakness. Negative for seizures, syncope, numbness and headaches.   Hematological: Does not bruise/bleed easily.   Psychiatric/Behavioral: The patient is not nervous/anxious.      Objective:     Vital Signs (Most Recent):  Temp: 97.9 °F (36.6 °C) (10/29/20 1621)  Pulse: 75 (10/30/20 0930)  Resp: (!) 22 (10/30/20 0930)  BP: (!) 92/49 (10/30/20 0930)  SpO2: 96 % (10/30/20 0930) Vital Signs (24h Range):  Temp:  [97.9 °F (36.6 °C)] 97.9 °F (36.6 °C)  Pulse:  [69-90] 75  Resp:  [17-28] 22  SpO2:  [93 %-97 %] 96 %  BP: ()/(37-56) 92/49     Weight: 71.2 kg (157 lb 1.2 oz) (10/29/20 1621)  Body mass index is 25.35 kg/m².    No intake or output data in the 24 hours ending 10/30/20 1008    Lines/Drains/Airways     Peripheral Intravenous Line                 Peripheral IV - Single Lumen 10/29/20 1714 20 G Left Antecubital less than 1 day                Physical Exam  Constitutional:       Appearance: She is well-developed and underweight. She is ill-appearing.   HENT:      Head: Normocephalic and atraumatic.    Eyes:      General: Scleral icterus present.      Extraocular Movements: Extraocular movements intact.   Neck:      Musculoskeletal: Normal range of motion and neck supple.      Vascular: No carotid bruit.   Cardiovascular:      Rate and Rhythm: Normal rate and regular rhythm.      Heart sounds: No murmur.   Pulmonary:      Effort: Pulmonary effort is normal. No respiratory distress.      Breath sounds: Decreased breath sounds (on the right; left is normal) present. No wheezing.   Abdominal:      General: Bowel sounds are normal. There is no distension.      Palpations: Abdomen is soft. There is no mass.      Tenderness: There is no abdominal tenderness.   Musculoskeletal:      Right lower leg: No edema.      Left lower leg: No edema.   Skin:     General: Skin is warm and dry.      Coloration: Skin is jaundiced.      Findings: No rash.   Neurological:      Mental Status: She is oriented to person, place, and time.      Cranial Nerves: No cranial nerve deficit.      Comments: No asterixis.   Psychiatric:         Behavior: Behavior normal.         Significant Labs:  CBC:   Recent Labs   Lab 10/29/20  1714   WBC 12.24   HGB 11.9*   HCT 33.7*        CMP:   Recent Labs   Lab 10/29/20  1714   *   CALCIUM 7.6*   ALBUMIN 1.5*   PROT 6.3   *   K 4.6   CO2 23   CL 92*   BUN 48*   CREATININE 2.7*   ALKPHOS 761*   ALT 91*   *   BILITOT 24.9*     Coagulation:   Recent Labs   Lab 10/29/20  1714   INR 2.6*   APTT 39.6*       Significant Imaging:  Imaging results within the past 24 hours have been reviewed.    Assessment/Plan:     Decompensated hepatic cirrhosis  Bed now available in [place] and will be transferred soon.   Prior to finalization of that plan, orders placed included BCx x 2, UA, IV albumin, urine sodium, IR consult for thoracentesis and empiric antibiotics discussed with Dr. Page. I believe labs done but other not done yet. Will get additional work up once transfer to [facility].  complete. Case discussed with Dr. Gray who has been actively involved.          Thank you for your consult. I will follow-up with patient. Please contact us if you have any additional questions.    Junior Segura PA-C  Gastroenterology  [facility]

## 2020-10-30 NOTE — CONSULTS
Palliative consult received. Spoke with primary team. Will complete consult over the weekend if possible

## 2020-10-30 NOTE — SIGNIFICANT EVENT
I was contacted by the [facility] emergency department this morning.  Patient's blood pressure has been lower this morning.  No acute change in symptoms, though she is on increased FiO2 on chart review.  No alteration in mentation noted.  She is on midodrine at home.  They will give her morning dose and monitor blood pressure.  Speaking with the referral center, there are still no no beds available at [facility].  Once we see the response to midodrine, will determine whether patient needs to transition to an ICU admit or potentially go from ER-ER.

## 2020-10-30 NOTE — ED NOTES
Blood pressure cuff changed to appropriate size.Changed patient into gown at this time. Increased O2 to 4L due to increased shortness of breath on exertion.  VSS. Will continue to monitor.

## 2020-10-30 NOTE — PROCEDURES
Radiology Post-Procedure Note    Pre Op Diagnosis: Effusion    Post Op Diagnosis: Same    Procedure: US guided thoracentesis.    Procedure performed by: Javier Pace MD    Written Informed Consent Obtained: Yes  Specimen Removed: YES 1.7 L serosanguinous fluid  Estimated Blood Loss: Minimal    Findings:   Successful right thoracentesis.  No complications. Sample sent to lab.    Patient tolerated procedure well.    Javier Pace M.D.  Diagnostic and Interventional Radiologist  Department of Radiology  Pager: [phone]

## 2020-10-31 PROBLEM — Z51.5 PALLIATIVE CARE ENCOUNTER: Status: ACTIVE | Noted: 2020-01-01

## 2020-10-31 PROBLEM — R57.9 SHOCK, UNSPECIFIED: Status: ACTIVE | Noted: 2020-01-01

## 2020-10-31 PROBLEM — J96.01 ACUTE HYPOXEMIC RESPIRATORY FAILURE: Status: ACTIVE | Noted: 2020-01-01

## 2020-10-31 NOTE — ASSESSMENT & PLAN NOTE
Likely related to volume overloaded in setting of decompensated cirrhosis complicated by hepato-hydrothorax and worsening LINN with minimal UOP. CXR with significant pleural effusion of the right side    --4L on admission; now up to 12L  --S/p thoracentesis with 1.5L removal on 10/31  --Repeat thoracentesis today  --Wean supplemental oxygen for goal saturation 88-92%  --May eventually require bipap to maintain WOB

## 2020-10-31 NOTE — SUBJECTIVE & OBJECTIVE
Past Medical History:   Diagnosis Date    Alpha-1-antitrypsin deficiency     ZZ phenotype    Eosinophilic esophagitis     h/o pill impaction, bx proven    Hyperlipidemia     may be related to liver enzymes, 2ary to high HDL    Schatzki's ring     s/p dilatation       Past Surgical History:   Procedure Laterality Date     SECTION      CHOLECYSTECTOMY      DILATION AND CURETTAGE OF UTERUS      ESOPHAGOGASTRODUODENOSCOPY N/A 2020    Procedure: EGD (ESOPHAGOGASTRODUODENOSCOPY)-need rapid covid;  Surgeon: Jose Smith MD;  Location: [facility];  Service: Endoscopy;  Laterality: N/A;       Review of patient's allergies indicates:  No Known Allergies  Current Facility-Administered Medications   Medication Frequency    0.9%  NaCl infusion (for blood administration) Q24H PRN    albumin human 25% bottle 25 g BID    [START ON 11/3/2020] ergocalciferol capsule 50,000 Units Q7 Days    fentaNYL injection Code/trauma/sedation Med    heparin (porcine) injection 5,000 Units Q8H    hydrocortisone suppository 25 mg BID    lactulose 20 gram/30 mL solution Soln 20 g TID    lidocaine HCL 10 mg/ml (1%) injection Code/trauma/sedation Med    midazolam (VERSED) 1 mg/mL injection Code/trauma/sedation Med    midodrine tablet 10 mg TID WM    multivitamin tablet Daily    norepinephrine 4 mg in dextrose 5% 250 mL infusion (premix) (titrating) Continuous    octreotide injection 100 mcg Q8H    ondansetron 4 mg/5 mL solution 4 mg Once    ondansetron injection 4 mg Q6H PRN    piperacillin-tazobactam 4.5 g in dextrose 5 % 100 mL IVPB (ready to mix system) Q8H    pneumoc 13-david conj-dip cr(PF) (PREVNAR 13 (PF)) 0.5 mL vaccine x 1 dose    pramoxine-hydrocortisone rectal cream TID    promethazine (PHENERGAN) 6.25 mg in dextrose 5 % 50 mL IVPB Q6H PRN    rifAXIMin tablet 550 mg BID    sodium chloride 0.9% flush 10 mL PRN    vancomycin - pharmacy to dose pharmacy to manage frequency    zinc sulfate  capsule 220 mg Daily     Family History     None        Tobacco Use    Smoking status: Never Smoker    Smokeless tobacco: Never Used   Substance and Sexual Activity    Alcohol use: Not Currently     Comment: patient reports she has not had any alcohol in past few months    Drug use: No    Sexual activity: Yes     Partners: Male     Birth control/protection: OCP     Review of Systems   Respiratory: Positive for cough and shortness of breath.    Gastrointestinal: Positive for abdominal distention.     Objective:     Vital Signs (Most Recent):  Temp: 98 °F (36.7 °C) (10/31/20 0400)  Pulse: 70 (10/31/20 0545)  Resp: (!) 28 (10/31/20 0545)  BP: (!) 82/47 (10/31/20 0545)  SpO2: (!) 93 % (10/31/20 0545)  O2 Device (Oxygen Therapy): High Flow nasal Cannula (10/31/20 0530) Vital Signs (24h Range):  Temp:  [97.8 °F (36.6 °C)-98.4 °F (36.9 °C)] 98 °F (36.7 °C)  Pulse:  [68-85] 70  Resp:  [15-40] 28  SpO2:  [89 %-98 %] 93 %  BP: ()/(37-78) 82/47     Weight: 71.2 kg (157 lb 1.2 oz) (10/30/20 1340)  Body mass index is 25.35 kg/m².  Body surface area is 1.82 meters squared.    I/O last 3 completed shifts:  In: 1180.9 [I.V.:580.9; Blood:250; IV Piggyback:350]  Out: 423 [Urine:423]    Physical Exam  Vitals signs and nursing note reviewed.   Constitutional:       General: She is not in acute distress.  HENT:      Head: Normocephalic and atraumatic.   Neck:      Musculoskeletal: Normal range of motion.   Cardiovascular:      Rate and Rhythm: Normal rate and regular rhythm.      Heart sounds: No murmur.   Pulmonary:      Effort: No respiratory distress.      Comments: Inspiratory crackles left lung  Decreased breath sounds rt lung  Abdominal:      General: There is distension.      Tenderness: There is no abdominal tenderness.   Musculoskeletal:      Right lower leg: No edema.      Left lower leg: No edema.   Neurological:      General: No focal deficit present.      Mental Status: She is alert and oriented to person, place,  and time.         Significant Labs:  ABGs: No results for input(s): PH, PCO2, HCO3, POCSATURATED, BE in the last 168 hours.  CMP:   Recent Labs   Lab 10/31/20  0452   *   CALCIUM 8.1*   ALBUMIN 3.0*   PROT 5.9*   *   K 4.7   CO2 22*   CL 90*   BUN 55*   CREATININE 4.0*   ALKPHOS 563*   ALT 58*   AST 80*   BILITOT 25.0*     Coagulation:   Recent Labs   Lab 10/30/20  1340 10/31/20  0452   INR 2.4* 3.5*   APTT 40.4*  --      LFTs:   Recent Labs   Lab 10/31/20  0452   ALT 58*   AST 80*   ALKPHOS 563*   BILITOT 25.0*   PROT 5.9*   ALBUMIN 3.0*     Recent Labs   Lab 10/30/20  1334   COLORU Yellow  Natalie   SPECGRAV 1.020  1.015   PHUR 6.0  5.0   PROTEINUA 2+*  Negative   BACTERIA Occasional  Occasional   NITRITE Negative  Negative   LEUKOCYTESUR 2+*  Negative   HYALINECASTS 88*  87*     All labs within the past 24 hours have been reviewed.    Significant Imaging:     X-Ray: Reviewed  10-31: Personally reviewed

## 2020-10-31 NOTE — ASSESSMENT & PLAN NOTE
45 year old female with PMH decompensated cirrhosis due to alpha 1 antitrypsin and alcohol use, LINN, hyponatremia, recurrent hepatic hydrothorax and functional decline presents as transfer for liver bx and transplant work-up. Patient is on 9L HFNC and levophed. Liver biopsy results pending       GOC/ACP   -Met with patient at bedside. Introduced palliative medicine and how we can be helpful going forward. Discussed hoping for the best while beginning to think of a plan for if things do not go the way we are hoping. Patient is anxiously awaiting results of liver biopsy to see if she will be a candidate for liver transplant. Patient says that she has no ACP documents or living will. Says she has never thought about if she were to get sicker but acknowledges this is something she and her  need to discuss. Patient's  is visiting this weekend. Patient confirmed that she would want her  Alli Bell [phone] to be her surrogate decision maker. Encouraged patient to discuss her wishes with her  this weekend     -Patient is full code   -Patient's  Alli Bell confirmed as patient's surrogate decision maker   -Patient plans to discuss her wishes with her  this weekend should she get sicker. Will continue to follow

## 2020-10-31 NOTE — ASSESSMENT & PLAN NOTE
Reports 3 thoracentesis in the past for symptom management.     --s/p thoracentesis of 1.5 L removal on 10/30; labs negative for SBP  --Plan for repeat thora 10/31 due to increasing oxygen requirements

## 2020-10-31 NOTE — ASSESSMENT & PLAN NOTE
Likely related to decompensated cirrhosis vs underlying infection (less likely)    --See sepsis and cirrhosis for further plan  --Continue norepinephrine and vasopressin to maintain MAP >70

## 2020-10-31 NOTE — ASSESSMENT & PLAN NOTE
New leukocytosis with hypotension concerning for possible infection    --Broad spectrum abx initiated  --BCx NGTD  --UA with 2+ leuks and 46 WBCs; reflex to culture pending  --No pocket for paracentesis  --Thoracentesis fluid negative for SBP

## 2020-10-31 NOTE — SUBJECTIVE & OBJECTIVE
Interval History/Significant Events: Oxygen requirements increased from 4L to 9L overnight. Norepinephrine initiated and currently requiring 0.12 mcg/kg/min.     Review of Systems   Constitutional: Negative for chills and fatigue.   HENT: Negative for congestion and facial swelling.    Eyes: Negative for photophobia and visual disturbance.   Respiratory: Positive for shortness of breath. Negative for cough.    Cardiovascular: Negative for chest pain.   Gastrointestinal: Negative for abdominal pain and nausea.   Genitourinary: Positive for decreased urine volume. Negative for hematuria.   Musculoskeletal: Negative for back pain and joint swelling.   Skin: Negative for pallor and rash.   Neurological: Negative for light-headedness and headaches.   Psychiatric/Behavioral: Negative for agitation and confusion.     Objective:     Vital Signs (Most Recent):  Temp: 98 °F (36.7 °C) (10/31/20 0400)  Pulse: 73 (10/31/20 0645)  Resp: 20 (10/31/20 0645)  BP: (!) 83/45 (10/31/20 0645)  SpO2: (!) 92 % (10/31/20 0645) Vital Signs (24h Range):  Temp:  [97.8 °F (36.6 °C)-98.4 °F (36.9 °C)] 98 °F (36.7 °C)  Pulse:  [68-85] 73  Resp:  [15-40] 20  SpO2:  [89 %-98 %] 92 %  BP: ()/(37-78) 83/45   Weight: 71.2 kg (157 lb 1.2 oz)  Body mass index is 25.35 kg/m².      Intake/Output Summary (Last 24 hours) at 10/31/2020 0822  Last data filed at 10/31/2020 0700  Gross per 24 hour   Intake 1228.3 ml   Output 443 ml   Net 785.3 ml       Physical Exam  Vitals signs reviewed.   Constitutional:       Appearance: She is well-developed.      Interventions: Nasal cannula in place.   HENT:      Head: Normocephalic and atraumatic.   Eyes:      General: Scleral icterus present.      Pupils: Pupils are equal, round, and reactive to light.   Neck:      Thyroid: No thyromegaly.      Trachea: No tracheal deviation.      Comments: [facility] TLC in place.   Cardiovascular:      Rate and Rhythm: Normal rate and regular rhythm.      Heart sounds: Normal heart  sounds. No murmur. No friction rub. No gallop.    Pulmonary:      Effort: Pulmonary effort is normal. Tachypnea present. No accessory muscle usage.      Breath sounds: Examination of the right-lower field reveals decreased breath sounds. Decreased breath sounds and rales present. No wheezing or rhonchi.   Abdominal:      General: Bowel sounds are normal. There is distension.      Palpations: Abdomen is soft.   Genitourinary:     Comments: Garcia in place.   Musculoskeletal: Normal range of motion.   Skin:     General: Skin is warm and dry.   Neurological:      Mental Status: She is alert and oriented to person, place, and time.      Sensory: No sensory deficit.   Psychiatric:         Behavior: Behavior is cooperative.          Lines/Drains/Airways     Central Venous Catheter Line            Percutaneous Central Line Insertion/Assessment - Triple Lumen  10/30/20 0855 left internal jugular less than 1 day          Drain                 Urethral Catheter 10/30/20 1615 16 Fr. less than 1 day          Peripheral Intravenous Line                 Peripheral IV - Single Lumen 10/29/20 1714 20 G Left Antecubital 1 day              Significant Labs:    CBC/Anemia Profile:  Recent Labs   Lab 10/29/20  1714 10/30/20  1340 10/31/20  0453   WBC 12.24 19.03* 17.19*   HGB 11.9* 11.2* 9.3*   HCT 33.7* 30.8* 26.7*    150 108*   MCV 89 90 92   RDW 18.2* 18.2* 18.3*        Chemistries:  Recent Labs   Lab 10/29/20  1714 10/30/20  1340 10/30/20  1802 10/31/20  0452   * 124*  --  125*   K 4.6 4.4  --  4.7   CL 92* 92*  --  90*   CO2 23 20*  --  22*   BUN 48* 54*  --  55*   CREATININE 2.7* 3.3*  --  4.0*   CALCIUM 7.6* 8.1*  --  8.1*   ALBUMIN 1.5* 2.0* 2.0* 3.0*   PROT 6.3 5.9* 5.8* 5.9*   BILITOT 24.9* 25.6*  --  25.0*   ALKPHOS 761* 735*  --  563*   ALT 91* 81*  --  58*   * 121*  --  80*   MG 3.6* 3.7*  --  3.5*   PHOS  --  4.6*  --  5.9*       All pertinent labs within the past 24 hours have been  reviewed.    Significant Imaging:  I have reviewed and interpreted all pertinent imaging results/findings within the past 24 hours.

## 2020-10-31 NOTE — NURSING
Informed Dr. Vance of UOP 5-15 ml/hr and that her oxygen requirements have increased from 5L nasal cannula to 9L High Flow.

## 2020-10-31 NOTE — ASSESSMENT & PLAN NOTE
Secondary to A1AT deficiency and ETOH abuse complicated by HCC, HE, hepatic hydrothorax, ascites, hepatic varices and hyponatremia.    --abdominal US with doppler 10/31with solid lesions in right and left lower lobe; portal HTN; splenomegaly  --S/p liver biopsy 10/30, pathology pending  --Hepatology consulted  --Continue midodrine 10 TID  --Lactulose/rifaximin    MELD-Na score: 44 at 10/31/2020  4:52 AM  MELD score: 46 at 10/31/2020  4:52 AM  Calculated from:  Serum Creatinine: 4.0 mg/dL at 10/31/2020  4:52 AM  Serum Sodium: 125 mmol/L at 10/31/2020  4:52 AM  Total Bilirubin: 25.0 mg/dL at 10/31/2020  4:52 AM  INR(ratio): 3.5 at 10/31/2020  4:52 AM  Age: 45 years

## 2020-10-31 NOTE — ASSESSMENT & PLAN NOTE
Reports 3 thoracentesis in the past for symptom management.     --s/p thoracentesis of 1.5 L removal on 10/30; labs pending  --Remains on 4L

## 2020-10-31 NOTE — HPI
A 44 y/o female with decompensated liver cirrhosis due to alpha-1 antitrypsin deficiency and alcohol use, recurrent right sided hepato-hydrothorax, HCC who presented to [place] ED on 10/29 with shortness of breath and transferred to [facility] for liver transplant evaluation on 10/30. Patient reports worsening SOB over the last two days. Patient scheduled for outpatient thoracentesis next week however was advised by GI doctor to present to the ED due to worsening symptoms. Patient reports associated dry cough with pleural effusions but no worse than baseline. Denies fever, chills, chest pain, abdominal pain, confusion.    Upon arrival, CXR with significant right sided pleural effusion. Labs significant for new leukocytosis, t bili of 24 and and sCr 2.7.   Patient underwent liver biopsy and thoracentesis (1.5 L removed) on 10/30.    Nephrology consulted for management of LINN.

## 2020-10-31 NOTE — ASSESSMENT & PLAN NOTE
Ms. Bell is a 44 y/o F with previously described history, physical exam, laboratories and imaging studies being consulted for LINN in the setting of decompensated liver cirrhosis 2/2 alpha 1 antitrypsin deficiency and alcohol abuse.   - LINN likely 2/2 HRS: patient with significant hemodynamic compromise at least in the last 24 hrs, with MAP's as low as 58 and readings consistently in the 60's. Likely culprit is sepsis/septic shock.   - U/A: granular and hyaline casts    Rec:  - Titrate Levophed to a MAP of ~80-85  - Continue Albumin, Midodrine, Octreotide   - Strict I/O and chart  - Avoid nephrotoxic medications, NSAIDs, IV contrast, etc.  - Daily weights and chart  - Medication doses adjusted to GFR  - Hb > 7 gm/dL  - Will follow closely

## 2020-10-31 NOTE — ASSESSMENT & PLAN NOTE
Secondary to A1AT deficiency and ETOH abuse complicated by HCC, HE, hepatic hydrothorax, ascites, hepatic varices and hyponatremia.    --S/p liver biopsy 10/30, pathology pending  --Hepatology consulted  --Continue midodrine 10 TID  --Lactulose/rifaximin  --abdominal US with doppler pending    MELD-Na score: 40 at 10/30/2020  1:40 PM  MELD score: 40 at 10/30/2020  1:40 PM  Calculated from:  Serum Creatinine: 3.3 mg/dL at 10/30/2020  1:40 PM  Serum Sodium: 124 mmol/L (Rounded to 125 mmol/L) at 10/30/2020  1:40 PM  Total Bilirubin: 25.6 mg/dL at 10/30/2020  1:40 PM  INR(ratio): 2.4 at 10/30/2020  1:40 PM  Age: 45 years

## 2020-10-31 NOTE — ASSESSMENT & PLAN NOTE
- Likely Hypervolemic hyponatremia 2/2 liver cirrhosis: Ying <20, FeNa ~0.4    Rec:  - If possible, avoid dilution of IV meds in D5W  - Urine osmolality

## 2020-10-31 NOTE — HPI
45 year old female with PMH decompensated cirrhosis 2/2 alpha 1 antitrypsin and alcohol use, recurrent hepatic hydrothorax, and liver lesions who was transferred from [place] for further work up for liver transplant. Patient was undergoing transplant work up as outpatient but then started feeling worse with increased shortness of breath. She was advised to present to the ER. Patient underwent thoracentesis and liver bx 10/30 upon arrival. Patient also with hyponatremia and LINN. Per hepatology note, transplant status depends on results of pending liver bx.     Palliative consulted as part of liver transplant work up

## 2020-10-31 NOTE — SUBJECTIVE & OBJECTIVE
Interval History: Patient reports feeling a little less short of breath now that she is on HFNC. Denies pain. Reports sleeping well last night.  to visit later on today     Past Medical History:   Diagnosis Date    Alpha-1-antitrypsin deficiency     ZZ phenotype    Eosinophilic esophagitis     h/o pill impaction, bx proven    Hyperlipidemia     may be related to liver enzymes, 2ary to high HDL    Schatzki's ring     s/p dilatation       Past Surgical History:   Procedure Laterality Date     SECTION      CHOLECYSTECTOMY      DILATION AND CURETTAGE OF UTERUS      ESOPHAGOGASTRODUODENOSCOPY N/A 2020    Procedure: EGD (ESOPHAGOGASTRODUODENOSCOPY)-need rapid covid;  Surgeon: Jose Smith MD;  Location[facility];  Service: Endoscopy;  Laterality: N/A;       Review of patient's allergies indicates:  No Known Allergies    Medications:  Continuous Infusions:   norepinephrine bitartrate-D5W 0.34 mcg/kg/min (10/31/20 1020)     Scheduled Meds:   albumin human 25%  25 g Intravenous BID    [START ON 11/3/2020] ergocalciferol  50,000 Units Oral Q7 Days    lactulose  20 g Oral TID    midodrine  10 mg Oral TID WM    multivitamin  1 tablet Oral Daily    octreotide  100 mcg Subcutaneous Q8H    ondansetron  4 mg Oral Once    [START ON 2020] phytonadione ((AQUA-MEPHYTON) IVPB  10 mg Intravenous Daily    piperacillin-tazobactam (ZOSYN) IVPB  4.5 g Intravenous Q12H    rifAXImin  550 mg Oral BID    zinc sulfate  220 mg Oral Daily     PRN Meds:sodium chloride, fentaNYL, lidocaine HCL 10 mg/ml (1%), midazolam, ondansetron, pneumoc 13-david conj-dip cr(PF), promethazine (PHENERGAN) IVPB, sodium chloride 0.9%, Pharmacy to dose Vancomycin consult **AND** vancomycin - pharmacy to dose    Family Hx: No known family hx of cancer       Tobacco Use    Smoking status: Never Smoker    Smokeless tobacco: Never Used   Substance and Sexual Activity    Alcohol use: Not Currently     Comment:  patient reports she has not had any alcohol in past few months    Drug use: No    Sexual activity: Yes     Partners: Male     Birth control/protection: OCP       Review of Systems   Constitutional: Positive for activity change, appetite change and fatigue.   HENT: Negative.    Eyes: Negative.    Respiratory: Positive for cough and shortness of breath.    Cardiovascular: Negative.    Gastrointestinal: Positive for nausea.   Genitourinary: Positive for decreased urine volume.   Musculoskeletal: Negative.    Skin: Negative.    Neurological: Negative.    Hematological: Negative.    Psychiatric/Behavioral: Negative.      Objective:     Vital Signs (Most Recent):  Temp: 98 °F (36.7 °C) (10/31/20 0400)  Pulse: 91 (10/31/20 1015)  Resp: (!) 30 (10/31/20 1015)  BP: (!) 106/56 (10/31/20 1015)  SpO2: (!) 90 % (10/31/20 1015) Vital Signs (24h Range):  Temp:  [97.8 °F (36.6 °C)-98.4 °F (36.9 °C)] 98 °F (36.7 °C)  Pulse:  [68-91] 91  Resp:  [15-59] 30  SpO2:  [89 %-98 %] 90 %  BP: ()/(42-78) 106/56     Weight: 71.2 kg (157 lb 1.2 oz)  Body mass index is 25.35 kg/m².    Physical Exam  Constitutional:       General: She is not in acute distress.     Appearance: She is ill-appearing.   HENT:      Head: Normocephalic.      Right Ear: External ear normal.      Left Ear: External ear normal.      Nose: Nose normal.      Mouth/Throat:      Mouth: Mucous membranes are dry.   Eyes:      General: Scleral icterus present.   Neck:      Musculoskeletal: Normal range of motion.   Cardiovascular:      Rate and Rhythm: Normal rate.   Pulmonary:      Effort: No respiratory distress.      Comments: Increased effort when talking   Abdominal:      General: There is distension.   Musculoskeletal: Normal range of motion.   Skin:     General: Skin is warm and dry.   Neurological:      Mental Status: She is alert and oriented to person, place, and time.   Psychiatric:         Mood and Affect: Mood normal.         Behavior: Behavior normal.          Review of Symptoms    Symptom Assessment (ESAS 0-10 Scale)  Pain:  0  Dyspnea:  5  Anxiety:  0  Nausea:  2  Depression:  0  Anorexia:  3  Fatigue:  4  Insomnia:  0  Restlessness:  0  Agitation:  0     CAM / Delirium:  Negative  Constipation:  Negative  Diarrhea:  Negative    Bowel Management Plan (BMP):  Yes            Performance Status:  50    Living Arrangements:  Lives with family    Psychosocial/Cultural: Patient lives with  and teenage children in [place]. Works as an [employer] tech     Spiritual:  F - Ema and Belief:  Not addressed       Advance Care Planning   Advance Directives:   Living Will: No    LaPOST: No    Do Not Resuscitate Status: No    Medical Power of : No (Patient would like her hsuband who is legal NOK to be her surrogate decision maker )    Agent's Name:  Alli Bell   Agent's Contact Number:  [phone]    Decision Making:  Patient answered questions         Significant Labs: All pertinent labs within the past 24 hours have been reviewed.  CBC:   Recent Labs   Lab 10/31/20  0916   WBC 17.29*   HGB 9.4*   HCT 27.6*   MCV 93   *     BMP:  Recent Labs   Lab 10/31/20  0452   *   *   K 4.7   CL 90*   CO2 22*   BUN 55*   CREATININE 4.0*   CALCIUM 8.1*   MG 3.5*     LFT:  Lab Results   Component Value Date    AST 80 (H) 10/31/2020     (H) 10/08/2020    ALKPHOS 563 (H) 10/31/2020    BILITOT 25.0 (H) 10/31/2020     Albumin:   Albumin   Date Value Ref Range Status   10/31/2020 3.0 (L) 3.5 - 5.2 g/dL Final     Protein:   Total Protein   Date Value Ref Range Status   10/31/2020 5.9 (L) 6.0 - 8.4 g/dL Final     Lactic acid:   Lab Results   Component Value Date    LACTATE 1.5 10/30/2020    LACTATE 1.7 10/29/2020       Significant Imaging: I have reviewed all pertinent imaging results/findings within the past 24 hours.

## 2020-10-31 NOTE — PLAN OF CARE
During abdominal ultrasound evaluation, there was insufficient ascites identified for safe paracentesis. No paracentesis performed. Will attempt repeat thoracentesis to assist with dyspnea.       Codie Wong PA-C  Critical Care Medicine  10/31/2020   4:22 PM

## 2020-10-31 NOTE — CONSULTS
[facility]  Nephrology  Consult Note    Patient Name: Madhavi Bell  MRN: [id number]  Admission Date: 10/30/2020  Hospital Length of Stay: 1 days  Attending Provider: Thierry Kong MD   Primary Care Physician: Anurag Knutson MD  Principal Problem:Decompensated hepatic cirrhosis    Inpatient consult to Nephrology  Consult performed by: Ronn Almendarez MD  Consult ordered by: Codie Wong PA-C  Reason for consult: LINN        Subjective:     HPI: A 44 y/o female with decompensated liver cirrhosis due to alpha-1 antitrypsin deficiency and alcohol use, recurrent right sided hepato-hydrothorax, HCC who presented to [place] ED on 10/29 with shortness of breath and transferred to [facility] for liver transplant evaluation on 10/30. Patient reports worsening SOB over the last two days. Patient scheduled for outpatient thoracentesis next week however was advised by GI doctor to present to the ED due to worsening symptoms. Patient reports associated dry cough with pleural effusions but no worse than baseline. Denies fever, chills, chest pain, abdominal pain, confusion.    Upon arrival, CXR with significant right sided pleural effusion. Labs significant for new leukocytosis, t bili of 24 and and sCr 2.7.   Patient underwent liver biopsy and thoracentesis (1.5 L removed) on 10/30.    Nephrology consulted for management of LINN.     Past Medical History:   Diagnosis Date    Alpha-1-antitrypsin deficiency     ZZ phenotype    Eosinophilic esophagitis     h/o pill impaction, bx proven    Hyperlipidemia     may be related to liver enzymes, 2ary to high HDL    Schatzki's ring     s/p dilatation       Past Surgical History:   Procedure Laterality Date     SECTION      CHOLECYSTECTOMY      DILATION AND CURETTAGE OF UTERUS      ESOPHAGOGASTRODUODENOSCOPY N/A 2020    Procedure: EGD (ESOPHAGOGASTRODUODENOSCOPY)-need rapid covid;  Surgeon: Jose Smith MD;  Location: [facility]  ENDO;  Service: Endoscopy;  Laterality: N/A;       Review of patient's allergies indicates:  No Known Allergies  Current Facility-Administered Medications   Medication Frequency    0.9%  NaCl infusion (for blood administration) Q24H PRN    albumin human 25% bottle 25 g BID    [START ON 11/3/2020] ergocalciferol capsule 50,000 Units Q7 Days    fentaNYL injection Code/trauma/sedation Med    heparin (porcine) injection 5,000 Units Q8H    hydrocortisone suppository 25 mg BID    lactulose 20 gram/30 mL solution Soln 20 g TID    lidocaine HCL 10 mg/ml (1%) injection Code/trauma/sedation Med    midazolam (VERSED) 1 mg/mL injection Code/trauma/sedation Med    midodrine tablet 10 mg TID WM    multivitamin tablet Daily    norepinephrine 4 mg in dextrose 5% 250 mL infusion (premix) (titrating) Continuous    octreotide injection 100 mcg Q8H    ondansetron 4 mg/5 mL solution 4 mg Once    ondansetron injection 4 mg Q6H PRN    piperacillin-tazobactam 4.5 g in dextrose 5 % 100 mL IVPB (ready to mix system) Q8H    pneumoc 13-david conj-dip cr(PF) (PREVNAR 13 (PF)) 0.5 mL vaccine x 1 dose    pramoxine-hydrocortisone rectal cream TID    promethazine (PHENERGAN) 6.25 mg in dextrose 5 % 50 mL IVPB Q6H PRN    rifAXIMin tablet 550 mg BID    sodium chloride 0.9% flush 10 mL PRN    vancomycin - pharmacy to dose pharmacy to manage frequency    zinc sulfate capsule 220 mg Daily     Family History     None        Tobacco Use    Smoking status: Never Smoker    Smokeless tobacco: Never Used   Substance and Sexual Activity    Alcohol use: Not Currently     Comment: patient reports she has not had any alcohol in past few months    Drug use: No    Sexual activity: Yes     Partners: Male     Birth control/protection: OCP     Review of Systems   Respiratory: Positive for cough and shortness of breath.    Gastrointestinal: Positive for abdominal distention.     Objective:     Vital Signs (Most Recent):  Temp: 98 °F (36.7 °C)  (10/31/20 0400)  Pulse: 70 (10/31/20 0545)  Resp: (!) 28 (10/31/20 0545)  BP: (!) 82/47 (10/31/20 0545)  SpO2: (!) 93 % (10/31/20 0545)  O2 Device (Oxygen Therapy): High Flow nasal Cannula (10/31/20 0530) Vital Signs (24h Range):  Temp:  [97.8 °F (36.6 °C)-98.4 °F (36.9 °C)] 98 °F (36.7 °C)  Pulse:  [68-85] 70  Resp:  [15-40] 28  SpO2:  [89 %-98 %] 93 %  BP: ()/(37-78) 82/47     Weight: 71.2 kg (157 lb 1.2 oz) (10/30/20 1340)  Body mass index is 25.35 kg/m².  Body surface area is 1.82 meters squared.    I/O last 3 completed shifts:  In: 1180.9 [I.V.:580.9; Blood:250; IV Piggyback:350]  Out: 423 [Urine:423]    Physical Exam  Vitals signs and nursing note reviewed.   Constitutional:       General: She is not in acute distress.  HENT:      Head: Normocephalic and atraumatic.   Neck:      Musculoskeletal: Normal range of motion.   Cardiovascular:      Rate and Rhythm: Normal rate and regular rhythm.      Heart sounds: No murmur.   Pulmonary:      Effort: No respiratory distress.      Comments: Inspiratory crackles left lung  Decreased breath sounds rt lung  Abdominal:      General: There is distension.      Tenderness: There is no abdominal tenderness.   Musculoskeletal:      Right lower leg: No edema.      Left lower leg: No edema.   Neurological:      General: No focal deficit present.      Mental Status: She is alert and oriented to person, place, and time.         Significant Labs:  ABGs: No results for input(s): PH, PCO2, HCO3, POCSATURATED, BE in the last 168 hours.  CMP:   Recent Labs   Lab 10/31/20  0452   *   CALCIUM 8.1*   ALBUMIN 3.0*   PROT 5.9*   *   K 4.7   CO2 22*   CL 90*   BUN 55*   CREATININE 4.0*   ALKPHOS 563*   ALT 58*   AST 80*   BILITOT 25.0*     Coagulation:   Recent Labs   Lab 10/30/20  1340 10/31/20  0452   INR 2.4* 3.5*   APTT 40.4*  --      LFTs:   Recent Labs   Lab 10/31/20  0452   ALT 58*   AST 80*   ALKPHOS 563*   BILITOT 25.0*   PROT 5.9*   ALBUMIN 3.0*     Recent Labs    Lab 10/30/20  1334   COLORU Yellow  Natalie   SPECGRAV 1.020  1.015   PHUR 6.0  5.0   PROTEINUA 2+*  Negative   BACTERIA Occasional  Occasional   NITRITE Negative  Negative   LEUKOCYTESUR 2+*  Negative   HYALINECASTS 88*  87*     All labs within the past 24 hours have been reviewed.    Significant Imaging:     X-Ray: Reviewed  10-31: Personally reviewed    Assessment/Plan:     Acute renal failure  Ms. Bell is a 44 y/o F with previously described history, physical exam, laboratories and imaging studies being consulted for LINN in the setting of decompensated liver cirrhosis 2/2 alpha 1 antitrypsin deficiency and alcohol abuse.   - LINN likely 2/2 HRS: patient with significant hemodynamic compromise at least in the last 24 hrs, with MAP's as low as 58 and readings consistently in the 60's. Likely culprit is sepsis/septic shock.   - U/A: granular and hyaline casts    Rec:  - Titrate Levophed to a MAP of ~80-85  - Continue Albumin, Midodrine, Octreotide   - Strict I/O and chart  - Avoid nephrotoxic medications, NSAIDs, IV contrast, etc.  - Daily weights and chart  - Medication doses adjusted to GFR  - Hb > 7 gm/dL  - Will follow closely    Hyponatremia  - Likely Hypervolemic hyponatremia 2/2 liver cirrhosis: Ying <20, FeNa ~0.4    Rec:  - If possible, avoid dilution of IV meds in D5W  - Urine osmolality         Thank you for your consult. I will follow-up with patient. Please contact us if you have any additional questions.    Ronn Almendarez MD  Nephrology  [facility]

## 2020-10-31 NOTE — EICU
Intervention Initiated From:  Bedside    Genoveva Communicated with Bedside Nurse regarding:  Time-Out    Comments: Called to bedside for thoracentesis. Time out performed. Thoracentesis performed by MD Wilks with MD Kong supervising. 2 liters removed, pt tolerated procedure well. PCXR ordered.

## 2020-10-31 NOTE — PROGRESS NOTES
Pharmacist Renal Dose Adjustment Note    Madhavi Bell is a 45 y.o. female being treated with the medication Piperacillin/tazobactam    Patient Data:    Vital Signs (Most Recent):  Temp: 98 °F (36.7 °C) (10/31/20 0400)  Pulse: 81 (10/31/20 0836)  Resp: 20 (10/31/20 0836)  BP: (!) 82/52 (10/31/20 0836)  SpO2: (!) 92 % (10/31/20 0836)   Vital Signs (72h Range):  Temp:  [97.8 °F (36.6 °C)-98.4 °F (36.9 °C)]   Pulse:  [68-90]   Resp:  [15-40]   BP: ()/(37-78)   SpO2:  [89 %-98 %]      Recent Labs   Lab 10/29/20  1714 10/30/20  1340 10/31/20  0452   CREATININE 2.7* 3.3* 4.0*     Serum creatinine: 4 mg/dL (H) 10/31/20 0452  Estimated creatinine clearance: 17.8 mL/min (A)    Medication: Piperacillin/tazobactam will be renally adjusted from Q8H dosing to Q12H (CrCl < 20 mL/min.    Pharmacist's Name: Arnold Regalado  Pharmacist's Extension: [phone]

## 2020-10-31 NOTE — ASSESSMENT & PLAN NOTE
Possibly iATN vs HRS    --Urine lytes sent  --RP US pending  --Nephrology consulted  --HRS treatment initiated (albumin, midodrine, and octreotide)  --Garcia in place with strict I/Os

## 2020-10-31 NOTE — PLAN OF CARE
Pt was admitted today at 1315 from [facility] in .  Report received from JUNG Tavarez. She went to IR for liver biopsy and thoracentesis. They removed 1.7L. UOP is minimal and tea colored. Please see chart for other details.

## 2020-10-31 NOTE — CONSULTS
[facility]  Palliative Medicine  Consult Note    Patient Name: Madhavi Bell  MRN: [id number]  Admission Date: 10/30/2020  Hospital Length of Stay: 1 days  Code Status: Full Code   Attending Provider: Thierry Kong MD  Consulting Provider: Ofelia Silverman MD  Primary Care Physician: Anurag Knutson MD  Principal Problem:Decompensated hepatic cirrhosis    Patient information was obtained from patient and ER records.      Consults  Assessment/Plan:     Palliative care encounter  45 year old female with PMH decompensated cirrhosis due to alpha 1 antitrypsin and alcohol use, LINN, hyponatremia, recurrent hepatic hydrothorax and functional decline presents as transfer for liver bx and transplant work-up. Patient is on 9L HFNC and levophed. Liver biopsy results pending       GOC/ACP   -Met with patient at bedside. Introduced palliative medicine and how we can be helpful going forward. Discussed hoping for the best while beginning to think of a plan for if things do not go the way we are hoping. Patient is anxiously awaiting results of liver biopsy to see if she will be a candidate for liver transplant. Patient says that she has no ACP documents or living will. Says she has never thought about if she were to get sicker but acknowledges this is something she and her  need to discuss. Patient's  is visiting this weekend. Patient confirmed that she would want her  Alli Bell [phone] to be her surrogate decision maker. Encouraged patient to discuss her wishes with her  this weekend     -Patient is full code   -Patient's  Alli Bell confirmed as patient's surrogate decision maker   -Patient plans to discuss her wishes with her  this weekend should she get sicker. Will continue to follow          Thank you for your consult. I will follow-up with patient. Please contact us if you have any additional questions.    Subjective:     HPI:   45 year old female  with PMH decompensated cirrhosis 2/2 alpha 1 antitrypsin and alcohol use, recurrent hepatic hydrothorax, and liver lesions who was transferred from [place] for further work up for liver transplant. Patient was undergoing transplant work up as outpatient but then started feeling worse with increased shortness of breath. She was advised to present to the ER. Patient underwent thoracentesis and liver bx 10/30 upon arrival. Patient also with hyponatremia and LINN. Per hepatology note, transplant status depends on results of pending liver bx.     Palliative consulted as part of liver transplant work up     Hospital Course:  No notes on file    Interval History: Patient reports feeling a little less short of breath now that she is on HFNC. Denies pain. Reports sleeping well last night.  to visit later on today     Past Medical History:   Diagnosis Date    Alpha-1-antitrypsin deficiency     ZZ phenotype    Eosinophilic esophagitis     h/o pill impaction, bx proven    Hyperlipidemia     may be related to liver enzymes, 2ary to high HDL    Schatzki's ring     s/p dilatation       Past Surgical History:   Procedure Laterality Date     SECTION      CHOLECYSTECTOMY      DILATION AND CURETTAGE OF UTERUS      ESOPHAGOGASTRODUODENOSCOPY N/A 2020    Procedure: EGD (ESOPHAGOGASTRODUODENOSCOPY)-need rapid covid;  Surgeon: Jose Smith MD;  Location: [facility];  Service: Endoscopy;  Laterality: N/A;       Review of patient's allergies indicates:  No Known Allergies    Medications:  Continuous Infusions:   norepinephrine bitartrate-D5W 0.34 mcg/kg/min (10/31/20 1020)     Scheduled Meds:   albumin human 25%  25 g Intravenous BID    [START ON 11/3/2020] ergocalciferol  50,000 Units Oral Q7 Days    lactulose  20 g Oral TID    midodrine  10 mg Oral TID WM    multivitamin  1 tablet Oral Daily    octreotide  100 mcg Subcutaneous Q8H    ondansetron  4 mg Oral Once    [START ON 2020]  phytonadione ((AQUA-MEPHYTON) IVPB  10 mg Intravenous Daily    piperacillin-tazobactam (ZOSYN) IVPB  4.5 g Intravenous Q12H    rifAXImin  550 mg Oral BID    zinc sulfate  220 mg Oral Daily     PRN Meds:sodium chloride, fentaNYL, lidocaine HCL 10 mg/ml (1%), midazolam, ondansetron, pneumoc 13-david conj-dip cr(PF), promethazine (PHENERGAN) IVPB, sodium chloride 0.9%, Pharmacy to dose Vancomycin consult **AND** vancomycin - pharmacy to dose    Family Hx: No known family hx of cancer       Tobacco Use    Smoking status: Never Smoker    Smokeless tobacco: Never Used   Substance and Sexual Activity    Alcohol use: Not Currently     Comment: patient reports she has not had any alcohol in past few months    Drug use: No    Sexual activity: Yes     Partners: Male     Birth control/protection: OCP       Review of Systems   Constitutional: Positive for activity change, appetite change and fatigue.   HENT: Negative.    Eyes: Negative.    Respiratory: Positive for cough and shortness of breath.    Cardiovascular: Negative.    Gastrointestinal: Positive for nausea.   Genitourinary: Positive for decreased urine volume.   Musculoskeletal: Negative.    Skin: Negative.    Neurological: Negative.    Hematological: Negative.    Psychiatric/Behavioral: Negative.      Objective:     Vital Signs (Most Recent):  Temp: 98 °F (36.7 °C) (10/31/20 0400)  Pulse: 91 (10/31/20 1015)  Resp: (!) 30 (10/31/20 1015)  BP: (!) 106/56 (10/31/20 1015)  SpO2: (!) 90 % (10/31/20 1015) Vital Signs (24h Range):  Temp:  [97.8 °F (36.6 °C)-98.4 °F (36.9 °C)] 98 °F (36.7 °C)  Pulse:  [68-91] 91  Resp:  [15-59] 30  SpO2:  [89 %-98 %] 90 %  BP: ()/(42-78) 106/56     Weight: 71.2 kg (157 lb 1.2 oz)  Body mass index is 25.35 kg/m².    Physical Exam  Constitutional:       General: She is not in acute distress.     Appearance: She is ill-appearing.   HENT:      Head: Normocephalic.      Right Ear: External ear normal.      Left Ear: External ear normal.       Nose: Nose normal.      Mouth/Throat:      Mouth: Mucous membranes are dry.   Eyes:      General: Scleral icterus present.   Neck:      Musculoskeletal: Normal range of motion.   Cardiovascular:      Rate and Rhythm: Normal rate.   Pulmonary:      Effort: No respiratory distress.      Comments: Increased effort when talking   Abdominal:      General: There is distension.   Musculoskeletal: Normal range of motion.   Skin:     General: Skin is warm and dry.   Neurological:      Mental Status: She is alert and oriented to person, place, and time.   Psychiatric:         Mood and Affect: Mood normal.         Behavior: Behavior normal.         Review of Symptoms    Symptom Assessment (ESAS 0-10 Scale)  Pain:  0  Dyspnea:  5  Anxiety:  0  Nausea:  2  Depression:  0  Anorexia:  3  Fatigue:  4  Insomnia:  0  Restlessness:  0  Agitation:  0     CAM / Delirium:  Negative  Constipation:  Negative  Diarrhea:  Negative    Bowel Management Plan (BMP):  Yes            Performance Status:  50    Living Arrangements:  Lives with family    Psychosocial/Cultural: Patient lives with  and teenage children in [place]. Works as an [employer] tech     Spiritual:  F - Ema and Belief:  Not addressed       Advance Care Planning   Advance Directives:   Living Will: No    LaPOST: No    Do Not Resuscitate Status: No    Medical Power of : No (Patient would like her hsuband who is legal NOK to be her surrogate decision maker )    Agent's Name:  Alli Bell   Agent's Contact Number:  [phone]    Decision Making:  Patient answered questions         Significant Labs: All pertinent labs within the past 24 hours have been reviewed.  CBC:   Recent Labs   Lab 10/31/20  0916   WBC 17.29*   HGB 9.4*   HCT 27.6*   MCV 93   *     BMP:  Recent Labs   Lab 10/31/20  0452   *   *   K 4.7   CL 90*   CO2 22*   BUN 55*   CREATININE 4.0*   CALCIUM 8.1*   MG 3.5*     LFT:  Lab Results   Component Value Date    AST 80 (H)  10/31/2020     (H) 10/08/2020    ALKPHOS 563 (H) 10/31/2020    BILITOT 25.0 (H) 10/31/2020     Albumin:   Albumin   Date Value Ref Range Status   10/31/2020 3.0 (L) 3.5 - 5.2 g/dL Final     Protein:   Total Protein   Date Value Ref Range Status   10/31/2020 5.9 (L) 6.0 - 8.4 g/dL Final     Lactic acid:   Lab Results   Component Value Date    LACTATE 1.5 10/30/2020    LACTATE 1.7 10/29/2020       Significant Imaging: I have reviewed all pertinent imaging results/findings within the past 24 hours.      > 50% of 75 min visit spent in chart review, face to face discussion of goals of care,  symptom assessment, coordination of care and emotional support.    Ofelia Silverman MD  Palliative Medicine  [facility]

## 2020-10-31 NOTE — PROGRESS NOTES
Pharmacokinetic Assessment Follow Up: IV Vancomycin    Vancomycin Regimen Assessment & Plan:  - Vancomycin level drawn with morning labs today resulted as 25.6 ug/mL, goal trough 10-20 ug/mL.  - Nephrology following for LINN. Will continue pulse dosing while renal function remains unstable.  - No need for vancomycin dose today given level. Draw vancomycin level with morning labs tomorrow. Will re-dose as needed depending on level and renal function.    Drug levels (last 3 results):  Recent Labs   Lab Result Units 10/31/20  0452   Vancomycin, Random ug/mL 25.6     Pharmacy will continue to follow and monitor vancomycin.    Please contact pharmacy at extension [phone] for questions regarding this assessment.    Thank you for the consult,   Ezio Ruiz     Patient brief summary:  Madhavi Bell is a 45 y.o. female initiated on antimicrobial therapy with IV Vancomycin for treatment of lower respiratory infection    The patient's current regimen is pulse dosing.    Drug Allergies:   Review of patient's allergies indicates:  No Known Allergies    Actual Body Weight:   71.2 kg    Renal Function:   Estimated Creatinine Clearance: 17.8 mL/min (A) (based on SCr of 4 mg/dL (H)).,     Dialysis Method (if applicable):  N/A

## 2020-10-31 NOTE — PROGRESS NOTES
[facility]  Critical Care Medicine  Progress Note    Patient Name: Madhavi Bell  MRN: [id number]  Admission Date: 10/30/2020  Hospital Length of Stay: 1 days  Code Status: Full Code  Attending Provider: Thierry Kong MD  Primary Care Provider: Anurag Knutson MD   Principal Problem: Decompensated hepatic cirrhosis    Subjective:     HPI:  Madhavi Bell is a 45 year old female with decompensated liver cirrhosis due to alpha-1 antitrypsin deficiency and alcohol use, recurrent right sided hepato-hydrothorax, HCC who presented to [place] ED on 10/29 with shortness of breath and transferred to [facility] for liver transplant evaluation on 10/30. Patient reports worsening SOB over the last two days. Patient scheduled for outpatient thoracentesis next week however was advised by GI doctor to present to the ED due to worsening symptoms. Patient reports associated dry cough with pleural effusions but no worse than baseline. Denies fever, chills, chest pain, abdominal pain, confusion.     Upon arrival, patient with oxygen saturations >94% on 4L NC with borderline BP (SBP 88-92). CXR with significant right sided pleural effusion. Labs significant for new leukocytosis, t bili of 24 and LINN with sCr 2.7.     Hospital/ICU Course:  Patient admitted to the CMICU as a transfer for liver transplant evaluation. Patient underwent liver biopsy and thoracentesis (1.5 L removed) on 10/30. Hepatology and nephrology consulted for assistance.     Interval History/Significant Events: Oxygen requirements increased from 4L to 9L overnight. Norepinephrine initiated and currently requiring 0.12 mcg/kg/min.     Review of Systems   Constitutional: Negative for chills and fatigue.   HENT: Negative for congestion and facial swelling.    Eyes: Negative for photophobia and visual disturbance.   Respiratory: Positive for shortness of breath. Negative for cough.    Cardiovascular: Negative for chest pain.   Gastrointestinal:  Negative for abdominal pain and nausea.   Genitourinary: Positive for decreased urine volume. Negative for hematuria.   Musculoskeletal: Negative for back pain and joint swelling.   Skin: Negative for pallor and rash.   Neurological: Negative for light-headedness and headaches.   Psychiatric/Behavioral: Negative for agitation and confusion.     Objective:     Vital Signs (Most Recent):  Temp: 98 °F (36.7 °C) (10/31/20 0400)  Pulse: 73 (10/31/20 0645)  Resp: 20 (10/31/20 0645)  BP: (!) 83/45 (10/31/20 0645)  SpO2: (!) 92 % (10/31/20 0645) Vital Signs (24h Range):  Temp:  [97.8 °F (36.6 °C)-98.4 °F (36.9 °C)] 98 °F (36.7 °C)  Pulse:  [68-85] 73  Resp:  [15-40] 20  SpO2:  [89 %-98 %] 92 %  BP: ()/(37-78) 83/45   Weight: 71.2 kg (157 lb 1.2 oz)  Body mass index is 25.35 kg/m².      Intake/Output Summary (Last 24 hours) at 10/31/2020 0822  Last data filed at 10/31/2020 0700  Gross per 24 hour   Intake 1228.3 ml   Output 443 ml   Net 785.3 ml       Physical Exam  Vitals signs reviewed.   Constitutional:       Appearance: She is well-developed.      Interventions: Nasal cannula in place.   HENT:      Head: Normocephalic and atraumatic.   Eyes:      General: Scleral icterus present.      Pupils: Pupils are equal, round, and reactive to light.   Neck:      Thyroid: No thyromegaly.      Trachea: No tracheal deviation.      Comments: LIJ TLC in place.   Cardiovascular:      Rate and Rhythm: Normal rate and regular rhythm.      Heart sounds: Normal heart sounds. No murmur. No friction rub. No gallop.    Pulmonary:      Effort: Pulmonary effort is normal. Tachypnea present. No accessory muscle usage.      Breath sounds: Examination of the right-lower field reveals decreased breath sounds. Decreased breath sounds and rales present. No wheezing or rhonchi.   Abdominal:      General: Bowel sounds are normal. There is distension.      Palpations: Abdomen is soft.   Genitourinary:     Comments: Garcia in place.    Musculoskeletal: Normal range of motion.   Skin:     General: Skin is warm and dry.   Neurological:      Mental Status: She is alert and oriented to person, place, and time.      Sensory: No sensory deficit.   Psychiatric:         Behavior: Behavior is cooperative.          Lines/Drains/Airways     Central Venous Catheter Line            Percutaneous Central Line Insertion/Assessment - Triple Lumen  10/30/20 0855 left internal jugular less than 1 day          Drain                 Urethral Catheter 10/30/20 1615 16 Fr. less than 1 day          Peripheral Intravenous Line                 Peripheral IV - Single Lumen 10/29/20 1714 20 G Left Antecubital 1 day              Significant Labs:    CBC/Anemia Profile:  Recent Labs   Lab 10/29/20  1714 10/30/20  1340 10/31/20  0453   WBC 12.24 19.03* 17.19*   HGB 11.9* 11.2* 9.3*   HCT 33.7* 30.8* 26.7*    150 108*   MCV 89 90 92   RDW 18.2* 18.2* 18.3*        Chemistries:  Recent Labs   Lab 10/29/20  1714 10/30/20  1340 10/30/20  1802 10/31/20  0452   * 124*  --  125*   K 4.6 4.4  --  4.7   CL 92* 92*  --  90*   CO2 23 20*  --  22*   BUN 48* 54*  --  55*   CREATININE 2.7* 3.3*  --  4.0*   CALCIUM 7.6* 8.1*  --  8.1*   ALBUMIN 1.5* 2.0* 2.0* 3.0*   PROT 6.3 5.9* 5.8* 5.9*   BILITOT 24.9* 25.6*  --  25.0*   ALKPHOS 761* 735*  --  563*   ALT 91* 81*  --  58*   * 121*  --  80*   MG 3.6* 3.7*  --  3.5*   PHOS  --  4.6*  --  5.9*       All pertinent labs within the past 24 hours have been reviewed.    Significant Imaging:  I have reviewed and interpreted all pertinent imaging results/findings within the past 24 hours.      ABG  No results for input(s): PH, PO2, PCO2, HCO3, BE in the last 168 hours.  Assessment/Plan:     Pulmonary  Acute hypoxemic respiratory failure  Likely related to volume overloaded in setting of decompensated cirrhosis complicated by hepato-hydrothorax and worsening LINN with minimal UOP. CXR with significant pleural effusion of the right  side    --4L on admission; now up to 12L  --S/p thoracentesis with 1.5L removal on 10/31  --Repeat thoracentesis today  --Wean supplemental oxygen for goal saturation 88-92%  --May eventually require bipap to maintain WOB    Renal/  Acute renal failure  Possibly iATN vs HRS    --Urine lytes indicating pre-renal cause  --RP US negative for hydronephrosis  --Nephrology following; Recommending MAP goal >80 - however patient with significant norepinephrine requirements in order to achieve that goal. MAP goal changed to 70.   --HRS treatment initiated (albumin, midodrine, and octreotide)  --Garcia in place with strict I/Os    Hyponatremia  2/2 decompensated liver cirrhosis vs new LINN    --frequent BMP  --will continue to monitor    ID  Sepsis  New leukocytosis with hypotension concerning for possible infection    --Broad spectrum abx initiated  --BCx NGTD  --UA with 2+ leuks and 46 WBCs; reflex to culture pending  --No pocket for paracentesis  --Thoracentesis fluid negative for SBP    GI  * Decompensated hepatic cirrhosis  Secondary to A1AT deficiency and ETOH abuse complicated by HCC, HE, hepatic hydrothorax, ascites, hepatic varices and hyponatremia.    --abdominal US with doppler 10/31with solid lesions in right and left lower lobe; portal HTN; splenomegaly  --S/p liver biopsy 10/30, pathology pending  --Hepatology consulted  --Continue midodrine 10 TID  --Lactulose/rifaximin    MELD-Na score: 44 at 10/31/2020  4:52 AM  MELD score: 46 at 10/31/2020  4:52 AM  Calculated from:  Serum Creatinine: 4.0 mg/dL at 10/31/2020  4:52 AM  Serum Sodium: 125 mmol/L at 10/31/2020  4:52 AM  Total Bilirubin: 25.0 mg/dL at 10/31/2020  4:52 AM  INR(ratio): 3.5 at 10/31/2020  4:52 AM  Age: 45 years    Pleural effusion associated with hepatic disorder  Reports 3 thoracentesis in the past for symptom management.     --s/p thoracentesis of 1.5 L removal on 10/30; labs negative for SBP  --Plan for repeat thora 10/31 due to increasing oxygen  requirements      Other  Shock, unspecified  Likely related to decompensated cirrhosis vs underlying infection (less likely)    --See sepsis and cirrhosis for further plan  --Continue norepinephrine and vasopressin to maintain MAP >70    Palliative care encounter  Palliative care consulted due to liver transplant evaluation     Critical Care Daily Checklist:    A: Awake: RASS Goal/Actual Goal:    Actual: Phan Agitation Sedation Scale (RASS): Alert and calm   B: Spontaneous Breathing Trial Performed?     C: SAT & SBT Coordinated?  N/A                      D: Delirium: CAM-ICU Overall CAM-ICU: Negative   E: Early Mobility Performed? Yes   F: Feeding Goal:    Status:     Current Diet Order   Procedures    Diet renal [facility]     Order Specific Question:   Indicate patient location for additional diet options:     Answer:   [facility]      AS: Analgesia/Sedation None   T: Thromboembolic Prophylaxis Holding due to elevated bleeding risk   H: HOB > 300 Yes   U: Stress Ulcer Prophylaxis (if needed) N/A   G: Glucose Control At goal 140-180   B: Bowel Function     I: Indwelling Catheter (Lines & Moon) Necessity LIJ TLC, moon   D: De-escalation of Antimicrobials/Pharmacotherapies Continue vancomycin and zosyn    Plan for the day/ETD Supportive care    Code Status:  Family/Goals of Care: Full Code       Discussed with Dr. Kong. Patient and  updated at the bedside.     Critical Care Time: 60 minutes  Critical secondary to Patient has a condition that poses threat to life and bodily function: shock requiring vasopressors      Critical care was time spent personally by me on the following activities: development of treatment plan with patient or surrogate and bedside caregivers, discussions with consultants, evaluation of patient's response to treatment, examination of patient, ordering and performing treatments and interventions, ordering and review of laboratory studies, ordering and review of  radiographic studies, pulse oximetry, re-evaluation of patient's condition. This critical care time did not overlap with that of any other provider or involve time for any procedures.     Codie Wong PA-C  Critical Care Medicine  [facility]

## 2020-10-31 NOTE — PLAN OF CARE
CMICU DAILY GOALS       A: Awake    RASS: Goal -    Actual -     Restraint necessity:    B: Breath   SBT: Not intubated   C: Coordinate A & B, analgesics/sedatives   Pain: managed    SAT: Not intubated  D: Delirium   CAM-ICU:    E: Early(intubated/ Progressive (non-intubated) Mobility   MOVE Screen: Fail   Activity: Activity Management: rolling - L1  FAS: Feeding/Nutrition   Diet order: Diet/Nutrition Received: NPO,   Fluid restriction:    T: Thrombus   DVT prophylaxis: VTE Required Core Measure: (SCDs) Sequential compression device initiated/maintained  H: HOB Elevation   Head of Bed (HOB): HOB at 30-45 degrees  U: Ulcer Prophylaxis   GI: yes  G: Glucose control   managed    S: Skin   Bundle compliance: yes   Bathing/Skin Care: bath, chlorhexidine, bath, complete, back care, dressed/undressed, incontinence care, linen changed Date: 10/30/20 @ 2300  B: Bowel Function   no issues   I: Indwelling Catheters   Garcia necessity:      Urethral Catheter 10/30/20 1615 16 Fr.-Reason for Continuing Urinary Catheterization: Critically ill in ICU requiring intensive monitoring   CVC necessity: yes   IPAD offered: No  D: De-escalation Antibx   No  Plan for the day  Plan is to wean Levophed off while maintaining her BP MAP > 65, and wean her oxygen requirements.  Family/Goals of care/Code Status   Code Status: Full Code     No acute events throughout day, VS and assessment per flow sheet, patient progressing towards goals as tolerated, plan of care reviewed with Madhavi Bell and family, all concerns addressed, will continue to monitor.

## 2020-10-31 NOTE — H&P
[facility]  Critical Care Medicine  History & Physical    Patient Name: Madhavi Bell  MRN: [id number]  Admission Date: 10/30/2020  Hospital Length of Stay: 0 days  Code Status: Full Code  Attending Physician: Thierry Kong MD   Primary Care Provider: Anurag Knutson MD   Principal Problem: Decompensated hepatic cirrhosis    Subjective:     HPI:  Madhavi Bell is a 45 year old female with decompensated liver cirrhosis due to alpha-1 antitrypsin deficiency and alcohol use, recurrent right sided hepato-hydrothorax, HCC who presented to [place] ED on 10/29 with shortness of breath and transferred to [facility] for liver transplant evaluation on 10/30. Patient reports worsening SOB over the last two days. Patient scheduled for outpatient thoracentesis next week however was advised by GI doctor to present to the ED due to worsening symptoms. Patient reports associated dry cough with pleural effusions but no worse than baseline. Denies fever, chills, chest pain, abdominal pain, confusion.     Upon arrival, patient with oxygen saturations >94% on 4L NC with borderline BP (SBP 88-92). CXR with significant right sided pleural effusion. Labs significant for new leukocytosis, t bili of 24 and LINN with sCr 2.7.     Hospital/ICU Course:  Patient admitted to the CMICU as a transfer for liver transplant evaluation. Patient underwent liver biopsy and thoracentesis (1.5 L removed) on 10/30. Hepatology and nephrology consulted for assistance.      Past Medical History:   Diagnosis Date    Alpha-1-antitrypsin deficiency     ZZ phenotype    Eosinophilic esophagitis     h/o pill impaction, bx proven    Hyperlipidemia     may be related to liver enzymes, 2ary to high HDL    Schatzki's ring     s/p dilatation       Past Surgical History:   Procedure Laterality Date     SECTION      CHOLECYSTECTOMY      DILATION AND CURETTAGE OF UTERUS      ESOPHAGOGASTRODUODENOSCOPY N/A 2020    Procedure: EGD  (ESOPHAGOGASTRODUODENOSCOPY)-need rapid covid;  Surgeon: Jose Smith MD;  Location: [facility];  Service: Endoscopy;  Laterality: N/A;       Review of patient's allergies indicates:  No Known Allergies    Family History     None        Tobacco Use    Smoking status: Never Smoker    Smokeless tobacco: Never Used   Substance and Sexual Activity    Alcohol use: Not Currently     Comment: patient reports she has not had any alcohol in past few months    Drug use: No    Sexual activity: Yes     Partners: Male     Birth control/protection: OCP      Review of Systems   Constitutional: Negative for chills and fever.   HENT: Negative for sinus pressure and trouble swallowing.    Eyes: Negative for photophobia and visual disturbance.   Respiratory: Positive for cough (dry) and shortness of breath. Negative for wheezing.    Cardiovascular: Negative for chest pain, palpitations and leg swelling.   Gastrointestinal: Positive for nausea and vomiting. Negative for abdominal pain.   Genitourinary: Positive for decreased urine volume. Negative for difficulty urinating.   Musculoskeletal: Negative for back pain and joint swelling.   Neurological: Negative for light-headedness and headaches.   Psychiatric/Behavioral: Negative for agitation and confusion.     Objective:     Vital Signs (Most Recent):  Temp: 98.1 °F (36.7 °C) (10/30/20 1545)  Pulse: 80 (10/30/20 1716)  Resp: 16 (10/30/20 1716)  BP: 95/78 (10/30/20 1716)  SpO2: (!) 94 % (10/30/20 1716) Vital Signs (24h Range):  Temp:  [97.8 °F (36.6 °C)-98.4 °F (36.9 °C)] 98.1 °F (36.7 °C)  Pulse:  [69-85] 80  Resp:  [16-30] 16  SpO2:  [91 %-98 %] 94 %  BP: ()/(37-78) 95/78   Weight: 71.2 kg (157 lb 1.2 oz)  Body mass index is 25.35 kg/m².      Intake/Output Summary (Last 24 hours) at 10/30/2020 1756  Last data filed at 10/30/2020 1630  Gross per 24 hour   Intake 250 ml   Output 200 ml   Net 50 ml       Physical Exam  Vitals signs reviewed.   Constitutional:        Appearance: She is well-developed. She is ill-appearing.      Interventions: Nasal cannula in place.   HENT:      Head: Normocephalic and atraumatic.   Eyes:      General: Scleral icterus present.      Pupils: Pupils are equal, round, and reactive to light.   Neck:      Thyroid: No thyromegaly.      Trachea: No tracheal deviation.      Comments: LIJ TLC in place.   Cardiovascular:      Rate and Rhythm: Normal rate and regular rhythm.      Heart sounds: Normal heart sounds. No murmur. No friction rub. No gallop.    Pulmonary:      Effort: Pulmonary effort is normal. No tachypnea or accessory muscle usage.      Breath sounds: Examination of the right-upper field reveals decreased breath sounds. Examination of the right-middle field reveals decreased breath sounds. Examination of the right-lower field reveals decreased breath sounds. Decreased breath sounds present. No wheezing, rhonchi or rales.   Abdominal:      General: Bowel sounds are normal. There is distension.      Palpations: Abdomen is soft.      Tenderness: There is generalized abdominal tenderness.   Genitourinary:     Comments: Garcia in place.   Musculoskeletal: Normal range of motion.   Skin:     General: Skin is warm and dry.   Neurological:      Mental Status: She is alert.      Sensory: No sensory deficit.   Psychiatric:         Behavior: Behavior is cooperative.            Lines/Drains/Airways     Central Venous Catheter Line            Percutaneous Central Line Insertion/Assessment - Triple Lumen  10/30/20 0855 left internal jugular less than 1 day          Drain                 Urethral Catheter 10/30/20 1615 16 Fr. less than 1 day          Peripheral Intravenous Line                 Peripheral IV - Single Lumen 10/29/20 1714 20 G Left Antecubital 1 day              Significant Labs:    CBC/Anemia Profile:  Recent Labs   Lab 10/29/20  1714 10/30/20  1340   WBC 12.24 19.03*   HGB 11.9* 11.2*   HCT 33.7* 30.8*    150   MCV 89 90   RDW 18.2*  18.2*        Chemistries:  Recent Labs   Lab 10/29/20  1714 10/30/20  1340   * 124*   K 4.6 4.4   CL 92* 92*   CO2 23 20*   BUN 48* 54*   CREATININE 2.7* 3.3*   CALCIUM 7.6* 8.1*   ALBUMIN 1.5* 2.0*   PROT 6.3 5.9*   BILITOT 24.9* 25.6*   ALKPHOS 761* 735*   ALT 91* 81*   * 121*   MG 3.6* 3.7*   PHOS  --  4.6*       All pertinent labs within the past 24 hours have been reviewed.    Significant Imaging: I have reviewed and interpreted all pertinent imaging results/findings within the past 24 hours.    Assessment/Plan:     Renal/  Acute renal failure  Possibly iATN vs HRS    --Urine lytes sent  --RP US pending  --Nephrology consulted  --HRS treatment initiated (albumin, midodrine, and octreotide)  --Garcia in place with strict I/Os    Hyponatremia  2/2 decompensated liver cirrhosis vs new LINN    --frequent BMP  --will continue to monitor    ID  Sepsis  New leukocytosis with hypotension concerning for possible infection    --Broad spectrum abx initiated  --Cultures pending  --Will assess for possible paracentesis on 10/31 to rule out SBP    GI  * Decompensated hepatic cirrhosis  Secondary to A1AT deficiency and ETOH abuse complicated by HCC, HE, hepatic hydrothorax, ascites, hepatic varices and hyponatremia.    --S/p liver biopsy 10/30, pathology pending  --Hepatology consulted  --Continue midodrine 10 TID  --Lactulose/rifaximin  --abdominal US with doppler pending    MELD-Na score: 40 at 10/30/2020  1:40 PM  MELD score: 40 at 10/30/2020  1:40 PM  Calculated from:  Serum Creatinine: 3.3 mg/dL at 10/30/2020  1:40 PM  Serum Sodium: 124 mmol/L (Rounded to 125 mmol/L) at 10/30/2020  1:40 PM  Total Bilirubin: 25.6 mg/dL at 10/30/2020  1:40 PM  INR(ratio): 2.4 at 10/30/2020  1:40 PM  Age: 45 years    Pleural effusion associated with hepatic disorder  Reports 3 thoracentesis in the past for symptom management.     --s/p thoracentesis of 1.5 L removal on 10/30; labs pending  --Remains on 4L      Critical Care  Daily Checklist:    A: Awake: RASS Goal/Actual Goal:    Actual: Phan Agitation Sedation Scale (RASS): Alert and calm   B: Spontaneous Breathing Trial Performed?     C: SAT & SBT Coordinated?  N/A                      D: Delirium: CAM-ICU     E: Early Mobility Performed? Yes   F: Feeding Goal:    Status:     Current Diet Order   Procedures    Diet NPO      AS: Analgesia/Sedation None   T: Thromboembolic Prophylaxis heparin   H: HOB > 300 Yes   U: Stress Ulcer Prophylaxis (if needed) N/A   G: Glucose Control At goal 140-180   B: Bowel Function     I: Indwelling Catheter (Lines & Moon) Necessity moon   D: De-escalation of Antimicrobials/Pharmacotherapies Vanc/Zosyn    Plan for the day/ETD Supportive care    Code Status:  Family/Goals of Care: Full Code       Discussed with Dr. Kong.  updated at the bedside.     Critical Care Time: 60 minutes  Critical secondary to Patient has a condition that poses threat to life and bodily function: decompensated cirrhosis at high risk for acute decompensation.      Critical care was time spent personally by me on the following activities: development of treatment plan with patient or surrogate and bedside caregivers, discussions with consultants, evaluation of patient's response to treatment, examination of patient, ordering and performing treatments and interventions, ordering and review of laboratory studies, ordering and review of radiographic studies, pulse oximetry, re-evaluation of patient's condition. This critical care time did not overlap with that of any other provider or involve time for any procedures.     Codie Wong PA-C  Critical Care Medicine  [facility]

## 2020-10-31 NOTE — ASSESSMENT & PLAN NOTE
Possibly iATN vs HRS    --Urine lytes indicating pre-renal cause  --RP US negative for hydronephrosis  --Nephrology following; Recommending MAP goal >80 - however patient with significant norepinephrine requirements in order to achieve that goal. MAP goal changed to 70.   --HRS treatment initiated (albumin, midodrine, and octreotide)  --Garcia in place with strict I/Os

## 2020-11-01 PROBLEM — E87.20 LACTIC ACIDOSIS: Status: ACTIVE | Noted: 2020-01-01

## 2020-11-01 NOTE — PROGRESS NOTES
[facility]  Nephrology  Progress Note    Patient Name: Madhavi Bell  MRN: [id number]  Admission Date: 10/30/2020  Hospital Length of Stay: 2 days  Attending Provider: Thierry Kong MD   Primary Care Physician: Anurag Knutson MD  Principal Problem:Decompensated hepatic cirrhosis    Subjective:     HPI: A 44 y/o female with decompensated liver cirrhosis due to alpha-1 antitrypsin deficiency and alcohol use, recurrent right sided hepato-hydrothorax, HCC who presented to [place] ED on 10/29 with shortness of breath and transferred [place] [facility] [place] evaluation on 10/30. Patient reports worsening SOB over the last two days. Patient scheduled for outpatient thoracentesis next week however was advised by GI doctor to present to the ED due to worsening symptoms. Patient reports associated dry cough with pleural effusions but no worse than baseline. Denies fever, chills, chest pain, abdominal pain, confusion.    Upon arrival, CXR with significant right sided pleural effusion. Labs significant for new leukocytosis, t bili of 24 and and sCr 2.7.   Patient underwent liver biopsy and thoracentesis (1.5 L removed) on 10/30.    Nephrology consulted for management of LINN.     Interval History: Seen and examined at bedside this AM. Improvement on sCr and UOP (~1.7L). Thoracentesis done yesterday. A-fib overnight, s/p Amio bolus, now on gtts. On multiple pressors and BiPaP. Morning CXR with bilateral opacities.      Review of patient's allergies indicates:  No Known Allergies  Current Facility-Administered Medications   Medication Frequency    0.9%  NaCl infusion (for blood administration) Q24H PRN    albumin human 25% bottle 25 g BID    amiodarone 360 mg/200 mL (1.8 mg/mL) infusion Continuous    azithromycin 500 mg in dextrose 5 % 250 mL IVPB (ready to mix system) Q24H    dexmedetomidine (PRECEDEX) 400mcg/100mL 0.9% NaCL infusion Continuous    fentaNYL injection Code/trauma/sedation Med     [START ON 11/2/2020] fluconazole (DIFLUCAN) IVPB 200 mg 100 mL Q24H    hydrocortisone sodium succinate injection 100 mg Q8H    HYDROmorphone injection 0.2 mg Q6H PRN    lactulose 20 gram/30 mL solution Soln 20 g TID    lidocaine HCL 10 mg/ml (1%) injection Code/trauma/sedation Med    meropenem-0.9% sodium chloride 1 g/50 mL IVPB Q12H    midazolam (VERSED) 1 mg/mL injection Code/trauma/sedation Med    midodrine tablet 10 mg TID WM    norepinephrine 32 mg in sodium chloride 0.9% 250 mL infusion Continuous    octreotide injection 100 mcg Q8H    ondansetron injection 4 mg Q6H PRN    phytonadione vitamin k (AQUA-MEPHYTON) 10 mg in sodium chloride 0.9% IVPB Daily    pneumoc 13-david conj-dip cr(PF) (PREVNAR 13 (PF)) 0.5 mL vaccine x 1 dose    promethazine (PHENERGAN) 6.25 mg in dextrose 5 % 50 mL IVPB Q6H PRN    rifAXIMin tablet 550 mg BID    sodium chloride 0.9% flush 10 mL PRN    tobramycin - pharmacy to dose pharmacy to manage frequency    vancomycin - pharmacy to dose pharmacy to manage frequency    vasopressin (PITRESSIN) 0.2 Units/mL in dextrose 5 % 100 mL infusion Continuous       Objective:     Vital Signs (Most Recent):  Temp: 98.6 °F (37 °C) (11/01/20 0300)  Pulse: 72 (11/01/20 1200)  Resp: (!) 35 (11/01/20 1218)  BP: (!) 94/55 (11/01/20 1200)  SpO2: (!) 90 % (11/01/20 1200)  O2 Device (Oxygen Therapy): BiPAP (11/01/20 1200) Vital Signs (24h Range):  Temp:  [98.2 °F (36.8 °C)-99 °F (37.2 °C)] 98.6 °F (37 °C)  Pulse:  [] 72  Resp:  [18-44] 35  SpO2:  [81 %-98 %] 90 %  BP: ()/(40-61) 94/55  Arterial Line BP: (104-144)/(42-52) 104/42     Weight: 71.2 kg (157 lb) (11/01/20 0809)  Body mass index is 25.34 kg/m².  Body surface area is 1.82 meters squared.    I/O last 3 completed shifts:  In: 2058.5 [I.V.:1658.5; IV Piggyback:400]  Out: 3337 [Urine:1337; Other:2000]    Physical Exam  Vitals signs and nursing note reviewed.   Constitutional:       General: She is not in acute  distress.  HENT:      Head: Normocephalic and atraumatic.   Neck:      Musculoskeletal: Normal range of motion.   Cardiovascular:      Rate and Rhythm: Normal rate and regular rhythm.      Heart sounds: No murmur.   Pulmonary:      Breath sounds: Rales present.      Comments: BiPaP in place  Abdominal:      General: There is distension.      Tenderness: There is no abdominal tenderness.   Musculoskeletal:      Right lower leg: No edema.      Left lower leg: No edema.   Neurological:      General: No focal deficit present.      Mental Status: She is alert and oriented to person, place, and time.         Significant Labs:  ABGs:   Recent Labs   Lab 11/01/20  0102   PH 7.294*   PCO2 36.9   HCO3 17.9*   POCSATURATED 91*   BE -9     CBC:   Recent Labs   Lab 11/01/20  0334   WBC 25.11*   RBC 3.10*   HGB 10.1*   HCT 29.4*      MCV 95   MCH 32.6*   MCHC 34.4     CMP:   Recent Labs   Lab 11/01/20 0334   *   CALCIUM 8.1*   ALBUMIN 2.9*   PROT 5.7*   *   K 4.1   CO2 17*   CL 90*   BUN 62*   CREATININE 4.1*   ALKPHOS 477*   ALT 53*   AST 81*   BILITOT 31.6*     All labs within the past 24 hours have been reviewed.     Significant Imaging:     X-Ray: Reviewed  CXR: Personally reviewed    Assessment/Plan:     Acute hypoxemic respiratory failure  - On BiPaP  - Managed by primary team.  - s/p Rt Thoracentesis 10/31: 2L    Acute renal failure  Ms. Bell is a 46 y/o F with previously described history, physical exam, laboratories and imaging studies being consulted for LINN in the setting of decompensated liver cirrhosis 2/2 alpha 1 antitrypsin deficiency and alcohol abuse.   - LINN likely 2/2 HRS: patient with significant hemodynamic compromise at least in the last 24 hrs, with MAP's as low as 58 and readings consistently in the 60's. Likely culprit is sepsis/septic shock.   - U/A: granular and hyaline casts    Rec:  - Patient had an adequate response to titration of Levophed yesterday even when recommend MAP's  where not achieved. Given her current respiratory status, continue monitoring RFP. If intubation is imminent, please proceed and put a temporary dialysis line since most likely patient will need CRRT afterwards. Patient and  were oriented about treatment plan.  expressed understanding.   - Continue Albumin, Midodrine, Octreotide  - Strict I/O and chart  - Avoid nephrotoxic medications, NSAIDs, IV contrast, etc.  - Medication doses adjusted to GFR  - Hb > 7 gm/dL  - Will follow closely        Thank you for your consult. I will follow-up with patient. Please contact us if you have any additional questions.    Ronn Almendarez MD  Nephrology  [facility]

## 2020-11-01 NOTE — PLAN OF CARE
CMICU DAILY GOALS       A: Awake    RASS: Goal -  0  Actual - RASS (Phan Agitation-Sedation Scale): 0-->alert and calm   Restraint necessity:  N/A  B: Breath   SBT: Not intubated   C: Coordinate A & B, analgesics/sedatives   Pain: managed    SAT: Not intubated  D: Delirium   CAM-ICU: Overall CAM-ICU: Negative  E: Early(intubated/ Progressive (non-intubated) Mobility   MOVE Screen: Pass   Activity: Activity Management: rolling - L1  FAS: Feeding/Nutrition   Diet order: Diet/Nutrition Received: regular, Specialty Diet/Nutrition Received: renal diet Fluid restriction:    T: Thrombus   DVT prophylaxis: VTE Required Core Measure: (SCDs) Sequential compression device initiated/maintained  H: HOB Elevation   Head of Bed (HOB): HOB at 30-45 degrees  U: Ulcer Prophylaxis   GI: yes  G: Glucose control   managed Glycemic Management: blood glucose monitoring  S: Skin   Bundle compliance: yes   Bathing/Skin Care: bath, chlorhexidine, bath, complete, incontinence care, linen changed Date: Yes  B: Bowel Function   diarrhea   I: Indwelling Catheters   Garcia necessity:      Urethral Catheter 10/30/20 1615 16 Fr.-Reason for Continuing Urinary Catheterization: Critically ill in ICU requiring intensive monitoring   CVC necessity: Yes   IPAD offered: Not appropriate  D: De-escalation Antibx   Yes  Plan for the day   Amio load and GTTS started due to AFIB. Levo and VASO with MAP goal of 70. BiPAP continuous. Arterial line placed due to increasing pressor requirements.  Family/Goals of care/Code Status   Code Status: Full Code   [facility]

## 2020-11-01 NOTE — NURSING
At 0020, patient's EKG changed to Afib with rates in the 130s to 140s. Dr. Dias notified of status change, ordered 150 mg/100 mL amiodarone bolus with a continuous 1 mg GTTS to follow.    WCTM.

## 2020-11-01 NOTE — PROGRESS NOTES
[facility]  Hepatology Service  Progress Note    Patient Name: Madhavi Bell  MRN: [id number]  Admission Date: 10/30/2020  Hospital Length of Stay: 2 days  Code Status: Full Code   Principal Problem:Decompensated hepatic cirrhosis      Subjective:     Interval history:   Thoracentesis yesterday for worsening hypoxic respiratory failure - 2L removed.  Overnight, developed AFib with RVR and started on amiodarone drip.  This morning, on BiPAP - patient feels better with this.  Chest x-ray this morning with new opacities on the left.  Antibiotics broadened - added fluconazole.  Also on hydrocortisone.  Increasing pressor requirements, now on levo and vaso.  Increasing white count    Objective:     Vitals:    11/01/20 1218   BP:    Pulse:    Resp: (!) 35   Temp:        General:  Mild respiratory distress, on BiPAP, drowsy but arousable  HEENT: +scleral icterus.  Resp: on BiPAP, 80% FiO2, satting low 90%s  Cardiac: RRR  Abdomen: Normoactive bowel sounds. Distended.  Extremities: No peripheral edema.   Neurologic: No gross neurological deficits      Significant Labs:  Recent Labs   Lab 10/31/20  0916 10/31/20  2048 11/01/20  0334   HGB 9.4* 9.5* 10.1*       Lab Results   Component Value Date    WBC 25.11 (H) 11/01/2020    HGB 10.1 (L) 11/01/2020    HCT 29.4 (L) 11/01/2020    MCV 95 11/01/2020     11/01/2020       Lab Results   Component Value Date     (L) 11/01/2020    K 4.1 11/01/2020    CL 90 (L) 11/01/2020    CO2 17 (L) 11/01/2020    BUN 62 (H) 11/01/2020    CREATININE 4.1 (H) 11/01/2020    CALCIUM 8.1 (L) 11/01/2020    ANIONGAP 20 (H) 11/01/2020    ESTGFRAFRICA 14.2 (A) 11/01/2020    EGFRNONAA 12.3 (A) 11/01/2020       Lab Results   Component Value Date    ALT 53 (H) 11/01/2020    AST 81 (H) 11/01/2020     (H) 10/08/2020    ALKPHOS 477 (H) 11/01/2020    BILITOT 31.6 (H) 11/01/2020       Lab Results   Component Value Date    INR 2.4 (H) 11/01/2020    INR 3.5 (H) 10/31/2020     INR 2.4 (H) 10/30/2020       Significant Imaging:  Reviewed pertinent radiology findings.       Assessment/Plan:     Madhavi Bell is a 45 y.o. female with history of decompensated liver cirrhosis due to alpha-1 antitrypsin deficiency and alcohol, recurrent pleural effusion, HCC (with the additional bilobar indeterminate liver lesions) who presented to [place] with shortness of breath and transferred to [facility] for further evaluation.     Problem List:  1. Decompensated liver cirrhosis due to alpha-1 antitrypsin deficiency and alcohol  2. Hepatocellular carcinoma - 3.4 cm right lesion with additional indeterminate foci.  Single lesion alone will would make patient a transplant candidate, but discussed that IR conference and will need biopsy of 1 of the other lesions to make sure she is still a transplant candidate  3. Hypoxemic respiratory failure / recurrent hepatic hydrothorax / ? Multifocal pneumonia  4. Acute renal failure / HRS  5. Hyponatremia     Plan:  - Acute hypoxemic respiratory failure:  S/p thoracentesis on 10/30 (1.7L) and 10/31 (2L).  Worsening chest x-ray, increasing WBC, increasing pressor requirements - ?  concern for septic shock due to pneumonia.  Agree with broad-spectrum antibiotics / infectious workup, consider ID consult.  Recommend against chest tube for hepatic hydrothorax alone, as hydrothorax will continuously drain and lead to significant dehydration/electrolyte abnormalities; also can have difficult to control leakage after removal.  - HCC:  Pending path from one of the indeterminate liver lesions.  - Hepatic hydrothorax: CXR on presentation with right lung opacification.  Thoracentesis  as above.  2 gram Na restrict.  Hold diuretics given hyponatremia and renal failure.  - LINN/HRS:  Nephrology following.  On Levophed, octreotide, albumin.  Also on midodrine  - Hyponatremia:  Hold diuretics, fluid restrict.  Trial of IV albumin.  - Esophageal varices: last EGD 9/9 with small  esophageal varices  - Hepatic encephalopathy: none  - Transplant:  Candidacy will depend on results liver biopsy.  Has completed remaining workup.  Will present to committee after biopsy results are obtained.    Thank you for involving us in the care of Madhavi Carr Arabella. Please call with any additional questions, concerns or changes in the patient's clinical status.    Shane Ross MD  Gastroenterology Fellow PGY IV   [facility]

## 2020-11-01 NOTE — PROCEDURES
Bronchoscopy Procedure Note      Date of Operation: 11/01/2020    Pre-op Diagnosis:     Post-op Diagnosis:     Surgeons: Nina Wilks MD(fellow) &  Rula Kong MD (staff)    Anesthesia:   Patient intubated and sedated with propofol   Operation: Flexible fiberoptic bronchoscopy, diagnostic    Specimen: bronchial was     Estimated Blood Loss: Minimal    Indications:   Worsening hypoxic respiratory failure    Consent:   The risks, benefits, complications, treatment options and expected outcomes were discussed with the patient. The possibilities of reaction to medication, pulmonary aspiration, pneumothorax, bleeding, failure to diagnose their condition, and creating a complication requiring transfusion or operation were discussed with the patient. All questions were answered, and the consent was signed and placed in the chart.    Description of Procedure:  The patient was identified as  Madhavi Bell with 1975  and the procedure verified as Flexible Fiberoptic Bronchoscopy.  A Time Out was conducted, and the above information confirmed.     The scope was then passed through the ETT into the trachea.  Careful inspection of the tracheal lumen was accomplished. The scope was sequentially passed into the left main and then left upper and lower bronchi and segmental bronchi. The scope was then withdrawn and advanced into the right main bronchus and then into the RUL, RML, and RLL bronchi and segmental bronchi.  Copious amounts of dark orange secretions were suctioned bilaterally. 20mL bronchial wash was obtained from the LLL. Cultures were sent from this specimen.     The scope was then withdrawn, and the patient tolerated the procedure without immediate complication.     Nina Wilks M.D.  [facility] Fellow

## 2020-11-01 NOTE — SUBJECTIVE & OBJECTIVE
Past Medical History:   Diagnosis Date    Alpha-1-antitrypsin deficiency     ZZ phenotype    Eosinophilic esophagitis     h/o pill impaction, bx proven    Hyperlipidemia     may be related to liver enzymes, 2ary to high HDL    Schatzki's ring     s/p dilatation       Past Surgical History:   Procedure Laterality Date     SECTION      CHOLECYSTECTOMY      DILATION AND CURETTAGE OF UTERUS      ESOPHAGOGASTRODUODENOSCOPY N/A 2020    Procedure: EGD (ESOPHAGOGASTRODUODENOSCOPY)-need rapid covid;  Surgeon: Jose Smith MD;  Location: [facility];  Service: Endoscopy;  Laterality: N/A;       Review of patient's allergies indicates:  No Known Allergies    Medications:  Medications Prior to Admission   Medication Sig    ergocalciferol (ERGOCALCIFEROL) 50,000 unit Cap Take 1 capsule (50,000 Units total) by mouth every 7 days.    furosemide (LASIX) 20 MG tablet Take 2 tablets (40 mg total) by mouth every 12 (twelve) hours. (Patient taking differently: Take 40 mg by mouth once daily. )    [] hydrocortisone (ANUSOL-HC) 25 mg suppository Place 1 suppository (25 mg total) rectally 2 (two) times daily. for 10 days    lactulose (CHRONULAC) 10 gram/15 mL solution Take 20 g by mouth daily as needed.     magnesium oxide (MAG-OX) 400 mg (241.3 mg magnesium) tablet Take 1 tablet (400 mg total) by mouth 2 (two) times daily.    midodrine (PROAMATINE) 2.5 MG Tab Take 3 tablets (7.5 mg total) by mouth 3 (three) times daily with meals.    multivitamin Tab Take 1 tablet by mouth once daily.    pramoxine-hydrocortisone (PROCTOCREAM-HC) 1-1 % rectal cream Place rectally 3 (three) times daily.    spironolactone (ALDACTONE) 50 MG tablet Take 2 tablets (100 mg total) by mouth 2 (two) times daily. (Patient taking differently: Take 100 mg by mouth once daily. )    zinc gluconate 50 mg tablet Take 50 mg by mouth once daily.     Antibiotics (From admission, onward)    Start     Stop Route Frequency  Ordered    11/01/20 1145  meropenem-0.9% sodium chloride 1 g/50 mL IVPB      -- IV Every 12 hours (non-standard times) 11/01/20 1037    11/01/20 1100  azithromycin 500 mg in dextrose 5 % 250 mL IVPB (ready to mix system)      11/04 1059 IV Every 24 hours (non-standard times) 11/01/20 0956    11/01/20 0515  tobramycin - pharmacy to dose  (tobramycin IVPB)      -- IV pharmacy to manage frequency 11/01/20 0416    10/30/20 2100  rifAXIMin tablet 550 mg      -- Oral 2 times daily 10/30/20 1940    10/30/20 1829  vancomycin - pharmacy to dose  (vancomycin IVPB)      -- IV pharmacy to manage frequency 10/30/20 1732        Antifungals (From admission, onward)    Start     Stop Route Frequency Ordered    11/02/20 0900  fluconazole (DIFLUCAN) IVPB 200 mg 100 mL      -- IV Every 24 hours (non-standard times) 11/01/20 0821        Antivirals (From admission, onward)    None           Immunization History   Administered Date(s) Administered    Influenza - Quadrivalent - PF *Preferred* (6 months and older) 10/17/2020       Family History     None        Social History     Socioeconomic History    Marital status:      Spouse name: Not on file    Number of children: Not on file    Years of education: Not on file    Highest education level: Not on file   Occupational History    Not on file   Social Needs    Financial resource strain: Not on file    Food insecurity     Worry: Not on file     Inability: Not on file    Transportation needs     Medical: Not on file     Non-medical: Not on file   Tobacco Use    Smoking status: Never Smoker    Smokeless tobacco: Never Used   Substance and Sexual Activity    Alcohol use: Not Currently     Comment: patient reports she has not had any alcohol in past few months    Drug use: No    Sexual activity: Yes     Partners: Male     Birth control/protection: OCP   Lifestyle    Physical activity     Days per week: Not on file     Minutes per session: Not on file    Stress: Not on  file   Relationships    Social connections     Talks on phone: Not on file     Gets together: Not on file     Attends Buddhist service: Not on file     Active member of club or organization: Not on file     Attends meetings of clubs or organizations: Not on file     Relationship status: Not on file   Other Topics Concern    Not on file   Social History Narrative    Not on file     Review of Systems   Constitutional: Negative for chills and fatigue.   Respiratory: Positive for cough and shortness of breath.    Cardiovascular: Negative for chest pain.   Gastrointestinal: Positive for abdominal distention. Negative for abdominal pain and nausea.   Genitourinary: Positive for decreased urine volume. Negative for hematuria.   Musculoskeletal: Negative for back pain and joint swelling.   Skin: Negative for pallor and rash.   Neurological: Negative for headaches.   Psychiatric/Behavioral: Negative for agitation and confusion.     Objective:     Vital Signs (Most Recent):  Temp: 98.6 °F (37 °C) (11/01/20 0300)  Pulse: 72 (11/01/20 1200)  Resp: (!) 35 (11/01/20 1218)  BP: (!) 94/55 (11/01/20 1200)  SpO2: (!) 90 % (11/01/20 1200) Vital Signs (24h Range):  Temp:  [98.2 °F (36.8 °C)-99 °F (37.2 °C)] 98.6 °F (37 °C)  Pulse:  [] 72  Resp:  [18-44] 35  SpO2:  [81 %-98 %] 90 %  BP: ()/(40-61) 94/55  Arterial Line BP: (104-144)/(42-52) 104/42     Weight: 71.2 kg (157 lb)  Body mass index is 25.34 kg/m².    Estimated Creatinine Clearance: 17.3 mL/min (A) (based on SCr of 4.1 mg/dL (H)).    Physical Exam  Vitals signs reviewed.   Constitutional:       Appearance: She is well-developed.      Interventions: Nasal cannula in place.   HENT:      Head: Normocephalic and atraumatic.   Eyes:      General: Scleral icterus present.      Pupils: Pupils are equal, round, and reactive to light.   Neck:      Thyroid: No thyromegaly.      Trachea: No tracheal deviation.      Comments: [facility] TLC in place.   Cardiovascular:      Rate  and Rhythm: Normal rate and regular rhythm.      Heart sounds: Normal heart sounds.   Pulmonary:      Effort: Tachypnea present. No accessory muscle usage.      Breath sounds: Decreased breath sounds and rales present. No wheezing.      Comments: BiPAP  Abdominal:      General: Bowel sounds are normal. There is distension.      Palpations: Abdomen is soft.   Genitourinary:     Comments: Garcia in place.   Musculoskeletal: Normal range of motion.   Skin:     General: Skin is warm and dry.      Coloration: Skin is jaundiced.   Neurological:      Mental Status: She is alert and oriented to person, place, and time.      Sensory: No sensory deficit.   Psychiatric:         Behavior: Behavior is cooperative.         Significant Labs: All pertinent labs within the past 24 hours have been reviewed.    Significant Imaging: I have reviewed all pertinent imaging results/findings within the past 24 hours.

## 2020-11-01 NOTE — PT/OT/SLP PROGRESS
Physical Therapy      Patient Name:  Madhavi Carr Adcox   MRN:  [id number]    Attempted to see patient for PT; however nursing requesting to hold as patient currently with labile respiratory. Physical therapy will follow-up with patient tomorrow.      Chacorta Trinidad, PT

## 2020-11-01 NOTE — CONSULTS
[facility]  Infectious Disease  Consult Note    Patient Name: Madhavi Bell  MRN: [id number]  Admission Date: 10/30/2020  Hospital Length of Stay: 2 days  Attending Physician: Thierry Kong MD  Primary Care Provider: Anurag Knutson MD     Isolation Status: No active isolations    Patient information was obtained from patient and past medical records.      Inpatient consult to Infectious Diseases  Consult performed by: Ebony May PA-C  Consult ordered by: Codie Wong PA-C        Assessment/Plan:     Shock, unspecified     45 year old female with decompensated liver cirrhosis, recurrent right sided hepato-hydrothorax, HCC who presented to OSH on 10/29 with SOB [place] [facility] [place] [facility] to have a new leukocytosis,t bili 24, LINN.  Right sided pleural effusion on CXR s/p thoracentesis on 10/30. Started on empiric Vanc/Zosyn for pneumonia coverage.  Also received a dose of Tobramycin. Blood, urine and resp cx on admit NGTD. Over the last 24 hours her respiratory status has worsened with increased oxygen requirements and now requiring pressor support. CXR worsened pulmonary opacities, with prominent confluent infiltrate and consolidative change noted bilaterally. WBC increased to 25K. ID consulted for abx recs.    Recommendations  - Broaden antimicrobial coverage to include Vancomycin, Meropenem and Fluconazole  - Monitor renal function. Adjust abx dosing as needed.  - Follow respiratory culture  - Recommend bronchoscopy if able and send for cultures  - Discussed case with primary team. ID will follow.        Thank you for the consult. Please call for any questions.  Ebony May PA-C  Phone: [phone]  Pager: [phone]    Subjective:     Principal Problem: Decompensated hepatic cirrhosis    HPI: Madhavi Bell is a 45 year old female with decompensated liver cirrhosis due to alpha-1 antitrypsin deficiency and  alcohol use, recurrent right sided hepato-hydrothorax, HCC who presented to [facility] ED on 10/29 with shortness of breath [place] [facility] [place]. Patient reports worsening SOB over the last two days. Patient scheduled for outpatient thoracentesis next week however was advised by GI doctor to present to the ED due to worsening symptoms. Patient reports associated dry cough but no worse than baseline. Denies fever, chills, chest pain, abdominal pain, confusion.      Upon arrival, patient with oxygen saturations >94% on 4L NC. No fever. CXR with significant right sided pleural effusion. Labs significant for new leukocytosis, t bili of 24 and LINN with sCr 2.7. Patient admitted to the CMICU. Patient underwent liver biopsy and thoracentesis (1.5 L removed) on 10/30. Started on empiric Vanc/Zosyn for pneumonia coverage.  Also received a dose of Tobramycin. Blood, urine and resp cx thus far unrevealing. Over the last 24 hours her oxygen requirements increased from 4L to 9L and she has been started on pressors. WBC has increased to 25K. CXR revealed Bilateral pulmonary opacities with significant interval detrimental change, there is prominent appearance of confluent infiltrates/airspace disease and consolidation with air bronchogram involving the mid to lower right hemithorax, with obscuration of the right hemidiaphragm and right heart border, the areas of multifocal rounded opacification on the left appears somewhat more confluent, with prominent confluent abnormal opacity seen throughout the left hemithorax, and central air bronchogram formation.  There is some obscuration of the left heart border however without significant obscuration of the left hemidiaphragm.  There is likely a component of pleural fluid on the right however significant pleural fluid on the left is not appreciated.  There is no evidence for pneumothorax. Repeat thoracentesis showed 2527 WBC, 67% segs.  ID  consulted for abx recs.    Past Medical History:   Diagnosis Date    Alpha-1-antitrypsin deficiency     ZZ phenotype    Eosinophilic esophagitis     h/o pill impaction, bx proven    Hyperlipidemia     may be related to liver enzymes, 2ary to high HDL    Schatzki's ring     s/p dilatation       Past Surgical History:   Procedure Laterality Date     SECTION      CHOLECYSTECTOMY      DILATION AND CURETTAGE OF UTERUS      ESOPHAGOGASTRODUODENOSCOPY N/A 2020    Procedure: EGD (ESOPHAGOGASTRODUODENOSCOPY)-need rapid covid;  Surgeon: Jose Smith MD;  Location: [facility];  Service: Endoscopy;  Laterality: N/A;       Review of patient's allergies indicates:  No Known Allergies    Medications:  Medications Prior to Admission   Medication Sig    ergocalciferol (ERGOCALCIFEROL) 50,000 unit Cap Take 1 capsule (50,000 Units total) by mouth every 7 days.    furosemide (LASIX) 20 MG tablet Take 2 tablets (40 mg total) by mouth every 12 (twelve) hours. (Patient taking differently: Take 40 mg by mouth once daily. )    [] hydrocortisone (ANUSOL-HC) 25 mg suppository Place 1 suppository (25 mg total) rectally 2 (two) times daily. for 10 days    lactulose (CHRONULAC) 10 gram/15 mL solution Take 20 g by mouth daily as needed.     magnesium oxide (MAG-OX) 400 mg (241.3 mg magnesium) tablet Take 1 tablet (400 mg total) by mouth 2 (two) times daily.    midodrine (PROAMATINE) 2.5 MG Tab Take 3 tablets (7.5 mg total) by mouth 3 (three) times daily with meals.    multivitamin Tab Take 1 tablet by mouth once daily.    pramoxine-hydrocortisone (PROCTOCREAM-HC) 1-1 % rectal cream Place rectally 3 (three) times daily.    spironolactone (ALDACTONE) 50 MG tablet Take 2 tablets (100 mg total) by mouth 2 (two) times daily. (Patient taking differently: Take 100 mg by mouth once daily. )    zinc gluconate 50 mg tablet Take 50 mg by mouth once daily.     Antibiotics (From admission, onward)    Start      Stop Route Frequency Ordered    11/01/20 1145  meropenem-0.9% sodium chloride 1 g/50 mL IVPB      -- IV Every 12 hours (non-standard times) 11/01/20 1037    11/01/20 1100  azithromycin 500 mg in dextrose 5 % 250 mL IVPB (ready to mix system)      11/04 1059 IV Every 24 hours (non-standard times) 11/01/20 0956    11/01/20 0515  tobramycin - pharmacy to dose  (tobramycin IVPB)      -- IV pharmacy to manage frequency 11/01/20 0416    10/30/20 2100  rifAXIMin tablet 550 mg      -- Oral 2 times daily 10/30/20 1940    10/30/20 1829  vancomycin - pharmacy to dose  (vancomycin IVPB)      -- IV pharmacy to manage frequency 10/30/20 1732        Antifungals (From admission, onward)    Start     Stop Route Frequency Ordered    11/02/20 0900  fluconazole (DIFLUCAN) IVPB 200 mg 100 mL      -- IV Every 24 hours (non-standard times) 11/01/20 0821        Antivirals (From admission, onward)    None           Immunization History   Administered Date(s) Administered    Influenza - Quadrivalent - PF *Preferred* (6 months and older) 10/17/2020       Family History     None        Social History     Socioeconomic History    Marital status:      Spouse name: Not on file    Number of children: Not on file    Years of education: Not on file    Highest education level: Not on file   Occupational History    Not on file   Social Needs    Financial resource strain: Not on file    Food insecurity     Worry: Not on file     Inability: Not on file    Transportation needs     Medical: Not on file     Non-medical: Not on file   Tobacco Use    Smoking status: Never Smoker    Smokeless tobacco: Never Used   Substance and Sexual Activity    Alcohol use: Not Currently     Comment: patient reports she has not had any alcohol in past few months    Drug use: No    Sexual activity: Yes     Partners: Male     Birth control/protection: OCP   Lifestyle    Physical activity     Days per week: Not on file     Minutes per session: Not on file     Stress: Not on file   Relationships    Social connections     Talks on phone: Not on file     Gets together: Not on file     Attends Hoahaoism service: Not on file     Active member of club or organization: Not on file     Attends meetings of clubs or organizations: Not on file     Relationship status: Not on file   Other Topics Concern    Not on file   Social History Narrative    Not on file     Review of Systems   Constitutional: Negative for chills and fatigue.   Respiratory: Positive for cough and shortness of breath.    Cardiovascular: Negative for chest pain.   Gastrointestinal: Positive for abdominal distention. Negative for abdominal pain and nausea.   Genitourinary: Positive for decreased urine volume. Negative for hematuria.   Musculoskeletal: Negative for back pain and joint swelling.   Skin: Negative for pallor and rash.   Neurological: Negative for headaches.   Psychiatric/Behavioral: Negative for agitation and confusion.     Objective:     Vital Signs (Most Recent):  Temp: 98.6 °F (37 °C) (11/01/20 0300)  Pulse: 72 (11/01/20 1200)  Resp: (!) 35 (11/01/20 1218)  BP: (!) 94/55 (11/01/20 1200)  SpO2: (!) 90 % (11/01/20 1200) Vital Signs (24h Range):  Temp:  [98.2 °F (36.8 °C)-99 °F (37.2 °C)] 98.6 °F (37 °C)  Pulse:  [] 72  Resp:  [18-44] 35  SpO2:  [81 %-98 %] 90 %  BP: ()/(40-61) 94/55  Arterial Line BP: (104-144)/(42-52) 104/42     Weight: 71.2 kg (157 lb)  Body mass index is 25.34 kg/m².    Estimated Creatinine Clearance: 17.3 mL/min (A) (based on SCr of 4.1 mg/dL (H)).    Physical Exam  Vitals signs reviewed.   Constitutional:       Appearance: She is well-developed.      Interventions: Nasal cannula in place.   HENT:      Head: Normocephalic and atraumatic.   Eyes:      General: Scleral icterus present.      Pupils: Pupils are equal, round, and reactive to light.   Neck:      Thyroid: No thyromegaly.      Trachea: No tracheal deviation.      Comments: [facility] TLC in place.    Cardiovascular:      Rate and Rhythm: Normal rate and regular rhythm.      Heart sounds: Normal heart sounds.   Pulmonary:      Effort: Tachypnea present. No accessory muscle usage.      Breath sounds: Decreased breath sounds and rales present. No wheezing.      Comments: BiPAP  Abdominal:      General: Bowel sounds are normal. There is distension.      Palpations: Abdomen is soft.   Genitourinary:     Comments: Garcia in place.   Musculoskeletal: Normal range of motion.   Skin:     General: Skin is warm and dry.      Coloration: Skin is jaundiced.   Neurological:      Mental Status: She is alert and oriented to person, place, and time.      Sensory: No sensory deficit.   Psychiatric:         Behavior: Behavior is cooperative.         Significant Labs: All pertinent labs within the past 24 hours have been reviewed.    Significant Imaging: I have reviewed all pertinent imaging results/findings within the past 24 hours.

## 2020-11-01 NOTE — SUBJECTIVE & OBJECTIVE
Interval History/Significant Events: See hospital course for recent events.     Review of Systems   Unable to perform ROS: Severe respiratory distress   Respiratory: Positive for cough and shortness of breath.      Objective:     Vital Signs (Most Recent):  Temp: 98.6 °F (37 °C) (11/01/20 0300)  Pulse: 82 (11/01/20 1432)  Resp: 20 (11/01/20 1432)  BP: (!) 102/54 (11/01/20 1400)  SpO2: 97 % (11/01/20 1432) Vital Signs (24h Range):  Temp:  [98.2 °F (36.8 °C)-99 °F (37.2 °C)] 98.6 °F (37 °C)  Pulse:  [] 82  Resp:  [18-44] 20  SpO2:  [81 %-98 %] 97 %  BP: ()/(40-61) 102/54  Arterial Line BP: (104-144)/(42-52) 113/44   Weight: 71.2 kg (157 lb)  Body mass index is 25.34 kg/m².      Intake/Output Summary (Last 24 hours) at 11/1/2020 1436  Last data filed at 11/1/2020 1400  Gross per 24 hour   Intake 2980.9 ml   Output 3080 ml   Net -99.1 ml       Physical Exam  Vitals signs reviewed.   Constitutional:       General: She is in acute distress.      Appearance: She is well-developed. She is toxic-appearing.      Interventions: Face mask in place.   HENT:      Head: Normocephalic and atraumatic.   Eyes:      General: Scleral icterus present.      Pupils: Pupils are equal, round, and reactive to light.   Neck:      Thyroid: No thyromegaly.      Trachea: No tracheal deviation.      Comments: [facility] TLC in place.   Cardiovascular:      Rate and Rhythm: Normal rate and regular rhythm.      Heart sounds: No murmur. No friction rub. No gallop.    Pulmonary:      Effort: Tachypnea, accessory muscle usage and respiratory distress present.      Breath sounds: Rales present. No decreased breath sounds, wheezing or rhonchi.   Abdominal:      General: Bowel sounds are normal.      Palpations: Abdomen is soft.      Tenderness: There is no abdominal tenderness.   Genitourinary:     Comments: Garcia in place  Musculoskeletal: Normal range of motion.   Skin:     General: Skin is warm and dry.   Neurological:      General: No focal  deficit present.      Sensory: No sensory deficit.         Vents:  Vent Mode: A/C (11/01/20 1432)  Set Rate: 24 BPM (11/01/20 1432)  Vt Set: 430 mL (11/01/20 1432)  PEEP/CPAP: 10 cmH20 (11/01/20 1432)  Oxygen Concentration (%): 100 (11/01/20 0529)  Peak Airway Pressure: 32 cmH2O (11/01/20 1432)  F/VT Ratio<105 (RSBI): (!) (P) 45.98 (11/01/20 1432)  Lines/Drains/Airways     Central Venous Catheter Line            Percutaneous Central Line Insertion/Assessment - Triple Lumen  10/30/20 0855 left internal jugular 2 days          Drain                 Urethral Catheter 10/30/20 1615 16 Fr. 1 day         NG/OG Tube 11/01/20 1434 Center mouth less than 1 day          Airway                 Airway - Non-Surgical 11/01/20 1424 Endotracheal Tube less than 1 day          Arterial Line            Arterial Line 11/01/20 0626 Left Radial less than 1 day          Peripheral Intravenous Line                 Peripheral IV - Single Lumen 10/29/20 1714 20 G Left Antecubital 2 days              Significant Labs:    CBC/Anemia Profile:  Recent Labs   Lab 10/31/20  0916 10/31/20  2048 11/01/20  0334   WBC 17.29* 22.09* 25.11*   HGB 9.4* 9.5* 10.1*   HCT 27.6* 28.2* 29.4*   * 134* 163   MCV 93 94 95   RDW 18.4* 18.4* 18.5*        Chemistries:  Recent Labs   Lab 10/31/20  0452 10/31/20  1852 11/01/20  0334   * 124* 127*   K 4.7 4.6 4.1   CL 90* 89* 90*   CO2 22* 18* 17*   BUN 55* 59* 62*   CREATININE 4.0* 4.4* 4.1*   CALCIUM 8.1* 8.4* 8.1*   ALBUMIN 3.0* 3.6 2.9*   PROT 5.9* 6.5 5.7*   BILITOT 25.0* 29.4* 31.6*   ALKPHOS 563* 540* 477*   ALT 58* 56* 53*   AST 80* 74* 81*   MG 3.5*  --  3.1*   PHOS 5.9*  --  7.3*       All pertinent labs within the past 24 hours have been reviewed.    Significant Imaging:  I have reviewed and interpreted all pertinent imaging results/findings within the past 24 hours.

## 2020-11-01 NOTE — ASSESSMENT & PLAN NOTE
Secondary to A1AT deficiency and ETOH abuse complicated by HCC, HE, hepatic hydrothorax, ascites, hepatic varices and hyponatremia.    --abdominal US with doppler 10/31with solid lesions in right and left lower lobe; portal HTN; splenomegaly  --S/p liver biopsy 10/30, pathology pending  --Hepatology consulted  --Continue midodrine 10 TID  --Lactulose/rifaximin    MELD-Na score: 42 at 11/1/2020  3:34 AM  MELD score: 43 at 11/1/2020  3:34 AM  Calculated from:  Serum Creatinine: 4.1 mg/dL (Rounded to 4 mg/dL) at 11/1/2020  3:34 AM  Serum Sodium: 127 mmol/L at 11/1/2020  3:34 AM  Total Bilirubin: 31.6 mg/dL at 11/1/2020  3:34 AM  INR(ratio): 2.4 at 11/1/2020  3:34 AM  Age: 45 years

## 2020-11-01 NOTE — PT/OT/SLP PROGRESS
Occupational Therapy      Patient Name:  Madhavi Carr Adcox   MRN:  [id number]    Patient not seen today secondary to on hold per RN 2* labile respiratory status  . Will follow-up 11/2/20.    LU Fu  11/1/2020

## 2020-11-01 NOTE — PROCEDURES
"Madhavi Bell is a 45 y.o. female patient.    Temp: 98.6 °F (37 °C) (20 0300)  Pulse: 82 (20 1500)  Resp: 20 (20 1500)  BP: 128/64 (20 1500)  SpO2: (!) 91 % (20)  Weight: 71.2 kg (157 lb) (20)  Height: 5' 6" (167.6 cm) (20)       Intubation    Date/Time: 2020 3:20 PM  Location procedure was performed: [facility] CRITICAL CARE MEDICINE  Performed by: Nina Wilks MD  Authorized by: Nina Wilks MD   Assisting provider: Thierry Kong MD  Consent Done: Yes  Consent: Verbal consent obtained.  Consent given by: patient  Required items: required blood products, implants, devices, and special equipment available  Patient identity confirmed:  and MRN  Indications: airway protection and  respiratory failure  Intubation method: video-assisted  Patient status: paralyzed (RSI)  Sedatives: etomidate  Paralytic: rocuronium  Laryngoscope size: Mac 4  Tube size: 7.5 mm  Tube type: cuffed  Number of attempts: 1  Cricoid pressure: no  Cords visualized: yes  Post-procedure assessment: CO2 detector  Breath sounds: equal and absent over the epigastrium  Cuff inflated: yes  ETT to lip: 23 cm  Tube secured with: ETT graham  Chest x-ray interpreted by me.  Chest x-ray findings: endotracheal tube in appropriate position  Patient tolerance: Patient tolerated the procedure well with no immediate complications  Complications: No  Estimated blood loss (mL): 0  Specimens: No  Implants: No          Nina Wilks  2020  "

## 2020-11-01 NOTE — PROGRESS NOTES
Pharmacokinetic Assessment Follow Up: IV Vancomycin    Vancomycin Regimen Assessment & Plan:  - Vancomycin level drawn with morning labs today resulted as 15.7 ug/mL, goal trough 10-20 ug/mL.  - Nephrology following for LINN. Will continue pulse dosing while renal function remains unstable.  - Administer vancomycin 750 mg IV x1 dose since level is <20. Draw vancomycin level with morning labs tomorrow. Will re-dose as needed depending on level and renal function.    Drug levels (last 3 results):  Recent Labs   Lab Result Units 10/31/20  0452 11/01/20  0334   Vancomycin, Random ug/mL 25.6 15.7     Pharmacy will continue to follow and monitor vancomycin.    Please contact pharmacy at extension [phone] for questions regarding this assessment.    Thank you for the consult,   Ezio Ruiz     Patient brief summary:  Madhavi Bell is a 45 y.o. female initiated on antimicrobial therapy with IV Vancomycin for treatment of lower respiratory infection    The patient's current regimen is pulse dosing.    Drug Allergies:   Review of patient's allergies indicates:  No Known Allergies    Actual Body Weight:   71.2 kg    Renal Function:   Estimated Creatinine Clearance: 17.3 mL/min (A) (based on SCr of 4.1 mg/dL (H)).,     Dialysis Method (if applicable):  N/A

## 2020-11-01 NOTE — ASSESSMENT & PLAN NOTE
Likely related to volume overloaded in setting of decompensated cirrhosis complicated by hepato-hydrothorax and worsening LINN with minimal UOP vs underlying PNA. CXR with significant pleural effusion of the right side. Most recent CXR with worsening opacifications on the left concerning for possible PNA.    --S/p thoracentesis with 1.5L removal on 10/30 and 10/31 with 2L removed  --Intubated 11/1  --S/p bronschoscopy 11/1; cultures pending  --Continue broad spectrum abx (vancomycin, meropenem, fluconazole, and azithromycin)  --follow up legionella, histo, blasto and aspergillus urine antigen  --Follow up fungitell  --Wean oxygen for goal saturation 88-92%

## 2020-11-01 NOTE — ASSESSMENT & PLAN NOTE
45 year old female with decompensated liver cirrhosis, recurrent right sided hepato-hydrothorax, HCC who presented to OSH on 10/29 with SOB and transferred to [facility] for liver transplant evaluation on 10/30. On admit to C noted to have a new leukocytosis,t bili 24, LINN.  Right sided pleural effusion on CXR s/p thoracentesis on 10/30. Started on empiric Vanc/Zosyn for pneumonia coverage.  Also received a dose of Tobramycin. Blood, urine and resp cx on admit NGTD. Over the last 24 hours her respiratory status has worsened with increased oxygen requirements and now requiring pressor support. CXR worsened pulmonary opacities, with prominent confluent infiltrate and consolidative change noted bilaterally. WBC increased to 25K. ID consulted for abx recs.    Recommendations  - Broaden antimicrobial coverage to include Vancomycin, Meropenem and Fluconazole  - Monitor renal function. Adjust abx dosing as needed.  - Follow respiratory culture  - Recommend bronchoscopy if able and send for cultures  - Discussed case with primary team. ID will follow.

## 2020-11-01 NOTE — EICU
Intervention Initiated From:  Bedside    Comments: Called to bedside for emergent intubation.  1420 - etomidate 10 mg given ICP  1421 - rocuronium 100 mg given IVP  1422 - intubated by MD Marsh using glidescope.  ETT # 7.5 ETT placed.  Color change noted to ETCO2 detector.  Bilat breath sounds auscultated.  Tube secured at 23 cm to teeth.  PCXR ordered.  Propofol initiated at 25 mcg/kg/min.  1433 - OGT placed.   Pt tolerated procedures well.

## 2020-11-01 NOTE — PROCEDURES
"Madhavi Bell is a 45 y.o. female patient.    Temp: 98 °F (36.7 °C) (10/31/20 1115)  Pulse: 95 (10/31/20 1900)  Resp: (!) 27 (10/31/20 1900)  BP: 110/60 (10/31/20 1900)  SpO2: (!) 94 % (10/31/20 1900)  Weight: 71.2 kg (157 lb 1.2 oz) (10/30/20 1340)       Thoracentesis    Date/Time: 10/31/2020 8:01 PM  Location procedure was performed: [facility] CRITICAL CARE MEDICINE  Performed by: Nina Wilks MD  Authorized by: Nina Wilks MD   Assisting provider: Thierry Kong MD  Consent Done: Yes  Consent: Verbal consent obtained.  Risks and benefits: risks, benefits and alternatives were discussed  Time out: Immediately prior to procedure a "time out" was called to verify the correct patient, procedure, equipment, support staff and site/side marked as required.  Procedure purpose: therapeutic  Indications: pleural effusion  Preparation: Patient was prepped and draped in the usual sterile fashion.  Local anesthesia used: yes  Anesthesia: local infiltration    Anesthesia:  Local anesthesia used: yes  Local Anesthetic: lidocaine 1% without epinephrine  Anesthetic total: 8 mL  Preparation: skin prepped with ChloraPrep  Ultrasound guidance: yes  Location: right posterior  Intercostal space: 5th  Puncture method: over-the-needle catheter  Needle size: 14  Catheter size: 14 gauge  Number of attempts: 1  Drainage amount: 2000 ml  Drainage characteristics: bloody and serosanguinous  Patient tolerance: Patient tolerated the procedure well with no immediate complications  Chest x-ray performed: yes  Chest x-ray interpreted by me.  Chest x-ray findings: pulmonary edema, improved effusion.  Complications: No  Estimated blood loss (mL): 10  Specimens: No  Implants: No          Nina  Hill  10/31/2020  "

## 2020-11-01 NOTE — CARE UPDATE
Patient intubated at the bedside by MD on documented settings. Will continue to monitor for titration

## 2020-11-01 NOTE — PROGRESS NOTES
[facility]  Critical Care Medicine  Progress Note    Patient Name: Madhavi Bell  MRN: [id number]  Admission Date: 10/30/2020  Hospital Length of Stay: 2 days  Code Status: Full Code  Attending Provider: Thierry Kong MD  Primary Care Provider: Anurag Knutson MD   Principal Problem: Decompensated hepatic cirrhosis    Subjective:     HPI:  Madhavi Bell is a 45 year old female with decompensated liver cirrhosis due to alpha-1 antitrypsin deficiency and alcohol use, recurrent right sided hepato-hydrothorax, HCC who presented to [place] ED on 10/29 with shortness of breath and transferred to [facility] for liver transplant evaluation on 10/30. Patient reports worsening SOB over the last two days. Patient scheduled for outpatient thoracentesis next week however was advised by GI doctor to present to the ED due to worsening symptoms. Patient reports associated dry cough with pleural effusions but no worse than baseline. Denies fever, chills, chest pain, abdominal pain, confusion.     Upon arrival, patient with oxygen saturations >94% on 4L NC with borderline BP (SBP 88-92). CXR with significant right sided pleural effusion. Labs significant for new leukocytosis, t bili of 24 and LINN with sCr 2.7.     Hospital/ICU Course:  Patient admitted to the CMICU as a transfer for liver transplant evaluation. Patient underwent liver biopsy and thoracentesis (1.5 L removed) on 10/30. Hepatology and nephrology consulted for assistance. Patient with worsening respiratory status on 10/31 and thoracentesis was completed again for symptom management with 2L removed. Vasopressor requirements significantly increased in order to maintain blood pressure. Unfortunately, her respiratory status continued to decline and was ultimately required intubation 11/1.  New pulmonary infiltrates developed on the left side more concerning for PNA. Bronchoscopy completed 11/1. ID consulted for assistance. Abx broadened to  vancomycin, meropenem, azithromycin, and fluconazole.     Interval History/Significant Events: See hospital course for recent events.     Review of Systems   Unable to perform ROS: Severe respiratory distress   Respiratory: Positive for cough and shortness of breath.      Objective:     Vital Signs (Most Recent):  Temp: 98.6 °F (37 °C) (11/01/20 0300)  Pulse: 82 (11/01/20 1432)  Resp: 20 (11/01/20 1432)  BP: (!) 102/54 (11/01/20 1400)  SpO2: 97 % (11/01/20 1432) Vital Signs (24h Range):  Temp:  [98.2 °F (36.8 °C)-99 °F (37.2 °C)] 98.6 °F (37 °C)  Pulse:  [] 82  Resp:  [18-44] 20  SpO2:  [81 %-98 %] 97 %  BP: ()/(40-61) 102/54  Arterial Line BP: (104-144)/(42-52) 113/44   Weight: 71.2 kg (157 lb)  Body mass index is 25.34 kg/m².      Intake/Output Summary (Last 24 hours) at 11/1/2020 1436  Last data filed at 11/1/2020 1400  Gross per 24 hour   Intake 2980.9 ml   Output 3080 ml   Net -99.1 ml       Physical Exam  Vitals signs reviewed.   Constitutional:       General: She is in acute distress.      Appearance: She is well-developed. She is toxic-appearing.      Interventions: Face mask in place.   HENT:      Head: Normocephalic and atraumatic.   Eyes:      General: Scleral icterus present.      Pupils: Pupils are equal, round, and reactive to light.   Neck:      Thyroid: No thyromegaly.      Trachea: No tracheal deviation.      Comments: LIJ TLC in place.   Cardiovascular:      Rate and Rhythm: Normal rate and regular rhythm.      Heart sounds: No murmur. No friction rub. No gallop.    Pulmonary:      Effort: Tachypnea, accessory muscle usage and respiratory distress present.      Breath sounds: Rales present. No decreased breath sounds, wheezing or rhonchi.   Abdominal:      General: Bowel sounds are normal.      Palpations: Abdomen is soft.      Tenderness: There is no abdominal tenderness.   Genitourinary:     Comments: Garcia in place  Musculoskeletal: Normal range of motion.   Skin:     General: Skin  is warm and dry.   Neurological:      General: No focal deficit present.      Sensory: No sensory deficit.         Vents:  Vent Mode: A/C (11/01/20 1432)  Set Rate: 24 BPM (11/01/20 1432)  Vt Set: 430 mL (11/01/20 1432)  PEEP/CPAP: 10 cmH20 (11/01/20 1432)  Oxygen Concentration (%): 100 (11/01/20 0529)  Peak Airway Pressure: 32 cmH2O (11/01/20 1432)  F/VT Ratio<105 (RSBI): (!) (P) 45.98 (11/01/20 1432)  Lines/Drains/Airways     Central Venous Catheter Line            Percutaneous Central Line Insertion/Assessment - Triple Lumen  10/30/20 0855 left internal jugular 2 days          Drain                 Urethral Catheter 10/30/20 1615 16 Fr. 1 day         NG/OG Tube 11/01/20 1434 Center mouth less than 1 day          Airway                 Airway - Non-Surgical 11/01/20 1424 Endotracheal Tube less than 1 day          Arterial Line            Arterial Line 11/01/20 0626 Left Radial less than 1 day          Peripheral Intravenous Line                 Peripheral IV - Single Lumen 10/29/20 1714 20 G Left Antecubital 2 days              Significant Labs:    CBC/Anemia Profile:  Recent Labs   Lab 10/31/20  0916 10/31/20  2048 11/01/20  0334   WBC 17.29* 22.09* 25.11*   HGB 9.4* 9.5* 10.1*   HCT 27.6* 28.2* 29.4*   * 134* 163   MCV 93 94 95   RDW 18.4* 18.4* 18.5*        Chemistries:  Recent Labs   Lab 10/31/20  0452 10/31/20  1852 11/01/20  0334   * 124* 127*   K 4.7 4.6 4.1   CL 90* 89* 90*   CO2 22* 18* 17*   BUN 55* 59* 62*   CREATININE 4.0* 4.4* 4.1*   CALCIUM 8.1* 8.4* 8.1*   ALBUMIN 3.0* 3.6 2.9*   PROT 5.9* 6.5 5.7*   BILITOT 25.0* 29.4* 31.6*   ALKPHOS 563* 540* 477*   ALT 58* 56* 53*   AST 80* 74* 81*   MG 3.5*  --  3.1*   PHOS 5.9*  --  7.3*       All pertinent labs within the past 24 hours have been reviewed.    Significant Imaging:  I have reviewed and interpreted all pertinent imaging results/findings within the past 24 hours.      [facility]  Recent Labs   Lab 11/01/20  0102   PH 7.294*   PO2 69*   PCO2  36.9   HCO3 17.9*   BE -9     Assessment/Plan:     Pulmonary  Acute hypoxemic respiratory failure  Likely related to volume overloaded in setting of decompensated cirrhosis complicated by hepato-hydrothorax and worsening LINN with minimal UOP vs underlying PNA. CXR with significant pleural effusion of the right side. Most recent CXR with worsening opacifications on the left concerning for possible PNA.    --S/p thoracentesis with 1.5L removal on 10/30 and 10/31 with 2L removed  --Intubated 11/1  --S/p bronschoscopy 11/1; cultures pending  --Continue broad spectrum abx (vancomycin, meropenem, fluconazole, and azithromycin)  --follow up legionella, histo, blasto and aspergillus urine antigen  --Follow up fungitell  --Wean oxygen for goal saturation 88-92%      Renal/  Lactic acidosis  Likely related to decompensated cirrhosis vs underlying infection    --Will continue to monitor    Acute renal failure  Possibly iATN vs HRS    --Urine lytes indicating pre-renal cause  --RP US negative for hydronephrosis  --Nephrology following; Recommending MAP goal >80 - however patient with significant norepinephrine requirements in order to achieve that goal. MAP goal changed to 70.   --HRS treatment initiated (albumin, midodrine, and octreotide)  --Garcia in place with strict I/Os    Hyponatremia  2/2 decompensated liver cirrhosis vs new LINN    --frequent BMP  --will continue to monitor    GI  * Decompensated hepatic cirrhosis  Secondary to A1AT deficiency and ETOH abuse complicated by HCC, HE, hepatic hydrothorax, ascites, hepatic varices and hyponatremia.    --abdominal US with doppler 10/31with solid lesions in right and left lower lobe; portal HTN; splenomegaly  --S/p liver biopsy 10/30, pathology pending  --Hepatology consulted  --Continue midodrine 10 TID  --Lactulose/rifaximin    MELD-Na score: 42 at 11/1/2020  3:34 AM  MELD score: 43 at 11/1/2020  3:34 AM  Calculated from:  Serum Creatinine: 4.1 mg/dL (Rounded to 4 mg/dL)  at 11/1/2020  3:34 AM  Serum Sodium: 127 mmol/L at 11/1/2020  3:34 AM  Total Bilirubin: 31.6 mg/dL at 11/1/2020  3:34 AM  INR(ratio): 2.4 at 11/1/2020  3:34 AM  Age: 45 years    Pleural effusion associated with hepatic disorder  Reports 3 thoracentesis in the past for symptom management.     --s/p thoracentesis of 1.5 L removal on 10/30; labs negative for infection  --Repeat thora 10/31 with 2L removed      Other  Shock, unspecified  Likely related to decompensated cirrhosis vs underlying infection (less likely)    --Continue vancomycin, meropenem, azithromycin, and fluconazole.  --ID following  --BCx NGTD  --Stress dose steroids initiated  --UA with 2+ leuks and 46 WBCs; reflex to culture pending  --No pocket for paracentesis   --Thoracentesis fluid negative for SBP  --Continue norepinephrine and vasopressin to maintain MAP >70    Palliative care encounter  Palliative care consulted due to liver transplant evaluation       Critical Care Daily Checklist:    A: Awake: RASS Goal/Actual Goal:    Actual: Phan Agitation Sedation Scale (RASS): Restless   B: Spontaneous Breathing Trial Performed?     C: SAT & SBT Coordinated?  N/A                      D: Delirium: CAM-ICU Overall CAM-ICU: Negative   E: Early Mobility Performed? Yes   F: Feeding Goal:    Status:     Current Diet Order   Procedures    Diet NPO      AS: Analgesia/Sedation Propofol, fentanyl   T: Thromboembolic Prophylaxis Holding due to high risk for bleeding   H: HOB > 300 Yes   U: Stress Ulcer Prophylaxis (if needed) famotidine   G: Glucose Control At goal 140-180   B: Bowel Function Stool Occurrence: 1   I: Indwelling Catheter (Lines & Moon) Necessity LIJ CVC, moon, arterial line   D: De-escalation of Antimicrobials/Pharmacotherapies Continue all    Plan for the day/ETD Hemodynamically stabilize    Code Status:  Family/Goals of Care: Full Code       Discussed with Dr. Kong. Patient and  updated at the bedside.     Critical Care Time: 60  minutes  Critical secondary to Patient has a condition that poses threat to life and bodily function: acute hypoxemic respiratory failure requiring mechanical ventilation.       Critical care was time spent personally by me on the following activities: development of treatment plan with patient or surrogate and bedside caregivers, discussions with consultants, evaluation of patient's response to treatment, examination of patient, ordering and performing treatments and interventions, ordering and review of laboratory studies, ordering and review of radiographic studies, pulse oximetry, re-evaluation of patient's condition. This critical care time did not overlap with that of any other provider or involve time for any procedures.     Codie Wong PA-C  Critical Care Medicine  [facility]

## 2020-11-01 NOTE — ASSESSMENT & PLAN NOTE
Likely related to decompensated cirrhosis vs underlying infection (less likely)    --Continue vancomycin, meropenem, azithromycin, and fluconazole.  --ID following  --BCx NGTD  --Stress dose steroids initiated  --UA with 2+ leuks and 46 WBCs; reflex to culture pending  --No pocket for paracentesis   --Thoracentesis fluid negative for SBP  --Continue norepinephrine and vasopressin to maintain MAP >70

## 2020-11-01 NOTE — ASSESSMENT & PLAN NOTE
Ms. Bell is a 44 y/o F with previously described history, physical exam, laboratories and imaging studies being consulted for LINN in the setting of decompensated liver cirrhosis 2/2 alpha 1 antitrypsin deficiency and alcohol abuse.   - LINN likely 2/2 HRS: patient with significant hemodynamic compromise at least in the last 24 hrs, with MAP's as low as 58 and readings consistently in the 60's. Likely culprit is sepsis/septic shock.   - U/A: granular and hyaline casts    Rec:  - Patient had an adequate response to titration of Levophed yesterday even when recommend MAP's where not achieved. Given her current respiratory status, continue monitoring RFP. If intubation is imminent, please proceed and put a temporary dialysis line since most likely patient will need CRRT afterwards. Patient and  were oriented about treatment plan.  expressed understanding.   - Continue Albumin, Midodrine, Octreotide  - Strict I/O and chart  - Avoid nephrotoxic medications, NSAIDs, IV contrast, etc.  - Medication doses adjusted to GFR  - Hb > 7 gm/dL  - Will follow closely

## 2020-11-01 NOTE — SUBJECTIVE & OBJECTIVE
Interval History: Seen and examined at bedside this AM. Improvement on sCr and UOP (~1.7L). Thoracentesis done yesterday. A-fib overnight, s/p Amio bolus, now on gtts. On multiple pressors and BiPaP. Morning CXR with bilateral opacities.      Review of patient's allergies indicates:  No Known Allergies  Current Facility-Administered Medications   Medication Frequency    0.9%  NaCl infusion (for blood administration) Q24H PRN    albumin human 25% bottle 25 g BID    amiodarone 360 mg/200 mL (1.8 mg/mL) infusion Continuous    azithromycin 500 mg in dextrose 5 % 250 mL IVPB (ready to mix system) Q24H    dexmedetomidine (PRECEDEX) 400mcg/100mL 0.9% NaCL infusion Continuous    fentaNYL injection Code/trauma/sedation Med    [START ON 11/2/2020] fluconazole (DIFLUCAN) IVPB 200 mg 100 mL Q24H    hydrocortisone sodium succinate injection 100 mg Q8H    HYDROmorphone injection 0.2 mg Q6H PRN    lactulose 20 gram/30 mL solution Soln 20 g TID    lidocaine HCL 10 mg/ml (1%) injection Code/trauma/sedation Med    meropenem-0.9% sodium chloride 1 g/50 mL IVPB Q12H    midazolam (VERSED) 1 mg/mL injection Code/trauma/sedation Med    midodrine tablet 10 mg TID WM    norepinephrine 32 mg in sodium chloride 0.9% 250 mL infusion Continuous    octreotide injection 100 mcg Q8H    ondansetron injection 4 mg Q6H PRN    phytonadione vitamin k (AQUA-MEPHYTON) 10 mg in sodium chloride 0.9% IVPB Daily    pneumoc 13-david conj-dip cr(PF) (PREVNAR 13 (PF)) 0.5 mL vaccine x 1 dose    promethazine (PHENERGAN) 6.25 mg in dextrose 5 % 50 mL IVPB Q6H PRN    rifAXIMin tablet 550 mg BID    sodium chloride 0.9% flush 10 mL PRN    tobramycin - pharmacy to dose pharmacy to manage frequency    vancomycin - pharmacy to dose pharmacy to manage frequency    vasopressin (PITRESSIN) 0.2 Units/mL in dextrose 5 % 100 mL infusion Continuous       Objective:     Vital Signs (Most Recent):  Temp: 98.6 °F (37 °C) (11/01/20 0300)  Pulse: 72  (11/01/20 1200)  Resp: (!) 35 (11/01/20 1218)  BP: (!) 94/55 (11/01/20 1200)  SpO2: (!) 90 % (11/01/20 1200)  O2 Device (Oxygen Therapy): BiPAP (11/01/20 1200) Vital Signs (24h Range):  Temp:  [98.2 °F (36.8 °C)-99 °F (37.2 °C)] 98.6 °F (37 °C)  Pulse:  [] 72  Resp:  [18-44] 35  SpO2:  [81 %-98 %] 90 %  BP: ()/(40-61) 94/55  Arterial Line BP: (104-144)/(42-52) 104/42     Weight: 71.2 kg (157 lb) (11/01/20 0809)  Body mass index is 25.34 kg/m².  Body surface area is 1.82 meters squared.    I/O last 3 completed shifts:  In: 2058.5 [I.V.:1658.5; IV Piggyback:400]  Out: 3337 [Urine:1337; Other:2000]    Physical Exam  Vitals signs and nursing note reviewed.   Constitutional:       General: She is not in acute distress.  HENT:      Head: Normocephalic and atraumatic.   Neck:      Musculoskeletal: Normal range of motion.   Cardiovascular:      Rate and Rhythm: Normal rate and regular rhythm.      Heart sounds: No murmur.   Pulmonary:      Breath sounds: Rales present.      Comments: BiPaP in place  Abdominal:      General: There is distension.      Tenderness: There is no abdominal tenderness.   Musculoskeletal:      Right lower leg: No edema.      Left lower leg: No edema.   Neurological:      General: No focal deficit present.      Mental Status: She is alert and oriented to person, place, and time.         Significant Labs:  ABGs:   Recent Labs   Lab 11/01/20  0102   PH 7.294*   PCO2 36.9   HCO3 17.9*   POCSATURATED 91*   BE -9     CBC:   Recent Labs   Lab 11/01/20 0334   WBC 25.11*   RBC 3.10*   HGB 10.1*   HCT 29.4*      MCV 95   MCH 32.6*   MCHC 34.4     CMP:   Recent Labs   Lab 11/01/20 0334   *   CALCIUM 8.1*   ALBUMIN 2.9*   PROT 5.7*   *   K 4.1   CO2 17*   CL 90*   BUN 62*   CREATININE 4.1*   ALKPHOS 477*   ALT 53*   AST 81*   BILITOT 31.6*     All labs within the past 24 hours have been reviewed.     Significant Imaging:     X-Ray: Reviewed  CXR: Personally reviewed

## 2020-11-01 NOTE — HPI
Madhavi Bell is a 45 year old female with decompensated liver cirrhosis due to alpha-1 antitrypsin deficiency and alcohol use, recurrent right sided hepato-hydrothorax, HCC who presented to [place] ED on 10/29 with shortness of breath and transferred to [place] for liver transplant evaluation on 10/30. Patient reports worsening SOB over the last two days. Patient scheduled for outpatient thoracentesis next week however was advised by GI doctor to present to the ED due to worsening symptoms. Patient reports associated dry cough but no worse than baseline. Denies fever, chills, chest pain, abdominal pain, confusion.      Upon arrival, patient with oxygen saturations >94% on 4L NC. No fever. CXR with significant right sided pleural effusion. Labs significant for new leukocytosis, t bili of 24 and LINN with sCr 2.7. Patient admitted to the CMICU. Patient underwent liver biopsy and thoracentesis (1.5 L removed) on 10/30. Started on empiric Vanc/Zosyn for pneumonia coverage.  Also received a dose of Tobramycin. Blood, urine and resp cx thus far unrevealing. Over the last 24 hours her oxygen requirements increased from 4L to 9L and she has been started on pressors. WBC has increased to 25K. CXR revealed Bilateral pulmonary opacities with significant interval detrimental change, there is prominent appearance of confluent infiltrates/airspace disease and consolidation with air bronchogram involving the mid to lower right hemithorax, with obscuration of the right hemidiaphragm and right heart border, the areas of multifocal rounded opacification on the left appears somewhat more confluent, with prominent confluent abnormal opacity seen throughout the left hemithorax, and central air bronchogram formation.  There is some obscuration of the left heart border however without significant obscuration of the left hemidiaphragm.  There is likely a component of pleural fluid on the right however significant pleural fluid on the  left is not appreciated.  There is no evidence for pneumothorax. Repeat thoracentesis showed 2527 WBC, 67% segs.  ID consulted for abx recs.    Further hx obtained from . 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 Two kids (one physically going to school). No recent illnesses or known COVID exposures. Two dogs. No recent travel.

## 2020-11-01 NOTE — ASSESSMENT & PLAN NOTE
Reports 3 thoracentesis in the past for symptom management.     --s/p thoracentesis of 1.5 L removal on 10/30; labs negative for infection  --Repeat thora 10/31 with 2L removed

## 2020-11-01 NOTE — PROCEDURES
"Madhavi Bell is a 45 y.o. female patient.    Temp: 98.7 °F (37.1 °C) (10/31/20 2300)  Pulse: 76 (11/01/20 0529)  Resp: (!) 34 (11/01/20 0529)  BP: (!) 89/47 (11/01/20 0529)  SpO2: (!) 91 % (11/01/20 0529)  Weight: 71.2 kg (157 lb 1.2 oz) (10/30/20 1340)       Arterial Line    Date/Time: 11/1/2020 6:51 AM  Location procedure was performed: [facility] ([facility])  Performed by: Dana Altamirano MD  Authorized by: Dana Altamirano MD   Assisting provider: Julio César Dias MD  Pre-op Diagnosis: Decompensated cirrhosis  Post-operative diagnosis: Decompensated cirrhosis  Consent Done: Yes  Consent: Written consent obtained.  Time out: Immediately prior to procedure a "time out" was called to verify the correct patient, procedure, equipment, support staff and site/side marked as required.  Preparation: Patient was prepped and draped in the usual sterile fashion.  Indications: hemodynamic monitoring  Location: left radial    Anesthesia:  Local Anesthetic: lidocaine 1% without epinephrine  Patient sedated: no  Number of attempts: 2  Complications: No  Estimated blood loss (mL): 2  Patient tolerance: Patient tolerated the procedure well with no immediate complications          Dana Altamirano  11/1/2020  "

## 2020-11-01 NOTE — PROGRESS NOTES
Pharmacokinetic Initial Assessment: Tobramycin    Assessment:  Weight utilized for dose calculation: Ideal Body Weight  Dosing method utilized: conventional dosing (not a candidate for extended interval due to severe renal impairment (CrCl <30 mL/min))    Plan: Traditional dosing regimen: Tobramycin 180 mg IV once, followed by a serum peak level scheduled 30 minutes after the end of the dose for calculation Vd (if necessary) to be drawn on 11/01 at 0630. Goal Peak level is 8-10 mcg/mL. Trough Monitoring to follow.    Pharmacy will continue to monitor.    Please contact pharmacy at extension [phone] with any questions regarding this assessment.    Thank you for the consult,    Eugene Leigh       Patient brief summary:  Madhavi Bell is a 45 y.o. female initiated on aminoglycoside therapy for treatment of suspected bacteremia    Drug Allergies:   Review of patient's allergies indicates:  No Known Allergies    Actual Body Weight:   71.2kg    Adjust Body Weight:   64.1kg    Ideal Body Weight:  59.3kg    Renal Function:   Estimated Creatinine Clearance: 17.3 mL/min (A) (based on SCr of 4.1 mg/dL (H)).,     Dialysis Method (if applicable):  N/A    CBC (last 72 hours):  Recent Labs   Lab Result Units 10/29/20  1714 10/30/20  1340 10/31/20  0453 10/31/20  0916 10/31/20  2048 11/01/20  0334   WBC K/uL 12.24 19.03* 17.19* 17.29* 22.09* 25.11*   Hemoglobin g/dL 11.9* 11.2* 9.3* 9.4* 9.5* 10.1*   Hematocrit % 33.7* 30.8* 26.7* 27.6* 28.2* 29.4*   Platelets K/uL 176 150 108* 119* 134* 163   Gran % % 69.4 83.7* 80.3* 81.5* 84.1* 84.8*   Lymph % % 17.5* 7.7* 11.1* 9.9* 7.0* 6.5*   Mono % % 9.7 6.9 6.2 5.0 5.8 5.4   Eosinophil % % 1.9 0.8 1.5 2.0 1.1 0.2   Basophil % % 0.4 0.2 0.2 0.3 0.2 0.2   Differential Method  Automated Automated Automated Automated Automated Automated       Metabolic Panel (last 72 hours):  Recent Labs   Lab Result Units 10/29/20  1714 10/30/20  1334 10/30/20  1340 10/30/20  1716 10/30/20  1802  10/31/20  0452 10/31/20  1852 11/01/20  0334   Sodium mmol/L 123*  --  124*  --   --  125* 124* 127*   Sodium, Urine mmol/L  --  <20*  --   --   --   --   --   --    Potassium mmol/L 4.6  --  4.4  --   --  4.7 4.6 4.1   Potassium, Urine mmol/L  --  53  --   --   --   --   --   --    Chloride mmol/L 92*  --  92*  --   --  90* 89* 90*   Chloride, Urine mmol/L  --  <20*  --   --   --   --   --   --    CO2 mmol/L 23  --  20*  --   --  22* 18* 17*   Glucose mg/dL 123*  --  92  --   --  121* 123* 132*   Glucose, Fluid mg/dL  --   --   --  112  --   --   --   --    Glucose, UA   --  Negative  1+*  --   --   --   --   --   --    BUN mg/dL 48*  --  54*  --   --  55* 59* 62*   Creatinine mg/dL 2.7*  --  3.3*  --   --  4.0* 4.4* 4.1*   Creatinine, Urine mg/dL  --  150.0  --   --   --   --   --   --    Albumin g/dL 1.5*  --  2.0*  --  2.0* 3.0* 3.6 2.9*   Total Bilirubin mg/dL 24.9*  --  25.6*  --   --  25.0* 29.4*  --    Alkaline Phosphatase U/L 761*  --  735*  --   --  563* 540* 477*   AST U/L 142*  --  121*  --   --  80* 74* 81*   ALT U/L 91*  --  81*  --   --  58* 56* 53*   Magnesium mg/dL 3.6*  --  3.7*  --   --  3.5*  --  3.1*   Phosphorus mg/dL  --   --  4.6*  --   --  5.9*  --  7.3*       Microbiologic Results:  Microbiology Results (last 7 days)     Procedure Component Value Units Date/Time    Culture, Respiratory with Gram Stain [[id number]] Collected: 11/01/20 0153    Order Status: Sent Specimen: Respiratory from Sputum, Expectorated Updated: 11/01/20 0153    Gram stain [[id number]] Collected: 10/31/20 2038    Order Status: Completed Specimen: Pleural Fluid Updated: 11/01/20 0026     Gram Stain Result Rare WBC's      No organisms seen    Urine Culture High Risk [[id number]] Collected: 10/30/20 1802    Order Status: Completed Specimen: Urine, Catheterized Updated: 10/31/20 2242     Urine Culture, Routine No significant growth    Narrative:      Indicated criteria for high risk culture:->Other  Other (specify):->liver  patient    Aerobic culture [[id number]] Collected: 10/31/20 2038    Order Status: Sent Specimen: Pleural Fluid Updated: 10/31/20 2157    AFB Culture & Smear [[id number]] Collected: 10/31/20 2038    Order Status: Sent Specimen: Pleural Fluid Updated: 10/31/20 2156    Urine culture [[id number]] Collected: 10/30/20 1334    Order Status: No result Specimen: Urine Updated: 10/31/20 0700    Culture, Body Fluid (Aerobic) w/ GS [[id number]] Collected: 10/30/20 1716    Order Status: Completed Specimen: Body Fluid from Pleural Fluid Updated: 10/30/20 2248     Gram Stain Result Few WBC's      No organisms seen    Culture, Anaerobic [[id number]] Collected: 10/30/20 1716    Order Status: Sent Specimen: Body Fluid from Pleural Fluid Updated: 10/30/20 1840

## 2020-11-02 NOTE — ASSESSMENT & PLAN NOTE
2/2 decompensated liver cirrhosis vs new LINN    --frequent BMP  --will continue to monitor  --CRRT to be initiated on 11/2

## 2020-11-02 NOTE — ASSESSMENT & PLAN NOTE
Likely related to decompensated cirrhosis vs underlying infection (less likely)    --Continue vancomycin, meropenem, azithromycin, and fluconazole.  --ID following  --BCx NGTD; resp cx normal ishan  --Stress dose steroids initiated  --UA with 2+ leuks and 46 WBCs; urine cx no growth  --No pocket for paracentesis   --Thoracentesis fluid negative for SBP  --Continue norepinephrine and vasopressin to maintain MAP >70

## 2020-11-02 NOTE — PLAN OF CARE
CMICU DAILY GOALS       A: Awake    RASS: Goal -  RASS 0  Actual - RASS (Phan Agitation-Sedation Scale): -2-->light sedation   Restraint necessity:  Yes  B: Breath   SBT: Not attempted   C: Coordinate A & B, analgesics/sedatives   Pain: managed    SAT: Pass  D: Delirium   CAM-ICU: Overall CAM-ICU: Positive  E: Early(intubated/ Progressive (non-intubated) Mobility   MOVE Screen: Fail   Activity: Activity Management: patient unable to perform activities  FAS: Feeding/Nutrition   Diet order: Diet/Nutrition Received: NPO, Specialty Diet/Nutrition Received: renal diet Fluid restriction:    T: Thrombus   DVT prophylaxis: VTE Required Core Measure: (SCDs) Sequential compression device initiated/maintained  H: HOB Elevation   Head of Bed (HOB): HOB elevated  U: Ulcer Prophylaxis   GI: yes  G: Glucose control   managed Glycemic Management: blood glucose monitoring  S: Skin   Bundle compliance: yes   Bathing/Skin Care: bath, chlorhexidine, linen changed Date: 11/1 AM  B: Bowel Function   diarrhea   I: Indwelling Catheters   Garcia necessity:      Urethral Catheter 10/30/20 1615 16 Fr.-Reason for Continuing Urinary Catheterization: Critically ill in ICU requiring intensive monitoring   CVC necessity: Yes   IPAD offered: Not appropriate  D: De-escalation Antibx   Yes  Plan for the day   Wean oxygen requirements. Manage pressure support with systolic goal of greater than 100. UO decreasing.  Family/Goals of care/Code Status   Code Status: Full Code     No acute events throughout day, VS and assessment per flow sheet, patient progressing towards goals as tolerated, plan of care reviewed with Madhavi Bell and family, all concerns addressed, will continue to monitor.

## 2020-11-02 NOTE — SUBJECTIVE & OBJECTIVE
Interval History: Patient intubated yesterday. Bronched. Cx pending. Family at bedside. On pressors. O2 req. Decreased today. Remains afebrile. PIV exchanged.    Further hx obtained from . 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 Two kids (one physically going to school). No recent illnesses or known COVID exposures. Two dogs. No recent travel.    Review of Systems   Unable to perform ROS: Intubated     Objective:     Vital Signs (Most Recent):  Temp: 98.2 °F (36.8 °C) (11/02/20 1100)  Pulse: 85 (11/02/20 1300)  Resp: (!) 26 (11/02/20 1300)  BP: (!) 106/57 (11/02/20 1300)  SpO2: (!) 89 % (11/02/20 1300) Vital Signs (24h Range):  Temp:  [98.2 °F (36.8 °C)-98.7 °F (37.1 °C)] 98.2 °F (36.8 °C)  Pulse:  [71-87] 85  Resp:  [20-39] 26  SpO2:  [88 %-98 %] 89 %  BP: ()/(48-64) 106/57  Arterial Line BP: ()/(38-55) 110/53     Weight: 71.2 kg (156 lb 15.5 oz)  Body mass index is 25.34 kg/m².    Estimated Creatinine Clearance: 14.8 mL/min (A) (based on SCr of 4.8 mg/dL (H)).    Physical Exam  Vitals signs reviewed.   Constitutional:       Appearance: She is well-developed. She is ill-appearing.      Interventions: She is sedated and intubated.   HENT:      Head: Normocephalic and atraumatic.   Eyes:      General: Scleral icterus present.   Neck:      Thyroid: No thyromegaly.      Trachea: No tracheal deviation.      Comments: [facility] TLC in place.   Cardiovascular:      Rate and Rhythm: Normal rate and regular rhythm.   Pulmonary:      Effort: Tachypnea present. No accessory muscle usage. She is intubated.      Breath sounds: Decreased breath sounds and rales present. No wheezing.   Abdominal:      General: Bowel sounds are normal. There is no distension.      Palpations: Abdomen is soft.      Hernia: No hernia is present.   Genitourinary:     Comments: Garcia in place.   Musculoskeletal:      Right lower leg: No edema.      Left  lower leg: No edema.   Skin:     General: Skin is warm and dry.      Coloration: Skin is jaundiced.      Findings: Bruising present.      Comments: PIV exchanged       Vent Mode: A/C  Oxygen Concentration (%):  [50-70] 50  Resp Rate Total:  [20 br/min-33 br/min] 33 br/min  Vt Set:  [400 mL-430 mL] 400 mL  PEEP/CPAP:  [10 cmH20] 10 cmH20  Mean Airway Pressure:  [16 [id number] cmH20] 19 cmH20      Significant Labs: All pertinent labs within the past 24 hours have been reviewed.    Significant Imaging: I have reviewed all pertinent imaging results/findings within the past 24 hours.

## 2020-11-02 NOTE — PROGRESS NOTES
[facility]  Hepatology Service  Progress Note    Patient Name: Madhavi Bell  MRN: [id number]  Admission Date: 10/30/2020  Hospital Length of Stay: 3 days  Code Status: Full Code   Principal Problem:Decompensated hepatic cirrhosis      Subjective:     Interval history:   Intubated yesterday.  S/p bronch - growing GPC and GNRs. ID consulted, on Vanc, douglas and fluconazole  Worsening renal function, nephrology on board  Path returned from liver biopsy - not carcinoma    Objective:     Vitals:    11/02/20 1300   BP: (!) 106/57   Pulse: 85   Resp: (!) 26   Temp:        General:  Intubated, sedated  HEENT: +scleral icterus.  Resp:  Ventilator breath sounds  Cardiac: RRR  Abdomen: Normoactive bowel sounds. Distended.  Extremities: No peripheral edema.   Neurologic:  Sedated      Significant Labs:  Recent Labs   Lab 10/31/20  2048 11/01/20  0334 11/02/20  0305   HGB 9.5* 10.1* 10.0*       Lab Results   Component Value Date    WBC 29.05 (H) 11/02/2020    HGB 10.0 (L) 11/02/2020    HCT 30.3 (L) 11/02/2020    MCV 98 11/02/2020     11/02/2020       Lab Results   Component Value Date     (L) 11/02/2020    K 4.7 11/02/2020    CL 89 (L) 11/02/2020    CO2 13 (L) 11/02/2020    BUN 72 (H) 11/02/2020    CREATININE 4.8 (H) 11/02/2020    CALCIUM 8.1 (L) 11/02/2020    ANIONGAP 23 (H) 11/02/2020    ESTGFRAFRICA 11.7 (A) 11/02/2020    EGFRNONAA 10.2 (A) 11/02/2020       Lab Results   Component Value Date    ALT 58 (H) 11/02/2020     (H) 11/02/2020     (H) 10/08/2020    ALKPHOS 352 (H) 11/02/2020    BILITOT 32.6 (H) 11/02/2020       Lab Results   Component Value Date    INR 2.6 (H) 11/02/2020    INR 2.4 (H) 11/01/2020    INR 3.5 (H) 10/31/2020       Significant Imaging:  Reviewed pertinent radiology findings.       Assessment/Plan:     Madhavi Bell is a 45 y.o. female with history of decompensated liver cirrhosis due to alpha-1 antitrypsin deficiency and alcohol, recurrent pleural  effusion, HCC (with the additional bilobar indeterminate liver lesions) who presented to [facility] with shortness of breath and transferred to [facility] [place] evaluation.    Course has been complicated by worsening hypoxic respiratory failure.  S/p thoracentesis x2, but developed new pulmonary infiltrates in intubated.  Bronch with evidence of pneumonia.  She did undergo liver biopsy of indeterminate lesion on 10/30 which was negative for malignancy.     Problem List:  1. Decompensated liver cirrhosis due to alpha-1 antitrypsin deficiency and alcohol  2. Hepatocellular carcinoma - 3.9 cm right lesion with additional indeterminate foci.  Bx of indeterminate liver lesion negative for malignancy which means she meets Jeancarlos criteria  3. Hypoxemic respiratory failure / septic shock due to pneumonia  4. Acute renal failure / HRS  5. Hyponatremia     Plan:  - Septic shock / Pneumonia:  Appreciate ICU assistance.  On levo and vaso.  - Acute hypoxemic respiratory failure:  S/p thoracentesis on 10/30 (1.7L) and 10/31 (2L).  Now intubated on 11/01.  Bronchoscopy 11/1 - cultures with WBCs, GPC and GNRs.  On broad-spectrum antibiotics and antifungal.  ID following.  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  Thoracentesis  as above.  2 gram Na restrict.  Hold diuretics given hyponatremia and renal failure.  Recommend against chest tube for hepatic hydrothorax alone, as hydrothorax will continuously drain and lead to significant dehydration/electrolyte abnormalities; also can have difficult to control leakage after removal.  - LINN/HRS:  Nephrology following.  On Levophed, octreotide, albumin.  Also on midodrine  - Hyponatremia:  Hold diuretics, fluid restrict.  Trial of IV albumin.  - Esophageal varices: last EGD 9/9 with small esophageal varices  - Hepatic encephalopathy:  Continue lactulose  titrated to 3-4 BMs daily  - Transplant:  Candidacy with initially dependent on ruling out multifocal HCC, but biopsy of indeterminate liver lesion is negative for malignancy.  However, now with septic shock due to pneumonia.  She needs a T-spot as quant gold was indeterminate.  Plan to present on Wednesday to committee    Thank you for involving us in the care of Madhavi Bell. Please call with any additional questions, concerns or changes in the patient's clinical status.    Shane Ross MD  Gastroenterology Fellow PGY IV   [facility]

## 2020-11-02 NOTE — CONSULTS
"  [facility]  Adult Nutrition  Consult Note    SUMMARY     Recommendations    1. TF recommendations: Novasource @ 35 mL/hr to provide 1680 calories, 76 grams of protein, 602 mL fluid.  2. RD to monitor & follow-up.    Goals: Meet % EEN, EPN by RD f/u date  Nutrition Goal Status: new  Communication of RD Recs: reviewed with RN    Reason for Assessment    Reason For Assessment: consult  Diagnosis: other (see comments)(Cirrhosis)  Interdisciplinary Rounds: attended    General Information Comments: Pt intubated/sedated. Family at bedside reports pt w/ decreased appetite x 1-2 weeks PTA, unsure of UBW 2/2 fluid status. Per outpatient RD note (10/8/2020), pt's -158#. NFPE complete - pt w/ no physical signs of malnutrition.  Nutrition Discharge Planning: Unable to determine    Nutrition/Diet History    Patient Reported Diet/Restrictions/Preferences: low salt  Factors Affecting Nutritional Intake: NPO, on mechanical ventilation    Anthropometrics    Temp: 98.6 °F (37 °C)  Height: 5' 6" (167.6 cm)  Height (inches): 66 in  Weight Method: Stated  Weight: 71.2 kg (156 lb 15.5 oz)  Weight (lb): 156.97 lb  Ideal Body Weight (IBW), Female: 130 lb  % Ideal Body Weight, Female (lb): 120.75 %  BMI (Calculated): 25.3  BMI Grade: 25 - 29.9 - overweight    Lab/Procedures/Meds    Pertinent Labs Reviewed: reviewed  Pertinent Labs Comments: Na 125, BUN 72, Creat 4.8, GFR 10.2, Bili 32.6  Pertinent Medications Reviewed: reviewed  Pertinent Medications Comments: Amiodarone, Fentanyl, Levophed, Vasopressin, Propofol    Estimated/Assessed Needs    Weight Used For Calorie Calculations: 71.2 kg (156 lb 15.5 oz)     Energy Calorie Requirements (kcal): 1727 kcal/d  Energy Need Method: [employer] [place]     Protein Requirements:  g/d (1.2-1.5 g/kg)  Weight Used For Protein Calculations: 71.2 kg (156 lb 15.5 oz)     Estimated Fluid Requirement Method: other (see comments)(Per MD or 1 mL/kcal)  RDA Method (mL): " 1727    Nutrition Prescription Ordered    Current Diet Order: NPO    Evaluation of Received Nutrient/Fluid Intake    Other Calories (kcal): 169 (Propofol)    Comments: LBM: 11/1    Nutrition Risk    Level of Risk/Frequency of Follow-up: (2x/week)     Assessment and Plan    Nutrition Problem  Inadequate energy intake    Related to (etiology):   Inability to consume sufficient energy    Signs and Symptoms (as evidenced by):   NPO    Interventions(treatment strategy):  Collaboration of nutrition care w/ other providers   Enteral nutrition     Nutrition Diagnosis Status:   New     Monitor and Evaluation    Food and Nutrient Intake: energy intake, food and beverage intake, enteral nutrition intake  Food and Nutrient Adminstration: diet order, enteral and parenteral nutrition administration  Physical Activity and Function: nutrition-related ADLs and IADLs  Anthropometric Measurements: weight, weight change  Biochemical Data, Medical Tests and Procedures: inflammatory profile, lipid profile, glucose/endocrine profile, gastrointestinal profile, electrolyte and renal panel  Nutrition-Focused Physical Findings: overall appearance     Malnutrition Assessment    Subcutaneous Fat (Malnutrition): other (see comments)(Well nourished)  Muscle Mass (Malnutrition): other (see comments)(Well nourished)   Orbital Region (Subcutaneous Fat Loss): well nourished  Upper Arm Region (Subcutaneous Fat Loss): well nourished  Thoracic and Lumbar Region: well nourished   Anglican Region (Muscle Loss): well nourished  Clavicle Bone Region (Muscle Loss): well nourished  Scapular Bone Region (Muscle Loss): well nourished  Dorsal Hand (Muscle Loss): well nourished  Patellar Region (Muscle Loss): well nourished  Posterior Calf Region (Muscle Loss): well nourished     Nutrition Follow-Up    RD Follow-up?: Yes

## 2020-11-02 NOTE — ASSESSMENT & PLAN NOTE
45 year old female with decompensated liver cirrhosis and recurrent right sided hepato-hydrothorax who presented to [facility] on 10/29 with SOB and transferred to [place] for liver transplant evaluation on 10/30. On admit to [facility] noted to have a new leukocytosis,t bili 24, LINN.  Right sided pleural effusion on CXR s/p thoracentesis on 10/30. Started on empiric Vanc/Zosyn for pneumonia coverage.  Also received a dose of Tobramycin. Blood, urine and pleural fluid cx on admit NGTD. On 10/31 she developed acute respiratory failure requiring intubation on 11/1. Now on pressors. CXR worsened pulmonary opacities, c/f LLL pneumonia. Abx broadened to Vanc, Meropenem, Azithromycin and Fluconazole. S/p bronchoscopy 11/1 found to have copious amounts of dark orange secretions. BAL cx pending. ID following for antibiotic management.     Recommendations  - Continue empiric Vancomycin, Meropenem, Azithro and Fluconazole as acutely ill  - Vanc level 25 this a.m. Re-dose once below 20.  - Will follow cultures and outstanding infectious labs  - Has gotten rapid COVID tests. Routine test ordered (will attempt to add on to BAL specimen).   - Continue management per primary team  - ID will follow.

## 2020-11-02 NOTE — MEDICAL/APP STUDENT
[facility]  Nephrology  Progress Note    Patient Name: Madhavi Bell   : 1975   MRN: [id number]  Admission Date: 10/30/2020  Attending Physician:    Date of Admission: 10/30/2020  1:06 PM      No chief complaint on file.      Subjective:    HPI: Patient is a 46yo female with decompensated liver cirrhosis secondary to alpha-1 antitrypsin deficiency and alcohol abuse. She has been following hepatology as outpatient and has required frequent thoracentesis for recurrent hepatic hydrothorax. She had an MRI on  that showed a R liver lobe lesion. She presented to [place] ED on 10/29 with SOB. CXR showed large R sided pleural effusion. Was noted to be hypotensive (systolic BP in 90s), hyponatremic (Na - 123), and had decreased renal function (BUN - 48 and Cr - 2.7). Baseline BUN is 19 and Cr is 1.0. She was transferred to [facility] and on 10/30 had an IR biopsy of the liver lesion and thoracentesis. She was managed for ?HRS and started on albumin, midodrine, octreotide. Developed leukocytosis and started on broad spectrum antibiotics (vancomycin, meropenem, azithromycin, fluconazole, tobramycin). Zosyn was discontinued. Intubated on  due to worsening respiratory status.     Nephrology was consulted for worsening renal function and oliguria.    Today:  Family at bedside, discussed starting dialysis.     Objective:  Vitals:    20 0900 20 1000 20 1100 20 1207   BP: (!) 100/50 (!) 99/49  Comment: Simultaneous filing. User may not have seen previous data. (!) 105/55 (!) 101/52   BP Location: Right arm Right arm  Comment: Simultaneous filing. User may not have seen previous data. Right arm    Patient Position: Lying Lying  Comment: Simultaneous filing. User may not have seen previous data. Lying    Pulse: 86 85  Comment: Simultaneous filing. User may not have seen previous data. 87 84   Resp: (!) 28 (!) 26  Comment: Simultaneous filing. User may not have seen previous  "data. (!) 29 (!) 31   Temp:   98.2 °F (36.8 °C)    TempSrc:   Oral    SpO2: 96% 96%  Comment: Simultaneous filing. User may not have seen previous data. 96% (!) 91%   Weight:  71.2 kg (156 lb 15.5 oz)     Height:  5' 6" (1.676 m)          Intake/Output Summary (Last 24 hours) at 11/2/2020 1242  Last data filed at 11/2/2020 1106  Gross per 24 hour   Intake 1649.19 ml   Output 160 ml   Net 1489.19 ml         Physical Exam  Constitutional:       Interventions: She is sedated and intubated.   Eyes:      General: Scleral icterus present.   Cardiovascular:      Rate and Rhythm: Normal rate. Rhythm irregular.      Heart sounds: Normal heart sounds.   Pulmonary:      Effort: She is intubated.      Breath sounds: Normal breath sounds.   Abdominal:      General: Bowel sounds are decreased. There is distension.   Musculoskeletal:      Right lower leg: No edema.      Left lower leg: No edema.   Skin:     General: Skin is warm and dry.      Capillary Refill: Capillary refill takes 2 to 3 seconds.      Coloration: Skin is jaundiced.          Recent Results (from the past 48 hour(s))   Comprehensive metabolic panel    Collection Time: 10/31/20  6:52 PM   Result Value Ref Range    Sodium 124 (L) 136 - 145 mmol/L    Potassium 4.6 3.5 - 5.1 mmol/L    Chloride 89 (L) 95 - 110 mmol/L    CO2 18 (L) 23 - 29 mmol/L    Glucose 123 (H) 70 - 110 mg/dL    BUN 59 (H) 6 - 20 mg/dL    Creatinine 4.4 (H) 0.5 - 1.4 mg/dL    Calcium 8.4 (L) 8.7 - 10.5 mg/dL    Total Protein 6.5 6.0 - 8.4 g/dL    Albumin 3.6 3.5 - 5.2 g/dL    Total Bilirubin 29.4 (H) 0.1 - 1.0 mg/dL    Alkaline Phosphatase 540 (H) 55 - 135 U/L    AST 74 (H) 10 - 40 U/L    ALT 56 (H) 10 - 44 U/L    Anion Gap 17 (H) 8 - 16 mmol/L    eGFR if African American 13.0 (A) >60 mL/min/1.73 m^2    eGFR if non  11.3 (A) >60 mL/min/1.73 m^2   WBC & Diff,Body Fluid Thoracentesis Fluid    Collection Time: 10/31/20  8:38 PM   Result Value Ref Range    Body Fluid Type Thoracentesis " Fluid     Fluid Appearance Bloody     Fluid Color Red     WBC, Body Fluid 2355 /cu mm    Segs, Fluid 75 %    Lymphs, Fluid 6 %    Monocytes/Macrophages, Fluid 6 %    Mesothelial Cells, Fluid 13 %   Aerobic culture    Collection Time: 10/31/20  8:38 PM    Specimen: Pleural Fluid   Result Value Ref Range    Aerobic Bacterial Culture No growth    Gram stain    Collection Time: 10/31/20  8:38 PM    Specimen: Pleural Fluid   Result Value Ref Range    Gram Stain Result Rare WBC's     Gram Stain Result No organisms seen    AFB Culture & Smear    Collection Time: 10/31/20  8:38 PM    Specimen: Pleural Fluid   Result Value Ref Range    AFB Culture & Smear Culture in progress    WBC & Diff,Body Fluid Thoracentesis Fluid    Collection Time: 10/31/20  8:38 PM   Result Value Ref Range    Body Fluid Type Thoracentesis Fluid     Fluid Appearance Bloody     Fluid Color Red     WBC, Body Fluid 2575 /cu mm    Segs, Fluid 67 %    Lymphs, Fluid 9 %    Monocytes/Macrophages, Fluid 14 %    Mesothelial Cells, Fluid 10 %   CBC Auto Differential    Collection Time: 10/31/20  8:48 PM   Result Value Ref Range    WBC 22.09 (H) 3.90 - 12.70 K/uL    RBC 2.99 (L) 4.00 - 5.40 M/uL    Hemoglobin 9.5 (L) 12.0 - 16.0 g/dL    Hematocrit 28.2 (L) 37.0 - 48.5 %    MCV 94 82 - 98 fL    MCH 31.8 (H) 27.0 - 31.0 pg    MCHC 33.7 32.0 - 36.0 g/dL    RDW 18.4 (H) 11.5 - 14.5 %    Platelets 134 (L) 150 - 350 K/uL    MPV 11.8 9.2 - 12.9 fL    Immature Granulocytes 1.8 (H) 0.0 - 0.5 %    Gran # (ANC) 18.6 (H) 1.8 - 7.7 K/uL    Immature Grans (Abs) 0.39 (H) 0.00 - 0.04 K/uL    Lymph # 1.5 1.0 - 4.8 K/uL    Mono # 1.3 (H) 0.3 - 1.0 K/uL    Eos # 0.3 0.0 - 0.5 K/uL    Baso # 0.05 0.00 - 0.20 K/uL    nRBC 0 0 /100 WBC    Gran % 84.1 (H) 38.0 - 73.0 %    Lymph % 7.0 (L) 18.0 - 48.0 %    Mono % 5.8 4.0 - 15.0 %    Eosinophil % 1.1 0.0 - 8.0 %    Basophil % 0.2 0.0 - 1.9 %    Differential Method Automated    ISTAT PROCEDURE    Collection Time: 11/01/20  1:02 AM    Result Value Ref Range    POC PH 7.294 (L) 7.35 - 7.45    POC PCO2 36.9 35 - 45 mmHg    POC PO2 69 (L) 80 - 100 mmHg    POC HCO3 17.9 (L) 24 - 28 mmol/L    POC BE -9 -2 to 2 mmol/L    POC SATURATED O2 91 (L) 95 - 100 %    POC TCO2 19 (L) 23 - 27 mmol/L    Sample ARTERIAL     Site RR     Allens Test Pass     DelSys HFDD     Mode SPONT     Flow 15     Sp02 89    Culture, Respiratory with Gram Stain    Collection Time: 11/01/20  1:53 AM    Specimen: Sputum, Expectorated; Respiratory   Result Value Ref Range    Respiratory Culture No Growth     Gram Stain (Respiratory) >10 epithelial cells per low power field     Gram Stain (Respiratory) Many WBC's     Gram Stain (Respiratory) Rare Gram positive cocci    Magnesium    Collection Time: 11/01/20  3:34 AM   Result Value Ref Range    Magnesium 3.1 (H) 1.6 - 2.6 mg/dL   Phosphorus    Collection Time: 11/01/20  3:34 AM   Result Value Ref Range    Phosphorus 7.3 (H) 2.7 - 4.5 mg/dL   Protime-INR    Collection Time: 11/01/20  3:34 AM   Result Value Ref Range    Prothrombin Time 25.2 (H) 9.0 - 12.5 sec    INR 2.4 (H) 0.8 - 1.2   CBC Auto Differential    Collection Time: 11/01/20  3:34 AM   Result Value Ref Range    WBC 25.11 (H) 3.90 - 12.70 K/uL    RBC 3.10 (L) 4.00 - 5.40 M/uL    Hemoglobin 10.1 (L) 12.0 - 16.0 g/dL    Hematocrit 29.4 (L) 37.0 - 48.5 %    MCV 95 82 - 98 fL    MCH 32.6 (H) 27.0 - 31.0 pg    MCHC 34.4 32.0 - 36.0 g/dL    RDW 18.5 (H) 11.5 - 14.5 %    Platelets 163 150 - 350 K/uL    MPV 11.5 9.2 - 12.9 fL    Immature Granulocytes 2.9 (H) 0.0 - 0.5 %    Gran # (ANC) 21.3 (H) 1.8 - 7.7 K/uL    Immature Grans (Abs) 0.73 (H) 0.00 - 0.04 K/uL    Lymph # 1.6 1.0 - 4.8 K/uL    Mono # 1.4 (H) 0.3 - 1.0 K/uL    Eos # 0.1 0.0 - 0.5 K/uL    Baso # 0.04 0.00 - 0.20 K/uL    nRBC 0 0 /100 WBC    Gran % 84.8 (H) 38.0 - 73.0 %    Lymph % 6.5 (L) 18.0 - 48.0 %    Mono % 5.4 4.0 - 15.0 %    Eosinophil % 0.2 0.0 - 8.0 %    Basophil % 0.2 0.0 - 1.9 %    Platelet Estimate Appears  normal     Aniso Slight     Poik Slight     Hypo Occasional     [place] Cells Occasional     Differential Method Automated    Vancomycin, Random    Collection Time: 11/01/20  3:34 AM   Result Value Ref Range    Vancomycin, Random 15.7 Not established ug/mL   Comprehensive metabolic panel    Collection Time: 11/01/20  3:34 AM   Result Value Ref Range    Sodium 127 (L) 136 - 145 mmol/L    Potassium 4.1 3.5 - 5.1 mmol/L    Chloride 90 (L) 95 - 110 mmol/L    CO2 17 (L) 23 - 29 mmol/L    Glucose 132 (H) 70 - 110 mg/dL    BUN 62 (H) 6 - 20 mg/dL    Creatinine 4.1 (H) 0.5 - 1.4 mg/dL    Calcium 8.1 (L) 8.7 - 10.5 mg/dL    Total Protein 5.7 (L) 6.0 - 8.4 g/dL    Albumin 2.9 (L) 3.5 - 5.2 g/dL    Total Bilirubin 31.6 (H) 0.1 - 1.0 mg/dL    Alkaline Phosphatase 477 (H) 55 - 135 U/L    AST 81 (H) 10 - 40 U/L    ALT 53 (H) 10 - 44 U/L    Anion Gap 20 (H) 8 - 16 mmol/L    eGFR if African American 14.2 (A) >60 mL/min/1.73 m^2    eGFR if non  12.3 (A) >60 mL/min/1.73 m^2   TOBRAMYCIN, PEAK    Collection Time: 11/01/20  6:50 AM   Result Value Ref Range    Tobramycin, Peak 7.3 5.0 - 30.0 ug/mL   Lactate dehydrogenase    Collection Time: 11/01/20  6:50 AM   Result Value Ref Range     (H) 110 - 260 U/L   Echo Color Flow Doppler? Yes    Collection Time: 11/01/20  8:30 AM   Result Value Ref Range    Ascending aorta 2.92 cm    STJ 2.49 cm    AV mean gradient 12 mmHg    Ao peak stephen 2.24 m/s    Ao VTI 36.54 cm    IVS 0.85 0.6 - 1.1 cm    LA size 3.92 cm    Left Atrium Major Axis 5.16 cm    Left Atrium Minor Axis 4.45 cm    LVIDd 4.20 3.5 - 6.0 cm    LVIDs 2.62 2.1 - 4.0 cm    LVOT diameter 1.98 cm    LVOT peak VTI 28.85 cm    Posterior Wall 0.79 0.6 - 1.1 cm    MV Peak A Setphen 0.82 m/s    E wave decelartion time 173.25 msec    MV Peak E Stephen 1.75 m/s    RA Major Axis 4.48 cm    RA Width 3.29 cm    RVDD 2.70 cm    Sinus 2.58 cm    TR Max Stephen 2.77 m/s    LA WIDTH 3.55 cm    MV stenosis pressure 1/2 time 50.24 ms    LV  Diastolic Volume 78.43 mL    LV Systolic Volume 25.11 mL    LVOT peak moses 1.58 m/s    FS 38 %    LA volume 56.53 cm3    LV mass 104.67 g    Left Ventricle Relative Wall Thickness 0.38 cm    AV valve area 2.43 cm2    AV Velocity Ratio 0.71     AV index (prosthetic) 0.79     MV valve area p 1/2 method 4.38 cm2    E/A ratio 2.13     LVOT area 3.1 cm2    LVOT stroke volume 88.79 cm3    AV peak gradient 20 mmHg    Triscuspid Valve Regurgitation Peak Gradient 31 mmHg    BSA 1.82 m2    LV Systolic Volume Index 13.9 mL/m2    LV Diastolic Volume Index 43.46 mL/m2    LA Volume Index 31.3 mL/m2    LV Mass Index 58 g/m2    Right Atrial Pressure (from IVC) 3 mmHg    TV rest pulmonary artery pressure 34 mmHg   Lactic Acid, Plasma    Collection Time: 11/01/20 10:20 AM   Result Value Ref Range    Lactate (Lactic Acid) 4.8 (HH) 0.5 - 2.2 mmol/L   Comprehensive metabolic panel    Collection Time: 11/01/20  3:18 PM   Result Value Ref Range    Sodium 125 (L) 136 - 145 mmol/L    Potassium 4.3 3.5 - 5.1 mmol/L    Chloride 89 (L) 95 - 110 mmol/L    CO2 15 (L) 23 - 29 mmol/L    Glucose 155 (H) 70 - 110 mg/dL    BUN 62 (H) 6 - 20 mg/dL    Creatinine 4.1 (H) 0.5 - 1.4 mg/dL    Calcium 7.9 (L) 8.7 - 10.5 mg/dL    Total Protein 5.4 (L) 6.0 - 8.4 g/dL    Albumin 3.0 (L) 3.5 - 5.2 g/dL    Total Bilirubin 32.3 (H) 0.1 - 1.0 mg/dL    Alkaline Phosphatase 408 (H) 55 - 135 U/L    AST 89 (H) 10 - 40 U/L    ALT 53 (H) 10 - 44 U/L    Anion Gap 21 (H) 8 - 16 mmol/L    eGFR if African American 14.2 (A) >60 mL/min/1.73 m^2    eGFR if non  12.3 (A) >60 mL/min/1.73 m^2   Comprehensive Metabolic Panel    Collection Time: 11/01/20  3:18 PM   Result Value Ref Range    Sodium 125 (L) 136 - 145 mmol/L    Potassium 4.3 3.5 - 5.1 mmol/L    Chloride 89 (L) 95 - 110 mmol/L    CO2 15 (L) 23 - 29 mmol/L    Glucose 155 (H) 70 - 110 mg/dL    BUN 62 (H) 6 - 20 mg/dL    Creatinine 4.1 (H) 0.5 - 1.4 mg/dL    Calcium 7.9 (L) 8.7 - 10.5 mg/dL    Total Protein  5.4 (L) 6.0 - 8.4 g/dL    Albumin 3.0 (L) 3.5 - 5.2 g/dL    Total Bilirubin 32.3 (H) 0.1 - 1.0 mg/dL    Alkaline Phosphatase 408 (H) 55 - 135 U/L    AST 89 (H) 10 - 40 U/L    ALT 53 (H) 10 - 44 U/L    Anion Gap 21 (H) 8 - 16 mmol/L    eGFR if African American 14.2 (A) >60 mL/min/1.73 m^2    eGFR if non  12.3 (A) >60 mL/min/1.73 m^2   Lactic Acid, Plasma    Collection Time: 11/01/20  3:18 PM   Result Value Ref Range    Lactate (Lactic Acid) 3.4 (H) 0.5 - 2.2 mmol/L   Culture, Respiratory    Collection Time: 11/01/20  3:21 PM    Specimen: Bronchial Wash, LLL; Respiratory   Result Value Ref Range    Gram Stain (Respiratory) <10 epithelial cells per low power field.     Gram Stain (Respiratory) Rare WBC's     Gram Stain (Respiratory) Rare Gram negative rods    MICHAELLE prep    Collection Time: 11/01/20  3:21 PM    Specimen: Lung, LLL; Body Fluid   Result Value Ref Range    KOH Prep No yeast or fungal elements seen    ISTAT PROCEDURE    Collection Time: 11/01/20  4:29 PM   Result Value Ref Range    POC PH 7.196 (LL) 7.35 - 7.45    POC PCO2 48.0 (H) 35 - 45 mmHg    POC PO2 94 80 - 100 mmHg    POC HCO3 18.6 (L) 24 - 28 mmol/L    POC BE -10 -2 to 2 mmol/L    POC SATURATED O2 95 95 - 100 %    POC TCO2 20 (L) 23 - 27 mmol/L    Rate 20     Sample ARTERIAL     Site Leti/UAC     Allens Test N/A     DelSys Adult Vent     Mode AC/PRVC     Vt 430     PEEP 10     FiO2 100     Sp02 98    ISTAT PROCEDURE    Collection Time: 11/01/20  6:32 PM   Result Value Ref Range    POC PH 7.221 (LL) 7.35 - 7.45    POC PCO2 37.3 35 - 45 mmHg    POC PO2 72 (L) 80 - 100 mmHg    POC HCO3 15.3 (L) 24 - 28 mmol/L    POC BE -12 -2 to 2 mmol/L    POC SATURATED O2 91 (L) 95 - 100 %    POC TCO2 16 (L) 23 - 27 mmol/L    Rate 24     Sample ARTERIAL     Site Leti/UAC     Allens Test N/A     DelSys Adult Vent     Mode AC/PRVC     Vt 400     PEEP 10     FiO2 70     Sp02 96    Magnesium    Collection Time: 11/02/20  3:05 AM   Result Value Ref Range     Magnesium 3.1 (H) 1.6 - 2.6 mg/dL   Phosphorus    Collection Time: 11/02/20  3:05 AM   Result Value Ref Range    Phosphorus 8.9 (H) 2.7 - 4.5 mg/dL   Protime-INR    Collection Time: 11/02/20  3:05 AM   Result Value Ref Range    Prothrombin Time 27.8 (H) 9.0 - 12.5 sec    INR 2.6 (H) 0.8 - 1.2   CBC Auto Differential    Collection Time: 11/02/20  3:05 AM   Result Value Ref Range    WBC 29.05 (H) 3.90 - 12.70 K/uL    RBC 3.08 (L) 4.00 - 5.40 M/uL    Hemoglobin 10.0 (L) 12.0 - 16.0 g/dL    Hematocrit 30.3 (L) 37.0 - 48.5 %    MCV 98 82 - 98 fL    MCH 32.5 (H) 27.0 - 31.0 pg    MCHC 33.0 32.0 - 36.0 g/dL    RDW 18.5 (H) 11.5 - 14.5 %    Platelets 234 150 - 350 K/uL    MPV 11.3 9.2 - 12.9 fL    Immature Granulocytes CANCELED 0.0 - 0.5 %    Immature Grans (Abs) CANCELED 0.00 - 0.04 K/uL    Lymph # CANCELED 1.0 - 4.8 K/uL    Mono # CANCELED 0.3 - 1.0 K/uL    Eos # CANCELED 0.0 - 0.5 K/uL    Baso # CANCELED 0.00 - 0.20 K/uL    nRBC 0 0 /100 WBC    Gran % 90.0 (H) 38.0 - 73.0 %    Lymph % 6.0 (L) 18.0 - 48.0 %    Mono % 4.0 4.0 - 15.0 %    Eosinophil % 0.0 0.0 - 8.0 %    Basophil % 0.0 0.0 - 1.9 %    Platelet Estimate Appears normal     Aniso Slight     Poik Slight     Hypo Occasional     Alphonse Cells Occasional     Differential Method Manual    Vancomycin, Random    Collection Time: 11/02/20  3:05 AM   Result Value Ref Range    Vancomycin, Random 25.0 Not established ug/mL   Comprehensive metabolic panel    Collection Time: 11/02/20  3:05 AM   Result Value Ref Range    Sodium 125 (L) 136 - 145 mmol/L    Potassium 4.7 3.5 - 5.1 mmol/L    Chloride 89 (L) 95 - 110 mmol/L    CO2 13 (L) 23 - 29 mmol/L    Glucose 120 (H) 70 - 110 mg/dL    BUN 72 (H) 6 - 20 mg/dL    Creatinine 4.8 (H) 0.5 - 1.4 mg/dL    Calcium 8.1 (L) 8.7 - 10.5 mg/dL    Total Protein 5.7 (L) 6.0 - 8.4 g/dL    Albumin 3.3 (L) 3.5 - 5.2 g/dL    Total Bilirubin 32.6 (H) 0.1 - 1.0 mg/dL    Alkaline Phosphatase 352 (H) 55 - 135 U/L     (H) 10 - 40 U/L    ALT  58 (H) 10 - 44 U/L    Anion Gap 23 (H) 8 - 16 mmol/L    eGFR if African American 11.7 (A) >60 mL/min/1.73 m^2    eGFR if non African American 10.2 (A) >60 mL/min/1.73 m^2   Tobramycin level, random    Collection Time: 11/02/20  3:05 AM   Result Value Ref Range    Tobramycin, Random 1.9 Not established ug/mL   ISTAT PROCEDURE    Collection Time: 11/02/20  3:20 AM   Result Value Ref Range    POC PH 7.229 (LL) 7.35 - 7.45    POC PCO2 39.7 35 - 45 mmHg    POC PO2 81 80 - 100 mmHg    POC HCO3 16.6 (L) 24 - 28 mmol/L    POC BE -11 -2 to 2 mmol/L    POC SATURATED O2 93 (L) 95 - 100 %    POC TCO2 18 (L) 23 - 27 mmol/L    Rate 24     Sample ARTERIAL     Site Leti/UAC     Allens Test N/A     DelSys Adult Vent     Mode AC/PRVC     Vt 400     PEEP 10     FiO2 60     Sp02 94        Current Facility-Administered Medications:     0.9%  NaCl infusion (for blood administration), , Intravenous, Q24H PRN, Maninder Coates MD    [COMPLETED] albumin human 25% bottle 75 g, 75 g, Intravenous, Once, 75 g at 10/30/20 1806 **FOLLOWED BY** albumin human 25% bottle 25 g, 25 g, Intravenous, BID, Codie Wong PA-C, 25 g at 11/02/20 1037    amiodarone 360 mg/200 mL (1.8 mg/mL) infusion, 0.5 mg/min, Intravenous, Continuous, Julio César Dias MD, Last Rate: 16.7 mL/hr at 11/02/20 1100, 0.5 mg/min at 11/02/20 1100    azithromycin 500 mg in dextrose 5 % 250 mL IVPB (ready to mix system), 500 mg, Intravenous, Q24H, Codie Wong PA-C, Last Rate: 250 mL/hr at 11/02/20 1147, 500 mg at 11/02/20 1147    chlorhexidine 0.12 % solution 15 mL, 15 mL, Mouth/Throat, BID, Codie Wong PA-C    etomidate injection 20 mg, 20 mg, Intravenous, Once, Codie Wong PA-C    famotidine (PF) injection 20 mg, 20 mg, Intravenous, Daily, Codie Wong PA-C, 20 mg at 11/02/20 1034    fentaNYL 2500 mcg in 0.9% sodium chloride 250 mL infusion premix (titrating), 25 mcg/hr, Intravenous, Continuous, Codie THOMPSON  GUIDO Wong, Last Rate: 5 mL/hr at 20 1100, 50 mcg/hr at 20 1100    [] fentaNYL injection 50 mcg, 50 mcg, Intravenous, Q15 Min PRN **FOLLOWED BY** fentaNYL injection 50 mcg, 50 mcg, Intravenous, Q1H PRN, Codie Wong PA-C    fluconazole (DIFLUCAN) IVPB 200 mg 100 mL, 200 mg, Intravenous, Q24H, Codie Wong PA-C, 200 mg at 20 1035    hydrocortisone sodium succinate injection 100 mg, 100 mg, Intravenous, Q8H, Codie Wong PA-C, 100 mg at 20 0511    lactulose 20 gram/30 mL solution Soln 20 g, 20 g, Oral, TID, Codie Wong PA-C, 20 g at 20 1034    meropenem-0.9% sodium chloride 1 g/50 mL IVPB, 1 g, Intravenous, Q12H, Ebony May PA-C, Last Rate: 50 mL/hr at 20 1232, 1 g at 20 1232    midodrine tablet 10 mg, 10 mg, Oral, TID WM, Codie Wong PA-C, 10 mg at 20 1146    norepinephrine 32 mg in sodium chloride 0.9% 250 mL infusion, 0.02 mcg/kg/min, Intravenous, Continuous, Dnaa Altamirano MD, Last Rate: 32.1 mL/hr at 20 1115, 0.96 mcg/kg/min at 20 1115    octreotide injection 100 mcg, 100 mcg, Subcutaneous, Q8H, Codie Wong PA-C, 100 mcg at 20 0511    ondansetron injection 4 mg, 4 mg, Intravenous, Q6H PRN, Codie Wong PA-C, 4 mg at 10/31/20 1719    phenylephrine HCl in 0.9% NaCl 1 mg/10 mL (100 mcg/mL) syringe 500 mcg, 500 mcg, Intravenous, PRN, VAHID SwensonC    pneumoc 13-david conj-dip cr(PF) (PREVNAR 13 (PF)) 0.5 mL, 0.5 mL, Intramuscular, vaccine x 1 dose, Thierry Kong MD    promethazine (PHENERGAN) 6.25 mg in dextrose 5 % 50 mL IVPB, 6.25 mg, Intravenous, Q6H PRN, Alex Rene DO, Last Rate: 150 mL/hr at 10/31/20 2111, 6.25 mg at 10/31/20 2111    propofol (DIPRIVAN) 10 mg/mL infusion, 5 mcg/kg/min, Intravenous, Continuous, Codie Wong PA-C, Last Rate: 8.5 mL/hr at 20 1100, 20 mcg/kg/min at 20 1100     rifAXIMin tablet 550 mg, 550 mg, Oral, BID, Codie Wong PA-C, 550 mg at 11/02/20 1035    rocuronium injection 100 mg, 100 mg, Intravenous, Once, Codie Wong PA-C    sodium chloride 0.9% flush 10 mL, 10 mL, Intravenous, PRN, Maninder Coates MD    Pharmacy to dose Vancomycin consult, , , Once **AND** vancomycin - pharmacy to dose, , Intravenous, pharmacy to manage frequency, Codie Wong PA-C    vasopressin (PITRESSIN) 0.2 Units/mL in dextrose 5 % 100 mL infusion, 0.04 Units/min, Intravenous, Continuous, Codie Wong PA-C, Last Rate: 12 mL/hr at 11/02/20 1100, 0.04 Units/min at 11/02/20 1100     Assessment/Plan  Patient is a 44yo female with LINN.     1. LINN  - most likely ATN secondary to sepsis  - UA 2+ protein, 3+ blood and 2+ WBC. Urine MCS today showed muddy brown casts and normal RBCs. Suggests ATN but does not rule out HRS  - WBC - 29, CXR concerning for pneumonia, started on broad spectrum antibiotics  - Worsening metabolic acidosis (pH - 7.2, pCO2 - 39.7, pHCO - 16.6)  - Recommend placing dialysis catheter to start CRRT today   - Continue HRS management - Albumin, Midodrine, Octreotide  - Strict I/O and chart  - Avoid nephrotoxic medications, NSAIDs, IV contrast, etc.  - Medication doses adjusted to GFR  - Hb > 7 gm/dL      Philip Eddy  MS4      Staff attestation  I have seen the patient, reviewed the MS-4, Philip Eddy's assessment, plan and progress note. I personally evaluated patient, reviewed serial labs, imaging studies, pertinent clinical data and formulated treatment plan from renal standpoint, details as included in the note by MS-4 who was under my direct supervision.     Kidney function worsening with development of severe oligo anuric state, worsening metabolic acidosis, hyponatremia. She was intubated and on ventilator support. Remained on 2 vasopressor support. Edema ++. Family updated on these changes, worsened kidney function. LINN is  clinically felt to be due to ATN than HRS. Explained need to initiate SLED for uremic toxin clearance and UF if her hemodynamic can tolerate. Discussed with team about details of SLED. Slow correction of hyponatremia with appropriate changes in dialysate bath. Family members were in agreement with this plan. SLED started once dialysis CVC was placed.

## 2020-11-02 NOTE — ASSESSMENT & PLAN NOTE
Reports x 3 thoracentesis in the past for symptom management.     --s/p thoracentesis of 1.5 L removal on 10/30; labs negative for infection  --Repeat thora 10/31 with 2L removed

## 2020-11-02 NOTE — PLAN OF CARE
Anurag Knutson MD    Emergency Contact: Alli Bell, spouse, cp# [phone] and Gerardo Miller, brother/cp# [phone]    Admitting Dx: Pleural Effusion Association  Date of admission: 10/30/2020      [facility] - [place], [place] - [address]  [address]  [place] [address]  Phone: [phone] Fax: [phone]    [id number] - [place], [place] - [address]  [address]  [place] [address]  Phone: [phone] Fax: [phone]    Payor: [place] BLUE [facility] / Plan: BCBS ALL OUT OF STATE / Product Type: PPO /     Future Appointments   Date Time Provider Department Center   11/3/2020 10:00 AM JUSTIN Wayne [facility] GASTRO [facility]   11/4/2020  8:00 AM [facility] CT1 LIMIT 500 LBS [facility] CT SCAN [place]   11/4/2020 11:00 AM [facility] US1 [facility] ULSOUND [place]   11/11/2020  8:30 AM HGV MRI [facility] MRI [facility]   11/23/2020  7:50 AM LABORATORY, [facility] HG LAB [facility]   11/23/2020  8:00 AM Maria De Jesus Gray MD [facility] GASTRO [facility]     Assessment completed w/spouse outside of room as pt is ventilated and on pressors.  Pt doesn't smoke or drink.  Pt [employer] preference is [facility]. Transportation will be provided by spouse if pt can transport home in a vehicle.  SW will continue to follow and assist w/discharge planning as needs are determined by interdisciplinary team.       11/02/20 1339   Discharge Assessment   Assessment Type Discharge Planning Assessment   Confirmed/corrected address and phone number on facesheet? Yes   Assessment information obtained from? Other  (Alli Bell, spouse cp# [phone])   Expected Length of Stay (days) 7   Communicated expected length of stay with patient/caregiver yes   Prior to hospitilization cognitive status: Alert/Oriented   Prior to hospitalization functional status: Independent   Current cognitive status: Coma/Sedated/Intubated   Current Functional Status: Assistive Equipment;Needs Assistance;Completely  Dependent   Facility Arrived From: [facility]   Lives With spouse   Able to Return to Prior Arrangements other (see comments)  (TBD)   Is patient able to care for self after discharge? Unable to determine at this time (comments)   Who are your caregiver(s) and their phone number(s)? Alli Bell, spouse/cp# [phone]   Patient's perception of discharge disposition long-term acute care facility (LTAC)   Readmission Within the Last 30 Days previous discharge plan unsuccessful   If yes, most recent facility name: [facility]   Patient currently being followed by outpatient case management? No   Patient currently receives any other outside agency services? No   Equipment Currently Used at Home none   Do you have any problems affording any of your prescribed medications? No   Is the patient taking medications as prescribed? yes   Does the patient have transportation home? Yes   Transportation Anticipated family or friend will provide   Does the patient receive services at the [facility]? No   Discharge Plan A Long-term acute care facility (LTAC)   Discharge Plan B Rehab   DME Needed Upon Discharge  other (see comments)  (TBD)   Patient/Family in Agreement with Plan other (see comments)  (TBD)   Readmission Questionnaire   At the time of your discharge, did someone talk to you about what your health problems were? Yes   At the time of discharge, did someone talk to you about what to watch out for regarding worsening of your health problem? Yes   At the time of discharge, did someone talk to you about what to do if you experienced worsening of your health problem? Yes   At the time of discharge, did someone talk to you about which medication to take when you left the hospital and which ones to stop taking? Yes   At the time of discharge, did someone talk to you about when and where to follow up with a doctor after you left the hospital? Yes   Do you have any problems affording any of  your  prescribed medications? No   Do you have problems obtaining/receiving your medications? No   Does the patient have transportation to healthcare appointments? Yes   Living Arrangements house   Does the patient have family/friends to help with healtcare needs after discharge? yes     Lynn Brown LMSW  [facility]  [phone]

## 2020-11-02 NOTE — PROGRESS NOTES
Therapy with tobramycin complete and/or consult discontinued by provider.  Pharmacy will sign off, please re-consult as needed.

## 2020-11-02 NOTE — PROGRESS NOTES
[facility]  Critical Care Medicine  Progress Note    Patient Name: Madhavi Bell  MRN: [id number]  Admission Date: 10/30/2020  Hospital Length of Stay: 3 days  Code Status: Full Code  Attending Provider: Hal Seha*  Primary Care Provider: Anurag Knutson MD   Principal Problem: Decompensated hepatic cirrhosis    Subjective:     HPI:  Madhavi Bell is a 45 year old female with decompensated liver cirrhosis due to alpha-1 antitrypsin deficiency and alcohol use, recurrent right sided hepato-hydrothorax, HCC who presented to [place] ED on 10/29 with shortness of breath and transferred to [facility] for liver transplant evaluation on 10/30. Patient reports worsening SOB over the last two days. Patient scheduled for outpatient thoracentesis next week however was advised by GI doctor to present to the ED due to worsening symptoms. Patient reports associated dry cough with pleural effusions but no worse than baseline. Denies fever, chills, chest pain, abdominal pain, confusion.     Upon arrival, patient with oxygen saturations >94% on 4L NC with borderline BP (SBP 88-92). CXR with significant right sided pleural effusion. Labs significant for new leukocytosis, t bili of 24 and LINN with sCr 2.7.     Hospital/ICU Course:  Patient admitted to the CMICU as a transfer for liver transplant evaluation. Patient underwent liver biopsy and thoracentesis (1.5 L removed) on 10/30. Hepatology and nephrology consulted for assistance. Patient with worsening respiratory status on 10/31 and thoracentesis was completed again for symptom management with 2L removed. Vasopressor requirements significantly increased in order to maintain blood pressure. Unfortunately, her respiratory status continued to decline and was ultimately required intubation 11/1.  New pulmonary infiltrates developed on the left side more concerning for PNA. Bronchoscopy completed 11/1. ID consulted for assistance. Abx broadened to  vancomycin, meropenem, azithromycin, and fluconazole. Patient initiated on CRRT on 11/2. Patient to be presented to liver transplant committee on 11/4.    No new subjective & objective note has been filed under this hospital service since the last note was generated.      ABG  Recent Labs   Lab 11/02/20  1439   PH 7.258*   PO2 49   PCO2 40.6   HCO3 18.1*   BE -9     Assessment/Plan:     Pulmonary  Acute hypoxemic respiratory failure  Likely related to volume overloaded in setting of decompensated cirrhosis complicated by hepato-hydrothorax and worsening LINN with minimal UOP vs underlying PNA. CXR with significant pleural effusion of the right side. Most recent CXR with worsening opacifications on the left concerning for possible PNA.    --S/p thoracentesis with 1.5L removal on 10/30 and 10/31 with 2L removed  --Intubated 11/1  --S/p bronschoscopy 11/1; cultures pending  --Continue broad spectrum abx (vancomycin, meropenem, fluconazole, and azithromycin)  --follow up legionella, histo, blasto and aspergillus urine antigen  --Follow up fungitell  --Wean oxygen for goal saturation 88-92%      Renal/  Lactic acidosis  Likely related to decompensated cirrhosis vs underlying infection    --Will continue to monitor  --LA daily    Acute renal failure  Possibly iATN vs HRS    --Urine lytes indicating pre-renal cause  --RP US negative for hydronephrosis  --Nephrology following; Recommending MAP goal >80 - however patient with significant norepinephrine requirements in order to achieve that goal. MAP goal changed to 70.   --HRS treatment initiated (albumin, midodrine, and octreotide)  --Garcia in place with strict I/Os  --CRRT initiated on 11/2    Hyponatremia  2/2 decompensated liver cirrhosis vs new LINN    --frequent BMP  --will continue to monitor  --CRRT to be initiated on 11/2    GI  * Decompensated hepatic cirrhosis  Secondary to A1AT deficiency and ETOH abuse complicated by HCC, HE, hepatic hydrothorax, ascites,  hepatic varices and hyponatremia.    --abdominal US with doppler 10/31 with solid lesions in right and left lower lobe; portal HTN; splenomegaly  --S/p liver biopsy 10/30, pathology negative  --Hepatology consulted  --Continue midodrine 15 TID  --Lactulose/rifaximin  --to be presented to transplant committee on 11/4    MELD-Na score: 42 at 11/2/2020  3:05 AM  MELD score: 44 at 11/2/2020  3:05 AM  Calculated from:  Serum Creatinine: 4.8 mg/dL (Rounded to 4 mg/dL) at 11/2/2020  3:05 AM  Serum Sodium: 125 mmol/L at 11/2/2020  3:05 AM  Total Bilirubin: 32.6 mg/dL at 11/2/2020  3:05 AM  INR(ratio): 2.6 at 11/2/2020  3:05 AM  Age: 45 years    Pleural effusion associated with hepatic disorder  Reports x 3 thoracentesis in the past for symptom management.     --s/p thoracentesis of 1.5 L removal on 10/30; labs negative for infection  --Repeat thora 10/31 with 2L removed      Other  Shock, unspecified  Likely related to decompensated cirrhosis vs underlying infection (less likely)    --Continue vancomycin, meropenem, azithromycin, and fluconazole.  --ID following  --BCx NGTD; resp cx normal ishan  --Stress dose steroids initiated  --UA with 2+ leuks and 46 WBCs; urine cx no growth  --No pocket for paracentesis   --Thoracentesis fluid negative for SBP  --Continue norepinephrine and vasopressin to maintain MAP >70    Palliative care encounter  Palliative care consulted due to liver transplant evaluation       Critical Care Daily Checklist:    A: Awake: RASS Goal/Actual Goal: RASS Goal: -2-->light sedation  Actual: Phan Agitation Sedation Scale (RASS): Moderate sedation   B: Spontaneous Breathing Trial Performed? Spon. Breathing Trial Initiated?: Not initiated (11/02/20 0808)   C: SAT & SBT Coordinated?  n/a                      D: Delirium: CAM-ICU Overall CAM-ICU: Positive   E: Early Mobility Performed? No   F: Feeding Goal: Goals: Meet % EEN, EPN by RD f/u date  Status: Nutrition Goal Status: new   Current Diet  Order   Procedures    Diet NPO      AS: Analgesia/Sedation done   T: Thromboembolic Prophylaxis held d/t high INR   H: HOB > 300 Yes   U: Stress Ulcer Prophylaxis (if needed) done   G: Glucose Control done   B: Bowel Function Stool Occurrence: 1   I: Indwelling Catheter (Lines & Garcia) Necessity necessary   D: De-escalation of Antimicrobials/Pharmacotherapies done    Plan for the day/ETD Cont course    Code Status:  Family/Goals of Care: Full Code  Cont course     Critical Care Time: 70 minutes  Critical secondary to Patient has a condition that poses threat to life and bodily function: remains intubated on vasopressors.     Critical care was time spent personally by me on the following activities: development of treatment plan with patient or surrogate and bedside caregivers, discussions with consultants, evaluation of patient's response to treatment, examination of patient, ordering and performing treatments and interventions, ordering and review of laboratory studies, ordering and review of radiographic studies, pulse oximetry, re-evaluation of patient's condition. This critical care time did not overlap with that of any other provider or involve time for any procedures.     Cedric Jimenez NP  Critical Care Medicine  [facility]

## 2020-11-02 NOTE — ASSESSMENT & PLAN NOTE
Secondary to A1AT deficiency and ETOH abuse complicated by HCC, HE, hepatic hydrothorax, ascites, hepatic varices and hyponatremia.    --abdominal US with doppler 10/31 with solid lesions in right and left lower lobe; portal HTN; splenomegaly  --S/p liver biopsy 10/30, pathology negative  --Hepatology consulted  --Continue midodrine 15 TID  --Lactulose/rifaximin  --to be presented to transplant committee on 11/4    MELD-Na score: 42 at 11/2/2020  3:05 AM  MELD score: 44 at 11/2/2020  3:05 AM  Calculated from:  Serum Creatinine: 4.8 mg/dL (Rounded to 4 mg/dL) at 11/2/2020  3:05 AM  Serum Sodium: 125 mmol/L at 11/2/2020  3:05 AM  Total Bilirubin: 32.6 mg/dL at 11/2/2020  3:05 AM  INR(ratio): 2.6 at 11/2/2020  3:05 AM  Age: 45 years

## 2020-11-02 NOTE — ASSESSMENT & PLAN NOTE
Likely related to decompensated cirrhosis vs underlying infection    --Will continue to monitor  --LA daily

## 2020-11-02 NOTE — PROCEDURES
Central Line    Date/Time: 11/2/2020 3:12 PM  Performed by: Jennifer Lares MD  Authorized by: Jennifer Lares MD     Supervisor Name:  Simoncharity  Location procedure was performed:  [facility] CRITICAL CARE MEDICINE  Pre-operative diagnosis:  Acute kidney injury  Post-operative diagnosis:  Acute kidney injury  Consent Done ?:  Yes  Time out complete?: Verified correct patient, procedure, equipment, staff, and site/side    Indications:  Hemodialysis  Anesthesia:  General anesthesia and see MAR for details  Preparation:  Skin prepped with ChloraPrep  Sterile barriers: All five maximal sterile barriers used - gloves, gown, cap, mask and large sterile sheet    Hand hygiene: Hand hygiene performed immediately prior to central venous catheter insertion    Location:  Right internal jugular  Catheter type:  Triple lumen  Catheter size:  12 Fr  Inserted Catheter Length (cm):  16  Ultrasound guidance: Yes    Vessel Caliber:  Medium   patent  Comprressibility:  Normal  Doppler:  Not done  Needle advanced into vessel with real time ultrasound guidance.    Guidewire confirmed in vessel.    Steril sheath on probe.    Sterile gel used.  Manometry: Yes    Number of attempts:  4  Securement:  Line sutured, chlorhexidine patch, sterile dressing applied and blood return through all ports  Complications: No    Estimated blood loss (mL):  10  Specimens: No    Implants: No    Other Complications:  Patient tolerated well, CXR ordered and pending   Patient tolerated well, CXR ordered and pending        Jennifer Lares  11/2/2020     CCS Attending    General supervision provided.  Pt tolerated well.  Separate from critical care time.    Hal Shea MD  [phone]  Critical Care Medicine

## 2020-11-02 NOTE — PLAN OF CARE
Recommendations     1. TF recommendations: Novasource @ 35 mL/hr to provide 1680 calories, 76 grams of protein, 602 mL fluid.  2. RD to monitor & follow-up.     Goals: Meet % EEN, EPN by RD f/u date  Nutrition Goal Status: new  Communication of RD Recs: reviewed with RN

## 2020-11-02 NOTE — PROGRESS NOTES
Pharmacokinetic Assessment Follow Up: IV Vancomycin    Vancomycin Regimen Assessment & Plan:  - Vancomycin level drawn with morning labs today resulted as 25 ug/mL, goal trough 10-20 ug/mL.  - Nephrology following for LINN. Will continue pulse dosing while renal function remains unstable.  - No need for vancomycin dose today given level. Draw vancomycin level with morning labs tomorrow. Will re-dose as needed depending on level and renal function.    Drug levels (last 3 results):  Recent Labs   Lab Result Units 10/31/20  0452 11/01/20  0334 11/02/20  0305   Vancomycin, Random ug/mL 25.6 15.7 25.0     Pharmacy will continue to follow and monitor vancomycin.    Please contact pharmacy at extension [phone] for questions regarding this assessment.    Thank you for the consult,   Ezio Ruiz     Patient brief summary:  Madhavi Bell is a 45 y.o. female initiated on antimicrobial therapy with IV Vancomycin for treatment of lower respiratory infection    The patient's current regimen is pulse dosing.    Drug Allergies:   Review of patient's allergies indicates:  No Known Allergies    Actual Body Weight:   71.2 kg    Renal Function:   Estimated Creatinine Clearance: 14.8 mL/min (A) (based on SCr of 4.8 mg/dL (H)).,     Dialysis Method (if applicable):  N/A

## 2020-11-02 NOTE — PROGRESS NOTES
[facility]  Infectious Disease  Progress Note    Patient Name: Madhavi Bell  MRN: [id number]  Admission Date: 10/30/2020  Length of Stay: 3 days  Attending Physician: Hal Shea*  Primary Care Provider: Anurag Knutson MD    Isolation Status: No active isolations  Assessment/Plan:      Shock, unspecified     45 year old female with decompensated liver cirrhosis and recurrent right sided hepato-hydrothorax who presented to OSH on 10/29 with SOB and transferred to [facility] for liver transplant evaluation on 10/30. On admit to [facility] noted to have a new leukocytosis,t bili 24, LINN.  Right sided pleural effusion on CXR s/p thoracentesis on 10/30. Started on empiric Vanc/Zosyn for pneumonia coverage.  Also received a dose of Tobramycin. Blood, urine and pleural fluid cx on admit NGTD. On 10/31 she developed acute respiratory failure requiring intubation on 11/1. Now on pressors. CXR worsened pulmonary opacities, c/f LLL pneumonia. Abx broadened to Vanc, Meropenem, Azithromycin and Fluconazole. S/p bronchoscopy 11/1 found to have copious amounts of dark orange secretions. BAL cx pending. ID following for antibiotic management.     Recommendations  - Continue empiric Vancomycin, Meropenem, Azithro and Fluconazole as acutely ill  - Vanc level 25 this a.m. Re-dose once below 20.  - Will follow cultures and outstanding infectious labs  - Has gotten rapid COVID tests. Routine test ordered (will attempt to add on to BAL specimen).   - Continue management per primary team  - ID will follow.         Please call for any questions. Thank you.  Ebony May PA-C  Phone: [phone]  Pager: [phone]    Subjective:     Principal Problem:Decompensated hepatic cirrhosis    HPI: Madhavi Bell is a 45 year old female with decompensated liver cirrhosis due to alpha-1 antitrypsin deficiency and alcohol use, recurrent right sided hepato-hydrothorax, HCC who presented to [place] ED on 10/29 with shortness  of breath and transferred to [facility] for liver transplant evaluation on 10/30. Patient reports worsening SOB over the last two days. Patient scheduled for outpatient thoracentesis next week however was advised by GI doctor to present to the ED due to worsening symptoms. Patient reports associated dry cough but no worse than baseline. Denies fever, chills, chest pain, abdominal pain, confusion.      Upon arrival, patient with oxygen saturations >94% on 4L NC. No fever. CXR with significant right sided pleural effusion. Labs significant for new leukocytosis, t bili of 24 and LINN with sCr 2.7. Patient admitted to the CMICU. Patient underwent liver biopsy and thoracentesis (1.5 L removed) on 10/30. Started on empiric Vanc/Zosyn for pneumonia coverage.  Also received a dose of Tobramycin. Blood, urine and resp cx thus far unrevealing. Over the last 24 hours her oxygen requirements increased from 4L to 9L and she has been started on pressors. WBC has increased to 25K. CXR revealed Bilateral pulmonary opacities with significant interval detrimental change, there is prominent appearance of confluent infiltrates/airspace disease and consolidation with air bronchogram involving the mid to lower right hemithorax, with obscuration of the right hemidiaphragm and right heart border, the areas of multifocal rounded opacification on the left appears somewhat more confluent, with prominent confluent abnormal opacity seen throughout the left hemithorax, and central air bronchogram formation.  There is some obscuration of the left heart border however without significant obscuration of the left hemidiaphragm.  There is likely a component of pleural fluid on the right however significant pleural fluid on the left is not appreciated.  There is no evidence for pneumothorax. Repeat thoracentesis showed 2527 WBC, 67% segs.  ID consulted for abx recs.    Further hx obtained from . Patient works as an ultrasound tech part time. Stopped  working two weeks ago due to liver issues.  works in sales. Two kids (one physically going to school). No recent illnesses or known COVID exposures. Two dogs. No recent travel.  Interval History: Patient intubated yesterday. Bronched. Cx pending. Family at bedside. On pressors. O2 req. Decreased today. Remains afebrile. PIV exchanged.    Further hx obtained from . 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 Two kids (one physically going to school). No recent illnesses or known COVID exposures. Two dogs. No recent travel.    Review of Systems   Unable to perform ROS: Intubated     Objective:     Vital Signs (Most Recent):  Temp: 98.2 °F (36.8 °C) (11/02/20 1100)  Pulse: 85 (11/02/20 1300)  Resp: (!) 26 (11/02/20 1300)  BP: (!) 106/57 (11/02/20 1300)  SpO2: (!) 89 % (11/02/20 1300) Vital Signs (24h Range):  Temp:  [98.2 °F (36.8 °C)-98.7 °F (37.1 °C)] 98.2 °F (36.8 °C)  Pulse:  [71-87] 85  Resp:  [20-39] 26  SpO2:  [88 %-98 %] 89 %  BP: ()/(48-64) 106/57  Arterial Line BP: ()/(38-55) 110/53     Weight: 71.2 kg (156 lb 15.5 oz)  Body mass index is 25.34 kg/m².    Estimated Creatinine Clearance: 14.8 mL/min (A) (based on SCr of 4.8 mg/dL (H)).    Physical Exam  Vitals signs reviewed.   Constitutional:       Appearance: She is well-developed. She is ill-appearing.      Interventions: She is sedated and intubated.   HENT:      Head: Normocephalic and atraumatic.   Eyes:      General: Scleral icterus present.   Neck:      Thyroid: No thyromegaly.      Trachea: No tracheal deviation.      Comments: [facility] TLC in place.   Cardiovascular:      Rate and Rhythm: Normal rate and regular rhythm.   Pulmonary:      Effort: Tachypnea present. No accessory muscle usage. She is intubated.      Breath sounds: Decreased breath sounds and rales present. No wheezing.   Abdominal:      General: Bowel sounds are normal. There is no  distension.      Palpations: Abdomen is soft.      Hernia: No hernia is present.   Genitourinary:     Comments: Garcia in place.   Musculoskeletal:      Right lower leg: No edema.      Left lower leg: No edema.   Skin:     General: Skin is warm and dry.      Coloration: Skin is jaundiced.      Findings: Bruising present.      Comments: PIV exchanged       Vent Mode: A/C  Oxygen Concentration (%):  [50-70] 50  Resp Rate Total:  [20 br/min-33 br/min] 33 br/min  Vt Set:  [400 mL-430 mL] 400 mL  PEEP/CPAP:  [10 cmH20] 10 cmH20  Mean Airway Pressure:  [16 [id number] cmH20] 19 cmH20      Significant Labs: All pertinent labs within the past 24 hours have been reviewed.    Significant Imaging: I have reviewed all pertinent imaging results/findings within the past 24 hours.

## 2020-11-02 NOTE — ASSESSMENT & PLAN NOTE
Possibly iATN vs HRS    --Urine lytes indicating pre-renal cause  --RP US negative for hydronephrosis  --Nephrology following; Recommending MAP goal >80 - however patient with significant norepinephrine requirements in order to achieve that goal. MAP goal changed to 70.   --HRS treatment initiated (albumin, midodrine, and octreotide)  --Garcia in place with strict I/Os  --CRRT initiated on 11/2

## 2020-11-03 NOTE — ASSESSMENT & PLAN NOTE
45 year old female with decompensated liver cirrhosis and recurrent right sided pleural effusions who presented to OSH on 10/29 with SOB and transferred to [facility] [place] for liver [place] evaluation on 10/30. Her course has been complicated by kidney failure and hypoxic respiratory failure. She is currently intubated, sedated requiring pressors, CRRT. She has been on empiric antimicrobials (Vanc, Sybil, Azithro, Fluconazole) for possible pneumonia. All cultures thus far have been negative (blood, urine, pleural fluid, bronchial fluid). COVID negative. She remains afebrile.  Suspect her clinical picture may be all related to her liver and not an underlying infectious process. Azithromycin discontinued today. Will continue Vancomycin, Meropenem and Fluconazole for now. Continue to follow culture data, T-spot, fungal serologies. ID will follow along.

## 2020-11-03 NOTE — PROGRESS NOTES
[facility]  Hepatology Service  Progress Note    Patient Name: Madhavi Bell  MRN: [id number]  Admission Date: 10/30/2020  Hospital Length of Stay: 4 days  Code Status: Full Code   Principal Problem:Decompensated hepatic cirrhosis      Subjective:     Interval history:   Started on CRRT. On levo and vaso.  On Vanc, meropenem and fluconazole.      Objective:     Vitals:    11/03/20 1200   BP: (!) 104/55   Pulse: 90   Resp: (!) 24   Temp:        General:  Intubated, sedated  HEENT: +scleral icterus.  Resp:  Ventilator breath sounds  Cardiac: RRR  Abdomen: Normoactive bowel sounds. Distended.  Extremities: No peripheral edema.   Neurologic:  Sedated      Significant Labs:  Recent Labs   Lab 11/01/20  0334 11/02/20  0305 11/03/20  0528   HGB 10.1* 10.0* 8.9*       Lab Results   Component Value Date    WBC 29.37 (H) 11/03/2020    HGB 8.9 (L) 11/03/2020    HCT 26.8 (L) 11/03/2020    MCV 97 11/03/2020    PLT 80 (L) 11/03/2020       Lab Results   Component Value Date     (L) 11/03/2020     (L) 11/03/2020    K 4.5 11/03/2020    K 4.5 11/03/2020    CL 94 (L) 11/03/2020    CL 94 (L) 11/03/2020    CO2 18 (L) 11/03/2020    CO2 16 (L) 11/03/2020    BUN 33 (H) 11/03/2020    BUN 32 (H) 11/03/2020    CREATININE 2.9 (H) 11/03/2020    CREATININE 2.9 (H) 11/03/2020    CALCIUM 7.7 (L) 11/03/2020    CALCIUM 7.7 (L) 11/03/2020    ANIONGAP 17 (H) 11/03/2020    ANIONGAP 19 (H) 11/03/2020    ESTGFRAFRICA 21.6 (A) 11/03/2020    ESTGFRAFRICA 21.6 (A) 11/03/2020    EGFRNONAA 18.7 (A) 11/03/2020    EGFRNONAA 18.7 (A) 11/03/2020       Lab Results   Component Value Date    ALT 78 (H) 11/03/2020     (H) 11/03/2020     (H) 10/08/2020    ALKPHOS 287 (H) 11/03/2020    BILITOT 34.2 (H) 11/03/2020       Lab Results   Component Value Date    INR 2.8 (H) 11/03/2020    INR 2.6 (H) 11/02/2020    INR 2.4 (H) 11/01/2020       Significant Imaging:  Reviewed pertinent radiology findings.       Assessment/Plan:      Madhavi Bell is a 45 y.o. female with history of decompensated liver cirrhosis due to alpha-1 antitrypsin deficiency and alcohol, recurrent pleural effusion, HCC (with the additional bilobar indeterminate liver lesions) who presented to [place] with shortness of breath and [place] [facility] [place].    Course has been complicated by worsening hypoxic respiratory failure.  S/p thoracentesis x2, but developed new pulmonary infiltrates and intubated 11/1.  Bronch with evidence of pneumonia.  She did undergo liver biopsy of indeterminate lesion on 10/30 which was negative for malignancy.     Problem List:  1. Decompensated liver cirrhosis due to alpha-1 antitrypsin deficiency and alcohol  2. Hepatocellular carcinoma - 3.9 cm right lesion with additional indeterminate foci.  Bx of indeterminate liver lesion negative for malignancy which means she meets Jeancarlos criteria  3. Hypoxemic respiratory failure / septic shock due to pneumonia  4. Acute renal failure / HRS  5. Hyponatremia     Plan:  - Septic shock / Pneumonia:  Appreciate ICU assistance.  On levo and vaso.  - Acute hypoxemic respiratory failure:  S/p thoracentesis on 10/30 (1.7L) and 10/31 (2L).  Now intubated on 11/01.  Bronchoscopy 11/1 - cultures with WBCs, GPC and GNRs.  On broad-spectrum antibiotics and antifungal.  ID following.  - HCC:  3.9cm HCC lesion with other indeterminate liver lesions - biopsy on 10/30 negative for malignancy  - Hepatic hydrothorax: CXR on presentation with right lung opacification.  Thoracentesis  as above.  2 gram Na restrict.  Hold diuretics given hyponatremia and renal failure.  Recommend against chest tube for hepatic hydrothorax alone, as hydrothorax will continuously drain and lead to significant dehydration/electrolyte abnormalities; also can have difficult to control leakage after removal.  - LINN/HRS:  Nephrology following.  On CRRT since 11/2  - Hyponatremia:  Hold diuretics, fluid  restrict.  Trial of IV albumin.  - Esophageal varices: last EGD 9/9 with small esophageal varices  - Hepatic encephalopathy:  Continue lactulose titrated to 3-4 BMs daily  - Transplant:  Candidacy was initially dependent on ruling out multifocal HCC, but biopsy of indeterminate liver lesion is negative for malignancy (known HCC lesion meets Helen criteria).  However, now with septic shock due to pneumonia.  Pending T-spot to complete evaluation. Plan to discuss on Wednesday, 11/4, to committee    Thank you for involving us in the care of Madhavi Bell. Please call with any additional questions, concerns or changes in the patient's clinical status.    Shane Ross MD  Gastroenterology Fellow PGY IV   [facility]

## 2020-11-03 NOTE — PROGRESS NOTES
[facility]  Infectious Disease  Progress Note    Patient Name: Madhavi Bell  MRN: [id number]  Admission Date: 10/30/2020  Length of Stay: 4 days  Attending Physician: Hal Shea*  Primary Care Provider: Anurag Knutson MD    Isolation Status: No active isolations  Assessment/Plan:      Shock, unspecified     45 year old female with decompensated liver cirrhosis and recurrent right sided pleural effusions who presented to OSH on 10/29 with SOB and transferred to [place] for liver transplant evaluation on 10/30. Her course has been complicated by kidney failure and hypoxic respiratory failure. She is currently intubated, sedated requiring pressors, CRRT. She has been on empiric antimicrobials (Vanc, Sybil, Azithro, Fluconazole) for possible pneumonia. All cultures thus far have been negative (blood, urine, pleural fluid, bronchial fluid). COVID negative. She remains afebrile.  Suspect her clinical picture may be all related to her liver and not an underlying infectious process. Azithromycin discontinued today. Will continue Vancomycin, Meropenem and Fluconazole for now as she is acutely ill. Continue to follow culture data, T-spot, fungal serologies. ID will follow along.          Please call for any questions. Thank you.  Ebony May PA-C      Subjective:     Principal Problem:Decompensated hepatic cirrhosis    HPI: Madhavi Bell is a 45 year old female with decompensated liver cirrhosis due to alpha-1 antitrypsin deficiency and alcohol use, recurrent right sided hepato-hydrothorax, HCC who presented to [place] ED on 10/29 with shortness of breath and transferred to [place] for liver transplant evaluation on 10/30. Patient reports worsening SOB over the last two days. Patient scheduled for outpatient thoracentesis next week however was advised by GI doctor to present to the ED due to worsening symptoms. Patient reports associated dry cough but no worse than baseline. Denies  fever, chills, chest pain, abdominal pain, confusion.      Upon arrival, patient with oxygen saturations >94% on 4L NC. No fever. CXR with significant right sided pleural effusion. Labs significant for new leukocytosis, t bili of 24 and LINN with sCr 2.7. Patient admitted to the CMICU. Patient underwent liver biopsy and thoracentesis (1.5 L removed) on 10/30. Started on empiric Vanc/Zosyn for pneumonia coverage.  Also received a dose of Tobramycin. Blood, urine and resp cx thus far unrevealing. Over the last 24 hours her oxygen requirements increased from 4L to 9L and she has been started on pressors. WBC has increased to 25K. CXR revealed Bilateral pulmonary opacities with significant interval detrimental change, there is prominent appearance of confluent infiltrates/airspace disease and consolidation with air bronchogram involving the mid to lower right hemithorax, with obscuration of the right hemidiaphragm and right heart border, the areas of multifocal rounded opacification on the left appears somewhat more confluent, with prominent confluent abnormal opacity seen throughout the left hemithorax, and central air bronchogram formation.  There is some obscuration of the left heart border however without significant obscuration of the left hemidiaphragm.  There is likely a component of pleural fluid on the right however significant pleural fluid on the left is not appreciated.  There is no evidence for pneumothorax. Repeat thoracentesis showed 2527 WBC, 67% segs.  ID consulted for abx recs.    Further hx obtained from . 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 Two kids (one physically going to school). No recent illnesses or known COVID exposures. Two dogs. No recent travel.  Interval History:   Remains intubated, sedated requiring pressors, CRRT. She has been on empiric antibiotics (Vanc, Sybil, Azithro, Fluconazole) for possible  pneumonia. All cultures thus far have been negative (blood, urine, pleural fluid, bronchial fluid). She hasnt had any fever. T spot and fungal serologies are pending. COVID negative. Getting presented to liver committee [place]. Vent settings stable.      Review of Systems   Unable to perform ROS: Intubated     Objective:     Vital Signs (Most Recent):  Temp: 98.9 °F (37.2 °C) (11/03/20 1500)  Pulse: 84 (11/03/20 1512)  Resp: (!) 24 (11/03/20 1512)  BP: (!) 110/58 (11/03/20 1500)  SpO2: (!) 93 % (11/03/20 1512) Vital Signs (24h Range):  Temp:  [97.2 °F (36.2 °C)-99.1 °F (37.3 °C)] 98.9 °F (37.2 °C)  Pulse:  [84-98] 84  Resp:  [24-26] 24  SpO2:  [89 %-96 %] 93 %  BP: (104-116)/(53-62) 110/58  Arterial Line BP: ()/(37-61) 140/59     Weight: 71.2 kg (156 lb 15.5 oz)  Body mass index is 25.34 kg/m².    Estimated Creatinine Clearance: 24.5 mL/min (A) (based on SCr of 2.9 mg/dL (H)).    Physical Exam  Vitals signs reviewed.   Constitutional:       Appearance: She is well-developed. She is ill-appearing.      Interventions: She is sedated and intubated.   HENT:      Head: Normocephalic and atraumatic.   Eyes:      General: Scleral icterus present.   Neck:      Thyroid: No thyromegaly.      Trachea: No tracheal deviation.      Comments: LIJ TLC in place.   Cardiovascular:      Rate and Rhythm: Normal rate and regular rhythm.   Pulmonary:      Effort: Tachypnea present. No accessory muscle usage. She is intubated.      Breath sounds: Decreased breath sounds and rales present. No wheezing.   Abdominal:      General: Bowel sounds are normal. There is no distension.      Palpations: Abdomen is soft.      Hernia: No hernia is present.   Genitourinary:     Comments: Garcia in place.   Musculoskeletal:      Right lower leg: No edema.      Left lower leg: No edema.   Skin:     General: Skin is warm and dry.      Coloration: Skin is jaundiced.      Findings: Bruising present.      Comments: PIV exchanged       Vent Mode:  A/C  Oxygen Concentration (%):  [50-60] 50  Resp Rate Total:  [24 br/min-25 br/min] 24 br/min  Vt Set:  [400 mL] 400 mL  PEEP/CPAP:  [10 cmH20] 10 cmH20  Mean Airway Pressure:  [10 vaN55-18 cmH20] 16 cmH20      Significant Labs: All pertinent labs within the past 24 hours have been reviewed.    Significant Imaging: I have reviewed all pertinent imaging results/findings within the past 24 hours.

## 2020-11-03 NOTE — NURSING
CRRT rinsed back per venous clotting. Arterial side rinsed back successfully. Unable to return venous side. Dialysis RN notified. Will follow up with Nephrology.

## 2020-11-03 NOTE — PLAN OF CARE
CMICU DAILY GOALS       A: Awake    RASS: Goal - RASS Goal: -2-->light sedation  Actual - RASS (Phan Agitation-Sedation Scale): -4-->deep sedation   Restraint necessity: Clinical Justification: Removing medical devices  B: Breath   SBT: Not attempted   C: Coordinate A & B, analgesics/sedatives   Pain: managed    SAT: Pass  D: Delirium   CAM-ICU: Overall CAM-ICU: Positive  E: Early(intubated/ Progressive (non-intubated) Mobility   MOVE Screen: Fail   Activity: Activity Management: patient unable to perform activities  FAS: Feeding/Nutrition   Diet order: Diet/Nutrition Received: NPO, tube feeding, Specialty Diet/Nutrition Received: renal diet Fluid restriction:    T: Thrombus   DVT prophylaxis: VTE Required Core Measure: (SCDs) Sequential compression device initiated/maintained  H: HOB Elevation   Head of Bed (HOB): HOB at 30-45 degrees  U: Ulcer Prophylaxis   GI: yes  G: Glucose control   managed Glycemic Management: blood glucose monitoring  S: Skin   Bundle compliance: yes   Bathing/Skin Care: back care, bath, chlorhexidine, bath, complete, dressed/undressed, incontinence care, foot care, linen changed Date: 11/2/2020  B: Bowel Function   no issues   I: Indwelling Catheters   Garcia necessity:      Urethral Catheter 10/30/20 1615 16 Fr.-Reason for Continuing Urinary Catheterization: Critically ill in ICU and requiring hourly monitoring of intake/output   CVC necessity: Yes   IPAD offered: Not appropriate  D: De-escalation Antibx   Yes  Plan for the day   Levo&Vaso for MAP>70. CRRT. Continue Abx.   Family/Goals of care/Code Status   Code Status: Full Code     No acute events throughout day, VS and assessment per flow sheet, patient progressing towards goals as tolerated, plan of care reviewed with Madhavi Bell and family, all concerns addressed, will continue to monitor.

## 2020-11-03 NOTE — PROGRESS NOTES
[facility]  Critical Care Medicine  Progress Note    Patient Name: Madhavi Bell  MRN: [id number]  Admission Date: 10/30/2020  Hospital Length of Stay: 4 days  Code Status: Full Code  Attending Provider: Hal Shea*  Primary Care Provider: Anurag Knutson MD   Principal Problem: Decompensated hepatic cirrhosis    Subjective:     HPI:  Madhavi Bell is a 45 year old female with decompensated liver cirrhosis due to alpha-1 antitrypsin deficiency and alcohol use, recurrent right sided hepato-hydrothorax, HCC who presented to [place] ED on 10/29 with shortness of breath and transferred to [facility] for liver transplant evaluation on 10/30. Patient reports worsening SOB over the last two days. Patient scheduled for outpatient thoracentesis next week however was advised by GI doctor to present to the ED due to worsening symptoms. Patient reports associated dry cough with pleural effusions but no worse than baseline. Denies fever, chills, chest pain, abdominal pain, confusion.     Upon arrival, patient with oxygen saturations >94% on 4L NC with borderline BP (SBP 88-92). CXR with significant right sided pleural effusion. Labs significant for new leukocytosis, t bili of 24 and LINN with sCr 2.7.     Hospital/ICU Course:  Patient admitted to the CMICU as a transfer for liver transplant evaluation. Patient underwent liver biopsy and thoracentesis (1.5 L removed) on 10/30. Hepatology and nephrology consulted for assistance. Patient with worsening respiratory status on 10/31 and thoracentesis was completed again for symptom management with 2L removed. Vasopressor requirements significantly increased in order to maintain blood pressure. Unfortunately, her respiratory status continued to decline and was ultimately required intubation 11/1.  New pulmonary infiltrates developed on the left side more concerning for PNA. Bronchoscopy completed 11/1. ID consulted for assistance. Abx broadened to  vancomycin, meropenem, azithromycin, and fluconazole. Patient initiated on CRRT on 11/2. Patient to be presented to liver transplant committee on 11/4.    Interval History/Significant Events: No acute overnight events. Remains on vasopressors and CRRT.    Review of Systems   Unable to perform ROS: Intubated     Objective:     Vital Signs (Most Recent):  Temp: 98.3 °F (36.8 °C) (11/03/20 0700)  Pulse: 91 (11/03/20 0800)  Resp: (!) 25 (11/03/20 0800)  BP: (!) 106/55 (11/03/20 0800)  SpO2: (!) 93 % (11/03/20 0800) Vital Signs (24h Range):  Temp:  [97.2 °F (36.2 °C)-98.9 °F (37.2 °C)] 98.3 °F (36.8 °C)  Pulse:  [81-98] 91  Resp:  [24-31] 25  SpO2:  [88 %-96 %] 93 %  BP: ()/(49-62) 106/55  Arterial Line BP: ()/(37-58) 134/53   Weight: 71.2 kg (156 lb 15.5 oz)  Body mass index is 25.34 kg/m².      Intake/Output Summary (Last 24 hours) at 11/3/2020 0901  Last data filed at 11/3/2020 0800  Gross per 24 hour   Intake 4302.58 ml   Output 3298 ml   Net 1004.58 ml       Physical Exam  Vitals signs reviewed.   Constitutional:       General: She is in acute distress.      Appearance: She is well-developed. She is toxic-appearing.      Interventions: Face mask in place.   HENT:      Head: Normocephalic and atraumatic.   Eyes:      General: Scleral icterus present.      Pupils: Pupils are equal, round, and reactive to light.   Neck:      Thyroid: No thyromegaly.      Trachea: No tracheal deviation.      Comments: [facility] TLC in place.   Cardiovascular:      Rate and Rhythm: Normal rate and regular rhythm.      Heart sounds: No murmur. No friction rub. No gallop.    Pulmonary:      Effort: Tachypnea, accessory muscle usage and respiratory distress present.      Breath sounds: Rales present. No decreased breath sounds, wheezing or rhonchi.   Abdominal:      General: Bowel sounds are normal.      Palpations: Abdomen is soft.      Tenderness: There is no abdominal tenderness.   Genitourinary:     Comments: Garcia in  place  Musculoskeletal: Normal range of motion.   Skin:     General: Skin is warm and dry.   Neurological:      General: No focal deficit present.      Sensory: No sensory deficit.         Vents:  Vent Mode: A/C (11/03/20 0755)  Set Rate: 24 BPM (11/03/20 0755)  Vt Set: 400 mL (11/03/20 0755)  PEEP/CPAP: 10 cmH20 (11/03/20 0755)  Oxygen Concentration (%): 50 (11/03/20 0800)  Peak Airway Pressure: 30 cmH2O (11/03/20 0755)  Plateau Pressure: 24 cmH20 (11/03/20 0755)  Total Ve: 9.14 mL (11/03/20 0755)  F/VT Ratio<105 (RSBI): (!) 57.42 (11/03/20 0755)  Lines/Drains/Airways     Central Venous Catheter Line            Percutaneous Central Line Insertion/Assessment - Triple Lumen  10/30/20 0855 left internal jugular 4 days    Trialysis (Dialysis) Catheter 11/02/20 1500 right internal jugular less than 1 day          Drain                 Urethral Catheter 10/30/20 1615 16 Fr. 3 days         Fecal Incontinence  11/01/20 1535 1 day         NG/OG Tube 11/01/20 1434 Center mouth 1 day          Airway                 Airway - Non-Surgical 11/01/20 1424 Endotracheal Tube 1 day          Arterial Line            Arterial Line 11/01/20 0626 Left Radial 2 days          Peripheral Intravenous Line                 Peripheral IV - Single Lumen 11/02/20 0515 22 G Anterior;Right Wrist 1 day              Significant Labs:    CBC/Anemia Profile:  Recent Labs   Lab 11/02/20  0305 11/03/20  0528   WBC 29.05* 29.37*   HGB 10.0* 8.9*   HCT 30.3* 26.8*    80*   MCV 98 97   RDW 18.5* 18.2*        Chemistries:  Recent Labs   Lab 11/02/20  0305 11/02/20  1512 11/02/20  2219 11/03/20  0343   * 122*  122* 125* 129*  129*   K 4.7 4.7  4.7 4.7 4.5  4.5   CL 89* 88*  87* 90* 94*  94*   CO2 13* 15*  14* 18* 16*  18*   BUN 72* 76*  76* 60* 32*  33*   CREATININE 4.8* 5.5*  5.4* 4.6* 2.9*  2.9*   CALCIUM 8.1* 7.9*  7.7* 8.0* 7.7*  7.7*   ALBUMIN 3.3* 3.4*  3.3* 3.3* 3.5  3.6   PROT 5.7* 5.6*  --  5.6*   BILITOT 32.6*  33.8*  --  34.2*   ALKPHOS 352* 296*  --  287*   ALT 58* 69*  --  78*   * 141*  --  176*   MG 3.1* 3.0* 2.8* 2.5  2.4   PHOS 8.9* 8.0* 7.1* 4.9*  5.1*       All pertinent labs within the past 24 hours have been reviewed.    Significant Imaging:  I have reviewed all pertinent imaging results/findings within the past 24 hours.      ABG  Recent Labs   Lab 11/03/20  0348   PH 7.254*   PO2 111*   PCO2 45.6*   HCO3 20.2*   BE -7     Assessment/Plan:     Pulmonary  Acute hypoxemic respiratory failure  Likely related to volume overloaded in setting of decompensated cirrhosis complicated by hepato-hydrothorax and worsening LINN with minimal UOP vs underlying PNA. CXR with significant pleural effusion of the right side. Most recent CXR with worsening opacifications on the left concerning for possible PNA.    --S/p thoracentesis with 1.5L removal on 10/30 and 10/31 with 2L removed  --Intubated 11/1  --S/p bronschoscopy 11/1; cultures pending  --Continue abx (vancomycin, meropenem, fluconazole, and azithromycin)  --follow up legionella, histo, blasto and aspergillus urine antigen  --Follow up fungitell  --Wean oxygen for goal saturation 88-92%      Renal/  Lactic acidosis  Likely related to decompensated cirrhosis vs underlying infection    --Will continue to monitor  --LA daily    Acute renal failure  Possibly iATN vs HRS    --Urine lytes indicating pre-renal cause  --RP US negative for hydronephrosis  --Nephrology following; Map goal >65   --CRRT initiated on 11/2  --HRS treatment d/c'ed after CRRT initiation  --Garcia in place with strict I/Os  --CRRT initiated on 11/2    Hyponatremia  2/2 decompensated liver cirrhosis vs new LINN    --frequent BMP  --will continue to monitor  --CRRT initiated on 11/2    GI  * Decompensated hepatic cirrhosis  Secondary to A1AT deficiency and ETOH abuse complicated by HCC, HE, hepatic hydrothorax, ascites, hepatic varices and hyponatremia.    --abdominal US with doppler 10/31 with  solid lesions in right and left lower lobe; portal HTN; splenomegaly  --S/p liver biopsy 10/30, pathology negative  --Hepatology consulted  --Continue midodrine 15 TID  --Lactulose/rifaximin  --to be presented to transplant committee on 11/4    MELD-Na score: 41 at 11/3/2020  3:43 AM  MELD score: 42 at 11/3/2020  3:43 AM  Calculated from:  Serum Creatinine: 2.9 mg/dL at 11/3/2020  3:43 AM  Serum Sodium: 129 mmol/L at 11/3/2020  3:43 AM  Total Bilirubin: 34.2 mg/dL at 11/3/2020  3:43 AM  INR(ratio): 2.8 at 11/3/2020  3:43 AM  Age: 45 years    Pleural effusion associated with hepatic disorder  Reports x 3 thoracentesis in the past for symptom management.     --s/p thoracentesis of 1.5 L removal on 10/30; labs negative for infection  --Repeat thora 10/31 with 2L removed      Other  Shock, unspecified  Likely related to decompensated cirrhosis vs underlying infection (less likely)    --Continue vancomycin, meropenem, azithromycin, and fluconazole.  --ID following  --BCx NGTD; resp cx normal ishan  --Stress dose steroids initiated  --UA with 2+ leuks and 46 WBCs; urine cx no growth  --No pocket for paracentesis   --Thoracentesis fluid negative for SBP  --Continue norepinephrine and vasopressin to maintain MAP >65    Palliative care encounter  Palliative care consulted due to liver transplant evaluation       Critical Care Daily Checklist:    A: Awake: RASS Goal/Actual Goal: RASS Goal: -2-->light sedation  Actual: Phan Agitation Sedation Scale (RASS): Deep sedation   B: Spontaneous Breathing Trial Performed? Spon. Breathing Trial Initiated?: Not initiated (11/02/20 0808)   C: SAT & SBT Coordinated?  done                      D: Delirium: CAM-ICU Overall CAM-ICU: Positive   E: Early Mobility Performed? No   F: Feeding Goal: Goals: Meet % EEN, EPN by RD f/u date  Status: Nutrition Goal Status: new   Current Diet Order   Procedures    Diet NPO      AS: Analgesia/Sedation done   T: Thromboembolic Prophylaxis Held  d/t increased INR   H: HOB > 300 Yes   U: Stress Ulcer Prophylaxis (if needed) famo   G: Glucose Control q6h   B: Bowel Function Stool Occurrence: 1   I: Indwelling Catheter (Lines & Garcia) Necessity done   D: De-escalation of Antimicrobials/Pharmacotherapies done    Plan for the day/ETD Cont course    Code Status:  Family/Goals of Care: Full Code  Cont course     Critical Care Time: 70 minutes  Critical secondary to Patient has a condition that poses threat to life and bodily function: intubated on crrt.     Critical care was time spent personally by me on the following activities: development of treatment plan with patient or surrogate and bedside caregivers, discussions with consultants, evaluation of patient's response to treatment, examination of patient, ordering and performing treatments and interventions, ordering and review of laboratory studies, ordering and review of radiographic studies, pulse oximetry, re-evaluation of patient's condition. This critical care time did not overlap with that of any other provider or involve time for any procedures.     Cedric Jimenez NP  Critical Care Medicine  [facility]

## 2020-11-03 NOTE — PROGRESS NOTES
Pharmacokinetic Assessment Follow Up: IV Vancomycin    Vancomycin serum concentration assessment(s):    Vancomycin random level resulted at 16.3 mcg/mL approximately 24 hours after the previous level. Goal level is 15 to 20 mcg/mL.     Nephrology is consulted for LINN and plans to restart SLED tonight or tomorrow.     Drug levels (last 3 results):  Recent Labs   Lab Result Units 11/01/20  0334 11/02/20  0305 11/03/20  0343   Vancomycin, Random ug/mL 15.7 25.0 16.3     Vancomycin Regimen Plan:    Administer vancomycin  mg and redose when the random level is less than 20 mcg/mL or as needed for dialysis. Next level to be drawn with morning labs on 11/4.     Pharmacy will continue to follow and monitor vancomycin.    Please contact pharmacy at extension [phone] for questions regarding this assessment.    Thank you for the consult,   Mary Berg, PharmD, BCCCP               Patient brief summary:  Madhavi Bell is a 45 y.o. female initiated on antimicrobial therapy with IV vancomycin for treatment of sepsis    Drug Allergies:   Review of patient's allergies indicates:  No Known Allergies    Actual Body Weight:   71.2 kg     Renal Function:   Estimated Creatinine Clearance: 23.7 mL/min (A) (based on SCr of 3 mg/dL (H)).    Dialysis Method (if applicable):  SLED    CBC (last 72 hours):  Recent Labs   Lab Result Units 10/31/20  2048 11/01/20  0334 11/02/20  0305 11/03/20  0528   WBC K/uL 22.09* 25.11* 29.05* 29.37*   Hemoglobin g/dL 9.5* 10.1* 10.0* 8.9*   Hematocrit % 28.2* 29.4* 30.3* 26.8*   Platelets K/uL 134* 163 234 80*   Gran % % 84.1* 84.8* 90.0* 86.0*   Lymph % % 7.0* 6.5* 6.0* 5.0*   Mono % % 5.8 5.4 4.0 6.0   Eosinophil % % 1.1 0.2 0.0 0.0   Basophil % % 0.2 0.2 0.0 0.0   Differential Method  Automated Automated Manual Manual       Metabolic Panel (last 72 hours):  Recent Labs   Lab Result Units 10/31/20  1852 11/01/20  0334 11/01/20  1518 11/02/20  0305 11/02/20  1512 11/02/20  2219 11/03/20  0343  11/03/20  1506   Sodium mmol/L 124* 127* 125*  125* 125* 122*  122* 125* 129*  129* 125*   Potassium mmol/L 4.6 4.1 4.3  4.3 4.7 4.7  4.7 4.7 4.5  4.5 4.3   Chloride mmol/L 89* 90* 89*  89* 89* 88*  87* 90* 94*  94* 94*   CO2 mmol/L 18* 17* 15*  15* 13* 15*  14* 18* 16*  18* 18*   Glucose mg/dL 123* 132* 155*  155* 120* 159*  158* 119* 100  100 149*   BUN mg/dL 59* 62* 62*  62* 72* 76*  76* 60* 32*  33* 31*   Creatinine mg/dL 4.4* 4.1* 4.1*  4.1* 4.8* 5.5*  5.4* 4.6* 2.9*  2.9* 3.0*   Albumin g/dL 3.6 2.9* 3.0*  3.0* 3.3* 3.4*  3.3* 3.3* 3.5  3.6 3.1*   Total Bilirubin mg/dL 29.4* 31.6* 32.3*  32.3* 32.6* 33.8*  --  34.2*  --    Alkaline Phosphatase U/L 540* 477* 408*  408* 352* 296*  --  287* 265*   AST U/L 74* 81* 89*  89* 111* 141*  --  176* 186*   ALT U/L 56* 53* 53*  53* 58* 69*  --  78* 78*   Magnesium mg/dL  --  3.1*  --  3.1* 3.0* 2.8* 2.5  2.4  --    Phosphorus mg/dL  --  7.3*  --  8.9* 8.0* 7.1* 4.9*  5.1*  --        Vancomycin Administrations:  vancomycin given in the last 96 hours                   vancomycin 750 mg in dextrose 5 % 250 mL IVPB (ready to mix system) (mg) 750 mg New Bag 11/03/20 0847    vancomycin 750 mg in dextrose 5 % 250 mL IVPB (ready to mix system) (mg) 750 mg New Bag 11/01/20 0847    vancomycin 1.25 g in dextrose 5% 250 mL IVPB (ready to mix) (mg) 1,250 mg New Bag 10/30/20 1835                Microbiologic Results:  Microbiology Results (last 7 days)     Procedure Component Value Units Date/Time    Fungus culture [[id number]] Collected: 11/01/20 1521    Order Status: Completed Specimen: Body Fluid from Lung, LLL Updated: 11/03/20 1409     Fungus (Mycology) Culture Culture in progress    Narrative:      Bronchial Wash    Cryptococcal antigen [[id number]] Collected: 11/02/20 1319    Order Status: Completed Specimen: Blood, Venous Updated: 11/03/20 1141     Cryptococcal Ag, Blood Negative    Culture, Respiratory with Gram Stain [[id number]] Collected: 11/01/20  0153    Order Status: Completed Specimen: Respiratory from Sputum, Expectorated Updated: 11/03/20 1037     Respiratory Culture Normal respiratory ishan      No S aureus or Pseudomonas isolated.     Gram Stain (Respiratory) >10 epithelial cells per low power field     Gram Stain (Respiratory) Many WBC's     Gram Stain (Respiratory) Rare Gram positive cocci    Culture, Respiratory [[id number]] Collected: 11/01/20 1521    Order Status: Completed Specimen: Respiratory from Bronchial Wash, LLL Updated: 11/03/20 0913     Respiratory Culture No Growth     Gram Stain (Respiratory) <10 epithelial cells per low power field.     Gram Stain (Respiratory) Rare WBC's     Gram Stain (Respiratory) Rare Gram negative rods    AFB Culture & Smear [[id number]] Collected: 11/01/20 1521    Order Status: Completed Specimen: Body Fluid from Lung, LLL Updated: 11/02/20 2127     AFB Culture & Smear Culture in progress     AFB CULTURE STAIN No acid fast bacilli seen.    AFB Culture & Smear [[id number]] Collected: 10/31/20 2038    Order Status: Completed Specimen: Pleural Fluid Updated: 11/02/20 1558     AFB Culture & Smear Culture in progress     AFB CULTURE STAIN No acid fast bacilli seen.    Cryptococcal antigen, blood [[id number]]     Order Status: Completed Specimen: Blood     Culture, Body Fluid (Aerobic) w/ GS [[id number]] Collected: 10/30/20 1716    Order Status: Completed Specimen: Body Fluid from Pleural Fluid Updated: 11/02/20 1023     AEROBIC CULTURE - FLUID No growth     Gram Stain Result Few WBC's      No organisms seen    Culture, Anaerobic [[id number]] Collected: 10/30/20 1716    Order Status: Completed Specimen: Body Fluid from Pleural Fluid Updated: 11/02/20 0851     Anaerobic Culture Culture in progress    Aerobic culture [[id number]] Collected: 10/31/20 2038    Order Status: Completed Specimen: Pleural Fluid Updated: 11/02/20 0734     Aerobic Bacterial Culture No growth    KOH prep [[id number]] Collected: 11/01/20 1521    Order  Status: Completed Specimen: Body Fluid from Lung, LLL Updated: 11/02/20 0043     KOH Prep No yeast or fungal elements seen    Narrative:      Bronchial Wash    Gram stain [[id number]] Collected: 11/01/20 1521    Order Status: Canceled Specimen: Body Fluid from Lung, LLL     Urine culture [[id number]] Collected: 10/30/20 1334    Order Status: Completed Specimen: Urine Updated: 11/01/20 0744     Urine Culture, Routine No growth    Narrative:      Specimen Source->Urine    Gram stain [[id number]] Collected: 10/31/20 2038    Order Status: Completed Specimen: Pleural Fluid Updated: 11/01/20 0026     Gram Stain Result Rare WBC's      No organisms seen    Urine Culture High Risk [[id number]] Collected: 10/30/20 1802    Order Status: Completed Specimen: Urine, Catheterized Updated: 10/31/20 2242     Urine Culture, Routine No significant growth    Narrative:      Indicated criteria for high risk culture:->Other  Other (specify):->liver patient

## 2020-11-03 NOTE — MEDICAL/APP STUDENT
[facility]  Nephrology  Progress Note    Patient Name: Madhavi Hamptonox   : 1975   MRN: [id number]  Admission Date: 10/30/2020  Attending Physician:    Date of Admission: 10/30/2020  1:06 PM      No chief complaint on file.      Subjective:    HPI: Patient is a 44yo female with decompensated liver cirrhosis secondary to alpha-1 antitrypsin deficiency and alcohol abuse. She has been following hepatology as outpatient and has required frequent thoracentesis for recurrent hepatic hydrothorax. She had an MRI on  that showed a R liver lobe lesion. She presented to [place] ED on 10/29 with SOB. CXR showed large R sided pleural effusion. Was noted to be hypotensive (systolic BP in 90s), hyponatremic (Na - 123), and had decreased renal function (BUN - 48 and Cr - 2.7). Baseline BUN is 19 and Cr is 1.0. She was transferred to [facility] and on 10/30 had an IR biopsy of the liver lesion and thoracentesis. She was managed for ?HRS and started on albumin, midodrine, octreotide. Developed leukocytosis and started on broad spectrum antibiotics (vancomycin, meropenem, azithromycin, fluconazole, tobramycin). Zosyn was discontinued. Intubated on  due to worsening respiratory status.     Nephrology was consulted for worsening renal function and oliguria.     Today:  Started SLED yesterday, clotted once and was restarted at -300. Clotted again today, currently on hold, pending labs.    Objective:  Vitals:    20 0900 20 1000 20 1100 20 1116   BP: (!) 116/59 (!) 113/56 (!) 110/56    BP Location: Right arm Right arm Right arm    Patient Position: Lying Lying Lying    Pulse: 92 91 90 88   Resp: (!) 24 (!) 24 (!) 24 (!) 24   Temp:   99.1 °F (37.3 °C)    TempSrc:   Axillary    SpO2: (!) 93% (!) 93% (!) 92% (!) 93%   Weight:       Height:            Intake/Output Summary (Last 24 hours) at 11/3/2020 1209  Last data filed at 11/3/2020 1200  Gross per 24 hour   Intake 5204.4 ml    Output 3852 ml   Net 1352.4 ml         Physical Exam  Constitutional:       Interventions: She is sedated and intubated.   Eyes:      General: Scleral icterus present.   Neck:      Comments: R IJ central line in place  Cardiovascular:      Rate and Rhythm: Normal rate. Rhythm irregular.      Heart sounds: Normal heart sounds.   Pulmonary:      Effort: She is intubated.      Breath sounds: Normal breath sounds.   Abdominal:      General: Bowel sounds are decreased. There is distension.   Musculoskeletal:      Right lower leg: No edema.      Left lower leg: No edema.   Skin:     General: Skin is warm and dry.      Capillary Refill: Capillary refill takes 2 to 3 seconds.      Coloration: Skin is jaundiced.          Recent Results (from the past 48 hour(s))   Comprehensive metabolic panel    Collection Time: 11/01/20  3:18 PM   Result Value Ref Range    Sodium 125 (L) 136 - 145 mmol/L    Potassium 4.3 3.5 - 5.1 mmol/L    Chloride 89 (L) 95 - 110 mmol/L    CO2 15 (L) 23 - 29 mmol/L    Glucose 155 (H) 70 - 110 mg/dL    BUN 62 (H) 6 - 20 mg/dL    Creatinine 4.1 (H) 0.5 - 1.4 mg/dL    Calcium 7.9 (L) 8.7 - 10.5 mg/dL    Total Protein 5.4 (L) 6.0 - 8.4 g/dL    Albumin 3.0 (L) 3.5 - 5.2 g/dL    Total Bilirubin 32.3 (H) 0.1 - 1.0 mg/dL    Alkaline Phosphatase 408 (H) 55 - 135 U/L    AST 89 (H) 10 - 40 U/L    ALT 53 (H) 10 - 44 U/L    Anion Gap 21 (H) 8 - 16 mmol/L    eGFR if African American 14.2 (A) >60 mL/min/1.73 m^2    eGFR if non  12.3 (A) >60 mL/min/1.73 m^2   Comprehensive Metabolic Panel    Collection Time: 11/01/20  3:18 PM   Result Value Ref Range    Sodium 125 (L) 136 - 145 mmol/L    Potassium 4.3 3.5 - 5.1 mmol/L    Chloride 89 (L) 95 - 110 mmol/L    CO2 15 (L) 23 - 29 mmol/L    Glucose 155 (H) 70 - 110 mg/dL    BUN 62 (H) 6 - 20 mg/dL    Creatinine 4.1 (H) 0.5 - 1.4 mg/dL    Calcium 7.9 (L) 8.7 - 10.5 mg/dL    Total Protein 5.4 (L) 6.0 - 8.4 g/dL    Albumin 3.0 (L) 3.5 - 5.2 g/dL    Total  Bilirubin 32.3 (H) 0.1 - 1.0 mg/dL    Alkaline Phosphatase 408 (H) 55 - 135 U/L    AST 89 (H) 10 - 40 U/L    ALT 53 (H) 10 - 44 U/L    Anion Gap 21 (H) 8 - 16 mmol/L    eGFR if African American 14.2 (A) >60 mL/min/1.73 m^2    eGFR if non  12.3 (A) >60 mL/min/1.73 m^2   Lactic Acid, Plasma    Collection Time: 11/01/20  3:18 PM   Result Value Ref Range    Lactate (Lactic Acid) 3.4 (H) 0.5 - 2.2 mmol/L   Culture, Respiratory    Collection Time: 11/01/20  3:21 PM    Specimen: Bronchial Wash, LLL; Respiratory   Result Value Ref Range    Respiratory Culture No Growth     Gram Stain (Respiratory) <10 epithelial cells per low power field.     Gram Stain (Respiratory) Rare WBC's     Gram Stain (Respiratory) Rare Gram negative rods    AFB Culture & Smear    Collection Time: 11/01/20  3:21 PM    Specimen: Lung, LLL; Body Fluid   Result Value Ref Range    AFB Culture & Smear Culture in progress     AFB CULTURE STAIN No acid fast bacilli seen.    KOH prep    Collection Time: 11/01/20  3:21 PM    Specimen: Lung, LLL; Body Fluid   Result Value Ref Range    KOH Prep No yeast or fungal elements seen    ISTAT PROCEDURE    Collection Time: 11/01/20  4:29 PM   Result Value Ref Range    POC PH 7.196 (LL) 7.35 - 7.45    POC PCO2 48.0 (H) 35 - 45 mmHg    POC PO2 94 80 - 100 mmHg    POC HCO3 18.6 (L) 24 - 28 mmol/L    POC BE -10 -2 to 2 mmol/L    POC SATURATED O2 95 95 - 100 %    POC TCO2 20 (L) 23 - 27 mmol/L    Rate 20     Sample ARTERIAL     Site Leti/UAC     Allens Test N/A     DelSys Adult Vent     Mode AC/PRVC     Vt 430     PEEP 10     FiO2 100     Sp02 98    ISTAT PROCEDURE    Collection Time: 11/01/20  6:32 PM   Result Value Ref Range    POC PH 7.221 (LL) 7.35 - 7.45    POC PCO2 37.3 35 - 45 mmHg    POC PO2 72 (L) 80 - 100 mmHg    POC HCO3 15.3 (L) 24 - 28 mmol/L    POC BE -12 -2 to 2 mmol/L    POC SATURATED O2 91 (L) 95 - 100 %    POC TCO2 16 (L) 23 - 27 mmol/L    Rate 24     Sample ARTERIAL     Site Leti/UAC      Allens Test N/A     DelSys Adult Vent     Mode AC/PRVC     Vt 400     PEEP 10     FiO2 70     Sp02 96    Magnesium    Collection Time: 11/02/20  3:05 AM   Result Value Ref Range    Magnesium 3.1 (H) 1.6 - 2.6 mg/dL   Phosphorus    Collection Time: 11/02/20  3:05 AM   Result Value Ref Range    Phosphorus 8.9 (H) 2.7 - 4.5 mg/dL   Protime-INR    Collection Time: 11/02/20  3:05 AM   Result Value Ref Range    Prothrombin Time 27.8 (H) 9.0 - 12.5 sec    INR 2.6 (H) 0.8 - 1.2   CBC Auto Differential    Collection Time: 11/02/20  3:05 AM   Result Value Ref Range    WBC 29.05 (H) 3.90 - 12.70 K/uL    RBC 3.08 (L) 4.00 - 5.40 M/uL    Hemoglobin 10.0 (L) 12.0 - 16.0 g/dL    Hematocrit 30.3 (L) 37.0 - 48.5 %    MCV 98 82 - 98 fL    MCH 32.5 (H) 27.0 - 31.0 pg    MCHC 33.0 32.0 - 36.0 g/dL    RDW 18.5 (H) 11.5 - 14.5 %    Platelets 234 150 - 350 K/uL    MPV 11.3 9.2 - 12.9 fL    Immature Granulocytes CANCELED 0.0 - 0.5 %    Immature Grans (Abs) CANCELED 0.00 - 0.04 K/uL    Lymph # CANCELED 1.0 - 4.8 K/uL    Mono # CANCELED 0.3 - 1.0 K/uL    Eos # CANCELED 0.0 - 0.5 K/uL    Baso # CANCELED 0.00 - 0.20 K/uL    nRBC 0 0 /100 WBC    Gran % 90.0 (H) 38.0 - 73.0 %    Lymph % 6.0 (L) 18.0 - 48.0 %    Mono % 4.0 4.0 - 15.0 %    Eosinophil % 0.0 0.0 - 8.0 %    Basophil % 0.0 0.0 - 1.9 %    Platelet Estimate Appears normal     Aniso Slight     Poik Slight     Hypo Occasional     [place] Cells Occasional     Differential Method Manual    Vancomycin, Random    Collection Time: 11/02/20  3:05 AM   Result Value Ref Range    Vancomycin, Random 25.0 Not established ug/mL   Comprehensive metabolic panel    Collection Time: 11/02/20  3:05 AM   Result Value Ref Range    Sodium 125 (L) 136 - 145 mmol/L    Potassium 4.7 3.5 - 5.1 mmol/L    Chloride 89 (L) 95 - 110 mmol/L    CO2 13 (L) 23 - 29 mmol/L    Glucose 120 (H) 70 - 110 mg/dL    BUN 72 (H) 6 - 20 mg/dL    Creatinine 4.8 (H) 0.5 - 1.4 mg/dL    Calcium 8.1 (L) 8.7 - 10.5 mg/dL    Total Protein  5.7 (L) 6.0 - 8.4 g/dL    Albumin 3.3 (L) 3.5 - 5.2 g/dL    Total Bilirubin 32.6 (H) 0.1 - 1.0 mg/dL    Alkaline Phosphatase 352 (H) 55 - 135 U/L     (H) 10 - 40 U/L    ALT 58 (H) 10 - 44 U/L    Anion Gap 23 (H) 8 - 16 mmol/L    eGFR if African American 11.7 (A) >60 mL/min/1.73 m^2    eGFR if non African American 10.2 (A) >60 mL/min/1.73 m^2   Tobramycin level, random    Collection Time: 11/02/20  3:05 AM   Result Value Ref Range    Tobramycin, Random 1.9 Not established ug/mL   ISTAT PROCEDURE    Collection Time: 11/02/20  3:20 AM   Result Value Ref Range    POC PH 7.229 (LL) 7.35 - 7.45    POC PCO2 39.7 35 - 45 mmHg    POC PO2 81 80 - 100 mmHg    POC HCO3 16.6 (L) 24 - 28 mmol/L    POC BE -11 -2 to 2 mmol/L    POC SATURATED O2 93 (L) 95 - 100 %    POC TCO2 18 (L) 23 - 27 mmol/L    Rate 24     Sample ARTERIAL     Site Leti/UAC     Allens Test N/A     DelSys Adult Vent     Mode AC/PRVC     Vt 400     PEEP 10     FiO2 60     Sp02 94    Lactic Acid, Plasma    Collection Time: 11/02/20 12:05 PM   Result Value Ref Range    Lactate (Lactic Acid) 3.7 (HH) 0.5 - 2.2 mmol/L   COVID-19 Rapid Screening    Collection Time: 11/02/20  1:01 PM   Result Value Ref Range    SARS-CoV-2 RNA, Amplification, Qual Negative Negative   Cryptococcal antigen    Collection Time: 11/02/20  1:19 PM    Specimen: Blood, Venous   Result Value Ref Range    Cryptococcal Ag, Blood Negative Negative   ISTAT PROCEDURE    Collection Time: 11/02/20  2:39 PM   Result Value Ref Range    POC PH 7.258 (L) 7.35 - 7.45    POC PCO2 40.6 35 - 45 mmHg    POC PO2 49 40 - 60 mmHg    POC HCO3 18.1 (L) 24 - 28 mmol/L    POC BE -9 -2 to 2 mmol/L    POC SATURATED O2 78 (L) 95 - 100 %    POC TCO2 19 (L) 24 - 29 mmol/L    Rate 24     Sample VENOUS     Site Leti/UAC     Allens Test Pass     DelSys Adult Vent     Mode AC/PRVC     Vt 400     PEEP 10     FiO2 100    Comprehensive metabolic panel    Collection Time: 11/02/20  3:12 PM   Result Value Ref Range     Sodium 122 (L) 136 - 145 mmol/L    Potassium 4.7 3.5 - 5.1 mmol/L    Chloride 88 (L) 95 - 110 mmol/L    CO2 15 (L) 23 - 29 mmol/L    Glucose 159 (H) 70 - 110 mg/dL    BUN 76 (H) 6 - 20 mg/dL    Creatinine 5.5 (H) 0.5 - 1.4 mg/dL    Calcium 7.9 (L) 8.7 - 10.5 mg/dL    Total Protein 5.6 (L) 6.0 - 8.4 g/dL    Albumin 3.4 (L) 3.5 - 5.2 g/dL    Total Bilirubin 33.8 (H) 0.1 - 1.0 mg/dL    Alkaline Phosphatase 296 (H) 55 - 135 U/L     (H) 10 - 40 U/L    ALT 69 (H) 10 - 44 U/L    Anion Gap 19 (H) 8 - 16 mmol/L    eGFR if African American 9.9 (A) >60 mL/min/1.73 m^2    eGFR if non  8.6 (A) >60 mL/min/1.73 m^2   Renal function panel    Collection Time: 11/02/20  3:12 PM   Result Value Ref Range    Glucose 158 (H) 70 - 110 mg/dL    Sodium 122 (L) 136 - 145 mmol/L    Potassium 4.7 3.5 - 5.1 mmol/L    Chloride 87 (L) 95 - 110 mmol/L    CO2 14 (L) 23 - 29 mmol/L    BUN 76 (H) 6 - 20 mg/dL    Calcium 7.7 (L) 8.7 - 10.5 mg/dL    Creatinine 5.4 (H) 0.5 - 1.4 mg/dL    Albumin 3.3 (L) 3.5 - 5.2 g/dL    Phosphorus 8.0 (H) 2.7 - 4.5 mg/dL    eGFR if  10.2 (A) >60 mL/min/1.73 m^2    eGFR if non  8.8 (A) >60 mL/min/1.73 m^2    Anion Gap 21 (H) 8 - 16 mmol/L   Magnesium    Collection Time: 11/02/20  3:12 PM   Result Value Ref Range    Magnesium 3.0 (H) 1.6 - 2.6 mg/dL   COVID-19 Routine Screening    Collection Time: 11/02/20  3:21 PM   Result Value Ref Range    SARS-CoV2 (COVID-19) Qualitative PCR Not Detected Not Detected   POCT glucose    Collection Time: 11/02/20  5:43 PM   Result Value Ref Range    POCT Glucose 148 (H) 70 - 110 mg/dL   Renal function panel    Collection Time: 11/02/20 10:19 PM   Result Value Ref Range    Glucose 119 (H) 70 - 110 mg/dL    Sodium 125 (L) 136 - 145 mmol/L    Potassium 4.7 3.5 - 5.1 mmol/L    Chloride 90 (L) 95 - 110 mmol/L    CO2 18 (L) 23 - 29 mmol/L    BUN 60 (H) 6 - 20 mg/dL    Calcium 8.0 (L) 8.7 - 10.5 mg/dL    Creatinine 4.6 (H) 0.5 - 1.4 mg/dL     Albumin 3.3 (L) 3.5 - 5.2 g/dL    Phosphorus 7.1 (H) 2.7 - 4.5 mg/dL    eGFR if  12.3 (A) >60 mL/min/1.73 m^2    eGFR if non African American 10.7 (A) >60 mL/min/1.73 m^2    Anion Gap 17 (H) 8 - 16 mmol/L   Magnesium    Collection Time: 11/02/20 10:19 PM   Result Value Ref Range    Magnesium 2.8 (H) 1.6 - 2.6 mg/dL   POCT glucose    Collection Time: 11/03/20 12:14 AM   Result Value Ref Range    POCT Glucose 107 70 - 110 mg/dL   Magnesium    Collection Time: 11/03/20  3:43 AM   Result Value Ref Range    Magnesium 2.4 1.6 - 2.6 mg/dL   Phosphorus    Collection Time: 11/03/20  3:43 AM   Result Value Ref Range    Phosphorus 5.1 (H) 2.7 - 4.5 mg/dL   Protime-INR    Collection Time: 11/03/20  3:43 AM   Result Value Ref Range    Prothrombin Time 29.3 (H) 9.0 - 12.5 sec    INR 2.8 (H) 0.8 - 1.2   Vancomycin, Random    Collection Time: 11/03/20  3:43 AM   Result Value Ref Range    Vancomycin, Random 16.3 Not established ug/mL   Comprehensive metabolic panel    Collection Time: 11/03/20  3:43 AM   Result Value Ref Range    Sodium 129 (L) 136 - 145 mmol/L    Potassium 4.5 3.5 - 5.1 mmol/L    Chloride 94 (L) 95 - 110 mmol/L    CO2 18 (L) 23 - 29 mmol/L    Glucose 100 70 - 110 mg/dL    BUN 33 (H) 6 - 20 mg/dL    Creatinine 2.9 (H) 0.5 - 1.4 mg/dL    Calcium 7.7 (L) 8.7 - 10.5 mg/dL    Total Protein 5.6 (L) 6.0 - 8.4 g/dL    Albumin 3.6 3.5 - 5.2 g/dL    Total Bilirubin 34.2 (H) 0.1 - 1.0 mg/dL    Alkaline Phosphatase 287 (H) 55 - 135 U/L     (H) 10 - 40 U/L    ALT 78 (H) 10 - 44 U/L    Anion Gap 17 (H) 8 - 16 mmol/L    eGFR if African American 21.6 (A) >60 mL/min/1.73 m^2    eGFR if non  18.7 (A) >60 mL/min/1.73 m^2   Lactic Acid, Plasma    Collection Time: 11/03/20  3:43 AM   Result Value Ref Range    Lactate (Lactic Acid) 2.1 0.5 - 2.2 mmol/L   Renal function panel    Collection Time: 11/03/20  3:43 AM   Result Value Ref Range    Glucose 100 70 - 110 mg/dL    Sodium 129 (L) 136 - 145  mmol/L    Potassium 4.5 3.5 - 5.1 mmol/L    Chloride 94 (L) 95 - 110 mmol/L    CO2 16 (L) 23 - 29 mmol/L    BUN 32 (H) 6 - 20 mg/dL    Calcium 7.7 (L) 8.7 - 10.5 mg/dL    Creatinine 2.9 (H) 0.5 - 1.4 mg/dL    Albumin 3.5 3.5 - 5.2 g/dL    Phosphorus 4.9 (H) 2.7 - 4.5 mg/dL    eGFR if African American 21.6 (A) >60 mL/min/1.73 m^2    eGFR if non  18.7 (A) >60 mL/min/1.73 m^2    Anion Gap 19 (H) 8 - 16 mmol/L   Magnesium    Collection Time: 11/03/20  3:43 AM   Result Value Ref Range    Magnesium 2.5 1.6 - 2.6 mg/dL   ISTAT PROCEDURE    Collection Time: 11/03/20  3:48 AM   Result Value Ref Range    POC PH 7.254 (LL) 7.35 - 7.45    POC PCO2 45.6 (H) 35 - 45 mmHg    POC PO2 111 (H) 80 - 100 mmHg    POC HCO3 20.2 (L) 24 - 28 mmol/L    POC BE -7 -2 to 2 mmol/L    POC SATURATED O2 97 95 - 100 %    POC TCO2 22 (L) 23 - 27 mmol/L    Rate 24     Sample ARTERIAL     Site Leti/UAC     Allens Test N/A     DelSys Adult Vent     Mode AC/PRVC     Vt 400     PEEP 10     FiO2 60     Sp02 96    POCT glucose    Collection Time: 11/03/20  5:27 AM   Result Value Ref Range    POCT Glucose 111 (H) 70 - 110 mg/dL   CBC auto differential    Collection Time: 11/03/20  5:28 AM   Result Value Ref Range    WBC 29.37 (H) 3.90 - 12.70 K/uL    RBC 2.76 (L) 4.00 - 5.40 M/uL    Hemoglobin 8.9 (L) 12.0 - 16.0 g/dL    Hematocrit 26.8 (L) 37.0 - 48.5 %    MCV 97 82 - 98 fL    MCH 32.2 (H) 27.0 - 31.0 pg    MCHC 33.2 32.0 - 36.0 g/dL    RDW 18.2 (H) 11.5 - 14.5 %    Platelets 80 (L) 150 - 350 K/uL    MPV 11.1 9.2 - 12.9 fL    Immature Granulocytes CANCELED 0.0 - 0.5 %    Immature Grans (Abs) CANCELED 0.00 - 0.04 K/uL    nRBC 0 0 /100 WBC    Gran % 86.0 (H) 38.0 - 73.0 %    Lymph % 5.0 (L) 18.0 - 48.0 %    Mono % 6.0 4.0 - 15.0 %    Eosinophil % 0.0 0.0 - 8.0 %    Basophil % 0.0 0.0 - 1.9 %    Bands 2.0 %    Myelocytes 1.0 %    Platelet Estimate Decreased (A)     Aniso Slight     Poik Slight     Poly Occasional     Hypo Occasional      Ovalocytes Occasional     Basophilic Stippling Occasional     Differential Method Manual    POCT glucose    Collection Time: 20  9:46 AM   Result Value Ref Range    POCT Glucose 157 (H) 70 - 110 mg/dL       Current Facility-Administered Medications:     0.9%  NaCl infusion (CRRT USE ONLY), , Intravenous, Continuous, Carlos Manuel Kilgore MD, Stopped at 20 0940    amiodarone tablet 200 mg, 200 mg, Per NG tube, Daily, Cedric Jimenez NP, 200 mg at 20 0903    azithromycin 500 mg in dextrose 5 % 250 mL IVPB (ready to mix system), 500 mg, Intravenous, Q24H, Codie Wong PA-C, Last Rate: 250 mL/hr at 20 1116, 500 mg at 20 1116    famotidine (PF) injection 20 mg, 20 mg, Intravenous, Daily, Codie Wong PA-C, 20 mg at 20 0853    fentaNYL 2500 mcg in 0.9% sodium chloride 250 mL infusion premix (titrating), 25 mcg/hr, Intravenous, Continuous, Codie Wong PA-C, Last Rate: 5 mL/hr at 20 1200, 50 mcg/hr at 20 1200    [] fentaNYL injection 50 mcg, 50 mcg, Intravenous, Q15 Min PRN **FOLLOWED BY** fentaNYL injection 50 mcg, 50 mcg, Intravenous, Q1H PRN, Codie Wong PA-C    fluconazole (DIFLUCAN) IVPB 200 mg 100 mL, 200 mg, Intravenous, Q24H, Codie Wong PA-C, 200 mg at 20 0924    hydrocortisone sodium succinate injection 100 mg, 100 mg, Intravenous, Q8H, Codie Wong PA-C, 100 mg at 20 0521    lactulose 20 gram/30 mL solution Soln 20 g, 20 g, Oral, TID, Codie Wong PA-C, 20 g at 20 0904    magnesium sulfate 2g in water 50mL IVPB (premix), 2 g, Intravenous, PRN, Carlos Manuel Kilgore MD    meropenem-0.9% sodium chloride 1 g/50 mL IVPB, 1 g, Intravenous, Q12H, Ebony May PA-C, Last Rate: 50 mL/hr at 20 1118, 1 g at 20 1118    midodrine tablet 15 mg, 15 mg, Per OG tube, TID WM, Cedric Jimenez, ALCIDES, 15 mg at 20 1125    norepinephrine 32 mg in sodium  chloride 0.9% 250 mL infusion, 0.02 mcg/kg/min, Intravenous, Continuous, Cedric Jimenez NP, Last Rate: 26.7 mL/hr at 11/03/20 1200, 0.8 mcg/kg/min at 11/03/20 1200    ondansetron injection 4 mg, 4 mg, Intravenous, Q6H PRN, Codie Wong PA-C, 4 mg at 10/31/20 1719    phenylephrine HCl in 0.9% NaCl 1 mg/10 mL (100 mcg/mL) syringe 500 mcg, 500 mcg, Intravenous, PRN, Codie Wong PA-C    pneumoc 13-david conj-dip cr(PF) (PREVNAR 13 (PF)) 0.5 mL, 0.5 mL, Intramuscular, vaccine x 1 dose, Thierry Kong MD    polyethylene glycol packet 17 g, 17 g, Per NG tube, Daily, Cedric Jimenez NP    promethazine (PHENERGAN) 6.25 mg in dextrose 5 % 50 mL IVPB, 6.25 mg, Intravenous, Q6H PRN, Alex Rene DO, Last Rate: 150 mL/hr at 10/31/20 2111, 6.25 mg at 10/31/20 2111    propofol (DIPRIVAN) 10 mg/mL infusion, 5 mcg/kg/min, Intravenous, Continuous, Codie Wong PA-C, Last Rate: 4.3 mL/hr at 11/03/20 1200, 10 mcg/kg/min at 11/03/20 1200    rifAXIMin tablet 550 mg, 550 mg, Per NG tube, BID, Cedric Jimenez NP, 550 mg at 11/03/20 0903    sennosides 8.8 mg/5 ml syrup 5 mL, 5 mL, Per NG tube, QHS, Cedric Jimenez NP    sodium chloride 0.9% flush 10 mL, 10 mL, Intravenous, PRN, Maninder Coates MD    sodium phosphate 20.01 mmol in dextrose 5 % 250 mL IVPB, 20.01 mmol, Intravenous, PRN, Carlos Manuel Kilgore MD    sodium phosphate 30 mmol in dextrose 5 % 250 mL IVPB, 30 mmol, Intravenous, PRN, Carlos Manuel Kilgore MD    sodium phosphate 39.99 mmol in dextrose 5 % 250 mL IVPB, 39.99 mmol, Intravenous, PRN, Carlos Manuel Kilgore MD    Pharmacy to dose Vancomycin consult, , , Once **AND** vancomycin - pharmacy to dose, , Intravenous, pharmacy to manage frequency, Codie Wong PA-C    vasopressin (PITRESSIN) 0.2 Units/mL in dextrose 5 % 100 mL infusion, 0.04 Units/min, Intravenous, Continuous, Codie Wong PA-C, Last Rate: 12 mL/hr at 11/03/20 1100, 0.04 Units/min at 11/03/20 1100      Assessment/Plan  Patient is a 46yo female with LINN.      1. LINN  - most likely ATN secondary to sepsis  - UA 2+ protein, 3+ blood and 2+ WBC. Urine MCS on 11/2 showed muddy brown casts and normal RBCs. Suggests ATN but does not rule out HRS  - WBC - 29, CXR concerning for pneumonia, started on broad spectrum antibiotics. Bronch sputum cx grew gram + cocci and gram - rods  - Started on SLED 11/2. Clotted again today, will hold until evening lab results. Will most likely restart.  - Metabolic acidosis - slight improvement. HCO - 18 and gap - 17  - Improvement in electrolytes. Na - 129 (from 125), Mg - 2.4 (2.8)  - Continue HRS management - Albumin, Midodrine, Octreotide  - Strict I/O and chart  - Avoid nephrotoxic medications, NSAIDs, IV contrast, etc.  - Medication doses adjusted to GFR  - Hb > 7 gm/dL       Philip Eddy  MS4

## 2020-11-03 NOTE — SUBJECTIVE & OBJECTIVE
Interval History/Significant Events: No acute overnight events. Remains on vasopressors and CRRT.    Review of Systems   Unable to perform ROS: Intubated     Objective:     Vital Signs (Most Recent):  Temp: 98.3 °F (36.8 °C) (11/03/20 0700)  Pulse: 91 (11/03/20 0800)  Resp: (!) 25 (11/03/20 0800)  BP: (!) 106/55 (11/03/20 0800)  SpO2: (!) 93 % (11/03/20 0800) Vital Signs (24h Range):  Temp:  [97.2 °F (36.2 °C)-98.9 °F (37.2 °C)] 98.3 °F (36.8 °C)  Pulse:  [81-98] 91  Resp:  [24-31] 25  SpO2:  [88 %-96 %] 93 %  BP: ()/(49-62) 106/55  Arterial Line BP: ()/(37-58) 134/53   Weight: 71.2 kg (156 lb 15.5 oz)  Body mass index is 25.34 kg/m².      Intake/Output Summary (Last 24 hours) at 11/3/2020 0901  Last data filed at 11/3/2020 0800  Gross per 24 hour   Intake 4302.58 ml   Output 3298 ml   Net 1004.58 ml       Physical Exam  Vitals signs reviewed.   Constitutional:       General: She is in acute distress.      Appearance: She is well-developed. She is toxic-appearing.      Interventions: Face mask in place.   HENT:      Head: Normocephalic and atraumatic.   Eyes:      General: Scleral icterus present.      Pupils: Pupils are equal, round, and reactive to light.   Neck:      Thyroid: No thyromegaly.      Trachea: No tracheal deviation.      Comments: [facility] TLC in place.   Cardiovascular:      Rate and Rhythm: Normal rate and regular rhythm.      Heart sounds: No murmur. No friction rub. No gallop.    Pulmonary:      Effort: Tachypnea, accessory muscle usage and respiratory distress present.      Breath sounds: Rales present. No decreased breath sounds, wheezing or rhonchi.   Abdominal:      General: Bowel sounds are normal.      Palpations: Abdomen is soft.      Tenderness: There is no abdominal tenderness.   Genitourinary:     Comments: Garcia in place  Musculoskeletal: Normal range of motion.   Skin:     General: Skin is warm and dry.   Neurological:      General: No focal deficit present.      Sensory: No  sensory deficit.         Vents:  Vent Mode: A/C (11/03/20 0755)  Set Rate: 24 BPM (11/03/20 0755)  Vt Set: 400 mL (11/03/20 0755)  PEEP/CPAP: 10 cmH20 (11/03/20 0755)  Oxygen Concentration (%): 50 (11/03/20 0800)  Peak Airway Pressure: 30 cmH2O (11/03/20 0755)  Plateau Pressure: 24 cmH20 (11/03/20 0755)  Total Ve: 9.14 mL (11/03/20 0755)  F/VT Ratio<105 (RSBI): (!) 57.42 (11/03/20 0755)  Lines/Drains/Airways     Central Venous Catheter Line            Percutaneous Central Line Insertion/Assessment - Triple Lumen  10/30/20 0855 left internal jugular 4 days    Trialysis (Dialysis) Catheter 11/02/20 1500 right internal jugular less than 1 day          Drain                 Urethral Catheter 10/30/20 1615 16 Fr. 3 days         Fecal Incontinence  11/01/20 1535 1 day         NG/OG Tube 11/01/20 1434 Center mouth 1 day          Airway                 Airway - Non-Surgical 11/01/20 1424 Endotracheal Tube 1 day          Arterial Line            Arterial Line 11/01/20 0626 Left Radial 2 days          Peripheral Intravenous Line                 Peripheral IV - Single Lumen 11/02/20 0515 22 G Anterior;Right Wrist 1 day              Significant Labs:    CBC/Anemia Profile:  Recent Labs   Lab 11/02/20  0305 11/03/20  0528   WBC 29.05* 29.37*   HGB 10.0* 8.9*   HCT 30.3* 26.8*    80*   MCV 98 97   RDW 18.5* 18.2*        Chemistries:  Recent Labs   Lab 11/02/20  0305 11/02/20  1512 11/02/20  2219 11/03/20  0343   * 122*  122* 125* 129*  129*   K 4.7 4.7  4.7 4.7 4.5  4.5   CL 89* 88*  87* 90* 94*  94*   CO2 13* 15*  14* 18* 16*  18*   BUN 72* 76*  76* 60* 32*  33*   CREATININE 4.8* 5.5*  5.4* 4.6* 2.9*  2.9*   CALCIUM 8.1* 7.9*  7.7* 8.0* 7.7*  7.7*   ALBUMIN 3.3* 3.4*  3.3* 3.3* 3.5  3.6   PROT 5.7* 5.6*  --  5.6*   BILITOT 32.6* 33.8*  --  34.2*   ALKPHOS 352* 296*  --  287*   ALT 58* 69*  --  78*   * 141*  --  176*   MG 3.1* 3.0* 2.8* 2.5  2.4   PHOS 8.9* 8.0* 7.1* 4.9*  5.1*        All pertinent labs within the past 24 hours have been reviewed.    Significant Imaging:  I have reviewed all pertinent imaging results/findings within the past 24 hours.

## 2020-11-03 NOTE — ASSESSMENT & PLAN NOTE
Secondary to A1AT deficiency and ETOH abuse complicated by HCC, HE, hepatic hydrothorax, ascites, hepatic varices and hyponatremia.    --abdominal US with doppler 10/31 with solid lesions in right and left lower lobe; portal HTN; splenomegaly  --S/p liver biopsy 10/30, pathology negative  --Hepatology consulted  --Continue midodrine 15 TID  --Lactulose/rifaximin  --to be presented to transplant committee on 11/4    MELD-Na score: 41 at 11/3/2020  3:43 AM  MELD score: 42 at 11/3/2020  3:43 AM  Calculated from:  Serum Creatinine: 2.9 mg/dL at 11/3/2020  3:43 AM  Serum Sodium: 129 mmol/L at 11/3/2020  3:43 AM  Total Bilirubin: 34.2 mg/dL at 11/3/2020  3:43 AM  INR(ratio): 2.8 at 11/3/2020  3:43 AM  Age: 45 years

## 2020-11-03 NOTE — ASSESSMENT & PLAN NOTE
Likely related to decompensated cirrhosis vs underlying infection (less likely)    --Continue vancomycin, meropenem, azithromycin, and fluconazole.  --ID following  --BCx NGTD; resp cx normal ishan  --Stress dose steroids initiated  --UA with 2+ leuks and 46 WBCs; urine cx no growth  --No pocket for paracentesis   --Thoracentesis fluid negative for SBP  --Continue norepinephrine and vasopressin to maintain MAP >65

## 2020-11-03 NOTE — PROGRESS NOTES
CRRT:    Treatment ran for 3Hours until system clotted off.  SLED restarted; both ports with good flow.  UF rate set to 250-300ml/hr

## 2020-11-03 NOTE — PROGRESS NOTES
Pt's CRRT machine alarming high TMP. -400, HD RN notified. Instructed to rinse back and HD RN will come and restart.

## 2020-11-03 NOTE — ASSESSMENT & PLAN NOTE
Possibly iATN vs HRS    --Urine lytes indicating pre-renal cause  --RP US negative for hydronephrosis  --Nephrology following; Map goal >65   --CRRT initiated on 11/2  --HRS treatment d/c'ed after CRRT initiation  --Garcia in place with strict I/Os  --CRRT initiated on 11/2

## 2020-11-03 NOTE — PLAN OF CARE
[facility] DAILY GOALS       A: Awake    RASS: Goal - RASS Goal: -2-->light sedation  Actual - RASS (Phan Agitation-Sedation Scale): -4-->deep sedation   Restraint necessity: Clinical Justification: Removing medical devices  B: Breath   SBT: Not attempted   C: Coordinate A & B, analgesics/sedatives   Pain: managed    SAT: Not attempted  D: Delirium   CAM-ICU: Overall CAM-ICU: Positive  E: Early(intubated/ Progressive (non-intubated) Mobility   MOVE Screen: Fail   Activity: Activity Management: patient unable to perform activities  FAS: Feeding/Nutrition   Diet order: Diet/Nutrition Received: NPO, tube feeding, Specialty Diet/Nutrition Received: renal diet Fluid restriction:    T: Thrombus   DVT prophylaxis: VTE Required Core Measure: (SCDs) Sequential compression device initiated/maintained  H: HOB Elevation   Head of Bed (HOB): HOB at 30-45 degrees  U: Ulcer Prophylaxis   GI: yes  G: Glucose control   managed Glycemic Management: blood glucose monitoring  S: Skin   Bundle compliance: yes   Bathing/Skin Care: back care, bath, chlorhexidine, bath, complete, dressed/undressed, incontinence care, foot care, linen changed Date:   11/2/2020  B: Bowel Function Flexiseal intact    I: Indwelling Catheters   Garcia necessity:      Urethral Catheter 10/30/20 1615 16 Fr.-Reason for Continuing Urinary Catheterization: Critically ill in ICU and requiring hourly monitoring of intake/output   CVC necessity: Yes   IPAD offered:   D: De-escalation Antibx   No  Plan for the day       No acute events throughout day, VS and assessment per flow sheet, patient progressing towards goals as tolerated, plan of care reviewed with Madhavi Bell and family, all concerns addressed, will continue to monitor.

## 2020-11-03 NOTE — SUBJECTIVE & OBJECTIVE
Interval History:   Remains intubated, sedated requiring pressors, CRRT. She has been on empiric antibiotics (Vanc, Sybil, Azithro, Fluconazole) for possible pneumonia. All cultures thus far have been negative (blood, urine, pleural fluid, bronchial fluid). She hasnt had any fever. T spot and fungal serologies are pending. COVID negative. Getting presented to liver committee tom. Vent settings stable.      Review of Systems   Unable to perform ROS: Intubated     Objective:     Vital Signs (Most Recent):  Temp: 98.9 °F (37.2 °C) (11/03/20 1500)  Pulse: 84 (11/03/20 1512)  Resp: (!) 24 (11/03/20 1512)  BP: (!) 110/58 (11/03/20 1500)  SpO2: (!) 93 % (11/03/20 1512) Vital Signs (24h Range):  Temp:  [97.2 °F (36.2 °C)-99.1 °F (37.3 °C)] 98.9 °F (37.2 °C)  Pulse:  [84-98] 84  Resp:  [24-26] 24  SpO2:  [89 %-96 %] 93 %  BP: (104-116)/(53-62) 110/58  Arterial Line BP: ()/(37-61) 140/59     Weight: 71.2 kg (156 lb 15.5 oz)  Body mass index is 25.34 kg/m².    Estimated Creatinine Clearance: 24.5 mL/min (A) (based on SCr of 2.9 mg/dL (H)).    Physical Exam  Vitals signs reviewed.   Constitutional:       Appearance: She is well-developed. She is ill-appearing.      Interventions: She is sedated and intubated.   HENT:      Head: Normocephalic and atraumatic.   Eyes:      General: Scleral icterus present.   Neck:      Thyroid: No thyromegaly.      Trachea: No tracheal deviation.      Comments: [facility] TLC in place.   Cardiovascular:      Rate and Rhythm: Normal rate and regular rhythm.   Pulmonary:      Effort: Tachypnea present. No accessory muscle usage. She is intubated.      Breath sounds: Decreased breath sounds and rales present. No wheezing.   Abdominal:      General: Bowel sounds are normal. There is no distension.      Palpations: Abdomen is soft.      Hernia: No hernia is present.   Genitourinary:     Comments: Garcia in place.   Musculoskeletal:      Right lower leg: No edema.      Left lower leg: No edema.   Skin:      General: Skin is warm and dry.      Coloration: Skin is jaundiced.      Findings: Bruising present.      Comments: PIV exchanged       Vent Mode: A/C  Oxygen Concentration (%):  [50-60] 50  Resp Rate Total:  [24 br/min-25 br/min] 24 br/min  Vt Set:  [400 mL] 400 mL  PEEP/CPAP:  [10 cmH20] 10 cmH20  Mean Airway Pressure:  [10 skS64-75 cmH20] 16 cmH20      Significant Labs: All pertinent labs within the past 24 hours have been reviewed.    Significant Imaging: I have reviewed all pertinent imaging results/findings within the past 24 hours.

## 2020-11-03 NOTE — ASSESSMENT & PLAN NOTE
2/2 decompensated liver cirrhosis vs new LINN    --frequent BMP  --will continue to monitor  --CRRT initiated on 11/2

## 2020-11-03 NOTE — ASSESSMENT & PLAN NOTE
Likely related to volume overloaded in setting of decompensated cirrhosis complicated by hepato-hydrothorax and worsening LINN with minimal UOP vs underlying PNA. CXR with significant pleural effusion of the right side. Most recent CXR with worsening opacifications on the left concerning for possible PNA.    --S/p thoracentesis with 1.5L removal on 10/30 and 10/31 with 2L removed  --Intubated 11/1  --S/p bronschoscopy 11/1; cultures pending  --Continue abx (vancomycin, meropenem, fluconazole, and azithromycin)  --follow up legionella, histo, blasto and aspergillus urine antigen  --Follow up fungitell  --Wean oxygen for goal saturation 88-92%

## 2020-11-04 PROBLEM — D69.6 THROMBOCYTOPENIA: Status: ACTIVE | Noted: 2020-01-01

## 2020-11-04 PROBLEM — E87.20 LACTIC ACIDOSIS: Status: RESOLVED | Noted: 2020-01-01 | Resolved: 2020-01-01

## 2020-11-04 NOTE — PROGRESS NOTES
Nephrology notified of previous serum bicarb and current bicarb level on ABG, orders to maintain current bicarb bath. WCTM.

## 2020-11-04 NOTE — PLAN OF CARE
Pt remains intubated and mechanically ventilated at this time. Pt medically sedated with propofol and fentanyl gtts. Levo and vaso gtts infusing per MAR to maintain appropriate MAP goal. CRRT stopped, but restarted during shift. L radial art line inadvertently removed, R radial art line placed by NP. Plan to present for liver transplant eval tomorrow. No acute events throughout day, VS and assessment per flow sheet, patient progressing towards goals as tolerated, plan of care reviewed with Madhavi Bell and family, all concerns addressed, will continue to monitor.    Problem: Adult Inpatient Plan of Care  Goal: Plan of Care Review  Outcome: Ongoing, Progressing  Flowsheets (Taken 11/3/2020 1910)  Plan of Care Reviewed With:   patient   family  Goal: Patient-Specific Goal (Individualization)  Outcome: Ongoing, Progressing  Flowsheets (Taken 11/3/2020 1910)  Individualized Care Needs: blanket from home used  Anxieties, Fears or Concerns: JERRICA, intubated sedated  Patient-Specific Goals (Include Timeframe): maintain MAP > 65, deescelate O2 needs, wean pressors, complete CRRT  Goal: Absence of Hospital-Acquired Illness or Injury  Outcome: Ongoing, Progressing  Goal: Optimal Comfort and Wellbeing  Outcome: Ongoing, Progressing     Problem: Fluid Imbalance (Pneumonia)  Goal: Fluid Balance  Outcome: Ongoing, Progressing     Problem: Respiratory Compromise (Pneumonia)  Goal: Effective Oxygenation and Ventilation  Outcome: Ongoing, Progressing     Problem: Electrolyte Imbalance (Acute Kidney Injury/Impairment)  Goal: Serum Electrolyte Balance  Outcome: Ongoing, Progressing    CM[facility] DAILY GOALS       A: Awake    RASS: Goal - RASS Goal: -2-->light sedation  Actual - RASS (Phan Agitation-Sedation Scale): -4-->deep sedation   Restraint necessity: Clinical Justification: Removing medical devices  B: Breath   SBT: Not attempted   C: Coordinate A & B, analgesics/sedatives   Pain: managed    SAT: Not attempted  D:  Delirium   CAM-ICU: Overall CAM-ICU: Positive  E: Early(intubated/ Progressive (non-intubated) Mobility   MOVE Screen: Fail   Activity: Activity Management: patient unable to perform activities  FAS: Feeding/Nutrition   Diet order: Diet/Nutrition Received: NPO, Specialty Diet/Nutrition Received: renal diet Fluid restriction:    T: Thrombus   DVT prophylaxis: VTE Required Core Measure: (SCDs) Sequential compression device initiated/maintained  H: HOB Elevation   Head of Bed (HOB): HOB elevated  U: Ulcer Prophylaxis   GI: yes  G: Glucose control   managed Glycemic Management: blood glucose monitoring  S: Skin   Bundle compliance: yes   Bathing/Skin Care: back care, bath, chlorhexidine, bath, complete, dressed/undressed, incontinence care, foot care, linen changed Date: 11/3/20 AM bath complete  B: Bowel Function   constipation   I: Indwelling Catheters   Garcia necessity:      Urethral Catheter 10/30/20 1615 16 Fr.-Reason for Continuing Urinary Catheterization: Critically ill in ICU and requiring hourly monitoring of intake/output   CVC necessity: Yes   IPAD offered: Not appropriate  D: De-escalation Antibx   Abx per MAR  Plan for the day   maintain MAP > 65, deescelate O2 needs, wean pressors, complete CRRT  Family/Goals of care/Code Status   Code Status: Full Code

## 2020-11-04 NOTE — PROGRESS NOTES
Absent bowel sounds, residual 650 discarded. OG to LIWS pulling air and small amount of  red-tinged/thin output. MD at bedside, orders to hold suction. Obtained ABG, MD notified, orders to increase rate to 26 bpm and decrease FiO2 to 60%. Pt anuric, ok from MD to pull moon.

## 2020-11-04 NOTE — PROCEDURES
"Madhavi Bell is a 45 y.o. female patient.    Temp: 98.9 °F (37.2 °C) (20 1500)  Pulse: 82 (20 1700)  Resp: (!) 24 (20 170)  BP: (!) 116/59 (20 170)  SpO2: 95 % (20)  Weight: 71.2 kg (156 lb 15.5 oz) (20 1000)  Height: 5' 6" (167.6 cm) (20 0348)       Arterial Line    Date/Time: 11/3/2020 6:39 PM  Location procedure was performed: [facility] CARDIAC MEDICAL ICU ([facility])  Performed by: Cedric Jimenez NP  Authorized by: Cedric Jimenez NP   Consent Done: Yes  Consent: Verbal consent obtained. Written consent obtained.  Consent given by: spouse  Patient identity confirmed: , MRN and name  Time out: Immediately prior to procedure a "time out" was called to verify the correct patient, procedure, equipment, support staff and site/side marked as required.  Preparation: Patient was prepped and draped in the usual sterile fashion.  Indications: multiple ABGs, respiratory failure and hemodynamic monitoring  Location: right radial  Patient sedated: yes  Complications: No  Specimens: No  Implants: No  Post-procedure: line sutured and dressing applied  Post-procedure CMS: normal  Patient tolerance: Patient tolerated the procedure well with no immediate complications          Cedric Jimenez  11/3/2020  "

## 2020-11-04 NOTE — ASSESSMENT & PLAN NOTE
Likely related to volume overloaded in setting of decompensated cirrhosis complicated by hepato-hydrothorax and worsening LINN with minimal UOP vs underlying PNA. CXR with significant pleural effusion of the right side. Most recent CXR with worsening opacifications on the left concerning for possible PNA.    --S/p thoracentesis with 1.5L removal on 10/30 and 10/31 with 2L removed  --Intubated 11/1  --S/p bronschoscopy 11/1; cultures pending  --Continue abx (vancomycin, meropenem, fluconazole, and azithromycin)  --follow up legionella, histo and blasto; aspergillus and legionella negative antigen  --Follow up fungitell  --Wean oxygen for goal saturation 88-92%

## 2020-11-04 NOTE — PLAN OF CARE
Pt is being evaluated for a liver transplant.  Joleen Mojica RN CM for transplants dept for insurance co ([facility]).  Contact # [phone]/f# [phone].    Lynn Brown LMSW  [facility]  X [phone]

## 2020-11-04 NOTE — PROGRESS NOTES
Pharmacokinetic Assessment Follow Up: IV Vancomycin    Vancomycin serum concentration assessment(s):    Vancomycin random level resulted at 18.5 mcg/mL approximately 19 hours after the previous level. Goal level is 15 to 20 mcg/mL.     Nephrology is consulted for LINN and plans to continue SLED     Drug levels (last 3 results):  Recent Labs   Lab Result Units 11/02/20  0305 11/03/20  0343 11/04/20  0331   Vancomycin, Random ug/mL 25.0 16.3 18.5     Vancomycin Regimen Plan:    Administer vancomycin  mg and redose when the random level is less than 20 mcg/mL or as needed for dialysis. Next level to be drawn with morning labs on 11/5.     Pharmacy will continue to follow and monitor vancomycin.    Please contact pharmacy at extension [phone] for questions regarding this assessment.    Thank you for the consult,   Mary Berg, PharmD, BCCCP               Patient brief summary:  Madhavi Bell is a 45 y.o. female initiated on antimicrobial therapy with IV vancomycin for treatment of sepsis    Drug Allergies:   Review of patient's allergies indicates:  No Known Allergies    Actual Body Weight:   71.2 kg     Renal Function:   Estimated Creatinine Clearance: 93.2 mL/min (based on SCr of 0.8 mg/dL).    Dialysis Method (if applicable):  SLED    CBC (last 72 hours):  Recent Labs   Lab Result Units 11/02/20  0305 11/03/20  0528 11/03/20  2321 11/04/20  0331 11/04/20  1205   WBC K/uL 29.05* 29.37* 28.85* 26.35* 34.44*   Hemoglobin g/dL 10.0* 8.9* 8.1* 7.7* 6.9*   Hematocrit % 30.3* 26.8* 23.5* 22.6* 20.6*   Platelets K/uL 234 80* 63* 48* 61*   Gran % % 90.0* 86.0* 84.5* 85.0* 83.0*   Lymph % % 6.0* 5.0* 4.5* 1.5* 2.0*   Mono % % 4.0 6.0 7.5 6.5 7.0   Eosinophil % % 0.0 0.0 0.0 0.0 0.0   Basophil % % 0.0 0.0 0.0 0.0 0.0   Differential Method  Manual Manual Manual Manual Manual       Metabolic Panel (last 72 hours):  Recent Labs   Lab Result Units 11/02/20  0305 11/02/20  1512 11/02/20  2219 11/03/20  0343  11/03/20  1506 11/03/20  2321 11/04/20  0331 11/04/20  1336 11/04/20  1441   Sodium mmol/L 125* 122*  122* 125* 129*  129* 123*  125* 129* 128* 128* 129*   Potassium mmol/L 4.7 4.7  4.7 4.7 4.5  4.5 4.3  4.3 4.4 4.2 4.3 4.5   Chloride mmol/L 89* 88*  87* 90* 94*  94* 93*  94* 95 94* 94* 96   CO2 mmol/L 13* 15*  14* 18* 16*  18* 16*  18* 21* 21* 21* 19*   Glucose mg/dL 120* 159*  158* 119* 100  100 148*  149* 105 92 96 86   BUN mg/dL 72* 76*  76* 60* 32*  33* 33*  31* 22* 19 10 9   Creatinine mg/dL 4.8* 5.5*  5.4* 4.6* 2.9*  2.9* 2.8*  3.0* 1.8* 1.7* 0.9 0.8   Albumin g/dL 3.3* 3.4*  3.3* 3.3* 3.5  3.6 3.0*  3.1* 3.0* 2.8* 2.6* 2.7*   Total Bilirubin mg/dL 32.6* 33.8*  --  34.2* 32.1*  --  29.2*  --   --    Alkaline Phosphatase U/L 352* 296*  --  287* 265*  --  277*  --  260*   AST U/L 111* 141*  --  176* 186*  --  195*  --  309*   ALT U/L 58* 69*  --  78* 78*  --  82*  --  109*   Magnesium mg/dL 3.1* 3.0* 2.8* 2.5  2.4 2.3 2.3 1.9 2.2  --    Phosphorus mg/dL 8.9* 8.0* 7.1* 4.9*  5.1* 4.6* 3.2 3.0 3.3  --        Vancomycin Administrations:  vancomycin given in the last 96 hours                   vancomycin 750 mg in dextrose 5 % 250 mL IVPB (ready to mix system) (mg) 750 mg New Bag 11/03/20 0847    vancomycin 750 mg in dextrose 5 % 250 mL IVPB (ready to mix system) (mg) 750 mg New Bag 11/01/20 0847    vancomycin 1.25 g in dextrose 5% 250 mL IVPB (ready to mix) (mg) 1,250 mg New Bag 10/30/20 1838                Microbiologic Results:  Microbiology Results (last 7 days)     Procedure Component Value Units Date/Time    Urine Culture High Risk [[id number]]     Order Status: No result Specimen: Urine     Culture, Respiratory with Gram Stain [[id number]]     Order Status: No result Specimen: Respiratory     Blood culture [[id number]]     Order Status: No result Specimen: Blood     Blood culture [[id number]]     Order Status: No result Specimen: Blood     Culture, Respiratory [[id number]] Collected:  11/01/20 1521    Order Status: Completed Specimen: Respiratory from Bronchial Wash, LLL Updated: 11/04/20 1039     Respiratory Culture No growth     Gram Stain (Respiratory) <10 epithelial cells per low power field.     Gram Stain (Respiratory) Rare WBC's     Gram Stain (Respiratory) Rare Gram negative rods    Culture, Anaerobic [[id number]] Collected: 10/30/20 1716    Order Status: Completed Specimen: Body Fluid from Pleural Fluid Updated: 11/04/20 0939     Anaerobic Culture No anaerobes isolated    Aerobic culture [[id number]] Collected: 10/31/20 2038    Order Status: Completed Specimen: Pleural Fluid Updated: 11/04/20 0928     Aerobic Bacterial Culture No growth    Culture, Body Fluid (Aerobic) w/ GS [[id number]] Collected: 10/30/20 1716    Order Status: Completed Specimen: Body Fluid from Pleural Fluid Updated: 11/04/20 0927     AEROBIC CULTURE - FLUID No growth     Gram Stain Result Few WBC's      No organisms seen    Fungus culture [[id number]] Collected: 11/01/20 1521    Order Status: Completed Specimen: Body Fluid from Lung, LLL Updated: 11/03/20 1409     Fungus (Mycology) Culture Culture in progress    Narrative:      Bronchial Wash    Cryptococcal antigen [[id number]] Collected: 11/02/20 1319    Order Status: Completed Specimen: Blood, Venous Updated: 11/03/20 1141     Cryptococcal Ag, Blood Negative    Culture, Respiratory with Gram Stain [[id number]] Collected: 11/01/20 0153    Order Status: Completed Specimen: Respiratory from Sputum, Expectorated Updated: 11/03/20 1037     Respiratory Culture Normal respiratory ishan      No S aureus or Pseudomonas isolated.     Gram Stain (Respiratory) >10 epithelial cells per low power field     Gram Stain (Respiratory) Many WBC's     Gram Stain (Respiratory) Rare Gram positive cocci    AFB Culture & Smear [[id number]] Collected: 11/01/20 1521    Order Status: Completed Specimen: Body Fluid from Lung, LLL Updated: 11/02/20 2127     AFB Culture & Smear Culture in progress      AFB CULTURE STAIN No acid fast bacilli seen.    AFB Culture & Smear [[id number]] Collected: 10/31/20 2038    Order Status: Completed Specimen: Pleural Fluid Updated: 11/02/20 1558     AFB Culture & Smear Culture in progress     AFB CULTURE STAIN No acid fast bacilli seen.    Cryptococcal antigen, blood [[id number]]     Order Status: Completed Specimen: Blood     KOH prep [[id number]] Collected: 11/01/20 1521    Order Status: Completed Specimen: Body Fluid from Lung, LLL Updated: 11/02/20 0043     KOH Prep No yeast or fungal elements seen    Narrative:      Bronchial Wash    Gram stain [[id number]] Collected: 11/01/20 1521    Order Status: Canceled Specimen: Body Fluid from Lung, LLL     Urine culture [[id number]] Collected: 10/30/20 1334    Order Status: Completed Specimen: Urine Updated: 11/01/20 0744     Urine Culture, Routine No growth    Narrative:      Specimen Source->Urine    Gram stain [[id number]] Collected: 10/31/20 2038    Order Status: Completed Specimen: Pleural Fluid Updated: 11/01/20 0026     Gram Stain Result Rare WBC's      No organisms seen    Urine Culture High Risk [[id number]] Collected: 10/30/20 1802    Order Status: Completed Specimen: Urine, Catheterized Updated: 10/31/20 2242     Urine Culture, Routine No significant growth    Narrative:      Indicated criteria for high risk culture:->Other  Other (specify):->liver patient

## 2020-11-04 NOTE — NURSING
ALCIDES Jimenez at bedside placing radial arterial line. L radial attempted, but unsuccessful. R radial arterial line placed successfully with no acute events during procedure. Will continue to monitor.

## 2020-11-04 NOTE — ASSESSMENT & PLAN NOTE
Possibly iATN vs HRS    --Urine lytes indicating pre-renal cause  --RP US negative for hydronephrosis  --Nephrology following; Map goal >65   --CRRT initiated on 11/2  --HRS treatment d/c'ed after CRRT initiation  --Garcia in place with strict I/Os

## 2020-11-04 NOTE — CARE UPDATE
I personally spoke to the patient's son, Juan, at bedside. All updates given and all questions answered.

## 2020-11-04 NOTE — PROGRESS NOTES
SLED treatment restarted to right IJ trialysis. Flows were good, lines connected, treatment initiated. See flow sheet for details.

## 2020-11-04 NOTE — SUBJECTIVE & OBJECTIVE
Interval History:   No AEON.  Afebrile.  WBC 26s. Remains intubated, sedated requiring pressors, CRRT. She has been on empiric antibiotics (Vanc, Sybil, Fluconazole) for possible pneumonia. All cultures thus far have been negative (blood, urine, pleural fluid, bronchial fluid). She hasnt had any fever. T spot and fungal serologies are pending. COVID negative.  Vent settings stable.      Review of Systems   Unable to perform ROS: Intubated     Objective:     Vital Signs (Most Recent):  Temp: 97.4 °F (36.3 °C) (11/04/20 0715)  Pulse: 89 (11/04/20 0900)  Resp: (!) 26 (11/04/20 0900)  BP: (!) 108/56 (11/04/20 0800)  SpO2: (!) 94 % (11/04/20 0900) Vital Signs (24h Range):  Temp:  [97.4 °F (36.3 °C)-99.1 °F (37.3 °C)] 97.4 °F (36.3 °C)  Pulse:  [74-91] 89  Resp:  [24-33] 26  SpO2:  [92 %-100 %] 94 %  BP: ()/(44-59) 108/56  Arterial Line BP: ()/(28-61) 111/42     Weight: 79 kg (174 lb 2.6 oz)  Body mass index is 28.11 kg/m².    Estimated Creatinine Clearance: 43.9 mL/min (A) (based on SCr of 1.7 mg/dL (H)).    Physical Exam  Vitals signs reviewed.   Constitutional:       Appearance: She is well-developed. She is ill-appearing.      Interventions: She is sedated and intubated.   HENT:      Head: Normocephalic and atraumatic.   Eyes:      General: Scleral icterus present.   Neck:      Thyroid: No thyromegaly.      Trachea: No tracheal deviation.      Comments: [facility] TLC in place.   Cardiovascular:      Rate and Rhythm: Normal rate and regular rhythm.   Pulmonary:      Effort: Tachypnea present. No accessory muscle usage. She is intubated.      Breath sounds: Decreased breath sounds present. No wheezing or rales.   Abdominal:      General: Bowel sounds are normal. There is no distension.      Palpations: Abdomen is soft.      Hernia: No hernia is present.   Genitourinary:     Comments: Garcia in place.   Musculoskeletal:      Right lower leg: No edema.      Left lower leg: No edema.   Skin:     General: Skin is warm  and dry.      Coloration: Skin is jaundiced.      Findings: Bruising present.      Comments: PIV exchanged       Vent Mode: A/C  Oxygen Concentration (%):  [50-80] 50  Resp Rate Total:  [24 br/min-26 br/min] 26 br/min  Vt Set:  [400 mL] 400 mL  PEEP/CPAP:  [10 cmH20] 10 cmH20  Mean Airway Pressure:  [9.9 zqG16-06 cmH20] 16 cmH20      Significant Labs:   Blood Culture:   Recent Labs   Lab 10/04/20  1625 10/04/20  1707 10/30/20  1005 10/30/20  1011   LABBLOO No growth after 5 days. No growth after 5 days. No Growth to date  No Growth to date  No Growth to date  No Growth to date  No Growth to date No Growth to date  No Growth to date  No Growth to date  No Growth to date  No Growth to date     CBC:   Recent Labs   Lab 11/03/20  2321 11/04/20  0331 11/04/20  1205   WBC 28.85* 26.35* 34.44*   HGB 8.1* 7.7* 6.9*   HCT 23.5* 22.6* 20.6*   PLT 63* 48* 61*     CMP:   Recent Labs   Lab 11/03/20  0343 11/03/20  1506 11/03/20  2321 11/04/20  0331 11/04/20  1336   *  129* 123*  125* 129* 128* 128*   K 4.5  4.5 4.3  4.3 4.4 4.2 4.3   CL 94*  94* 93*  94* 95 94* 94*   CO2 16*  18* 16*  18* 21* 21* 21*     100 148*  149* 105 92 96   BUN 32*  33* 33*  31* 22* 19 10   CREATININE 2.9*  2.9* 2.8*  3.0* 1.8* 1.7* 0.9   CALCIUM 7.7*  7.7* 7.2*  7.7* 7.6* 7.4* 7.7*   PROT 5.6* 4.9*  --  4.6*  --    ALBUMIN 3.5  3.6 3.0*  3.1* 3.0* 2.8* 2.6*   BILITOT 34.2* 32.1*  --  29.2*  --    ALKPHOS 287* 265*  --  277*  --    * 186*  --  195*  --    ALT 78* 78*  --  82*  --    ANIONGAP 19*  17* 14  13 13 13 13   EGFRNONAA 18.7*  18.7* 19.5*  17.9* 33.3* 35.7* >60.0     All pertinent labs within the past 24 hours have been reviewed.    Significant Imaging: I have reviewed all pertinent imaging results/findings within the past 24 hours.   X-Ray Chest AP Portable [[id number]] Resulted: 11/04/20 0744   Order Status: Completed Updated: 11/04/20 0746   Narrative:     EXAMINATION:   XR CHEST AP PORTABLE      CLINICAL HISTORY:   Intubated;     COMPARISON:   Comparison is made to 11/02/2020 at 15:12.     FINDINGS:   Endotracheal tube tip lies 2.3 cm above the paolo.  Vascular catheters entering from both right and left jugular approaches have tips near the junction of the superior vena cava and right atrium.  Enteric tube has its distal aspect well distal to the GE junction, the tube tip projected inferior to the imaged volume.  Heart size remains normal.  Pulmonary parenchymal opacity representing widespread bilateral airspace consolidation is again seen, with no appreciable interval clearing.  No pneumothorax.    Impression:       Continued demonstration of extensive widespread bilateral airspace consolidation.  Allowing for a poorer inspiratory depth level on the current examination, there has been no significant interval change in the appearance of the chest since 11/02/2020 at 15:12.       Electronically signed by: Eugene Miranda MD   Date: 11/04/2020   Time: 07:44   X-Ray Chest 1 View [[id number]] Resulted: 11/02/20 1548   Order Status: Completed Updated: 11/02/20 1551   Narrative:     EXAMINATION:   XR CHEST 1 VIEW     CLINICAL HISTORY:   RIJ Trialysis placement;     TECHNIQUE:   Single frontal view of the chest was performed.     COMPARISON:   11/02/2020     FINDINGS:   Endotracheal tube with tip above the paolo.  Left IJ central venous catheter with tip at the cavoatrial junction.  Cardiomediastinal silhouette is stable.  NG tube crosses the GE junction.  Tip is not visualized.  Interval placement of right IJ Trialysis catheter with tip projected over the SVC.  No evidence of pneumothorax.  Continued widespread airspace consolidation in both lungs not significantly changed from prior.  Bones show no acute abnormalities.    Impression:       Interval placement of right IJ central venous catheter in satisfactory position projected over the SVC.  No evidence of pneumothorax.  No other significant changes from prior  "radiograph.       Electronically signed by: Radames Newberry MD   Date: 11/02/2020   Time: 15:48   X-Ray Chest 1 View [[id number]] Resulted: 11/02/20 1232   Order Status: Completed Updated: 11/02/20 1234   Narrative:     EXAMINATION:   XR CHEST 1 VIEW     CLINICAL HISTORY:   pleural eff;     COMPARISON:   Comparison is made to 11/01/2020 at 14:36.     FINDINGS:   Endotracheal tube tip lies just above the apex of the aortic arch, well above the paolo.  Distal aspect of the enteric tube lies distal to the GE junction, the tube tip projected inferior to the imaged volume.  Left jugular origin vascular catheter is in the superior vena cava.  Heart size is normal, and the cardiomediastinal silhouette demonstrates no detrimental change since the prior exam.  Pulmonary parenchymal opacity consistent with widespread confluent airspace consolidation in both lungs is again noted.  The right hemidiaphragmatic margin is better delineated on the current examination than on the study referenced above, implying minimal clearing in the right lower lobe, but overall the appearance of the lung zones is quite similar to the prior exam.  No pneumothorax.    Impression:       Continued abnormal chest radiograph demonstrating extensive widespread bilateral airspace consolidation, with little interval change since 11/01/2020 at 14:36.       Electronically signed by: Eugene Miranda MD   Date: 11/02/2020   Time: 12:32   X-Ray Chest 1 View [[id number]] Resulted: 11/01/20 1458   Order Status: Completed Updated: 11/01/20 1500   Narrative:     EXAMINATION:   XR CHEST 1 VIEW     CLINICAL HISTORY:   Provided history is "ETT placement;  ".     TECHNIQUE:   One view of the chest.     COMPARISON:   11/01/2020 at 05:01.     FINDINGS:   Cardiac wires and other support devices overlie the chest.  Endotracheal tube terminates approximately 5 cm above the paolo.  Nasogastric tube extends below the field of view.  Left-sided central venous catheter overlies the " SVC.  Cardiac silhouette is stable.  There are severe diffuse bilateral multifocal airspace opacities and/or consolidation, mildly worse when compared with the most recent prior study.  Continued silhouetting of the right costophrenic angle and hemidiaphragm, potentially related to pleural fluid or consolidation.  No sizable left pleural effusion.    Impression:       Endotracheal tube terminates approximately 5 cm above the paolo.     Persistent severe multifocal airspace disease and consolidation, slightly worse when compared with the prior study.       Electronically signed by: Shabbir Greene MD   Date: 11/01/2020   Time: 14:58   X-Ray Chest 1 View [[id number]] (Abnormal) Resulted: 11/01/20 0623   Order Status: Completed Updated: 11/01/20 0625   Narrative:     EXAMINATION:   XR CHEST 1 VIEW     CLINICAL HISTORY:   SOB;     TECHNIQUE:   Single frontal view of the chest was performed.     COMPARISON:   October 31, 2020     FINDINGS:   Single chest view is submitted.  Left-sided central venous catheter again noted, stable configuration.  Bilateral pulmonary opacities are again noted with significant interval detrimental change, there is prominent appearance of confluent infiltrates/airspace disease and consolidation with air bronchogram involving the mid to lower right hemithorax, with obscuration of the right hemidiaphragm and right heart border, the areas of multifocal rounded opacification on the left appears somewhat more confluent, with prominent confluent abnormal opacity seen throughout the left hemithorax, and central air bronchogram formation.  There is some obscuration of the left heart border however without significant obscuration of the left hemidiaphragm.  There is likely a component of pleural fluid on the right however significant pleural fluid on the left is not appreciated.  There is no evidence for pneumothorax.  The osseous structures appear intact.    Impression:       Progression of abnormal  pattern of pulmonary opacities, with prominent confluent infiltrate and consolidative change noted bilaterally as discussed above.     This report was flagged in Epic as abnormal.       Electronically signed by: Rambo Finnegan   Date: 11/01/2020   Time: 06:23   X-Ray Chest 1 View [[id number]] Resulted: 10/31/20 2040   Order Status: Completed Updated: 10/31/20 2042   Narrative:     EXAMINATION:   XR CHEST 1 VIEW     CLINICAL HISTORY:   s/p thoracentesis;     TECHNIQUE:   Single frontal view of the chest was performed.     COMPARISON:   October 31, 2020     FINDINGS:   Single frontal view of the chest indicates that there has been readjustment of the left jugular central line such that terminates in the superior vena cava.  It is noted that the right lung has significantly improved aeration as compared to the previous examination.  The left lung of features multiple rounded opacifications in the lung parenchyma which either represents focal areas of fluffy infiltrates.    Impression:       Status post successful thoracentesis.     Readjustment of central line.       Electronically signed by: Angel Chappell   Date: 10/31/2020   Time: 20:40   X-Ray Chest AP Portable [[id number]] Resulted: 10/31/20 0126   Order Status: Completed Updated: 10/31/20 0129   Narrative:     EXAMINATION:   XR CHEST AP PORTABLE     CLINICAL HISTORY:   hepatic hydrothorax w worsening o2 req;     TECHNIQUE:   Single frontal view of the chest was performed.     COMPARISON:   October 30, 2029 20:00     FINDINGS:   Single view of the chest reveals persistent opacification involving the entire right lung field.  There is a right internal jugular line that has been placed which terminates in the medial most aspect of the right subclavian vein.  The cardiac silhouette is obscured by the opacification of the right lung field.  The left lung field appears to be stable in presentation.    Impression:       No significant changes compared to the previous  examination.       Electronically signed by: Angel Hintonron   Date: 10/31/2020   Time: 01:26   IR Biopsy Liver [[id number]] Resulted: 10/30/20 1750   Order Status: Completed Updated: 10/30/20 1753   Narrative:     EXAMINATION:   Image-guided biopsy     Procedural Personnel     Attending physician(s): Javier Pace MD     Fellow physician(s): None     Resident physician(s): None     Advanced practice provider(s): None     Pre-procedure diagnosis:     Post-procedure diagnosis: Same     Indication: Histopathologic diagnosis     Previous biopsy of same target (QCDR): No     Additional clinical history: None     Complications: No immediate complications.     PROCEDURAL SUMMARY:   Percutaneous Ultrasound and CT -guided coaxial core needle biopsy.     PROCEDURE:   Pre-procedure:     Reference imaging for biopsy target: None     Consent: Informed consent for the procedure was obtained and time-out was performed prior to the procedure.     Preparation: The site was prepared and draped using maximal sterile barrier technique including cutaneous antisepsis.     Anesthesia/sedation:     Level of anesthesia/sedation: Moderate sedation (conscious sedation)     Anesthesia/sedation administered by: Independent trained observer under attending supervision with continueous monitoring of the patient's level of conciousness level of status.     Total intra-service sedation time (minutes): 43     Biopsy     Local anesthesia was administered. Under imaging guidance as stated in the procedure summary, the biopsy needle was advanced to the target and biopsy was performed.     Coaxial needle: 17 gauge     Core needle biopsy device: [employer]     Core needle size: 18 gauge     Number of core specimens: 10     On-site biopsy touch preparation: Yes     Additional sampling recommendations: None     Preliminary assessment of sample adequacy: Adequate     Needle removal     The biopsy needle was removed and a sterile dressing was applied.      Tract embolization: None     Contrast     Contrast agent: None     Contrast volume (mL): 0     Radiation Dose     CT dose length product ( mGy-cm ):     Fluoroscopy time ( minutes ):     Reference air kerma ( mGy ):     Kerma area product ( cGy-m2 ):     Additional Details     Additional description of procedure: None     Equipment details: None     Specimens removed: Biopsy samples as detailed above     Estimated blood loss (mL): Less than 10     Standardized report: SIR_Biopsy_v2    Impression:       Image-guided biopsy of left hepatic lobe liver lesion.     Plan:     Specimen(s) sent for evaluation.     Attestation:     Signer name: Javier Pace MD     I attest that I was present for the entire procedure. I reviewed the stored images and agree with the report as written.       Electronically signed by: Javier Paec MD   Date: 10/30/2020   Time: 17:50   IR Thoracentesis with Imaging [[id number]] Resulted: 10/30/20 1749   Order Status: Completed Updated: 10/30/20 1751   Narrative:     EXAMINATION:   Ultrasound-guided thoracentesis     Procedural Personnel     Attending physician(s): Javier Pace MD     Fellow physician(s): None     Resident physician(s): None     Advanced practice provider(s): None     Pre-procedure diagnosis: Shortness of breath     Post-procedure diagnosis: Same     Indication: Shortness of breath     Complications: No immediate complications.     TECHNIQUE:   - Limited thoracic ultrasound     - Ultrasound-guided thoracentesis     FINDINGS:   Pre-procedure     Consent: Informed consent for the procedure was obtained and time-out was performed prior to the procedure.     Preparation: The site was prepared and draped using maximal sterile barrier technique including cutaneous antisepsis.     Anesthesia/sedation     Level of anesthesia/sedation: No sedation     Anesthesia/sedation administered by: Not applicable     Total intra-service sedation time (minutes): 0     Limited thoracic  ultrasound     Limited thoracic ultrasound was performed using a curved transducer. A safe window for thoracentesis was identified.     Left hemithorax findings: Not investigated     Right hemithorax findings: Large pleural effusion     Thoracentesis     Local anesthesia was administered. The pleural space was accessed and fluid return confirmed position. The fluid was drained.     Real-time ultrasound guidance used: Yes     Catheter placed: 5 Welsh one-step     Closure     The catheter was removed. A sterile bandage was applied.     Post-drainage hemithorax findings: Not performed     Additional Details     Additional description of procedure: None     Equipment details: None     Specimens removed: Pleural fluid     Estimated blood loss (mL): Less than 10     Standardized report: SIR_Thoracentesis_v2     Attestation     Signer name: Javier Pace MD     I attest that I was present for the entire procedure. I reviewed the stored images and agree with the report as written.    Impression:       Successful ultrasound-guided right thoracentesis with drainage of 1.7 liters of serous fluid.     Plan:     Resume care by clinical team.     _______________________________________________________________       Electronically signed by: Javier Pace MD   Date: 10/30/2020   Time: 17:49   US Liver with Doppler [[id number]] Resulted: 10/30/20 1646   Order Status: Completed Updated: 10/30/20 1648   Narrative:     EXAMINATION:   US LIVER WITH DOPPLER     CLINICAL HISTORY:   liver failure;     TECHNIQUE:   Duplex scan of the liver was performed using B-mode/gray scale imaging and Doppler spectral analysis and color flow.     COMPARISON:   Ultrasound abdomen 10/08/2020, 10/05/2020, MRI 08/18/2020     FINDINGS:   The visualized portions of pancreas appear normal.     The liver measures 13.1 cm and demonstrates a nodular contour compatible with cirrhosis.  Left lobe solid hyperechoic lesion measuring 1.3 x 1.1 x 1.4 cm without  detectable internal vascularity.  Right hepatic lobe solid echogenic lesion measuring 3.1 x 3.5 x 2.7 cm with questionable peripheral vascularity.  The hepatorenal index is 0.98.     The common duct measures 3 mm. No intra or extrahepatic bile duct dilatation.     The gallbladder is surgically absent.     The spleen is enlarged measuring 19.7 x 4.5 cm and demonstrates homogeneous echotexture.     There is ascites and right pleural effusion.     The main, right, and left branches of the portal vein demonstrate hepatopedal flow and are unremarkable. The superior mesenteric vein and splenic vein are patent with appropriate flow.  The IVC, middle hepatic vein, right hepatic veins, and left hepatic vein are patent with proper directional flow.  No recanalized umbilical vein.  The hepatic arterial system is unremarkable.    Impression:       Hepatic cirrhosis with indeterminate solid lesions in the right and left hepatic lobes, similar size compared to prior ultrasound.  Recommend correlation with biopsy results.     Stigmata of portal hypertension including ascites and splenomegaly.  There is also right pleural fusion.     Normal Doppler evaluation of the hepatic vasculature.     Electronically signed by resident: Carmela Regalado   Date: 10/30/2020   Time: 16:28     Electronically signed by: Terrell Emmanuel MD   Date: 10/30/2020   Time: 16:46   US Retroperitoneal Complete (Kidney and [[id number]] Resulted: 10/30/20 1645   Order Status: Completed Updated: 10/30/20 1648   Narrative:     EXAMINATION:   US RETROPERITONEAL COMPLETE     CLINICAL HISTORY:   anthony;     TECHNIQUE:   Ultrasound of the kidneys and urinary bladder was performed including color flow and Doppler evaluation of the kidneys.     COMPARISON:   Ultrasound abdomen 10/08/2020, MRI 08/18/2020     FINDINGS:   Right kidney: The right kidney measures 9.8 cm. No cortical thinning. No loss of corticomedullary distinction. Resistive index measures 0.91.  No mass. No  renal stone. No hydronephrosis.     Left kidney: The left kidney measures 11.5 cm. No cortical thinning. No loss of corticomedullary distinction. Resistive index measures 1.0.  No mass. No renal stone. No hydronephrosis.     Splenic resistive index of 0.77     The bladder is partially distended at the time of scanning and has an unremarkable appearance.    Impression:       Bilateral medical renal disease.  No hydronephrosis.     Electronically signed by resident: Carmela Regalado   Date: 10/30/2020   Time: 16:39     Electronically signed by: Terrell Emmanuel MD   Date: 10/30/2020   Time: 16:45   Imaging History    2020  Date Procedure Name Status Accession Number Location   11/04/20 05:49 AM X-Ray Chest AP Portable Final [id number] [facility]   11/02/20 03:36 PM X-Ray Chest 1 View Final [id number] [facility]   11/02/20 12:22 PM X-Ray Chest 1 View Final [id number] [facility]   11/01/20 02:48 PM X-Ray Chest 1 View Final [id number] [facility]   11/01/20 05:04 AM X-Ray Chest 1 View Final [id number] [facility]   10/31/20 07:20 PM X-Ray Chest 1 View Final [id number] [facility]   10/31/20 01:18 AM X-Ray Chest AP Portable Final [id number] [facility]   10/30/20 05:40 PM IR Biopsy Liver Final [id number] [facility]   10/30/20 05:30 PM IR Thoracentesis with Imaging Final [id number] [facility]   10/30/20 04:00 PM US Retroperitoneal [facility] ([id number] [facility]   10/30/20 03:30 PM US Liver with Doppler [facility] [id number] [facility]   10/30/20 09:27 AM X-Ray Chest AP Portable Final [id number] [facility]   10/29/20 05:45 PM X-Ray Chest AP Portable Final [id number] [facility]   10/28/20 03:17 PM X-Ray Chest PA And Lateral Final [id number] [facility]   10/16/20 12:00 PM X-Ray Chest AP Portable Final [id number] [facility]   10/16/20 11:25 AM US Chest Mediastinum Final [id number] [facility]   10/15/20 03:48 PM X-Ray Chest AP Portable Final [id number] [facility]   10/15/20 03:28 PM US Guided Thoracentesis Final [id number] [facility]   10/14/20 07:38 PM X-Ray Chest AP Portable Final [id number] [facility]   10/09/20 01:43 PM DXA Bone Density Spine And  Hip Final [id number] [facility]   10/08/20 03:28 PM US Abdomen Complete with Dopp_Pre Liver Final [id number] [facility]   10/08/20 10:54 AM X-Ray Chest PA And Lateral Final [id number] [facility]   10/05/20 12:55 PM X-Ray Chest 1 View Final [id number] [facility]   10/05/20 11:19 AM US Chest Mediastinum Final [id number] [facility]   10/05/20 10:10 AM US Abdomen Limited Final [id number] [facility]   10/04/20 03:22 PM CT Chest Without Contrast Final [id number] [facility]   10/14/20 12:00 AM CARDIAC MONITORING STRIPS Final     10/04/20 12:00 AM CARDIAC MONITORING STRIPS Final     11/01/20 08:30 AM Echo Color Flow Doppler? Yes [id number] [facility]   10/09/20 11:11 AM Stress Echo Which stress agent will be used? Pharmacological; Color Flow Doppler? Yes Final [id number] [facility]   10/05/20 10:01 AM [id number] [facility]

## 2020-11-04 NOTE — MEDICAL/APP STUDENT
[facility]  Nephrology  Progress Note    Patient Name: Madhavi Hamptonox   : 1975   MRN: [id number]  Admission Date: 10/30/2020  Attending Physician:    Date of Admission: 10/30/2020  1:06 PM      No chief complaint on file.      Subjective:    HPI: Patient is a 46yo female with decompensated liver cirrhosis secondary to alpha-1 antitrypsin deficiency and alcohol abuse. She has been following hepatology as outpatient and has required frequent thoracentesis for recurrent hepatic hydrothorax. She had an MRI on  that showed a R liver lobe lesion. She presented to [place] ED on 10/29 with SOB. CXR showed large R sided pleural effusion. Was noted to be hypotensive (systolic BP in 90s), hyponatremic (Na - 123), and had decreased renal function (BUN - 48 and Cr - 2.7). Baseline BUN is 19 and Cr is 1.0. She was transferred to [facility] and on 10/30 had an IR biopsy of the liver lesion and thoracentesis. She was managed for ?HRS and started on albumin, midodrine, octreotide. Developed leukocytosis and started on broad spectrum antibiotics (vancomycin, meropenem, azithromycin, fluconazole, tobramycin). Zosyn was discontinued. Intubated on  due to worsening respiratory status.     Nephrology was consulted for worsening renal function and oliguria.     Today:  SLED restarted yesterday at 6pm. Received 12hrs of treatment, clotted again this morning. Received 2hrs so far. Garcia removed yesterday. CXR unchanged.    Objective:  Vitals:    20 0615 20 0630 20 0715 20 0800   BP: (!) 102/50  (!) 91/44 (!) 108/56   BP Location:   Right arm    Patient Position:   Lying    Pulse: 76 75 87 91   Resp: (!) 26 (!) 26 (!) 26 (!) 26   Temp:   97.4 °F (36.3 °C)    TempSrc:   Tympanic    SpO2: 99% 100% 99% 97%   Weight:       Height:            Intake/Output Summary (Last 24 hours) at 2020 0915  Last data filed at 2020 0825  Gross per 24 hour   Intake 4904.7 ml   Output 4372 ml    Net 532.7 ml         Physical Exam  Constitutional:       Interventions: She is sedated and intubated.   Eyes:      General: Scleral icterus present.   Neck:      Comments: R IJ central line in place  Cardiovascular:      Rate and Rhythm: Normal rate. Rhythm irregular.      Heart sounds: Normal heart sounds.   Pulmonary:      Effort: She is intubated.      Breath sounds: Normal breath sounds.   Abdominal:      General: Bowel sounds are decreased. There is distension.   Musculoskeletal:      Right lower leg: No edema.      Left lower leg: No edema.   Skin:     General: Skin is warm and dry.      Capillary Refill: Capillary refill takes 2 to 3 seconds.      Coloration: Skin is jaundiced.          Recent Results (from the past 48 hour(s))   Lactic Acid, Plasma    Collection Time: 11/02/20 12:05 PM   Result Value Ref Range    Lactate (Lactic Acid) 3.7 (HH) 0.5 - 2.2 mmol/L   COVID-19 Rapid Screening    Collection Time: 11/02/20  1:01 PM   Result Value Ref Range    SARS-CoV-2 RNA, Amplification, Qual Negative Negative   Cryptococcal antigen    Collection Time: 11/02/20  1:19 PM    Specimen: Blood, Venous   Result Value Ref Range    Cryptococcal Ag, Blood Negative Negative   ISTAT PROCEDURE    Collection Time: 11/02/20  2:39 PM   Result Value Ref Range    POC PH 7.258 (L) 7.35 - 7.45    POC PCO2 40.6 35 - 45 mmHg    POC PO2 49 40 - 60 mmHg    POC HCO3 18.1 (L) 24 - 28 mmol/L    POC BE -9 -2 to 2 mmol/L    POC SATURATED O2 78 (L) 95 - 100 %    POC TCO2 19 (L) 24 - 29 mmol/L    Rate 24     Sample VENOUS     Site Leti/UAC     Allens Test Pass     DelSys Adult Vent     Mode AC/PRVC     Vt 400     PEEP 10     FiO2 100    Comprehensive metabolic panel    Collection Time: 11/02/20  3:12 PM   Result Value Ref Range    Sodium 122 (L) 136 - 145 mmol/L    Potassium 4.7 3.5 - 5.1 mmol/L    Chloride 88 (L) 95 - 110 mmol/L    CO2 15 (L) 23 - 29 mmol/L    Glucose 159 (H) 70 - 110 mg/dL    BUN 76 (H) 6 - 20 mg/dL    Creatinine 5.5 (H)  0.5 - 1.4 mg/dL    Calcium 7.9 (L) 8.7 - 10.5 mg/dL    Total Protein 5.6 (L) 6.0 - 8.4 g/dL    Albumin 3.4 (L) 3.5 - 5.2 g/dL    Total Bilirubin 33.8 (H) 0.1 - 1.0 mg/dL    Alkaline Phosphatase 296 (H) 55 - 135 U/L     (H) 10 - 40 U/L    ALT 69 (H) 10 - 44 U/L    Anion Gap 19 (H) 8 - 16 mmol/L    eGFR if African American 9.9 (A) >60 mL/min/1.73 m^2    eGFR if non  8.6 (A) >60 mL/min/1.73 m^2   Renal function panel    Collection Time: 11/02/20  3:12 PM   Result Value Ref Range    Glucose 158 (H) 70 - 110 mg/dL    Sodium 122 (L) 136 - 145 mmol/L    Potassium 4.7 3.5 - 5.1 mmol/L    Chloride 87 (L) 95 - 110 mmol/L    CO2 14 (L) 23 - 29 mmol/L    BUN 76 (H) 6 - 20 mg/dL    Calcium 7.7 (L) 8.7 - 10.5 mg/dL    Creatinine 5.4 (H) 0.5 - 1.4 mg/dL    Albumin 3.3 (L) 3.5 - 5.2 g/dL    Phosphorus 8.0 (H) 2.7 - 4.5 mg/dL    eGFR if  10.2 (A) >60 mL/min/1.73 m^2    eGFR if non  8.8 (A) >60 mL/min/1.73 m^2    Anion Gap 21 (H) 8 - 16 mmol/L   Magnesium    Collection Time: 11/02/20  3:12 PM   Result Value Ref Range    Magnesium 3.0 (H) 1.6 - 2.6 mg/dL   COVID-19 Routine Screening    Collection Time: 11/02/20  3:21 PM   Result Value Ref Range    SARS-CoV2 (COVID-19) Qualitative PCR Not Detected Not Detected   POCT glucose    Collection Time: 11/02/20  5:43 PM   Result Value Ref Range    POCT Glucose 148 (H) 70 - 110 mg/dL   Renal function panel    Collection Time: 11/02/20 10:19 PM   Result Value Ref Range    Glucose 119 (H) 70 - 110 mg/dL    Sodium 125 (L) 136 - 145 mmol/L    Potassium 4.7 3.5 - 5.1 mmol/L    Chloride 90 (L) 95 - 110 mmol/L    CO2 18 (L) 23 - 29 mmol/L    BUN 60 (H) 6 - 20 mg/dL    Calcium 8.0 (L) 8.7 - 10.5 mg/dL    Creatinine 4.6 (H) 0.5 - 1.4 mg/dL    Albumin 3.3 (L) 3.5 - 5.2 g/dL    Phosphorus 7.1 (H) 2.7 - 4.5 mg/dL    eGFR if  12.3 (A) >60 mL/min/1.73 m^2    eGFR if non African American 10.7 (A) >60 mL/min/1.73 m^2    Anion Gap 17 (H) 8 -  16 mmol/L   Magnesium    Collection Time: 11/02/20 10:19 PM   Result Value Ref Range    Magnesium 2.8 (H) 1.6 - 2.6 mg/dL   POCT glucose    Collection Time: 11/03/20 12:14 AM   Result Value Ref Range    POCT Glucose 107 70 - 110 mg/dL   Magnesium    Collection Time: 11/03/20  3:43 AM   Result Value Ref Range    Magnesium 2.4 1.6 - 2.6 mg/dL   Phosphorus    Collection Time: 11/03/20  3:43 AM   Result Value Ref Range    Phosphorus 5.1 (H) 2.7 - 4.5 mg/dL   Protime-INR    Collection Time: 11/03/20  3:43 AM   Result Value Ref Range    Prothrombin Time 29.3 (H) 9.0 - 12.5 sec    INR 2.8 (H) 0.8 - 1.2   Vancomycin, Random    Collection Time: 11/03/20  3:43 AM   Result Value Ref Range    Vancomycin, Random 16.3 Not established ug/mL   Comprehensive metabolic panel    Collection Time: 11/03/20  3:43 AM   Result Value Ref Range    Sodium 129 (L) 136 - 145 mmol/L    Potassium 4.5 3.5 - 5.1 mmol/L    Chloride 94 (L) 95 - 110 mmol/L    CO2 18 (L) 23 - 29 mmol/L    Glucose 100 70 - 110 mg/dL    BUN 33 (H) 6 - 20 mg/dL    Creatinine 2.9 (H) 0.5 - 1.4 mg/dL    Calcium 7.7 (L) 8.7 - 10.5 mg/dL    Total Protein 5.6 (L) 6.0 - 8.4 g/dL    Albumin 3.6 3.5 - 5.2 g/dL    Total Bilirubin 34.2 (H) 0.1 - 1.0 mg/dL    Alkaline Phosphatase 287 (H) 55 - 135 U/L     (H) 10 - 40 U/L    ALT 78 (H) 10 - 44 U/L    Anion Gap 17 (H) 8 - 16 mmol/L    eGFR if African American 21.6 (A) >60 mL/min/1.73 m^2    eGFR if non  18.7 (A) >60 mL/min/1.73 m^2   Lactic Acid, Plasma    Collection Time: 11/03/20  3:43 AM   Result Value Ref Range    Lactate (Lactic Acid) 2.1 0.5 - 2.2 mmol/L   Renal function panel    Collection Time: 11/03/20  3:43 AM   Result Value Ref Range    Glucose 100 70 - 110 mg/dL    Sodium 129 (L) 136 - 145 mmol/L    Potassium 4.5 3.5 - 5.1 mmol/L    Chloride 94 (L) 95 - 110 mmol/L    CO2 16 (L) 23 - 29 mmol/L    BUN 32 (H) 6 - 20 mg/dL    Calcium 7.7 (L) 8.7 - 10.5 mg/dL    Creatinine 2.9 (H) 0.5 - 1.4 mg/dL     Albumin 3.5 3.5 - 5.2 g/dL    Phosphorus 4.9 (H) 2.7 - 4.5 mg/dL    eGFR if African American 21.6 (A) >60 mL/min/1.73 m^2    eGFR if non  18.7 (A) >60 mL/min/1.73 m^2    Anion Gap 19 (H) 8 - 16 mmol/L   Magnesium    Collection Time: 11/03/20  3:43 AM   Result Value Ref Range    Magnesium 2.5 1.6 - 2.6 mg/dL   ISTAT PROCEDURE    Collection Time: 11/03/20  3:48 AM   Result Value Ref Range    POC PH 7.254 (LL) 7.35 - 7.45    POC PCO2 45.6 (H) 35 - 45 mmHg    POC PO2 111 (H) 80 - 100 mmHg    POC HCO3 20.2 (L) 24 - 28 mmol/L    POC BE -7 -2 to 2 mmol/L    POC SATURATED O2 97 95 - 100 %    POC TCO2 22 (L) 23 - 27 mmol/L    Rate 24     Sample ARTERIAL     Site Leti/UAC     Allens Test N/A     DelSys Adult Vent     Mode AC/PRVC     Vt 400     PEEP 10     FiO2 60     Sp02 96    POCT glucose    Collection Time: 11/03/20  5:27 AM   Result Value Ref Range    POCT Glucose 111 (H) 70 - 110 mg/dL   CBC auto differential    Collection Time: 11/03/20  5:28 AM   Result Value Ref Range    WBC 29.37 (H) 3.90 - 12.70 K/uL    RBC 2.76 (L) 4.00 - 5.40 M/uL    Hemoglobin 8.9 (L) 12.0 - 16.0 g/dL    Hematocrit 26.8 (L) 37.0 - 48.5 %    MCV 97 82 - 98 fL    MCH 32.2 (H) 27.0 - 31.0 pg    MCHC 33.2 32.0 - 36.0 g/dL    RDW 18.2 (H) 11.5 - 14.5 %    Platelets 80 (L) 150 - 350 K/uL    MPV 11.1 9.2 - 12.9 fL    Immature Granulocytes CANCELED 0.0 - 0.5 %    Immature Grans (Abs) CANCELED 0.00 - 0.04 K/uL    nRBC 0 0 /100 WBC    Gran % 86.0 (H) 38.0 - 73.0 %    Lymph % 5.0 (L) 18.0 - 48.0 %    Mono % 6.0 4.0 - 15.0 %    Eosinophil % 0.0 0.0 - 8.0 %    Basophil % 0.0 0.0 - 1.9 %    Bands 2.0 %    Myelocytes 1.0 %    Platelet Estimate Decreased (A)     Aniso Slight     Poik Slight     Poly Occasional     Hypo Occasional     Ovalocytes Occasional     Basophilic Stippling Occasional     Differential Method Manual    POCT glucose    Collection Time: 11/03/20  9:46 AM   Result Value Ref Range    POCT Glucose 157 (H) 70 - 110 mg/dL   POCT  glucose    Collection Time: 11/03/20 12:40 PM   Result Value Ref Range    POCT Glucose 202 (H) 70 - 110 mg/dL   Comprehensive metabolic panel    Collection Time: 11/03/20  3:06 PM   Result Value Ref Range    Sodium 125 (L) 136 - 145 mmol/L    Potassium 4.3 3.5 - 5.1 mmol/L    Chloride 94 (L) 95 - 110 mmol/L    CO2 18 (L) 23 - 29 mmol/L    Glucose 149 (H) 70 - 110 mg/dL    BUN 31 (H) 6 - 20 mg/dL    Creatinine 3.0 (H) 0.5 - 1.4 mg/dL    Calcium 7.7 (L) 8.7 - 10.5 mg/dL    Total Protein 4.9 (L) 6.0 - 8.4 g/dL    Albumin 3.1 (L) 3.5 - 5.2 g/dL    Total Bilirubin 32.1 (H) 0.1 - 1.0 mg/dL    Alkaline Phosphatase 265 (H) 55 - 135 U/L     (H) 10 - 40 U/L    ALT 78 (H) 10 - 44 U/L    Anion Gap 13 8 - 16 mmol/L    eGFR if African American 20.7 (A) >60 mL/min/1.73 m^2    eGFR if non  17.9 (A) >60 mL/min/1.73 m^2   Renal function panel    Collection Time: 11/03/20  3:06 PM   Result Value Ref Range    Glucose 148 (H) 70 - 110 mg/dL    Sodium 123 (L) 136 - 145 mmol/L    Potassium 4.3 3.5 - 5.1 mmol/L    Chloride 93 (L) 95 - 110 mmol/L    CO2 16 (L) 23 - 29 mmol/L    BUN 33 (H) 6 - 20 mg/dL    Calcium 7.2 (L) 8.7 - 10.5 mg/dL    Creatinine 2.8 (H) 0.5 - 1.4 mg/dL    Albumin 3.0 (L) 3.5 - 5.2 g/dL    Phosphorus 4.6 (H) 2.7 - 4.5 mg/dL    eGFR if African American 22.5 (A) >60 mL/min/1.73 m^2    eGFR if non African American 19.5 (A) >60 mL/min/1.73 m^2    Anion Gap 14 8 - 16 mmol/L   Magnesium    Collection Time: 11/03/20  3:06 PM   Result Value Ref Range    Magnesium 2.3 1.6 - 2.6 mg/dL   POCT glucose    Collection Time: 11/03/20  6:57 PM   Result Value Ref Range    POCT Glucose 135 (H) 70 - 110 mg/dL   ISTAT PROCEDURE    Collection Time: 11/03/20  8:56 PM   Result Value Ref Range    POC PH 7.313 (L) 7.35 - 7.45    POC PCO2 49.7 (H) 35 - 45 mmHg    POC PO2 237 (H) 80 - 100 mmHg    POC HCO3 25.2 24 - 28 mmol/L    POC BE -1 -2 to 2 mmol/L    POC SATURATED O2 100 95 - 100 %    POC TCO2 27 23 - 27 mmol/L    Rate  24     Sample ARTERIAL     Site [facility]     Allens Test N/A     DelSys Adult Vent     Mode AC/PRVC     Vt 400     PEEP 10     PiP 38     FiO2 80     Min Vol 9.7     Sp02 97    POCT glucose    Collection Time: 11/03/20 11:19 PM   Result Value Ref Range    POCT Glucose 97 70 - 110 mg/dL   Renal function panel    Collection Time: 11/03/20 11:21 PM   Result Value Ref Range    Glucose 105 70 - 110 mg/dL    Sodium 129 (L) 136 - 145 mmol/L    Potassium 4.4 3.5 - 5.1 mmol/L    Chloride 95 95 - 110 mmol/L    CO2 21 (L) 23 - 29 mmol/L    BUN 22 (H) 6 - 20 mg/dL    Calcium 7.6 (L) 8.7 - 10.5 mg/dL    Creatinine 1.8 (H) 0.5 - 1.4 mg/dL    Albumin 3.0 (L) 3.5 - 5.2 g/dL    Phosphorus 3.2 2.7 - 4.5 mg/dL    eGFR if  38.4 (A) >60 mL/min/1.73 m^2    eGFR if non  33.3 (A) >60 mL/min/1.73 m^2    Anion Gap 13 8 - 16 mmol/L   Magnesium    Collection Time: 11/03/20 11:21 PM   Result Value Ref Range    Magnesium 2.3 1.6 - 2.6 mg/dL   CBC auto differential    Collection Time: 11/03/20 11:21 PM   Result Value Ref Range    WBC 28.85 (H) 3.90 - 12.70 K/uL    RBC 2.46 (L) 4.00 - 5.40 M/uL    Hemoglobin 8.1 (L) 12.0 - 16.0 g/dL    Hematocrit 23.5 (L) 37.0 - 48.5 %    MCV 96 82 - 98 fL    MCH 32.9 (H) 27.0 - 31.0 pg    MCHC 34.5 32.0 - 36.0 g/dL    RDW 17.8 (H) 11.5 - 14.5 %    Platelets 63 (L) 150 - 350 K/uL    MPV 12.9 9.2 - 12.9 fL    Immature Granulocytes CANCELED 0.0 - 0.5 %    Immature Grans (Abs) CANCELED 0.00 - 0.04 K/uL    Lymph # CANCELED 1.0 - 4.8 K/uL    Mono # CANCELED 0.3 - 1.0 K/uL    Eos # CANCELED 0.0 - 0.5 K/uL    Baso # CANCELED 0.00 - 0.20 K/uL    nRBC 1 (A) 0 /100 WBC    Gran % 84.5 (H) 38.0 - 73.0 %    Lymph % 4.5 (L) 18.0 - 48.0 %    Mono % 7.5 4.0 - 15.0 %    Eosinophil % 0.0 0.0 - 8.0 %    Basophil % 0.0 0.0 - 1.9 %    Bands 1.5 %    Metamyelocytes 1.0 %    Myelocytes 0.5 %    Promyelocytes 0.5 %    Platelet Estimate Decreased (A)     Aniso Moderate     Poik Slight     Poly Occasional      Ovalocytes Occasional     Target Cells Occasional     Alphonse Cells Occasional     Basophilic Stippling Occasional     Toxic Granulation Present     Fragmented Cells Occasional     Vacuolated Granulocytes Present     Differential Method Manual    POCT glucose    Collection Time: 11/04/20  3:30 AM   Result Value Ref Range    POCT Glucose 96 70 - 110 mg/dL   Phosphorus    Collection Time: 11/04/20  3:31 AM   Result Value Ref Range    Phosphorus 3.0 2.7 - 4.5 mg/dL   Protime-INR    Collection Time: 11/04/20  3:31 AM   Result Value Ref Range    Prothrombin Time 35.5 (H) 9.0 - 12.5 sec    INR 3.5 (H) 0.8 - 1.2   CBC Auto Differential    Collection Time: 11/04/20  3:31 AM   Result Value Ref Range    WBC 26.35 (H) 3.90 - 12.70 K/uL    RBC 2.39 (L) 4.00 - 5.40 M/uL    Hemoglobin 7.7 (L) 12.0 - 16.0 g/dL    Hematocrit 22.6 (L) 37.0 - 48.5 %    MCV 95 82 - 98 fL    MCH 32.2 (H) 27.0 - 31.0 pg    MCHC 34.1 32.0 - 36.0 g/dL    RDW 17.8 (H) 11.5 - 14.5 %    Platelets 48 (L) 150 - 350 K/uL    MPV 9.8 9.2 - 12.9 fL    Immature Granulocytes CANCELED 0.0 - 0.5 %    Immature Grans (Abs) CANCELED 0.00 - 0.04 K/uL    Lymph # CANCELED 1.0 - 4.8 K/uL    Mono # CANCELED 0.3 - 1.0 K/uL    Eos # CANCELED 0.0 - 0.5 K/uL    Baso # CANCELED 0.00 - 0.20 K/uL    nRBC 1 (A) 0 /100 WBC    Gran % 85.0 (H) 38.0 - 73.0 %    Lymph % 1.5 (L) 18.0 - 48.0 %    Mono % 6.5 4.0 - 15.0 %    Eosinophil % 0.0 0.0 - 8.0 %    Basophil % 0.0 0.0 - 1.9 %    Bands 2.5 %    Metamyelocytes 1.0 %    Myelocytes 2.5 %    Promyelocytes 1.0 %    Platelet Estimate Decreased (A)     Aniso Moderate     Poly Occasional     Hypo Occasional     Basophilic Stippling Occasional     Toxic Granulation Present     Differential Method Manual    Vancomycin, Random    Collection Time: 11/04/20  3:31 AM   Result Value Ref Range    Vancomycin, Random 18.5 Not established ug/mL   Comprehensive metabolic panel    Collection Time: 11/04/20  3:31 AM   Result Value Ref Range    Sodium 128 (L)  136 - 145 mmol/L    Potassium 4.2 3.5 - 5.1 mmol/L    Chloride 94 (L) 95 - 110 mmol/L    CO2 21 (L) 23 - 29 mmol/L    Glucose 92 70 - 110 mg/dL    BUN 19 6 - 20 mg/dL    Creatinine 1.7 (H) 0.5 - 1.4 mg/dL    Calcium 7.4 (L) 8.7 - 10.5 mg/dL    Total Protein 4.6 (L) 6.0 - 8.4 g/dL    Albumin 2.8 (L) 3.5 - 5.2 g/dL    Total Bilirubin 29.2 (H) 0.1 - 1.0 mg/dL    Alkaline Phosphatase 277 (H) 55 - 135 U/L     (H) 10 - 40 U/L    ALT 82 (H) 10 - 44 U/L    Anion Gap 13 8 - 16 mmol/L    eGFR if African American 41.1 (A) >60 mL/min/1.73 m^2    eGFR if non  35.7 (A) >60 mL/min/1.73 m^2   Lactic Acid, Plasma    Collection Time: 11/04/20  3:31 AM   Result Value Ref Range    Lactate (Lactic Acid) 1.9 0.5 - 2.2 mmol/L   Magnesium    Collection Time: 11/04/20  3:31 AM   Result Value Ref Range    Magnesium 1.9 1.6 - 2.6 mg/dL   ISTAT PROCEDURE    Collection Time: 11/04/20  3:37 AM   Result Value Ref Range    POC PH 7.397 7.35 - 7.45    POC PCO2 41.2 35 - 45 mmHg    POC PO2 206 (H) 80 - 100 mmHg    POC HCO3 25.4 24 - 28 mmol/L    POC BE 1 -2 to 2 mmol/L    POC SATURATED O2 100 95 - 100 %    POC TCO2 27 23 - 27 mmol/L    Rate 26     Sample ARTERIAL     Site Leti/UAC     Allens Test N/A     DelSys Adult Vent     Mode AC/PRVC     Vt 400     PEEP 10     PiP 30     FiO2 60     Min Vol 10.5     Sp02 97    POCT glucose    Collection Time: 11/04/20  7:33 AM   Result Value Ref Range    POCT Glucose 75 70 - 110 mg/dL       Current Facility-Administered Medications:     0.9%  NaCl infusion (CRRT USE ONLY), , Intravenous, Continuous, Edie Seymour MD, Last Rate: 200 mL/hr at 11/04/20 0800    amiodarone tablet 200 mg, 200 mg, Per NG tube, Daily, Cedric Jimenez NP, 200 mg at 11/04/20 0811    famotidine (PF) injection 20 mg, 20 mg, Intravenous, Daily, Codie Wong PA-C, 20 mg at 11/04/20 0811    fentaNYL 2500 mcg in 0.9% sodium chloride 250 mL infusion premix (titrating), 25 mcg/hr, Intravenous,  Continuous, Codie Wong PA-C, Last Rate: 5 mL/hr at 20 0800, 50 mcg/hr at 20 0800    [] fentaNYL injection 50 mcg, 50 mcg, Intravenous, Q15 Min PRN **FOLLOWED BY** fentaNYL injection 50 mcg, 50 mcg, Intravenous, Q1H PRN, Codie Wong PA-C    fluconazole (DIFLUCAN) IVPB 200 mg 100 mL, 200 mg, Intravenous, Q24H, Codie Wong PA-C, 200 mg at 20    hydrocortisone sodium succinate injection 100 mg, 100 mg, Intravenous, Q8H, Codie Wong PA-C, 100 mg at 20 05    lactulose 20 gram/30 mL solution Soln 20 g, 20 g, Oral, TID, Codie Wong PA-C, 20 g at 20    magnesium sulfate 2g in water 50mL IVPB (premix), 2 g, Intravenous, PRN, Edie Seymour MD, Last Rate: 25 mL/hr at 20, 2 g at 20    meropenem-0.9% sodium chloride 1 g/50 mL IVPB, 1 g, Intravenous, Q12H, Ebony May PA-C, Last Rate: 50 mL/hr at 20, 1 g at 20    midodrine tablet 15 mg, 15 mg, Per OG tube, TID WM, Cedric Jimenez NP, 15 mg at 20    norepinephrine 32 mg in sodium chloride 0.9% 250 mL infusion, 0.02 mcg/kg/min, Intravenous, Continuous, Cedric Jimenez NP, Last Rate: 34.1 mL/hr at 20, 1.02 mcg/kg/min at 20    ondansetron injection 4 mg, 4 mg, Intravenous, Q6H PRN, Codie Wong PA-C, 4 mg at 10/31/20 1719    phenylephrine HCl in 0.9% NaCl 1 mg/10 mL (100 mcg/mL) syringe 500 mcg, 500 mcg, Intravenous, PRN, Codie Wong PA-C    pneumoc 13-david conj-dip cr(PF) (PREVNAR 13 (PF)) 0.5 mL, 0.5 mL, Intramuscular, vaccine x 1 dose, Thierry Kong MD    polyethylene glycol packet 17 g, 17 g, Per NG tube, Daily, Cedric Jimenez NP, 17 g at 20 0812    promethazine (PHENERGAN) 6.25 mg in dextrose 5 % 50 mL IVPB, 6.25 mg, Intravenous, Q6H PRN, Alex Rene DO, Last Rate: 150 mL/hr at 10/31/20 2111, 6.25 mg at 10/31/20 2111     propofol (DIPRIVAN) 10 mg/mL infusion, 5 mcg/kg/min, Intravenous, Continuous, Codie Wong PA-C, Last Rate: 4.3 mL/hr at 11/04/20 0800, 10 mcg/kg/min at 11/04/20 0800    rifAXIMin tablet 550 mg, 550 mg, Per NG tube, BID, Cedric LEE. Judnadeen, NP, 550 mg at 11/04/20 0812    sennosides 8.8 mg/5 ml syrup 5 mL, 5 mL, Per NG tube, QHS, Cedric E. Judice, NP, 5 mL at 11/03/20 1232    sodium chloride 0.9% flush 10 mL, 10 mL, Intravenous, PRN, Maninder Coates MD    sodium phosphate 20.01 mmol in dextrose 5 % 250 mL IVPB, 20.01 mmol, Intravenous, PRN, Edie Seymour MD, Last Rate: 125 mL/hr at 11/04/20 0825, 20.01 mmol at 11/04/20 0825    sodium phosphate 30 mmol in dextrose 5 % 250 mL IVPB, 30 mmol, Intravenous, PRN, Edie Seymour MD    sodium phosphate 39.99 mmol in dextrose 5 % 250 mL IVPB, 39.99 mmol, Intravenous, PRN, Edie Seymour MD    Pharmacy to dose Vancomycin consult, , , Once **AND** vancomycin - pharmacy to dose, , Intravenous, pharmacy to manage frequency, Codie Wong PA-C    vancomycin 750 mg in dextrose 5 % 250 mL IVPB (ready to mix system), 750 mg, Intravenous, Once, Hal Shea MD, Last Rate: 250 mL/hr at 11/04/20 0825, 750 mg at 11/04/20 0825    vasopressin (PITRESSIN) 0.2 Units/mL in dextrose 5 % 100 mL infusion, 0.04 Units/min, Intravenous, Continuous, Codie Wong PA-C, Last Rate: 12 mL/hr at 11/04/20 0800, 0.04 Units/min at 11/04/20 0800     Assessment/Plan  Patient is a 46yo female with LINN.      1. LINN  - most likely ATN secondary to sepsis  - UA 2+ protein, 3+ blood and 2+ WBC. Urine MCS on 11/2 showed muddy brown casts and normal RBCs. Suggests ATN but does not rule out HRS  - WBC - 29, CXR concerning for pneumonia, started on broad spectrum antibiotics. Bronch sputum cx grew gram + cocci and gram - rods  - Started on SLED 11/2. Continue and repeat evening labs. Will increase NS flow and UF to decrease clotting.  Hypocalcemia (7.4 today), change bath, current Ca is 2.25  - Metabolic acidosis - slight improvement. HCO - 21.  - Hyponatremia - unchanged, Na - 128 today  - Phos - 3.0 (from 5.1), Lactate - 1.9 (from 3.7, 2.1), both improving after starting dialysis  - Continue HRS management - Albumin, Midodrine, Octreotide  - Strict I/O and chart  - Avoid nephrotoxic medications, NSAIDs, IV contrast, etc.  - Medication doses adjusted to GFR  - Hb > 7 gm/dL    Philip Eddy  MS4

## 2020-11-04 NOTE — ASSESSMENT & PLAN NOTE
45 year old female with decompensated liver cirrhosis and recurrent right sided pleural effusions who presented to OSH on 10/29 with SOB [place] [facility] [place]. Her course has been complicated by kidney failure and hypoxic respiratory failure. She is currently intubated, sedated requiring pressors, CRRT. She remains afebrile but with leukocytosis.  .Azithromycin discontinued 11/3.  Cryptococcus Ag and Aspergillis AG negative.  Respiratory Cx (bronchial wash and sputum cx) NGTD but with GNR and GPC on gram stain.    Plan:  - Continue empiric antimicrobials (Vanc, Sybil, Fluconazole) for possible pneumonia for now.   - All cultures thus far have been negative (blood, urine, pleural fluid, bronchial fluid). COVID negative.   Suspect her clinical picture may be related to her liver and not an underlying infectious process.  -  Continue to follow culture data, T-spot, fungal serologies.  - ID will follow.

## 2020-11-04 NOTE — PROGRESS NOTES
[facility]  Infectious Disease  Progress Note    Patient Name: Madhavi Bell  MRN: [id number]  Admission Date: 10/30/2020  Length of Stay: 5 days  Attending Physician: Hal Shea*  Primary Care Provider: Anurag Knutson MD    Isolation Status: No active isolations  Assessment/Plan:      Shock, unspecified     45 year old female with decompensated liver cirrhosis and recurrent right sided pleural effusions who presented to OSH on 10/29 with SOB and transferred [place] [facility] [place] evaluation on 10/30. Her course has been complicated by kidney failure and hypoxic respiratory failure. She is currently intubated, sedated requiring pressors, CRRT. She remains afebrile but with leukocytosis.  .Azithromycin discontinued 11/3.  Cryptococcus Ag and Aspergillis AG negative.  Respiratory Cx (bronchial wash and sputum cx) NGTD but with GNR and GPC on gram stain.    Plan:  - Continue empiric antimicrobials (Vanc, Sybil, Fluconazole) for possible pneumonia for now.   - All cultures thus far have been negative (blood, urine, pleural fluid, bronchial fluid). COVID negative.   Suspect her clinical picture may be related to her liver and not an underlying infectious process.  -  Continue to follow culture data, T-spot, fungal serologies.  - ID will follow.      Anticipated Disposition: tbd    Thank you for your consult. I will follow-up with patient. Please contact us if you have any additional questions.    DAYNE Beckham  Infectious Disease  [facility]    Subjective:     Principal Problem:Decompensated hepatic cirrhosis    HPI: Madhavi Bell is a 45 year old female with decompensated liver cirrhosis due to alpha-1 antitrypsin deficiency and alcohol use, recurrent right sided hepato-hydrothorax, HCC who presented to [place] ED on 10/29 with shortness of breath and transferred to [facility] [place] for liver transplant evaluation on 10/30. Patient reports worsening SOB  over the last two days. Patient scheduled for outpatient thoracentesis next week however was advised by GI doctor to present to the ED due to worsening symptoms. Patient reports associated dry cough but no worse than baseline. Denies fever, chills, chest pain, abdominal pain, confusion.      Upon arrival, patient with oxygen saturations >94% on 4L NC. No fever. CXR with significant right sided pleural effusion. Labs significant for new leukocytosis, t bili of 24 and LINN with sCr 2.7. Patient admitted to the CMICU. Patient underwent liver biopsy and thoracentesis (1.5 L removed) on 10/30. Started on empiric Vanc/Zosyn for pneumonia coverage.  Also received a dose of Tobramycin. Blood, urine and resp cx thus far unrevealing. Over the last 24 hours her oxygen requirements increased from 4L to 9L and she has been started on pressors. WBC has increased to 25K. CXR revealed Bilateral pulmonary opacities with significant interval detrimental change, there is prominent appearance of confluent infiltrates/airspace disease and consolidation with air bronchogram involving the mid to lower right hemithorax, with obscuration of the right hemidiaphragm and right heart border, the areas of multifocal rounded opacification on the left appears somewhat more confluent, with prominent confluent abnormal opacity seen throughout the left hemithorax, and central air bronchogram formation.  There is some obscuration of the left heart border however without significant obscuration of the left hemidiaphragm.  There is likely a component of pleural fluid on the right however significant pleural fluid on the left is not appreciated.  There is no evidence for pneumothorax. Repeat thoracentesis showed 2527 WBC, 67% segs.  ID consulted for abx recs.    Further hx obtained from . 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 Two kids (one physically going to  school). No recent illnesses or known COVID exposures. Two dogs. No recent travel.  Interval History:   No AEON.  Afebrile.  WBC 26s. Remains intubated, sedated requiring pressors, CRRT. She has been on empiric antibiotics (Vanc, Sybil, Fluconazole) for possible pneumonia. All cultures thus far have been negative (blood, urine, pleural fluid, bronchial fluid). She hasnt had any fever. T spot and fungal serologies are pending. COVID negative.  Vent settings stable.      Review of Systems   Unable to perform ROS: Intubated     Objective:     Vital Signs (Most Recent):  Temp: 97.4 °F (36.3 °C) (11/04/20 0715)  Pulse: 89 (11/04/20 0900)  Resp: (!) 26 (11/04/20 0900)  BP: (!) 108/56 (11/04/20 0800)  SpO2: (!) 94 % (11/04/20 0900) Vital Signs (24h Range):  Temp:  [97.4 °F (36.3 °C)-99.1 °F (37.3 °C)] 97.4 °F (36.3 °C)  Pulse:  [74-91] 89  Resp:  [24-33] 26  SpO2:  [92 %-100 %] 94 %  BP: ()/(44-59) 108/56  Arterial Line BP: ()/(28-61) 111/42     Weight: 79 kg (174 lb 2.6 oz)  Body mass index is 28.11 kg/m².    Estimated Creatinine Clearance: 43.9 mL/min (A) (based on SCr of 1.7 mg/dL (H)).    Physical Exam  Vitals signs reviewed.   Constitutional:       Appearance: She is well-developed. She is ill-appearing.      Interventions: She is sedated and intubated.   HENT:      Head: Normocephalic and atraumatic.   Eyes:      General: Scleral icterus present.   Neck:      Thyroid: No thyromegaly.      Trachea: No tracheal deviation.      Comments: [facility] TLC in place.   Cardiovascular:      Rate and Rhythm: Normal rate and regular rhythm.   Pulmonary:      Effort: Tachypnea present. No accessory muscle usage. She is intubated.      Breath sounds: Decreased breath sounds present. No wheezing or rales.   Abdominal:      General: Bowel sounds are normal. There is no distension.      Palpations: Abdomen is soft.      Hernia: No hernia is present.   Genitourinary:     Comments: Garcia in place.   Musculoskeletal:      Right  lower leg: No edema.      Left lower leg: No edema.   Skin:     General: Skin is warm and dry.      Coloration: Skin is jaundiced.      Findings: Bruising present.      Comments: PIV exchanged       Vent Mode: A/C  Oxygen Concentration (%):  [50-80] 50  Resp Rate Total:  [24 br/min-26 br/min] 26 br/min  Vt Set:  [400 mL] 400 mL  PEEP/CPAP:  [10 cmH20] 10 cmH20  Mean Airway Pressure:  [9.9 ivW14-66 cmH20] 16 cmH20      Significant Labs:   Blood Culture:   Recent Labs   Lab 10/04/20  1625 10/04/20  1707 10/30/20  1005 10/30/20  1011   LABBLOO No growth after 5 days. No growth after 5 days. No Growth to date  No Growth to date  No Growth to date  No Growth to date  No Growth to date No Growth to date  No Growth to date  No Growth to date  No Growth to date  No Growth to date     CBC:   Recent Labs   Lab 11/03/20  2321 11/04/20  0331 11/04/20  1205   WBC 28.85* 26.35* 34.44*   HGB 8.1* 7.7* 6.9*   HCT 23.5* 22.6* 20.6*   PLT 63* 48* 61*     CMP:   Recent Labs   Lab 11/03/20  0343 11/03/20  1506 11/03/20  2321 11/04/20  0331 11/04/20  1336   *  129* 123*  125* 129* 128* 128*   K 4.5  4.5 4.3  4.3 4.4 4.2 4.3   CL 94*  94* 93*  94* 95 94* 94*   CO2 16*  18* 16*  18* 21* 21* 21*     100 148*  149* 105 92 96   BUN 32*  33* 33*  31* 22* 19 10   CREATININE 2.9*  2.9* 2.8*  3.0* 1.8* 1.7* 0.9   CALCIUM 7.7*  7.7* 7.2*  7.7* 7.6* 7.4* 7.7*   PROT 5.6* 4.9*  --  4.6*  --    ALBUMIN 3.5  3.6 3.0*  3.1* 3.0* 2.8* 2.6*   BILITOT 34.2* 32.1*  --  29.2*  --    ALKPHOS 287* 265*  --  277*  --    * 186*  --  195*  --    ALT 78* 78*  --  82*  --    ANIONGAP 19*  17* 14  13 13 13 13   EGFRNONAA 18.7*  18.7* 19.5*  17.9* 33.3* 35.7* >60.0     All pertinent labs within the past 24 hours have been reviewed.    Significant Imaging: I have reviewed all pertinent imaging results/findings within the past 24 hours.   X-Ray Chest AP Portable [[id number]] Resulted: 11/04/20 0744   Order Status:  Completed Updated: 11/04/20 0746   Narrative:     EXAMINATION:   XR CHEST AP PORTABLE     CLINICAL HISTORY:   Intubated;     COMPARISON:   Comparison is made to 11/02/2020 at 15:12.     FINDINGS:   Endotracheal tube tip lies 2.3 cm above the paolo.  Vascular catheters entering from both right and left jugular approaches have tips near the junction of the superior vena cava and right atrium.  Enteric tube has its distal aspect well distal to the GE junction, the tube tip projected inferior to the imaged volume.  Heart size remains normal.  Pulmonary parenchymal opacity representing widespread bilateral airspace consolidation is again seen, with no appreciable interval clearing.  No pneumothorax.    Impression:       Continued demonstration of extensive widespread bilateral airspace consolidation.  Allowing for a poorer inspiratory depth level on the current examination, there has been no significant interval change in the appearance of the chest since 11/02/2020 at 15:12.       Electronically signed by: Eugene Miranda MD   Date: 11/04/2020   Time: 07:44   X-Ray Chest 1 View [[id number]] Resulted: 11/02/20 1548   Order Status: Completed Updated: 11/02/20 1551   Narrative:     EXAMINATION:   XR CHEST 1 VIEW     CLINICAL HISTORY:   RIJ Trialysis placement;     TECHNIQUE:   Single frontal view of the chest was performed.     COMPARISON:   11/02/2020     FINDINGS:   Endotracheal tube with tip above the paolo.  Left IJ central venous catheter with tip at the cavoatrial junction.  Cardiomediastinal silhouette is stable.  NG tube crosses the GE junction.  Tip is not visualized.  Interval placement of right IJ Trialysis catheter with tip projected over the SVC.  No evidence of pneumothorax.  Continued widespread airspace consolidation in both lungs not significantly changed from prior.  Bones show no acute abnormalities.    Impression:       Interval placement of right IJ central venous catheter in satisfactory position  "projected over the SVC.  No evidence of pneumothorax.  No other significant changes from prior radiograph.       Electronically signed by: Radames Newberry MD   Date: 11/02/2020   Time: 15:48   X-Ray Chest 1 View [[id number]] Resulted: 11/02/20 1232   Order Status: Completed Updated: 11/02/20 1234   Narrative:     EXAMINATION:   XR CHEST 1 VIEW     CLINICAL HISTORY:   pleural eff;     COMPARISON:   Comparison is made to 11/01/2020 at 14:36.     FINDINGS:   Endotracheal tube tip lies just above the apex of the aortic arch, well above the paolo.  Distal aspect of the enteric tube lies distal to the GE junction, the tube tip projected inferior to the imaged volume.  Left jugular origin vascular catheter is in the superior vena cava.  Heart size is normal, and the cardiomediastinal silhouette demonstrates no detrimental change since the prior exam.  Pulmonary parenchymal opacity consistent with widespread confluent airspace consolidation in both lungs is again noted.  The right hemidiaphragmatic margin is better delineated on the current examination than on the study referenced above, implying minimal clearing in the right lower lobe, but overall the appearance of the lung zones is quite similar to the prior exam.  No pneumothorax.    Impression:       Continued abnormal chest radiograph demonstrating extensive widespread bilateral airspace consolidation, with little interval change since 11/01/2020 at 14:36.       Electronically signed by: Eugene Miranda MD   Date: 11/02/2020   Time: 12:32   X-Ray Chest 1 View [[id number]] Resulted: 11/01/20 1458   Order Status: Completed Updated: 11/01/20 1500   Narrative:     EXAMINATION:   XR CHEST 1 VIEW     CLINICAL HISTORY:   Provided history is "ETT placement;  ".     TECHNIQUE:   One view of the chest.     COMPARISON:   11/01/2020 at 05:01.     FINDINGS:   Cardiac wires and other support devices overlie the chest.  Endotracheal tube terminates approximately 5 cm above the paolo. "  Nasogastric tube extends below the field of view.  Left-sided central venous catheter overlies the SVC.  Cardiac silhouette is stable.  There are severe diffuse bilateral multifocal airspace opacities and/or consolidation, mildly worse when compared with the most recent prior study.  Continued silhouetting of the right costophrenic angle and hemidiaphragm, potentially related to pleural fluid or consolidation.  No sizable left pleural effusion.    Impression:       Endotracheal tube terminates approximately 5 cm above the paolo.     Persistent severe multifocal airspace disease and consolidation, slightly worse when compared with the prior study.       Electronically signed by: Shabbir Greene MD   Date: 11/01/2020   Time: 14:58   X-Ray Chest 1 View [[id number]] (Abnormal) Resulted: 11/01/20 0623   Order Status: Completed Updated: 11/01/20 0625   Narrative:     EXAMINATION:   XR CHEST 1 VIEW     CLINICAL HISTORY:   SOB;     TECHNIQUE:   Single frontal view of the chest was performed.     COMPARISON:   October 31, 2020     FINDINGS:   Single chest view is submitted.  Left-sided central venous catheter again noted, stable configuration.  Bilateral pulmonary opacities are again noted with significant interval detrimental change, there is prominent appearance of confluent infiltrates/airspace disease and consolidation with air bronchogram involving the mid to lower right hemithorax, with obscuration of the right hemidiaphragm and right heart border, the areas of multifocal rounded opacification on the left appears somewhat more confluent, with prominent confluent abnormal opacity seen throughout the left hemithorax, and central air bronchogram formation.  There is some obscuration of the left heart border however without significant obscuration of the left hemidiaphragm.  There is likely a component of pleural fluid on the right however significant pleural fluid on the left is not appreciated.  There is no evidence for  pneumothorax.  The osseous structures appear intact.    Impression:       Progression of abnormal pattern of pulmonary opacities, with prominent confluent infiltrate and consolidative change noted bilaterally as discussed above.     This report was flagged in Epic as abnormal.       Electronically signed by: Rambo Finnegan   Date: 11/01/2020   Time: 06:23   X-Ray Chest 1 View [[id number]] Resulted: 10/31/20 2040   Order Status: Completed Updated: 10/31/20 2042   Narrative:     EXAMINATION:   XR CHEST 1 VIEW     CLINICAL HISTORY:   s/p thoracentesis;     TECHNIQUE:   Single frontal view of the chest was performed.     COMPARISON:   October 31, 2020     FINDINGS:   Single frontal view of the chest indicates that there has been readjustment of the left jugular central line such that terminates in the superior vena cava.  It is noted that the right lung has significantly improved aeration as compared to the previous examination.  The left lung of features multiple rounded opacifications in the lung parenchyma which either represents focal areas of fluffy infiltrates.    Impression:       Status post successful thoracentesis.     Readjustment of central line.       Electronically signed by: Angel Chappell   Date: 10/31/2020   Time: 20:40   X-Ray Chest AP Portable [[id number]] Resulted: 10/31/20 0126   Order Status: Completed Updated: 10/31/20 0129   Narrative:     EXAMINATION:   XR CHEST AP PORTABLE     CLINICAL HISTORY:   hepatic hydrothorax w worsening o2 req;     TECHNIQUE:   Single frontal view of the chest was performed.     COMPARISON:   October 30, 2029 20:00     FINDINGS:   Single view of the chest reveals persistent opacification involving the entire right lung field.  There is a right internal jugular line that has been placed which terminates in the medial most aspect of the right subclavian vein.  The cardiac silhouette is obscured by the opacification of the right lung field.  The left lung field appears to be  stable in presentation.    Impression:       No significant changes compared to the previous examination.       Electronically signed by: Angel Hintonron   Date: 10/31/2020   Time: 01:26   IR Biopsy Liver [[id number]] Resulted: 10/30/20 1750   Order Status: Completed Updated: 10/30/20 1753   Narrative:     EXAMINATION:   Image-guided biopsy     Procedural Personnel     Attending physician(s): Javier Pace MD     Fellow physician(s): None     Resident physician(s): None     Advanced practice provider(s): None     Pre-procedure diagnosis:     Post-procedure diagnosis: Same     Indication: Histopathologic diagnosis     Previous biopsy of same target (QCDR): No     Additional clinical history: None     Complications: No immediate complications.     PROCEDURAL SUMMARY:   Percutaneous Ultrasound and CT -guided coaxial core needle biopsy.     PROCEDURE:   Pre-procedure:     Reference imaging for biopsy target: None     Consent: Informed consent for the procedure was obtained and time-out was performed prior to the procedure.     Preparation: The site was prepared and draped using maximal sterile barrier technique including cutaneous antisepsis.     Anesthesia/sedation:     Level of anesthesia/sedation: Moderate sedation (conscious sedation)     Anesthesia/sedation administered by: Independent trained observer under attending supervision with continueous monitoring of the patient's level of conciousness level of status.     Total intra-service sedation time (minutes): 43     Biopsy     Local anesthesia was administered. Under imaging guidance as stated in the procedure summary, the biopsy needle was advanced to the target and biopsy was performed.     Coaxial needle: 17 gauge     Core needle biopsy device: Shopping Buddy     Core needle size: 18 gauge     Number of core specimens: 10     On-site biopsy touch preparation: Yes     Additional sampling recommendations: None     Preliminary assessment of sample adequacy: Adequate      Needle removal     The biopsy needle was removed and a sterile dressing was applied.     Tract embolization: None     Contrast     Contrast agent: None     Contrast volume (mL): 0     Radiation Dose     CT dose length product ( mGy-cm ):     Fluoroscopy time ( minutes ):     Reference air kerma ( mGy ):     Kerma area product ( cGy-m2 ):     Additional Details     Additional description of procedure: None     Equipment details: None     Specimens removed: Biopsy samples as detailed above     Estimated blood loss (mL): Less than 10     Standardized report: SIR_Biopsy_v2    Impression:       Image-guided biopsy of left hepatic lobe liver lesion.     Plan:     Specimen(s) sent for evaluation.     Attestation:     Signer name: Javier Pace MD     I attest that I was present for the entire procedure. I reviewed the stored images and agree with the report as written.       Electronically signed by: Javier Pace MD   Date: 10/30/2020   Time: 17:50   IR Thoracentesis with Imaging [[id number]] Resulted: 10/30/20 1749   Order Status: Completed Updated: 10/30/20 1751   Narrative:     EXAMINATION:   Ultrasound-guided thoracentesis     Procedural Personnel     Attending physician(s): Javier Pace MD     Fellow physician(s): None     Resident physician(s): None     Advanced practice provider(s): None     Pre-procedure diagnosis: Shortness of breath     Post-procedure diagnosis: Same     Indication: Shortness of breath     Complications: No immediate complications.     TECHNIQUE:   - Limited thoracic ultrasound     - Ultrasound-guided thoracentesis     FINDINGS:   Pre-procedure     Consent: Informed consent for the procedure was obtained and time-out was performed prior to the procedure.     Preparation: The site was prepared and draped using maximal sterile barrier technique including cutaneous antisepsis.     Anesthesia/sedation     Level of anesthesia/sedation: No sedation     Anesthesia/sedation administered by: Not  applicable     Total intra-service sedation time (minutes): 0     Limited thoracic ultrasound     Limited thoracic ultrasound was performed using a curved transducer. A safe window for thoracentesis was identified.     Left hemithorax findings: Not investigated     Right hemithorax findings: Large pleural effusion     Thoracentesis     Local anesthesia was administered. The pleural space was accessed and fluid return confirmed position. The fluid was drained.     Real-time ultrasound guidance used: Yes     Catheter placed: 5 [place]er one-step     Closure     The catheter was removed. A sterile bandage was applied.     Post-drainage hemithorax findings: Not performed     Additional Details     Additional description of procedure: None     Equipment details: None     Specimens removed: Pleural fluid     Estimated blood loss (mL): Less than 10     Standardized report: SIR_Thoracentesis_v2     Attestation     Signer name: Javier Pace MD     I attest that I was present for the entire procedure. I reviewed the stored images and agree with the report as written.    Impression:       Successful ultrasound-guided right thoracentesis with drainage of 1.7 liters of serous fluid.     Plan:     Resume care by clinical team.     _______________________________________________________________       Electronically signed by: Javier Pace MD   Date: 10/30/2020   Time: 17:49   US Liver with Doppler [[id number]] Resulted: 10/30/20 1646   Order Status: Completed Updated: 10/30/20 1648   Narrative:     EXAMINATION:   US LIVER WITH DOPPLER     CLINICAL HISTORY:   liver failure;     TECHNIQUE:   Duplex scan of the liver was performed using B-mode/gray scale imaging and Doppler spectral analysis and color flow.     COMPARISON:   Ultrasound abdomen 10/08/2020, 10/05/2020, MRI 08/18/2020     FINDINGS:   The visualized portions of pancreas appear normal.     The liver measures 13.1 cm and demonstrates a nodular contour compatible with  cirrhosis.  Left lobe solid hyperechoic lesion measuring 1.3 x 1.1 x 1.4 cm without detectable internal vascularity.  Right hepatic lobe solid echogenic lesion measuring 3.1 x 3.5 x 2.7 cm with questionable peripheral vascularity.  The hepatorenal index is 0.98.     The common duct measures 3 mm. No intra or extrahepatic bile duct dilatation.     The gallbladder is surgically absent.     The spleen is enlarged measuring 19.7 x 4.5 cm and demonstrates homogeneous echotexture.     There is ascites and right pleural effusion.     The main, right, and left branches of the portal vein demonstrate hepatopedal flow and are unremarkable. The superior mesenteric vein and splenic vein are patent with appropriate flow.  The IVC, middle hepatic vein, right hepatic veins, and left hepatic vein are patent with proper directional flow.  No recanalized umbilical vein.  The hepatic arterial system is unremarkable.    Impression:       Hepatic cirrhosis with indeterminate solid lesions in the right and left hepatic lobes, similar size compared to prior ultrasound.  Recommend correlation with biopsy results.     Stigmata of portal hypertension including ascites and splenomegaly.  There is also right pleural fusion.     Normal Doppler evaluation of the hepatic vasculature.     Electronically signed by resident: Carmela Regalado   Date: 10/30/2020   Time: 16:28     Electronically signed by: Terrell Emmanuel MD   Date: 10/30/2020   Time: 16:46   US Retroperitoneal Complete (Kidney and [[id number]] Resulted: 10/30/20 1645   Order Status: Completed Updated: 10/30/20 1648   Narrative:     EXAMINATION:   US RETROPERITONEAL COMPLETE     CLINICAL HISTORY:   anthony;     TECHNIQUE:   Ultrasound of the kidneys and urinary bladder was performed including color flow and Doppler evaluation of the kidneys.     COMPARISON:   Ultrasound abdomen 10/08/2020, MRI 08/18/2020     FINDINGS:   Right kidney: The right kidney measures 9.8 cm. No cortical thinning. No  loss of corticomedullary distinction. Resistive index measures 0.91.  No mass. No renal stone. No hydronephrosis.     Left kidney: The left kidney measures 11.5 cm. No cortical thinning. No loss of corticomedullary distinction. Resistive index measures 1.0.  No mass. No renal stone. No hydronephrosis.     Splenic resistive index of 0.77     The bladder is partially distended at the time of scanning and has an unremarkable appearance.    Impression:       Bilateral medical renal disease.  No hydronephrosis.     Electronically signed by resident: Carmela Regalado   Date: 10/30/2020   Time: 16:39     Electronically signed by: Terrell Emmanuel MD   Date: 10/30/2020   Time: 16:45   Imaging History    2020  Date Procedure Name Status Accession Number Location   11/04/20 05:49 AM X-Ray Chest AP Portable Final [id number] [facility]   11/02/20 03:36 PM X-Ray Chest 1 View Final [id number] [facility]   11/02/20 12:22 PM X-Ray Chest 1 View Final [id number] [facility]   11/01/20 02:48 PM X-Ray Chest 1 View Final [id number] [facility]   11/01/20 05:04 AM X-Ray Chest 1 View Final [id number] [facility]   10/31/20 07:20 PM X-Ray Chest 1 View Final [id number] [facility]   10/31/20 01:18 AM X-Ray Chest AP Portable Final [id number] [facility]   10/30/20 05:40 PM IR Biopsy Liver Final [id number] [facility]   10/30/20 05:30 PM IR Thoracentesis with Imaging Final [id number] [facility]   10/30/20 04:00 PM US Retroperitoneal Complete (Kidney and Final [id number] [facility]   10/30/20 03:30 PM US Liver with Doppler Final [id number] [facility]   10/30/20 09:27 AM X-Ray Chest AP Portable Final [id number] [facility]   10/29/20 05:45 PM X-Ray Chest AP Portable Final [id number] [facility]   10/28/20 03:17 PM X-Ray Chest PA And Lateral Final [id number] [facility]   10/16/20 12:00 PM X-Ray Chest AP Portable Final [id number] [facility]   10/16/20 11:25 AM US Chest Mediastinum Final [id number] [facility]   10/15/20 03:48 PM X-Ray Chest AP Portable Final [id number] [facility]   10/15/20 03:28 PM US Guided Thoracentesis Final [id number] [facility]   10/14/20 07:38 PM X-Ray Chest  AP Portable Final [id number] [facility]   10/09/20 01:43 PM DXA Bone Density Spine And Hip Final [id number] [facility]   10/08/20 03:28 PM US Abdomen Complete with Dopp_Pre Liver Final [id number] [facility]   10/08/20 10:54 AM X-Ray Chest PA And Lateral Final [id number] [facility]   10/05/20 12:55 PM X-Ray Chest 1 View Final [id number] [facility]   10/05/20 11:19 AM US Chest Mediastinum Final [id number] [facility]   10/05/20 10:10 AM US Abdomen Limited Final [id number] [facility]   10/04/20 03:22 PM CT Chest Without Contrast Final [id number] [facility]   10/14/20 12:00 AM CARDIAC MONITORING STRIPS Final     10/04/20 12:00 AM CARDIAC MONITORING STRIPS Final     11/01/20 08:30 AM Echo Color Flow Doppler? Yes [id number]   10/09/20 11:11 AM Stress Echo Which stress agent will be used? Pharmacological; Color Flow Doppler? Yes [facility] [id number] [facility]   10/05/20 10:01 AM Echo Color Flow Doppler? Yes Final [id number] [facility]

## 2020-11-04 NOTE — PROGRESS NOTES
[facility]  Critical Care Medicine  Progress Note    Patient Name: Madhavi Bell  MRN: [id number]  Admission Date: 10/30/2020  Hospital Length of Stay: 5 days  Code Status: Full Code  Attending Provider: Hal Shea*  Primary Care Provider: Anurag Knutson MD   Principal Problem: Decompensated hepatic cirrhosis    Subjective:     HPI:  Madhavi Bell is a 45 year old female with decompensated liver cirrhosis due to alpha-1 antitrypsin deficiency and alcohol use, recurrent right sided hepato-hydrothorax, HCC who presented to [place] ED on 10/29 with shortness of breath and [place] [facility] [place]. Patient reports worsening SOB over the last two days. Patient scheduled for outpatient thoracentesis next week however was advised by GI doctor to present to the ED due to worsening symptoms. Patient reports associated dry cough with pleural effusions but no worse than baseline. Denies fever, chills, chest pain, abdominal pain, confusion.     Upon arrival, patient with oxygen saturations >94% on 4L NC with borderline BP (SBP 88-92). CXR with significant right sided pleural effusion. Labs significant for new leukocytosis, t bili of 24 and LINN with sCr 2.7.     Hospital/ICU Course:  Patient admitted to the CMICU as a transfer for liver transplant evaluation. Patient underwent liver biopsy and thoracentesis (1.5 L removed) on 10/30. Hepatology and nephrology consulted for assistance. Patient with worsening respiratory status on 10/31 and thoracentesis was completed again for symptom management with 2L removed. Vasopressor requirements significantly increased in order to maintain blood pressure. Unfortunately, her respiratory status continued to decline and was ultimately required intubation 11/1.  New pulmonary infiltrates developed on the left side more concerning for PNA. Bronchoscopy completed 11/1. ID consulted for assistance. Abx broadened to  vancomycin, meropenem, azithromycin, and fluconazole. Patient initiated on CRRT on 11/2. Patient to be presented to liver transplant committee on 11/4.    Interval History/Significant Events: No acute overnight events. Remains on CRRT and intubated. Plan for transplant committee meeting today.    Review of Systems   Unable to perform ROS: Intubated     Objective:     Vital Signs (Most Recent):  Temp: 97.6 °F (36.4 °C) (11/04/20 1100)  Pulse: 85 (11/04/20 1100)  Resp: (!) 26 (11/04/20 1100)  BP: (!) 108/53 (11/04/20 1000)  SpO2: (!) 91 % (11/04/20 1100) Vital Signs (24h Range):  Temp:  [97.4 °F (36.3 °C)-98.9 °F (37.2 °C)] 97.6 °F (36.4 °C)  Pulse:  [74-91] 85  Resp:  [24-33] 26  SpO2:  [91 %-100 %] 91 %  BP: ()/(44-59) 108/53  Arterial Line BP: ()/(28-61) 110/49   Weight: 79 kg (174 lb 2.6 oz)  Body mass index is 28.11 kg/m².      Intake/Output Summary (Last 24 hours) at 11/4/2020 1156  Last data filed at 11/4/2020 1118  Gross per 24 hour   Intake 5207.67 ml   Output 5035 ml   Net 172.67 ml       Physical Exam  Vitals signs reviewed.   Constitutional:       Appearance: She is well-developed. She is toxic-appearing.      Interventions: She is sedated, intubated and restrained.   HENT:      Head: Normocephalic and atraumatic.   Eyes:      General: Scleral icterus present.      Pupils: Pupils are equal, round, and reactive to light.   Neck:      Thyroid: No thyromegaly.      Trachea: No tracheal deviation.      Comments: [facility] TLC in place.   Cardiovascular:      Rate and Rhythm: Normal rate and regular rhythm.      Heart sounds: No murmur. No friction rub. No gallop.    Pulmonary:      Effort: She is intubated.      Breath sounds: No decreased breath sounds, wheezing or rhonchi.   Abdominal:      General: Bowel sounds are normal.      Palpations: Abdomen is soft.      Tenderness: There is no abdominal tenderness.   Genitourinary:     Comments: Garcia in place  Musculoskeletal: Normal range of motion.    Skin:     General: Skin is warm and dry.      Coloration: Skin is jaundiced.   Neurological:      General: No focal deficit present.      Sensory: No sensory deficit.         Vents:  Vent Mode: A/C (11/04/20 1141)  Set Rate: 26 BPM (11/04/20 1141)  Vt Set: 400 mL (11/04/20 1141)  PEEP/CPAP: 8 cmH20 (11/04/20 1141)  Oxygen Concentration (%): 40 (11/04/20 1141)  Peak Airway Pressure: 26 cmH2O (11/04/20 1141)  Plateau Pressure: 23 cmH20 (11/03/20 1116)  Total Ve: 10.5 mL (11/04/20 0332)  F/VT Ratio<105 (RSBI): (!) 64.36 (11/04/20 0332)  Lines/Drains/Airways     Central Venous Catheter Line            Percutaneous Central Line Insertion/Assessment - Triple Lumen  10/30/20 0855 left internal jugular 5 days    Trialysis (Dialysis) Catheter 11/02/20 1500 right internal jugular 1 day          Drain                 Fecal Incontinence  11/01/20 1535 2 days         NG/OG Tube 11/01/20 1434 Center mouth 2 days          Airway                 Airway - Non-Surgical 11/01/20 1424 Endotracheal Tube 2 days          Arterial Line            Arterial Line 11/03/20 1600 Right Radial less than 1 day              Significant Labs:    CBC/Anemia Profile:  Recent Labs   Lab 11/03/20  0528 11/03/20 2321 11/04/20 0331   WBC 29.37* 28.85* 26.35*   HGB 8.9* 8.1* 7.7*   HCT 26.8* 23.5* 22.6*   PLT 80* 63* 48*   MCV 97 96 95   RDW 18.2* 17.8* 17.8*        Chemistries:  Recent Labs   Lab 11/03/20  0343 11/03/20  1506 11/03/20 2321 11/04/20  0331   *  129* 123*  125* 129* 128*   K 4.5  4.5 4.3  4.3 4.4 4.2   CL 94*  94* 93*  94* 95 94*   CO2 16*  18* 16*  18* 21* 21*   BUN 32*  33* 33*  31* 22* 19   CREATININE 2.9*  2.9* 2.8*  3.0* 1.8* 1.7*   CALCIUM 7.7*  7.7* 7.2*  7.7* 7.6* 7.4*   ALBUMIN 3.5  3.6 3.0*  3.1* 3.0* 2.8*   PROT 5.6* 4.9*  --  4.6*   BILITOT 34.2* 32.1*  --  29.2*   ALKPHOS 287* 265*  --  277*   ALT 78* 78*  --  82*   * 186*  --  195*   MG 2.5  2.4 2.3 2.3 1.9   PHOS 4.9*  5.1* 4.6* 3.2  3.0       All pertinent labs within the past 24 hours have been reviewed.    Significant Imaging:  I have reviewed all pertinent imaging results/findings within the past 24 hours.      ABG  Recent Labs   Lab 11/04/20  0337   PH 7.397   PO2 206*   PCO2 41.2   HCO3 25.4   BE 1     Assessment/Plan:     Pulmonary  Acute hypoxemic respiratory failure  Likely related to volume overloaded in setting of decompensated cirrhosis complicated by hepato-hydrothorax and worsening LINN with minimal UOP vs underlying PNA. CXR with significant pleural effusion of the right side. Most recent CXR with worsening opacifications on the left concerning for possible PNA.    --S/p thoracentesis with 1.5L removal on 10/30 and 10/31 with 2L removed  --Intubated 11/1  --S/p bronschoscopy 11/1; cultures pending  --Continue abx (vancomycin, meropenem, fluconazole, and azithromycin)  --follow up legionella, histo and blasto; aspergillus and legionella negative antigen  --Follow up fungitell  --Wean oxygen for goal saturation 88-92%      Renal/  Acute renal failure  Possibly iATN vs HRS    --Urine lytes indicating pre-renal cause  --RP US negative for hydronephrosis  --Nephrology following; Map goal >65   --CRRT initiated on 11/2  --HRS treatment d/c'ed after CRRT initiation  --Garcia in place with strict I/Os    Hyponatremia  2/2 decompensated liver cirrhosis vs new LINN    --frequent BMP  --will continue to monitor  --CRRT initiated on 11/2    Hematology  Thrombocytopenia  Platelets down-trending.    --transfuse plt <50 with active bleeding    GI  * Decompensated hepatic cirrhosis  Secondary to A1AT deficiency and ETOH abuse complicated by HCC, HE, hepatic hydrothorax, ascites, hepatic varices and hyponatremia.    --abdominal US with doppler 10/31 with solid lesions in right and left lower lobe; portal HTN; splenomegaly  --S/p liver biopsy 10/30, pathology negative  --Hepatology consulted  --Continue midodrine 15 TID  --Lactulose/rifaximin  --to  be presented to transplant committee on 11/4    MELD-Na score: 39 at 11/4/2020  3:31 AM  MELD score: 38 at 11/4/2020  3:31 AM  Calculated from:  Serum Creatinine: 1.7 mg/dL at 11/4/2020  3:31 AM  Serum Sodium: 128 mmol/L at 11/4/2020  3:31 AM  Total Bilirubin: 29.2 mg/dL at 11/4/2020  3:31 AM  INR(ratio): 3.5 at 11/4/2020  3:31 AM  Age: 45 years    Pleural effusion associated with hepatic disorder  Reports x 3 thoracentesis in the past for symptom management.     --s/p thoracentesis of 1.5 L removal on 10/30; labs negative for infection  --Repeat thora 10/31 with 2L removed    Other  Shock, unspecified  Likely related to decompensated cirrhosis vs underlying infection (less likely)    --Continue vancomycin, meropenem, azithromycin, and fluconazole.  --ID following  --BCx NGTD; resp cx normal ishan  --Stress dose steroids initiated  --UA with 2+ leuks and 46 WBCs; urine cx no growth  --No pocket for paracentesis   --Thoracentesis fluid negative for SBP  --Continue norepinephrine and vasopressin to maintain MAP >65    Palliative care encounter  Palliative care consulted due to liver transplant evaluation       Critical Care Daily Checklist:    A: Awake: RASS Goal/Actual Goal: RASS Goal: 0-->alert and calm  Actual: Phan Agitation Sedation Scale (RASS): Moderate sedation   B: Spontaneous Breathing Trial Performed? Spon. Breathing Trial Initiated?: Not initiated (11/02/20 0808)   C: SAT & SBT Coordinated?  n/a                      D: Delirium: CAM-ICU Overall CAM-ICU: Positive   E: Early Mobility Performed? no   F: Feeding Goal: Goals: Meet % EEN, EPN by RD f/u date  Status: Nutrition Goal Status: new   Current Diet Order   Procedures    Diet NPO      AS: Analgesia/Sedation done   T: Thromboembolic Prophylaxis Held d/t thrombocytopenia/elevated INR   H: HOB > 300 Yes   U: Stress Ulcer Prophylaxis (if needed) famotidine   G: Glucose Control q6h   B: Bowel Function Stool Occurrence: 1   I: Indwelling Catheter  (Lines & Garcia) Necessity necessary   D: De-escalation of Antimicrobials/Pharmacotherapies done    Plan for the day/ETD Cont course    Code Status:  Family/Goals of Care: Full Code  Cont course     Critical Care Time: 70 minutes  Critical secondary to Patient has a condition that poses threat to life and bodily function: remains intubated on crrt.     Critical care was time spent personally by me on the following activities: development of treatment plan with patient or surrogate and bedside caregivers, discussions with consultants, evaluation of patient's response to treatment, examination of patient, ordering and performing treatments and interventions, ordering and review of laboratory studies, ordering and review of radiographic studies, pulse oximetry, re-evaluation of patient's condition. This critical care time did not overlap with that of any other provider or involve time for any procedures.     Cedric Jimenez NP  Critical Care Medicine  [facility]

## 2020-11-04 NOTE — PROGRESS NOTES
[facility]  Hepatology Service  Progress Note    Patient Name: Madhavi Bell  MRN: [id number]  Admission Date: 10/30/2020  Hospital Length of Stay: 5 days  Code Status: Full Code   Principal Problem:Decompensated hepatic cirrhosis      Subjective:     Interval history:   Remains intubated.  On levo and vaso.  On CRRT  On Vanc, meropenem and fluconazole.      Objective:     Vitals:    11/04/20 1500   BP:    Pulse: 93   Resp: (!) 26   Temp: 97.7 °F (36.5 °C)       General:  Intubated, sedated  HEENT: +scleral icterus.  Resp:  Ventilator breath sounds  Cardiac: RRR  Abdomen: Normoactive bowel sounds. Distended.  Extremities: No peripheral edema.   Neurologic:  Sedated      Significant Labs:  Recent Labs   Lab 11/04/20  0331 11/04/20  1205 11/04/20  1527   HGB 7.7* 6.9* 6.8*       Lab Results   Component Value Date    WBC 37.81 (H) 11/04/2020    HGB 6.8 (L) 11/04/2020    HCT 21.0 (L) 11/04/2020     (H) 11/04/2020     (L) 11/04/2020       Lab Results   Component Value Date     (L) 11/04/2020    K 4.5 11/04/2020    CL 96 11/04/2020    CO2 19 (L) 11/04/2020    BUN 9 11/04/2020    CREATININE 0.8 11/04/2020    CALCIUM 7.3 (L) 11/04/2020    ANIONGAP 14 11/04/2020    ESTGFRAFRICA >60.0 11/04/2020    EGFRNONAA >60.0 11/04/2020       Lab Results   Component Value Date     (H) 11/04/2020     (H) 11/04/2020     (H) 10/08/2020    ALKPHOS 260 (H) 11/04/2020    BILITOT 28.3 (H) 11/04/2020       Lab Results   Component Value Date    INR 3.5 (H) 11/04/2020    INR 2.8 (H) 11/03/2020    INR 2.6 (H) 11/02/2020       Significant Imaging:  Reviewed pertinent radiology findings.       Assessment/Plan:     Madhavi Bell is a 45 y.o. female with history of decompensated liver cirrhosis due to alpha-1 antitrypsin deficiency and alcohol, recurrent pleural effusion, HCC (with the additional bilobar indeterminate liver lesions) who presented to [place] with shortness of  breath and transferred to [facility] for further evaluation.    Course has been complicated by worsening hypoxic respiratory failure.  S/p thoracentesis x2, but developed new pulmonary infiltrates and intubated 11/1.  Bronch with evidence of pneumonia, but infectious workup so far with no growth.  She did undergo liver biopsy of indeterminate lesion on 10/30 which was negative for malignancy.     Problem List:  1. Decompensated liver cirrhosis due to alpha-1 antitrypsin deficiency and alcohol  2. Hepatocellular carcinoma - 3.9 cm right lesion with additional indeterminate foci.  Bx of indeterminate liver lesion negative for malignancy which means she meets Jeancarlos criteria  3. Hypoxemic respiratory failure / septic shock due to pneumonia  4. Acute renal failure / HRS  5. Hyponatremia     Plan:  - Septic shock / Pneumonia:  Appreciate ICU assistance.  On levo and vaso.  - Acute hypoxemic respiratory failure:  S/p thoracentesis on 10/30 (1.7L) and 10/31 (2L).  Now intubated on 11/01.  Bronchoscopy 11/1 - cultures with WBCs, GPC and GNRs.  On broad-spectrum antibiotics and antifungal.  ID following.  - HCC:  3.9cm HCC lesion with other indeterminate liver lesions - biopsy on 10/30 negative for malignancy  - Hepatic hydrothorax: CXR on presentation with right lung opacification.  Thoracentesis  as above.  2 gram Na restrict.  Hold diuretics given hyponatremia and renal failure.  Recommend against chest tube for hepatic hydrothorax alone, as hydrothorax will continuously drain and lead to significant dehydration/electrolyte abnormalities; also can have difficult to control leakage after removal.  - LINN/HRS:  Nephrology following.  On CRRT since 11/2  - Hyponatremia:  Hold diuretics, fluid restrict.  Trial of IV albumin.  - Esophageal varices: last EGD 9/9 with small esophageal varices  - Hepatic encephalopathy:  Continue lactulose titrated to 3-4 BMs daily  - Transplant:  Candidacy was initially dependent on ruling out multifocal  HCC, but biopsy of indeterminate liver lesion is negative for malignancy (known HCC lesion meets Jeancarlos criteria).  However, now with septic shock due to pneumonia.  Pending T-spot to complete evaluation. Plan to discuss today at committee - approved for liver transplant pending medical and clinical improvement and financial approval    Thank you for involving us in the care of Madhavi Bell. Please call with any additional questions, concerns or changes in the patient's clinical status.    Shane Ross MD  Gastroenterology Fellow PGY IV   [facility]

## 2020-11-04 NOTE — SUBJECTIVE & OBJECTIVE
Interval History/Significant Events: No acute overnight events. Remains on CRRT and intubated. Plan for transplant committee meeting today.    Review of Systems   Unable to perform ROS: Intubated     Objective:     Vital Signs (Most Recent):  Temp: 97.6 °F (36.4 °C) (11/04/20 1100)  Pulse: 85 (11/04/20 1100)  Resp: (!) 26 (11/04/20 1100)  BP: (!) 108/53 (11/04/20 1000)  SpO2: (!) 91 % (11/04/20 1100) Vital Signs (24h Range):  Temp:  [97.4 °F (36.3 °C)-98.9 °F (37.2 °C)] 97.6 °F (36.4 °C)  Pulse:  [74-91] 85  Resp:  [24-33] 26  SpO2:  [91 %-100 %] 91 %  BP: ()/(44-59) 108/53  Arterial Line BP: ()/(28-61) 110/49   Weight: 79 kg (174 lb 2.6 oz)  Body mass index is 28.11 kg/m².      Intake/Output Summary (Last 24 hours) at 11/4/2020 1156  Last data filed at 11/4/2020 1118  Gross per 24 hour   Intake 5207.67 ml   Output 5035 ml   Net 172.67 ml       Physical Exam  Vitals signs reviewed.   Constitutional:       Appearance: She is well-developed. She is toxic-appearing.      Interventions: She is sedated, intubated and restrained.   HENT:      Head: Normocephalic and atraumatic.   Eyes:      General: Scleral icterus present.      Pupils: Pupils are equal, round, and reactive to light.   Neck:      Thyroid: No thyromegaly.      Trachea: No tracheal deviation.      Comments: [facility] TLC in place.   Cardiovascular:      Rate and Rhythm: Normal rate and regular rhythm.      Heart sounds: No murmur. No friction rub. No gallop.    Pulmonary:      Effort: She is intubated.      Breath sounds: No decreased breath sounds, wheezing or rhonchi.   Abdominal:      General: Bowel sounds are normal.      Palpations: Abdomen is soft.      Tenderness: There is no abdominal tenderness.   Genitourinary:     Comments: Garcia in place  Musculoskeletal: Normal range of motion.   Skin:     General: Skin is warm and dry.      Coloration: Skin is jaundiced.   Neurological:      General: No focal deficit present.      Sensory: No sensory  deficit.         Vents:  Vent Mode: A/C (11/04/20 1141)  Set Rate: 26 BPM (11/04/20 1141)  Vt Set: 400 mL (11/04/20 1141)  PEEP/CPAP: 8 cmH20 (11/04/20 1141)  Oxygen Concentration (%): 40 (11/04/20 1141)  Peak Airway Pressure: 26 cmH2O (11/04/20 1141)  Plateau Pressure: 23 cmH20 (11/03/20 1116)  Total Ve: 10.5 mL (11/04/20 0332)  F/VT Ratio<105 (RSBI): (!) 64.36 (11/04/20 0332)  Lines/Drains/Airways     Central Venous Catheter Line            Percutaneous Central Line Insertion/Assessment - Triple Lumen  10/30/20 0855 left internal jugular 5 days    Trialysis (Dialysis) Catheter 11/02/20 1500 right internal jugular 1 day          Drain                 Fecal Incontinence  11/01/20 1535 2 days         NG/OG Tube 11/01/20 1434 Center mouth 2 days          Airway                 Airway - Non-Surgical 11/01/20 1424 Endotracheal Tube 2 days          Arterial Line            Arterial Line 11/03/20 1600 Right Radial less than 1 day              Significant Labs:    CBC/Anemia Profile:  Recent Labs   Lab 11/03/20  0528 11/03/20 2321 11/04/20 0331   WBC 29.37* 28.85* 26.35*   HGB 8.9* 8.1* 7.7*   HCT 26.8* 23.5* 22.6*   PLT 80* 63* 48*   MCV 97 96 95   RDW 18.2* 17.8* 17.8*        Chemistries:  Recent Labs   Lab 11/03/20  0343 11/03/20  1506 11/03/20 2321 11/04/20  0331   *  129* 123*  125* 129* 128*   K 4.5  4.5 4.3  4.3 4.4 4.2   CL 94*  94* 93*  94* 95 94*   CO2 16*  18* 16*  18* 21* 21*   BUN 32*  33* 33*  31* 22* 19   CREATININE 2.9*  2.9* 2.8*  3.0* 1.8* 1.7*   CALCIUM 7.7*  7.7* 7.2*  7.7* 7.6* 7.4*   ALBUMIN 3.5  3.6 3.0*  3.1* 3.0* 2.8*   PROT 5.6* 4.9*  --  4.6*   BILITOT 34.2* 32.1*  --  29.2*   ALKPHOS 287* 265*  --  277*   ALT 78* 78*  --  82*   * 186*  --  195*   MG 2.5  2.4 2.3 2.3 1.9   PHOS 4.9*  5.1* 4.6* 3.2 3.0       All pertinent labs within the past 24 hours have been reviewed.    Significant Imaging:  I have reviewed all pertinent imaging results/findings  within the past 24 hours.

## 2020-11-04 NOTE — ASSESSMENT & PLAN NOTE
Secondary to A1AT deficiency and ETOH abuse complicated by HCC, HE, hepatic hydrothorax, ascites, hepatic varices and hyponatremia.    --abdominal US with doppler 10/31 with solid lesions in right and left lower lobe; portal HTN; splenomegaly  --S/p liver biopsy 10/30, pathology negative  --Hepatology consulted  --Continue midodrine 15 TID  --Lactulose/rifaximin  --to be presented to transplant committee on 11/4    MELD-Na score: 39 at 11/4/2020  3:31 AM  MELD score: 38 at 11/4/2020  3:31 AM  Calculated from:  Serum Creatinine: 1.7 mg/dL at 11/4/2020  3:31 AM  Serum Sodium: 128 mmol/L at 11/4/2020  3:31 AM  Total Bilirubin: 29.2 mg/dL at 11/4/2020  3:31 AM  INR(ratio): 3.5 at 11/4/2020  3:31 AM  Age: 45 years

## 2020-11-04 NOTE — COMMITTEE REVIEW
Madhavi Arabella's case presented to selection committee.  Patient has been accepted for liver transplant due to  and complications of end stage liver disease including hyperbilirubinemia, hypoalbuminemia, coagulopathy, ascites, severe malnutrition, encephalopathy, jaundice, hyponatremia, portal hypertension, thrombocytopenia, esophageal varices and chronic anemia, edema, pleural effusion, LINN, hypotension with a MELD score of 39.  Patient has no absolute contraindications for liver transplant.  Patient will be listed pending medical and clinical improvement and financial approval.  Will continue to monitor cxr and ventilator settings.    Patient will accept HBcAb positive livers.  Patient will accept HCVAB positive livers.  Patient will accept DCD livers.  Patient will accept HCV OZZIE positive livers  Patient will accept HBV OZZIE positive livers    I was present at the committee meeting and attest to the decision of the committee.    Ezio Pastrana  11/05/2020

## 2020-11-05 PROBLEM — D64.9 ANEMIA: Status: ACTIVE | Noted: 2020-01-01

## 2020-11-05 PROBLEM — R79.1 ELEVATED INR: Status: ACTIVE | Noted: 2020-01-01

## 2020-11-05 NOTE — ASSESSMENT & PLAN NOTE
Likely related to decompensated cirrhosis vs underlying infection (less likely). Infectious work up essentially negative. Fungitell positive and repeat pending.     --Continue vancomycin, meropenem and fluconazole.  --Stress dose steroids initiated  --Continue norepinephrine and vasopressin to maintain MAP >65  --transplant ID following  --Repeat thoracentesis with IR 11/6

## 2020-11-05 NOTE — ASSESSMENT & PLAN NOTE
Likely related to volume overloaded in setting of decompensated cirrhosis complicated by hepato-hydrothorax and worsening LINN with minimal UOP vs underlying PNA. CXR with significant pleural effusion of the right side. Most recent CXR with worsening opacifications on the left concerning for possible PNA.    --S/p thoracentesis with 1.5L removal on 10/30 and 10/31 with 2L removed  --Intubated 11/1  --S/p bronschoscopy 11/1; fungitell + (167)  --Continue abx (vancomycin, meropenem, fluconazole)  --follow up legionella, histo and blasto; aspergillus and legionella negative antigen  --Wean oxygen for goal saturation 88-92%

## 2020-11-05 NOTE — NURSING
Notified Critical Care Team that within 15 min of CRRT starting with UF of 250 pt MAP dropped into the 50s requiring levo to increase from 1.4 to 2.8 to maintain MAP goal 65. UF decreased to 200. MD to bedside will discuss with team about adding another pressor or rinsing back CRRT. BEN

## 2020-11-05 NOTE — ASSESSMENT & PLAN NOTE
Secondary to A1AT deficiency and ETOH abuse complicated by HCC, HE, hepatic hydrothorax, ascites, hepatic varices and hyponatremia.    --abdominal US with doppler 10/31 with solid lesions in right and left lower lobe; portal HTN; splenomegaly  --S/p liver biopsy 10/30, pathology negative  --Hepatology consulted  --Continue midodrine 15 TID  --Lactulose/rifaximin  --presented to transplant committee on 11/4; patient approved but not listed. Plan to get presented again on Friday, 11/6.    MELD-Na score: 32 at 11/5/2020 11:26 AM  MELD score: 29 at 11/5/2020 11:26 AM  Calculated from:  Serum Creatinine: 0.8 mg/dL (Rounded to 1 mg/dL) at 11/5/2020  3:49 AM  Serum Sodium: 130 mmol/L at 11/5/2020  3:49 AM  Total Bilirubin: 29.2 mg/dL at 11/5/2020  3:49 AM  INR(ratio): 2.4 at 11/5/2020 11:26 AM  Age: 45 years

## 2020-11-05 NOTE — PLAN OF CARE
Recommendations     1. If/when medically feasible, resume enteral nutrition. Rec'd Novasource @ 40 mL/hr to provide 1920 calories, 87 grams of protein, 688 mL fluid.  2. RD to monitor & follow-up.     Goals: Meet % EEN, EPN by RD f/u date  Nutrition Goal Status: goal not met  Communication of RD Recs: reviewed with RN

## 2020-11-05 NOTE — PROGRESS NOTES
[facility]  Critical Care Medicine  Progress Note    Patient Name: Madhavi Bell  MRN: [id number]  Admission Date: 10/30/2020  Hospital Length of Stay: 6 days  Code Status: Full Code  Attending Provider: Hal Shea*  Primary Care Provider: Anurag Knutson MD   Principal Problem: Decompensated hepatic cirrhosis    Subjective:     HPI:  Madhavi Bell is a 45 year old female with decompensated liver cirrhosis due to alpha-1 antitrypsin deficiency and alcohol use, recurrent right sided hepato-hydrothorax, HCC who presented to [place] ED on 10/29 with shortness of breath and [place] [facility] [place]. Patient reports worsening SOB over the last two days. Patient scheduled for outpatient thoracentesis next week however was advised by GI doctor to present to the ED due to worsening symptoms. Patient reports associated dry cough with pleural effusions but no worse than baseline. Denies fever, chills, chest pain, abdominal pain, confusion.     Upon arrival, patient with oxygen saturations >94% on 4L NC with borderline BP (SBP 88-92). CXR with significant right sided pleural effusion. Labs significant for new leukocytosis, t bili of 24 and LINN with sCr 2.7.     Hospital/ICU Course:  Patient admitted to the CMICU as a transfer for liver transplant evaluation. Patient underwent liver biopsy and thoracentesis (1.5 L removed) on 10/30. Hepatology and nephrology consulted for assistance. Patient with worsening respiratory status on 10/31 and thoracentesis was completed again for symptom management with 2L removed. Vasopressor requirements significantly increased in order to maintain blood pressure. Unfortunately, her respiratory status continued to decline and was ultimately required intubation 11/1.  New pulmonary infiltrates developed on the left side more concerning for PNA. Bronchoscopy completed 11/1. ID consulted for assistance. Abx broadened to  vancomycin, meropenem, azithromycin, and fluconazole. Patient initiated on CRRT on 11/2. Patient to be presented to liver transplant committee on 11/4; patient was approved but not listed as she remains on CRRT requiring high vasopressors.    Interval History/Significant Events: Patient restarted on crrt overnight. Received 4 U FFP and 2 U PRBCs overnight.    Review of Systems   Unable to perform ROS: Intubated     Objective:     Vital Signs (Most Recent):  Temp: 98.3 °F (36.8 °C) (11/05/20 1258)  Pulse: 99 (11/05/20 1300)  Resp: (!) 25 (11/05/20 1300)  BP: (!) 100/47 (11/05/20 1200)  SpO2: (!) 93 % (11/05/20 1300) Vital Signs (24h Range):  Temp:  [97.7 °F (36.5 °C)-98.5 °F (36.9 °C)] 98.3 °F (36.8 °C)  Pulse:  [] 99  Resp:  [25-29] 25  SpO2:  [89 %-100 %] 93 %  BP: (100-124)/(47-60) 100/47  Arterial Line BP: ()/(37-63) 128/59   Weight: 81 kg (178 lb 9.2 oz)  Body mass index is 28.82 kg/m².      Intake/Output Summary (Last 24 hours) at 11/5/2020 1338  Last data filed at 11/5/2020 1300  Gross per 24 hour   Intake 8590.25 ml   Output 4593 ml   Net 3997.25 ml       Physical Exam  Vitals signs reviewed.   Constitutional:       Appearance: She is well-developed. She is toxic-appearing.      Interventions: She is sedated, intubated and restrained.   HENT:      Head: Normocephalic and atraumatic.   Eyes:      General: Scleral icterus present.      Pupils: Pupils are equal, round, and reactive to light.   Neck:      Thyroid: No thyromegaly.      Trachea: No tracheal deviation.      Comments: J TLC in place.   Cardiovascular:      Rate and Rhythm: Normal rate and regular rhythm.      Heart sounds: No murmur. No friction rub. No gallop.    Pulmonary:      Effort: She is intubated.      Breath sounds: No decreased breath sounds, wheezing or rhonchi.   Abdominal:      General: Bowel sounds are normal.      Palpations: Abdomen is soft.      Tenderness: There is no abdominal tenderness.   Genitourinary:      Comments: Garcia in place  Musculoskeletal: Normal range of motion.   Skin:     General: Skin is warm and dry.      Coloration: Skin is jaundiced.   Neurological:      General: No focal deficit present.      Sensory: No sensory deficit.         Vents:  Vent Mode: A/C (11/05/20 1229)  Set Rate: 26 BPM (11/05/20 1229)  Vt Set: 400 mL (11/05/20 1229)  PEEP/CPAP: 8 cmH20 (11/05/20 1229)  Oxygen Concentration (%): 100 (11/05/20 1229)  Peak Airway Pressure: 24 cmH2O (11/05/20 1229)  Plateau Pressure: 20 cmH20 (11/05/20 0734)  Total Ve: 11.6 mL (11/05/20 1229)  F/VT Ratio<105 (RSBI): (!) 59.91 (11/05/20 1229)  Lines/Drains/Airways     Central Venous Catheter Line            Percutaneous Central Line Insertion/Assessment - Triple Lumen  10/30/20 0855 left internal jugular 6 days    Trialysis (Dialysis) Catheter 11/02/20 1500 right internal jugular 2 days          Drain                 Fecal Incontinence  11/01/20 1535 3 days         NG/OG Tube 11/01/20 1434 Center mouth 3 days          Airway                 Airway - Non-Surgical 11/01/20 1424 Endotracheal Tube 3 days          Arterial Line            Arterial Line 11/03/20 1600 Right Radial 1 day              Significant Labs:    CBC/Anemia Profile:  Recent Labs   Lab 11/04/20  2306 11/05/20  0509 11/05/20  1126   WBC 33.14* 36.84* 27.22*   HGB 7.0* 7.9* 6.9*   HCT 20.8* 23.9* 20.0*   PLT 62* 60* 43*   MCV 97 95 93   RDW 17.2* 16.8* 17.2*        Chemistries:  Recent Labs   Lab 11/04/20  1336 11/04/20  1441 11/04/20  1527 11/04/20  2144 11/05/20  0349   * 129* 128* 127*  127* 130*  132*  132*   K 4.3 4.5 4.5 4.4  4.4 4.5  4.5  4.5   CL 94* 96 94* 94*  94* 97  98  98   CO2 21* 19* 20* 21*  21* 23  23  23   BUN 10 9 9 13  13 9  9  9   CREATININE 0.9 0.8 0.8 1.2  1.2 0.8  0.8  0.8   CALCIUM 7.7* 7.3* 7.2* 7.7*  7.7* 7.6*  7.4*  7.4*   ALBUMIN 2.6* 2.7* 2.6* 2.9*  2.9* 2.8*  2.8*  2.8*   PROT  --  4.4* 4.5*  --  4.4*   BILITOT  --  28.3*  28.6*  --  29.2*   ALKPHOS  --  260* 275*  --  266*   ALT  --  109* 109*  --  176*   AST  --  309* 314*  --  632*   MG 2.2  --   --  2.2  2.2 2.0  2.0   PHOS 3.3  --   --  3.1  3.1 2.3*  2.3*  2.3*       All pertinent labs within the past 24 hours have been reviewed.    Significant Imaging:  I have reviewed all pertinent imaging results/findings within the past 24 hours.      ABG  Recent Labs   Lab 11/05/20  0907   PH 7.459*   PO2 76*   PCO2 36.4   HCO3 25.8   BE 2     Assessment/Plan:     Pulmonary  Acute hypoxemic respiratory failure  Likely related to volume overloaded in setting of decompensated cirrhosis complicated by hepato-hydrothorax and worsening LINN with minimal UOP vs underlying PNA. CXR with significant pleural effusion of the right side. Most recent CXR with worsening opacifications on the left concerning for possible PNA.    --S/p thoracentesis with 1.5L removal on 10/30 and 10/31 with 2L removed  --Intubated 11/1  --S/p bronschoscopy 11/1; fungitell + (167)  --Continue abx (vancomycin, meropenem, fluconazole)  --follow up legionella, histo and blasto; aspergillus and legionella negative antigen  --Wean oxygen for goal saturation 88-92%      Renal/  Acute renal failure  Possibly iATN vs HRS    --Urine lytes indicating pre-renal cause  --RP US negative for hydronephrosis  --Nephrology following; Map goal >65   --CRRT initiated on 11/2  --able to remove ~1L per shift  --HRS treatment d/c'ed after CRRT initiation  --Garcia in place with strict I/Os    Hyponatremia  2/2 decompensated liver cirrhosis vs new LINN    --frequent BMP  --will continue to monitor  --CRRT initiated on 11/2    Hematology  Elevated INR  2/2 liver fx    --transfuse FFP/Vk    Thrombocytopenia  Platelets down-trending.    --transfuse plt <50 with active bleeding    Oncology  Anemia  2/2 liver fx and clotting off on CRRT    --transfuse for Hgb <7    GI  * Decompensated hepatic cirrhosis  Secondary to A1AT deficiency and ETOH abuse  complicated by HCC, HE, hepatic hydrothorax, ascites, hepatic varices and hyponatremia.    --abdominal US with doppler 10/31 with solid lesions in right and left lower lobe; portal HTN; splenomegaly  --S/p liver biopsy 10/30, pathology negative  --Hepatology consulted  --Continue midodrine 15 TID  --Lactulose/rifaximin  --presented to transplant committee on 11/4; patient approved but not listed. Plan to get presented again on Friday, 11/6.    MELD-Na score: 32 at 11/5/2020 11:26 AM  MELD score: 29 at 11/5/2020 11:26 AM  Calculated from:  Serum Creatinine: 0.8 mg/dL (Rounded to 1 mg/dL) at 11/5/2020  3:49 AM  Serum Sodium: 130 mmol/L at 11/5/2020  3:49 AM  Total Bilirubin: 29.2 mg/dL at 11/5/2020  3:49 AM  INR(ratio): 2.4 at 11/5/2020 11:26 AM  Age: 45 years    Pleural effusion associated with hepatic disorder  Reports x 3 thoracentesis in the past for symptom management.     --s/p thoracentesis of 1.5 L removal on 10/30; labs negative for infection  --Repeat thora 10/31 with 2L removed    Other  Shock, unspecified  Likely related to decompensated cirrhosis vs underlying infection (less likely)    --Continue vancomycin, meropenem, azithromycin, and fluconazole.  --ID following  --BCx NGTD; resp cx normal ishan  --Stress dose steroids initiated  --UA with 2+ leuks and 46 WBCs; urine cx no growth  --No pocket for paracentesis   --Thoracentesis fluid negative for SBP  --Continue norepinephrine and vasopressin to maintain MAP >65    Palliative care encounter  Palliative care consulted due to liver transplant evaluation      Critical Care Time: 70 minutes  Critical secondary to Patient has a condition that poses threat to life and bodily function: intubated.     Critical care was time spent personally by me on the following activities: development of treatment plan with patient or surrogate and bedside caregivers, discussions with consultants, evaluation of patient's response to treatment, examination of patient,  ordering and performing treatments and interventions, ordering and review of laboratory studies, ordering and review of radiographic studies, pulse oximetry, re-evaluation of patient's condition. This critical care time did not overlap with that of any other provider or involve time for any procedures.     Cedric Jimenez NP  Critical Care Medicine  [facility]

## 2020-11-05 NOTE — ASSESSMENT & PLAN NOTE
Possibly iATN vs HRS    --Urine lytes indicating pre-renal cause  --RP US negative for hydronephrosis  --Nephrology following; Map goal >65   --CRRT initiated on 11/2  --able to remove ~1L per shift  --HRS treatment d/c'ed after CRRT initiation  --Garcia in place with strict I/Os

## 2020-11-05 NOTE — NURSING
Notified Cedric Jimenez NP that when pt restarted on CRRT levophed requirements increased from 1 mcg/kg/min to 2.8 mcg/kg/min in order to maintain MAP > 65. Also inform NP that pt with copious amounts of thick, bright red blood secretions from ETT. NP verbalizes understanding states that we will await result of CBC.     After about 15 minutes able to wean levophed gtt back down to 1.1 mcg/kg/min. Will give pt 1 u PRBC and 1 u platelets per MD order. Will continue to monitor

## 2020-11-05 NOTE — SUBJECTIVE & OBJECTIVE
Interval History:   No fevers  Remains on norepi and vaso  On fentanyl and propofol  On CRRT  Bloody secretions removed from ET tube - subsequently vent requirements increased    Vent Mode: A/C  Oxygen Concentration (%):  [] 90  Resp Rate Total:  [26 br/min-27 br/min] 26 br/min  Vt Set:  [400 mL] 400 mL  PEEP/CPAP:  [8 cmH20] 8 cmH20  Mean Airway Pressure:  [12 cmH20-15 cmH20] 15 cmH20      Review of Systems   Unable to perform ROS: Acuity of condition     Objective:     Vital Signs (Most Recent):  Temp: 98.8 °F (37.1 °C) (11/05/20 1622)  Pulse: 91 (11/05/20 1622)  Resp: (!) 26 (11/05/20 1622)  BP: (!) 100/54 (11/05/20 1607)  SpO2: (!) 94 % (11/05/20 1622) Vital Signs (24h Range):  Temp:  [97.8 °F (36.6 °C)-98.8 °F (37.1 °C)] 98.8 °F (37.1 °C)  Pulse:  [] 91  Resp:  [25-29] 26  SpO2:  [89 %-100 %] 94 %  BP: (100-124)/(47-60) 100/54  Arterial Line BP: ()/(37-63) 108/48     Weight: 81 kg (178 lb 9.2 oz)  Body mass index is 28.82 kg/m².    Estimated Creatinine Clearance: 125.8 mL/min (based on SCr of 0.6 mg/dL).    Physical Exam  Vitals signs reviewed.   Constitutional:       General: She is not in acute distress.     Appearance: She is well-developed. She is ill-appearing and toxic-appearing. She is not diaphoretic.   HENT:      Head: Normocephalic and atraumatic.   Eyes:      Conjunctiva/sclera: Conjunctivae normal.   Neck:      Musculoskeletal: Normal range of motion and neck supple.   Cardiovascular:      Rate and Rhythm: Normal rate.   Pulmonary:      Effort: Pulmonary effort is normal. No respiratory distress.   Abdominal:      General: There is no distension.      Palpations: Abdomen is soft.   Musculoskeletal: Normal range of motion.   Skin:     General: Skin is warm and dry.      Findings: No erythema or rash.   Neurological:      Mental Status: She is oriented to person, place, and time.   Psychiatric:         Behavior: Behavior normal.         Significant Labs: All pertinent labs within the  past 24 hours have been reviewed.    Significant Imaging: I have reviewed all pertinent imaging results/findings within the past 24 hours.

## 2020-11-05 NOTE — PROGRESS NOTES
[facility]  Hepatology Service  Progress Note    Patient Name: Madhavi Bell  MRN: [id number]  Admission Date: 10/30/2020  Hospital Length of Stay: 6 days  Code Status: Full Code   Principal Problem:Decompensated hepatic cirrhosis      Subjective:     Interval history:   Remains intubated.  On levo and vaso - pressor requirements temporarily increased when starting CRRT, but overall stable.  On Vanc, meropenem and fluconazole.  Cultures so far unrevealing.      Objective:     Vitals:    11/05/20 1405   BP:    Pulse: 94   Resp: (!) 25   Temp: 98.3 °F (36.8 °C)       General:  Intubated, sedated  HEENT: +scleral icterus.  Resp:  Ventilator breath sounds  Cardiac: RRR  Abdomen: Normoactive bowel sounds. Distended.  Extremities: No peripheral edema.   Neurologic:  Sedated      Significant Labs:  Recent Labs   Lab 11/04/20  2306 11/05/20  0509 11/05/20  1126   HGB 7.0* 7.9* 6.9*       Lab Results   Component Value Date    WBC 27.22 (H) 11/05/2020    HGB 6.9 (L) 11/05/2020    HCT 20.0 (L) 11/05/2020    MCV 93 11/05/2020    PLT 43 (L) 11/05/2020       Lab Results   Component Value Date     (L) 11/05/2020     (L) 11/05/2020     (L) 11/05/2020    K 4.5 11/05/2020    K 4.5 11/05/2020    K 4.5 11/05/2020    CL 97 11/05/2020    CL 98 11/05/2020    CL 98 11/05/2020    CO2 23 11/05/2020    CO2 23 11/05/2020    CO2 23 11/05/2020    BUN 9 11/05/2020    BUN 9 11/05/2020    BUN 9 11/05/2020    CREATININE 0.8 11/05/2020    CREATININE 0.8 11/05/2020    CREATININE 0.8 11/05/2020    CALCIUM 7.6 (L) 11/05/2020    CALCIUM 7.4 (L) 11/05/2020    CALCIUM 7.4 (L) 11/05/2020    ANIONGAP 10 11/05/2020    ANIONGAP 11 11/05/2020    ANIONGAP 11 11/05/2020    ESTGFRAFRICA >60.0 11/05/2020    ESTGFRAFRICA >60.0 11/05/2020    ESTGFRAFRICA >60.0 11/05/2020    EGFRNONAA >60.0 11/05/2020    EGFRNONAA >60.0 11/05/2020    EGFRNONAA >60.0 11/05/2020       Lab Results   Component Value Date     (H) 11/05/2020      (H) 11/05/2020     (H) 10/08/2020    ALKPHOS 266 (H) 11/05/2020    BILITOT 29.2 (H) 11/05/2020       Lab Results   Component Value Date    INR 2.4 (H) 11/05/2020    INR 3.4 (H) 11/05/2020    INR 2.6 (H) 11/04/2020       Significant Imaging:  Reviewed pertinent radiology findings.       Assessment/Plan:     Madhavi Bell is a 45 y.o. female with history of decompensated liver cirrhosis due to alpha-1 antitrypsin deficiency and alcohol, recurrent pleural effusion, HCC (with the additional bilobar indeterminate liver lesions) who presented to [place] with shortness of breath and transferred to [facility] [place] for further evaluation.    Course has been complicated by worsening hypoxic respiratory failure.  S/p thoracentesis x2, but developed new pulmonary infiltrates vs pulmonary edema from lung expansion and intubated 11/1.  Bronch with evidence of pneumonia, but infectious workup so far, including bronch cultures, with no growth.  She did undergo liver biopsy of indeterminate lesion on 10/30 which was negative for malignancy.     Problem List:  1. Decompensated liver cirrhosis due to alpha-1 antitrypsin deficiency and alcohol  2. Hepatocellular carcinoma - 3.9 cm right lesion with additional indeterminate foci.  Bx of indeterminate liver lesion negative for malignancy which means she meets Jeancarlos criteria  3. Hypoxemic respiratory failure / septic shock due to pneumonia vs pulmonary edema  4. Acute renal failure / HRS - on CRRT  5. Hyponatremia     Plan:  - Septic shock / Pneumonia / Pulmonary edema:  Appreciate ICU assistance.  On levo and vaso.  Intubated.  - Acute hypoxemic respiratory failure:  S/p thoracentesis on 10/30 (1.7L) and 10/31 (2L).  Now intubated on 11/01.  Bronchoscopy 11/1 - cultures with GPC and GNRs, but no growth.  On broad-spectrum antibiotics and antifungal.  ID following.  - HCC:  3.9cm HCC lesion with other indeterminate liver lesions - biopsy on 10/30 negative for  malignancy  - Hepatic hydrothorax: CXR on presentation with right lung opacification.  Thoracentesis  as above.  2 gram Na restrict.  Hold diuretics given hyponatremia and renal failure.  Recommend against chest tube for hepatic hydrothorax alone, as hydrothorax will continuously drain and lead to significant dehydration/electrolyte abnormalities; also can have difficult to control leakage after removal.  - LINN/HRS:  Nephrology following.  On CRRT since 11/2  - Hyponatremia:  Hold diuretics, fluid restrict.  Trial of IV albumin.  - Esophageal varices: last EGD 9/9 with small esophageal varices  - Hepatic encephalopathy:  Continue lactulose titrated to 3-4 BMs daily  - Transplant:  Candidacy was initially dependent on ruling out multifocal HCC, but biopsy of indeterminate liver lesion is negative for malignancy (known HCC lesion meets Bracey criteria).  However, now with septic shock due to pneumonia.  Pending T-spot to complete evaluation. Plan to discuss today at committee - approved for liver transplant pending medical and clinical improvement and financial approval    Thank you for involving us in the care of Madhavi Bell. Please call with any additional questions, concerns or changes in the patient's clinical status.    Shane Ross MD  Gastroenterology Fellow PGY IV   [facility]

## 2020-11-05 NOTE — NURSING
Notified Cedric Jimenez NP that pt does not sound like she has bowel sounds. OGT appears to be suctioning medications - pt does not appear to be absorbing any meds put into OGT. Verbalizes understanding - states will review KUB.

## 2020-11-05 NOTE — SUBJECTIVE & OBJECTIVE
Interval History/Significant Events: Patient restarted on crrt overnight. Received 4 U FFP and 2 U PRBCs overnight.    Review of Systems   Unable to perform ROS: Intubated     Objective:     Vital Signs (Most Recent):  Temp: 98.3 °F (36.8 °C) (11/05/20 1258)  Pulse: 99 (11/05/20 1300)  Resp: (!) 25 (11/05/20 1300)  BP: (!) 100/47 (11/05/20 1200)  SpO2: (!) 93 % (11/05/20 1300) Vital Signs (24h Range):  Temp:  [97.7 °F (36.5 °C)-98.5 °F (36.9 °C)] 98.3 °F (36.8 °C)  Pulse:  [] 99  Resp:  [25-29] 25  SpO2:  [89 %-100 %] 93 %  BP: (100-124)/(47-60) 100/47  Arterial Line BP: ()/(37-63) 128/59   Weight: 81 kg (178 lb 9.2 oz)  Body mass index is 28.82 kg/m².      Intake/Output Summary (Last 24 hours) at 11/5/2020 1338  Last data filed at 11/5/2020 1300  Gross per 24 hour   Intake 8590.25 ml   Output 4593 ml   Net 3997.25 ml       Physical Exam  Vitals signs reviewed.   Constitutional:       Appearance: She is well-developed. She is toxic-appearing.      Interventions: She is sedated, intubated and restrained.   HENT:      Head: Normocephalic and atraumatic.   Eyes:      General: Scleral icterus present.      Pupils: Pupils are equal, round, and reactive to light.   Neck:      Thyroid: No thyromegaly.      Trachea: No tracheal deviation.      Comments: J TLC in place.   Cardiovascular:      Rate and Rhythm: Normal rate and regular rhythm.      Heart sounds: No murmur. No friction rub. No gallop.    Pulmonary:      Effort: She is intubated.      Breath sounds: No decreased breath sounds, wheezing or rhonchi.   Abdominal:      General: Bowel sounds are normal.      Palpations: Abdomen is soft.      Tenderness: There is no abdominal tenderness.   Genitourinary:     Comments: Garcia in place  Musculoskeletal: Normal range of motion.   Skin:     General: Skin is warm and dry.      Coloration: Skin is jaundiced.   Neurological:      General: No focal deficit present.      Sensory: No sensory deficit.          Vents:  Vent Mode: A/C (11/05/20 1229)  Set Rate: 26 BPM (11/05/20 1229)  Vt Set: 400 mL (11/05/20 1229)  PEEP/CPAP: 8 cmH20 (11/05/20 1229)  Oxygen Concentration (%): 100 (11/05/20 1229)  Peak Airway Pressure: 24 cmH2O (11/05/20 1229)  Plateau Pressure: 20 cmH20 (11/05/20 0734)  Total Ve: 11.6 mL (11/05/20 1229)  F/VT Ratio<105 (RSBI): (!) 59.91 (11/05/20 1229)  Lines/Drains/Airways     Central Venous Catheter Line            Percutaneous Central Line Insertion/Assessment - Triple Lumen  10/30/20 0855 left internal jugular 6 days    Trialysis (Dialysis) Catheter 11/02/20 1500 right internal jugular 2 days          Drain                 Fecal Incontinence  11/01/20 1535 3 days         NG/OG Tube 11/01/20 1434 Center mouth 3 days          Airway                 Airway - Non-Surgical 11/01/20 1424 Endotracheal Tube 3 days          Arterial Line            Arterial Line 11/03/20 1600 Right Radial 1 day              Significant Labs:    CBC/Anemia Profile:  Recent Labs   Lab 11/04/20  2306 11/05/20  0509 11/05/20  1126   WBC 33.14* 36.84* 27.22*   HGB 7.0* 7.9* 6.9*   HCT 20.8* 23.9* 20.0*   PLT 62* 60* 43*   MCV 97 95 93   RDW 17.2* 16.8* 17.2*        Chemistries:  Recent Labs   Lab 11/04/20  1336 11/04/20  1441 11/04/20  1527 11/04/20  2144 11/05/20  0349   * 129* 128* 127*  127* 130*  132*  132*   K 4.3 4.5 4.5 4.4  4.4 4.5  4.5  4.5   CL 94* 96 94* 94*  94* 97  98  98   CO2 21* 19* 20* 21*  21* 23  23  23   BUN 10 9 9 13  13 9  9  9   CREATININE 0.9 0.8 0.8 1.2  1.2 0.8  0.8  0.8   CALCIUM 7.7* 7.3* 7.2* 7.7*  7.7* 7.6*  7.4*  7.4*   ALBUMIN 2.6* 2.7* 2.6* 2.9*  2.9* 2.8*  2.8*  2.8*   PROT  --  4.4* 4.5*  --  4.4*   BILITOT  --  28.3* 28.6*  --  29.2*   ALKPHOS  --  260* 275*  --  266*   ALT  --  109* 109*  --  176*   AST  --  309* 314*  --  632*   MG 2.2  --   --  2.2  2.2 2.0  2.0   PHOS 3.3  --   --  3.1  3.1 2.3*  2.3*  2.3*       All pertinent labs within the  past 24 hours have been reviewed.    Significant Imaging:  I have reviewed all pertinent imaging results/findings within the past 24 hours.

## 2020-11-05 NOTE — PROGRESS NOTES
"  [facility]  Adult Nutrition  Consult Note    SUMMARY     Recommendations    1. If/when medically feasible, resume enteral nutrition. Rec'd Novasource @ 40 mL/hr to provide 1920 calories, 87 grams of protein, 688 mL fluid.  2. RD to monitor & follow-up.    Goals: Meet % EEN, EPN by RD f/u date  Nutrition Goal Status: goal not met  Communication of RD Recs: reviewed with RN    Reason for Assessment    Reason For Assessment: RD follow-up  Diagnosis: other (see comments)(Cirrhosis)  Interdisciplinary Rounds: attended    General Information Comments: Pt remains intubated/sedated, TFs currently held. Pt receiving CRRT. Family at bedside reports pt w/ decreased appetite x 1-2 weeks PTA, unsure of UBW 2/2 fluid status. Per outpatient RD note (10/8/2020), pt's -158#. NFPE complete 11/2 - pt w/ no physical signs of malnutrition.  Nutrition Discharge Planning: Unable to determine    Nutrition/Diet History    Patient Reported Diet/Restrictions/Preferences: low salt  Factors Affecting Nutritional Intake: NPO, on mechanical ventilation    Anthropometrics    Temp: 98.3 °F (36.8 °C)  Height: 5' 6" (167.6 cm)  Height (inches): 66 in  Weight Method: Bed Scale  Weight: 81 kg (178 lb 9.2 oz)  Weight (lb): 178.57 lb  Ideal Body Weight (IBW), Female: 130 lb  % Ideal Body Weight, Female (lb): 137.36 %  BMI (Calculated): 28.8  BMI Grade: 25 - 29.9 - overweight    Lab/Procedures/Meds    Pertinent Labs Reviewed: reviewed  Pertinent Labs Comments: Na 132, Bili 29.2  Pertinent Medications Reviewed: reviewed  Pertinent Medications Comments: Fentanyl, Levophed, Vasopressin    Estimated/Assessed Needs    Weight Used For Calorie Calculations: 81 kg (178 lb 9.2 oz)     Energy Calorie Requirements (kcal): 1822 kcal/d  Energy Need Method: [place]     Protein Requirements:  g/d (1.2-1.5 g/kg)  Weight Used For Protein Calculations: 81 kg (178 lb 9.2 oz)     Estimated Fluid Requirement Method: other (see " comments)(Per MD or 1 mL/kcal)  RDA Method (mL): 1722    Nutrition Prescription Ordered    Current Diet Order: NPO    Evaluation of Received Nutrient/Fluid Intake    Comments: LBM: 11/1    Nutrition Risk    Level of Risk/Frequency of Follow-up: (2x/week)     Assessment and Plan    Nutrition Problem  Inadequate energy intake     Related to (etiology):   Inability to consume sufficient energy     Signs and Symptoms (as evidenced by):   NPO     Interventions(treatment strategy):  Collaboration of nutrition care w/ other providers   Enteral nutrition      Nutrition Diagnosis Status:   Continues     Monitor and Evaluation    Food and Nutrient Intake: energy intake, food and beverage intake, enteral nutrition intake  Food and Nutrient Adminstration: diet order, enteral and parenteral nutrition administration  Physical Activity and Function: nutrition-related ADLs and IADLs  Anthropometric Measurements: weight, weight change  Biochemical Data, Medical Tests and Procedures: inflammatory profile, lipid profile, glucose/endocrine profile, gastrointestinal profile, electrolyte and renal panel  Nutrition-Focused Physical Findings: overall appearance     Malnutrition Assessment    Subcutaneous Fat (Malnutrition): other (see comments)(Well nourished)  Muscle Mass (Malnutrition): other (see comments)(Well nourished)   Orbital Region (Subcutaneous Fat Loss): well nourished  Upper Arm Region (Subcutaneous Fat Loss): well nourished  Thoracic and Lumbar Region: well nourished   [place] Region (Muscle Loss): well nourished  Clavicle Bone Region (Muscle Loss): well nourished  Scapular Bone Region (Muscle Loss): well nourished  Dorsal Hand (Muscle Loss): well nourished  Patellar Region (Muscle Loss): well nourished  Posterior Calf Region (Muscle Loss): well nourished     Nutrition Follow-Up    RD Follow-up?: Yes

## 2020-11-05 NOTE — PLAN OF CARE
CMICU DAILY GOALS       A: Awake    RASS: Goal - RASS Goal: 0-->alert and calm  Actual - RASS (Phan Agitation-Sedation Scale): -4-->deep sedation   Restraint necessity: Clinical Justification: Removing medical devices  B: Breath   SBT: Not attempted   C: Coordinate A & B, analgesics/sedatives   Pain: managed    SAT: Fail  D: Delirium   CAM-ICU: Overall CAM-ICU: Positive  E: Early(intubated/ Progressive (non-intubated) Mobility   MOVE Screen: Fail   Activity: Activity Management: patient unable to perform activities  FAS: Feeding/Nutrition   Diet order: Diet/Nutrition Received: NPO, Specialty Diet/Nutrition Received: renal diet Fluid restriction:    T: Thrombus   DVT prophylaxis: VTE Required Core Measure: (SCDs) Sequential compression device initiated/maintained  H: HOB Elevation   Head of Bed (HOB): HOB elevated  U: Ulcer Prophylaxis   GI: yes  G: Glucose control   managed Glycemic Management: blood glucose monitoring  S: Skin   Bundle compliance: yes   Bathing/Skin Care: back care, bath, chlorhexidine, bath, complete, incontinence care, linen changed Date: 11/4/20  B: Bowel Function   constipation   I: Indwelling Catheters   Garcia necessity: [REMOVED]      Urethral Catheter 10/30/20 1615 16 Fr.-Reason for Continuing Urinary Catheterization: Critically ill in ICU and requiring hourly monitoring of intake/output   CVC necessity: Yes   IPAD offered: Not appropriate  D: De-escalation Antibx   Yes  Plan for the day   Abx, wean pressors, CRRT, monitor bleeding and labs  Family/Goals of care/Code Status   Code Status: Full Code     CRRT restarted with Uf 250 and MAP dropped to 50s requiring levo to increase to 2.8; UF unable to increase above 200 for most of shift. 1 unit of RBCs given. Fibrinogen <70. Glucose 64.     VS and assessment per flow sheet, patient progressing towards goals as tolerated, plan of care reviewed with Madhavi Bell and family, all concerns addressed, will continue to monitor.

## 2020-11-05 NOTE — PROGRESS NOTES
Pharmacokinetic Assessment Follow Up: IV Vancomycin    Vancomycin serum concentration assessment(s):    Vancomycin random level resulted at 14.7 mcg/mL approximately 19 hours after the previous level. Level is subtherapeutic with goal level of 15 to 20 mcg/mL.    Nephrology is consulted for LINN and plans to continue SLED.     Drug levels (last 3 results):  Recent Labs   Lab Result Units 11/03/20  0343 11/04/20  0331 11/05/20  0349   Vancomycin, Random ug/mL 16.3 18.5 14.7     Vancomycin Regimen Plan:    Administer vancomycin IV 1000 mg and redose when the random level is less than 20 mcg/mL or as needed for dialysis. Next level to be drawn with morning labs on 11/6.     Pharmacy will continue to follow and monitor vancomycin.    Please contact pharmacy at extension [phone] for questions regarding this assessment.    Thank you for the consult,   Karen Mixon PharmD               Patient brief summary:  Madhavi Bell is a 45 y.o. female initiated on antimicrobial therapy with IV vancomycin for treatment of sepsis    Drug Allergies:   Review of patient's allergies indicates:  No Known Allergies    Actual Body Weight:   81 kg     Renal Function:   Estimated Creatinine Clearance: 94.3 mL/min (based on SCr of 0.8 mg/dL).    Dialysis Method (if applicable):  SLED    CBC (last 72 hours):  Recent Labs   Lab Result Units 11/03/20  0528 11/03/20  2321 11/04/20  0331 11/04/20  1205 11/04/20  1527 11/04/20  1755 11/04/20  2306 11/05/20  0509   WBC K/uL 29.37* 28.85* 26.35* 34.44* 37.81* 37.58* 33.14* 36.84*   Hemoglobin g/dL 8.9* 8.1* 7.7* 6.9* 6.8* 7.9* 7.0* 7.9*   Hematocrit % 26.8* 23.5* 22.6* 20.6* 21.0* 23.3* 20.8* 23.9*   Platelets K/uL 80* 63* 48* 61* 122* 108* 62* 60*   Gran % % 86.0* 84.5* 85.0* 83.0* 87.0* 77.0* 77.0* 82.0*   Lymph % % 5.0* 4.5* 1.5* 2.0* 4.0* 4.0* 4.5* 2.0*   Mono % % 6.0 7.5 6.5 7.0 3.0* 14.0 6.0 3.0*   Eosinophil % % 0.0 0.0 0.0 0.0 0.0 0.0 1.0 1.0   Basophil % % 0.0 0.0 0.0 0.0 0.0 0.0 0.0  0.0   Differential Method  Manual Manual Manual Manual Manual Manual Manual Manual       Metabolic Panel (last 72 hours):  Recent Labs   Lab Result Units 11/02/20  1512 11/02/20  2219 11/03/20  0343 11/03/20  1506 11/03/20  2321 11/04/20  0331 11/04/20  1336 11/04/20  1441 11/04/20  1527 11/04/20  2144 11/05/20  0349   Sodium mmol/L 122*  122* 125* 129*  129* 123*  125* 129* 128* 128* 129* 128* 127*  127* 130*  132*  132*   Potassium mmol/L 4.7  4.7 4.7 4.5  4.5 4.3  4.3 4.4 4.2 4.3 4.5 4.5 4.4  4.4 4.5  4.5  4.5   Chloride mmol/L 88*  87* 90* 94*  94* 93*  94* 95 94* 94* 96 94* 94*  94* 97  98  98   CO2 mmol/L 15*  14* 18* 16*  18* 16*  18* 21* 21* 21* 19* 20* 21*  21* 23  23  23   Glucose mg/dL 159*  158* 119* 100  100 148*  149* 105 92 96 86 75 76  76 68*  64*  64*   BUN mg/dL 76*  76* 60* 32*  33* 33*  31* 22* 19 10 9 9 13  13 9  9  9   Creatinine mg/dL 5.5*  5.4* 4.6* 2.9*  2.9* 2.8*  3.0* 1.8* 1.7* 0.9 0.8 0.8 1.2  1.2 0.8  0.8  0.8   Albumin g/dL 3.4*  3.3* 3.3* 3.5  3.6 3.0*  3.1* 3.0* 2.8* 2.6* 2.7* 2.6* 2.9*  2.9* 2.8*  2.8*  2.8*   Total Bilirubin mg/dL 33.8*  --  34.2* 32.1*  --  29.2*  --  28.3* 28.6*  --  29.2*   Alkaline Phosphatase U/L 296*  --  287* 265*  --  277*  --  260* 275*  --  266*   AST U/L 141*  --  176* 186*  --  195*  --  309* 314*  --  632*   ALT U/L 69*  --  78* 78*  --  82*  --  109* 109*  --  176*   Magnesium mg/dL 3.0* 2.8* 2.5  2.4 2.3 2.3 1.9 2.2  --   --  2.2  2.2 2.0  2.0   Phosphorus mg/dL 8.0* 7.1* 4.9*  5.1* 4.6* 3.2 3.0 3.3  --   --  3.1  3.1 2.3*  2.3*  2.3*       Vancomycin Administrations:  vancomycin given in the last 96 hours                   vancomycin 750 mg in dextrose 5 % 250 mL IVPB (ready to mix system) (mg) 750 mg New Bag 11/03/20 0847    vancomycin 750 mg in dextrose 5 % 250 mL IVPB (ready to mix system) (mg) 750 mg New Bag 11/01/20 0847    vancomycin 1.25 g in dextrose 5% 250 mL IVPB (ready to mix) (mg)  1,250 mg New Bag 10/30/20 1838                Microbiologic Results:  Microbiology Results (last 7 days)     Procedure Component Value Units Date/Time    Blood culture [[id number]] Collected: 11/04/20 1713    Order Status: Completed Specimen: Blood from Peripheral, Antecubital, Left Updated: 11/05/20 0115     Blood Culture, Routine No Growth to date    Narrative:      Blood cultures x 2 different sites. 4 bottles total. Please  draw cultures before administering antibiotics.    Blood culture [[id number]] Collected: 11/04/20 1743    Order Status: Completed Specimen: Blood Updated: 11/05/20 0115     Blood Culture, Routine No Growth to date    Narrative:      Blood cultures from 2 different sites. 4 bottles total.  Please draw before starting antibiotics.    Culture, Respiratory with Gram Stain [[id number]] Collected: 11/04/20 1646    Order Status: Completed Specimen: Respiratory from Endotracheal Aspirate Updated: 11/04/20 2056     Gram Stain (Respiratory) Rare WBC's     Gram Stain (Respiratory) No organisms seen    Urine Culture High Risk [[id number]]     Order Status: No result Specimen: Urine     Culture, Respiratory [[id number]] Collected: 11/01/20 1521    Order Status: Completed Specimen: Respiratory from Bronchial Wash, LLL Updated: 11/04/20 1039     Respiratory Culture No growth     Gram Stain (Respiratory) <10 epithelial cells per low power field.     Gram Stain (Respiratory) Rare WBC's     Gram Stain (Respiratory) Rare Gram negative rods    Culture, Anaerobic [[id number]] Collected: 10/30/20 1716    Order Status: Completed Specimen: Body Fluid from Pleural Fluid Updated: 11/04/20 0939     Anaerobic Culture No anaerobes isolated    Aerobic culture [[id number]] Collected: 10/31/20 2038    Order Status: Completed Specimen: Pleural Fluid Updated: 11/04/20 0928     Aerobic Bacterial Culture No growth    Culture, Body Fluid (Aerobic) w/ GS [[id number]] Collected: 10/30/20 1716    Order Status: Completed Specimen: Body  Fluid from Pleural Fluid Updated: 11/04/20 0927     AEROBIC CULTURE - FLUID No growth     Gram Stain Result Few WBC's      No organisms seen    Fungus culture [[id number]] Collected: 11/01/20 1521    Order Status: Completed Specimen: Body Fluid from Lung, LLL Updated: 11/03/20 1409     Fungus (Mycology) Culture Culture in progress    Narrative:      Bronchial Wash    Cryptococcal antigen [[id number]] Collected: 11/02/20 1319    Order Status: Completed Specimen: Blood, Venous Updated: 11/03/20 1141     Cryptococcal Ag, Blood Negative    Culture, Respiratory with Gram Stain [[id number]] Collected: 11/01/20 0153    Order Status: Completed Specimen: Respiratory from Sputum, Expectorated Updated: 11/03/20 1037     Respiratory Culture Normal respiratory ishan      No S aureus or Pseudomonas isolated.     Gram Stain (Respiratory) >10 epithelial cells per low power field     Gram Stain (Respiratory) Many WBC's     Gram Stain (Respiratory) Rare Gram positive cocci    AFB Culture & Smear [[id number]] Collected: 11/01/20 1521    Order Status: Completed Specimen: Body Fluid from Lung, LLL Updated: 11/02/20 2127     AFB Culture & Smear Culture in progress     AFB CULTURE STAIN No acid fast bacilli seen.    AFB Culture & Smear [[id number]] Collected: 10/31/20 2038    Order Status: Completed Specimen: Pleural Fluid Updated: 11/02/20 1558     AFB Culture & Smear Culture in progress     AFB CULTURE STAIN No acid fast bacilli seen.    Cryptococcal antigen, blood [[id number]]     Order Status: Completed Specimen: Blood     KOH prep [[id number]] Collected: 11/01/20 1521    Order Status: Completed Specimen: Body Fluid from Lung, LLL Updated: 11/02/20 0043     KOH Prep No yeast or fungal elements seen    Narrative:      Bronchial Wash    Gram stain [[id number]] Collected: 11/01/20 1521    Order Status: Canceled Specimen: Body Fluid from Lung, LLL     Urine culture [[id number]] Collected: 10/30/20 1334    Order Status: Completed Specimen: Urine  Updated: 11/01/20 0744     Urine Culture, Routine No growth    Narrative:      Specimen Source->Urine    Gram stain [[id number]] Collected: 10/31/20 2038    Order Status: Completed Specimen: Pleural Fluid Updated: 11/01/20 0026     Gram Stain Result Rare WBC's      No organisms seen    Urine Culture High Risk [[id number]] Collected: 10/30/20 1802    Order Status: Completed Specimen: Urine, Catheterized Updated: 10/31/20 2242     Urine Culture, Routine No significant growth    Narrative:      Indicated criteria for high risk culture:->Other  Other (specify):->liver patient

## 2020-11-05 NOTE — PROGRESS NOTES
SLED treatment restarted to left IJ trialysis. Flows were good, lines connected, treatment initiated. See flow sheet for details.

## 2020-11-05 NOTE — MEDICAL/APP STUDENT
[facility]  Nephrology  Progress Note    Patient Name: Madhavi Hamptonox   : 1975   MRN: [id number]  Admission Date: 10/30/2020  Attending Physician:    Date of Admission: 10/30/2020  1:06 PM      No chief complaint on file.      Subjective:    HPI: Patient is a 46yo female with decompensated liver cirrhosis secondary to alpha-1 antitrypsin deficiency and alcohol abuse. She has been following hepatology as outpatient and has required frequent thoracentesis for recurrent hepatic hydrothorax. She had an MRI on  that showed a R liver lobe lesion. She presented to [place] ED on 10/29 with SOB. CXR showed large R sided pleural effusion. Was noted to be hypotensive (systolic BP in 90s), hyponatremic (Na - 123), and had decreased renal function (BUN - 48 and Cr - 2.7). Baseline BUN is 19 and Cr is 1.0. She was transferred to [facility] and on 10/30 had an IR biopsy of the liver lesion and thoracentesis. She was managed for ?HRS and started on albumin, midodrine, octreotide. Developed leukocytosis and started on broad spectrum antibiotics (vancomycin, meropenem, azithromycin, fluconazole, tobramycin). Zosyn was discontinued. Intubated on  due to worsening respiratory status.     Nephrology was consulted for worsening renal function and oliguria.     Today:  Approved by hepatology for liver transplant pending medical and financial clearance. Yesterday, pre-filter rate increased to 200 and UF increased to 300-350 and MAPs dropped to 50s requiring Levo 2.8. Currently tolerated UF at 300 with improved pressor requirements (Levo - 1.48). Cx NGTD but gram stain with GNR and GPC. Repeat urine, resp, and blood cultures drawn yesterday.    Objective:  Vitals:    20 0745 20 0800 20 0810 20 0900   BP:  121/60     BP Location:       Patient Position:       Pulse: (!) 111 91 93 100   Resp: (!) 29 (!) 26 (!) 26 (!) 27   Temp: 97.9 °F (36.6 °C)  98 °F (36.7 °C)    TempSrc: Temporal   Temporal    SpO2: (!) 94% 100% 100% (!) 89%   Weight:       Height:            Intake/Output Summary (Last 24 hours) at 11/5/2020 0925  Last data filed at 11/5/2020 0900  Gross per 24 hour   Intake 8228.85 ml   Output 4945 ml   Net 3283.85 ml         Physical Exam  Constitutional:       Interventions: She is sedated and intubated.   Eyes:      General: Scleral icterus present.   Neck:      Comments: L IJ central line and R trialysis catheter in place  Cardiovascular:      Rate and Rhythm: Normal rate. Rhythm irregular.      Heart sounds: Normal heart sounds.   Pulmonary:      Effort: She is intubated.      Breath sounds: Normal breath sounds.   Abdominal:      General: Bowel sounds are decreased. There is distension.   Musculoskeletal:      Right lower leg: No edema.      Left lower leg: No edema.   Skin:     General: Skin is warm and dry.      Capillary Refill: Capillary refill takes 2 to 3 seconds.      Coloration: Skin is jaundiced.          Recent Results (from the past 48 hour(s))   POCT glucose    Collection Time: 11/03/20  9:46 AM   Result Value Ref Range    POCT Glucose 157 (H) 70 - 110 mg/dL   POCT glucose    Collection Time: 11/03/20 12:40 PM   Result Value Ref Range    POCT Glucose 202 (H) 70 - 110 mg/dL   Comprehensive metabolic panel    Collection Time: 11/03/20  3:06 PM   Result Value Ref Range    Sodium 125 (L) 136 - 145 mmol/L    Potassium 4.3 3.5 - 5.1 mmol/L    Chloride 94 (L) 95 - 110 mmol/L    CO2 18 (L) 23 - 29 mmol/L    Glucose 149 (H) 70 - 110 mg/dL    BUN 31 (H) 6 - 20 mg/dL    Creatinine 3.0 (H) 0.5 - 1.4 mg/dL    Calcium 7.7 (L) 8.7 - 10.5 mg/dL    Total Protein 4.9 (L) 6.0 - 8.4 g/dL    Albumin 3.1 (L) 3.5 - 5.2 g/dL    Total Bilirubin 32.1 (H) 0.1 - 1.0 mg/dL    Alkaline Phosphatase 265 (H) 55 - 135 U/L     (H) 10 - 40 U/L    ALT 78 (H) 10 - 44 U/L    Anion Gap 13 8 - 16 mmol/L    eGFR if African American 20.7 (A) >60 mL/min/1.73 m^2    eGFR if non  17.9 (A) >60  mL/min/1.73 m^2   Renal function panel    Collection Time: 11/03/20  3:06 PM   Result Value Ref Range    Glucose 148 (H) 70 - 110 mg/dL    Sodium 123 (L) 136 - 145 mmol/L    Potassium 4.3 3.5 - 5.1 mmol/L    Chloride 93 (L) 95 - 110 mmol/L    CO2 16 (L) 23 - 29 mmol/L    BUN 33 (H) 6 - 20 mg/dL    Calcium 7.2 (L) 8.7 - 10.5 mg/dL    Creatinine 2.8 (H) 0.5 - 1.4 mg/dL    Albumin 3.0 (L) 3.5 - 5.2 g/dL    Phosphorus 4.6 (H) 2.7 - 4.5 mg/dL    eGFR if African American 22.5 (A) >60 mL/min/1.73 m^2    eGFR if non African American 19.5 (A) >60 mL/min/1.73 m^2    Anion Gap 14 8 - 16 mmol/L   Magnesium    Collection Time: 11/03/20  3:06 PM   Result Value Ref Range    Magnesium 2.3 1.6 - 2.6 mg/dL   POCT glucose    Collection Time: 11/03/20  6:57 PM   Result Value Ref Range    POCT Glucose 135 (H) 70 - 110 mg/dL   ISTAT PROCEDURE    Collection Time: 11/03/20  8:56 PM   Result Value Ref Range    POC PH 7.313 (L) 7.35 - 7.45    POC PCO2 49.7 (H) 35 - 45 mmHg    POC PO2 237 (H) 80 - 100 mmHg    POC HCO3 25.2 24 - 28 mmol/L    POC BE -1 -2 to 2 mmol/L    POC SATURATED O2 100 95 - 100 %    POC TCO2 27 23 - 27 mmol/L    Rate 24     Sample ARTERIAL     Site Leti/UAC     Allens Test N/A     DelSys Adult Vent     Mode AC/PRVC     Vt 400     PEEP 10     PiP 38     FiO2 80     Min Vol 9.7     Sp02 97    POCT glucose    Collection Time: 11/03/20 11:19 PM   Result Value Ref Range    POCT Glucose 97 70 - 110 mg/dL   Renal function panel    Collection Time: 11/03/20 11:21 PM   Result Value Ref Range    Glucose 105 70 - 110 mg/dL    Sodium 129 (L) 136 - 145 mmol/L    Potassium 4.4 3.5 - 5.1 mmol/L    Chloride 95 95 - 110 mmol/L    CO2 21 (L) 23 - 29 mmol/L    BUN 22 (H) 6 - 20 mg/dL    Calcium 7.6 (L) 8.7 - 10.5 mg/dL    Creatinine 1.8 (H) 0.5 - 1.4 mg/dL    Albumin 3.0 (L) 3.5 - 5.2 g/dL    Phosphorus 3.2 2.7 - 4.5 mg/dL    eGFR if  38.4 (A) >60 mL/min/1.73 m^2    eGFR if non  33.3 (A) >60 mL/min/1.73 m^2     Anion Gap 13 8 - 16 mmol/L   Magnesium    Collection Time: 11/03/20 11:21 PM   Result Value Ref Range    Magnesium 2.3 1.6 - 2.6 mg/dL   CBC auto differential    Collection Time: 11/03/20 11:21 PM   Result Value Ref Range    WBC 28.85 (H) 3.90 - 12.70 K/uL    RBC 2.46 (L) 4.00 - 5.40 M/uL    Hemoglobin 8.1 (L) 12.0 - 16.0 g/dL    Hematocrit 23.5 (L) 37.0 - 48.5 %    MCV 96 82 - 98 fL    MCH 32.9 (H) 27.0 - 31.0 pg    MCHC 34.5 32.0 - 36.0 g/dL    RDW 17.8 (H) 11.5 - 14.5 %    Platelets 63 (L) 150 - 350 K/uL    MPV 12.9 9.2 - 12.9 fL    Immature Granulocytes CANCELED 0.0 - 0.5 %    Immature Grans (Abs) CANCELED 0.00 - 0.04 K/uL    Lymph # CANCELED 1.0 - 4.8 K/uL    Mono # CANCELED 0.3 - 1.0 K/uL    Eos # CANCELED 0.0 - 0.5 K/uL    Baso # CANCELED 0.00 - 0.20 K/uL    nRBC 1 (A) 0 /100 WBC    Gran % 84.5 (H) 38.0 - 73.0 %    Lymph % 4.5 (L) 18.0 - 48.0 %    Mono % 7.5 4.0 - 15.0 %    Eosinophil % 0.0 0.0 - 8.0 %    Basophil % 0.0 0.0 - 1.9 %    Bands 1.5 %    Metamyelocytes 1.0 %    Myelocytes 0.5 %    Promyelocytes 0.5 %    Platelet Estimate Decreased (A)     Aniso Moderate     Poik Slight     Poly Occasional     Ovalocytes Occasional     Target Cells Occasional     Alphonse Cells Occasional     Basophilic Stippling Occasional     Toxic Granulation Present     Fragmented Cells Occasional     Vacuolated Granulocytes Present     Differential Method Manual    POCT glucose    Collection Time: 11/04/20  3:30 AM   Result Value Ref Range    POCT Glucose 96 70 - 110 mg/dL   Phosphorus    Collection Time: 11/04/20  3:31 AM   Result Value Ref Range    Phosphorus 3.0 2.7 - 4.5 mg/dL   Protime-INR    Collection Time: 11/04/20  3:31 AM   Result Value Ref Range    Prothrombin Time 35.5 (H) 9.0 - 12.5 sec    INR 3.5 (H) 0.8 - 1.2   CBC Auto Differential    Collection Time: 11/04/20  3:31 AM   Result Value Ref Range    WBC 26.35 (H) 3.90 - 12.70 K/uL    RBC 2.39 (L) 4.00 - 5.40 M/uL    Hemoglobin 7.7 (L) 12.0 - 16.0 g/dL    Hematocrit  22.6 (L) 37.0 - 48.5 %    MCV 95 82 - 98 fL    MCH 32.2 (H) 27.0 - 31.0 pg    MCHC 34.1 32.0 - 36.0 g/dL    RDW 17.8 (H) 11.5 - 14.5 %    Platelets 48 (L) 150 - 350 K/uL    MPV 9.8 9.2 - 12.9 fL    Immature Granulocytes CANCELED 0.0 - 0.5 %    Immature Grans (Abs) CANCELED 0.00 - 0.04 K/uL    Lymph # CANCELED 1.0 - 4.8 K/uL    Mono # CANCELED 0.3 - 1.0 K/uL    Eos # CANCELED 0.0 - 0.5 K/uL    Baso # CANCELED 0.00 - 0.20 K/uL    nRBC 1 (A) 0 /100 WBC    Gran % 85.0 (H) 38.0 - 73.0 %    Lymph % 1.5 (L) 18.0 - 48.0 %    Mono % 6.5 4.0 - 15.0 %    Eosinophil % 0.0 0.0 - 8.0 %    Basophil % 0.0 0.0 - 1.9 %    Bands 2.5 %    Metamyelocytes 1.0 %    Myelocytes 2.5 %    Promyelocytes 1.0 %    Platelet Estimate Decreased (A)     Aniso Moderate     Poly Occasional     Hypo Occasional     Basophilic Stippling Occasional     Toxic Granulation Present     Differential Method Manual    Vancomycin, Random    Collection Time: 11/04/20  3:31 AM   Result Value Ref Range    Vancomycin, Random 18.5 Not established ug/mL   Comprehensive metabolic panel    Collection Time: 11/04/20  3:31 AM   Result Value Ref Range    Sodium 128 (L) 136 - 145 mmol/L    Potassium 4.2 3.5 - 5.1 mmol/L    Chloride 94 (L) 95 - 110 mmol/L    CO2 21 (L) 23 - 29 mmol/L    Glucose 92 70 - 110 mg/dL    BUN 19 6 - 20 mg/dL    Creatinine 1.7 (H) 0.5 - 1.4 mg/dL    Calcium 7.4 (L) 8.7 - 10.5 mg/dL    Total Protein 4.6 (L) 6.0 - 8.4 g/dL    Albumin 2.8 (L) 3.5 - 5.2 g/dL    Total Bilirubin 29.2 (H) 0.1 - 1.0 mg/dL    Alkaline Phosphatase 277 (H) 55 - 135 U/L     (H) 10 - 40 U/L    ALT 82 (H) 10 - 44 U/L    Anion Gap 13 8 - 16 mmol/L    eGFR if African American 41.1 (A) >60 mL/min/1.73 m^2    eGFR if non  35.7 (A) >60 mL/min/1.73 m^2   Lactic Acid, Plasma    Collection Time: 11/04/20  3:31 AM   Result Value Ref Range    Lactate (Lactic Acid) 1.9 0.5 - 2.2 mmol/L   Magnesium    Collection Time: 11/04/20  3:31 AM   Result Value Ref Range     Magnesium 1.9 1.6 - 2.6 mg/dL   ISTAT PROCEDURE    Collection Time: 11/04/20  3:37 AM   Result Value Ref Range    POC PH 7.397 7.35 - 7.45    POC PCO2 41.2 35 - 45 mmHg    POC PO2 206 (H) 80 - 100 mmHg    POC HCO3 25.4 24 - 28 mmol/L    POC BE 1 -2 to 2 mmol/L    POC SATURATED O2 100 95 - 100 %    POC TCO2 27 23 - 27 mmol/L    Rate 26     Sample ARTERIAL     Site Leti/UAC     Allens Test N/A     DelSys Adult Vent     Mode AC/PRVC     Vt 400     PEEP 10     PiP 30     FiO2 60     Min Vol 10.5     Sp02 97    POCT glucose    Collection Time: 11/04/20  7:33 AM   Result Value Ref Range    POCT Glucose 75 70 - 110 mg/dL   Prepare Platelets 1 Dose    Collection Time: 11/04/20 11:53 AM   Result Value Ref Range    UNIT NUMBER [id number]     Product Code V0798I36     DISPENSE STATUS TRANSFUSED     CODING SYSTEM [id number]     Unit Blood Type Code 6200     Unit Blood Type A POS     Unit Expiration [id number]    CBC auto differential    Collection Time: 11/04/20 12:05 PM   Result Value Ref Range    WBC 34.44 (H) 3.90 - 12.70 K/uL    RBC 2.08 (L) 4.00 - 5.40 M/uL    Hemoglobin 6.9 (L) 12.0 - 16.0 g/dL    Hematocrit 20.6 (L) 37.0 - 48.5 %    MCV 99 (H) 82 - 98 fL    MCH 33.2 (H) 27.0 - 31.0 pg    MCHC 33.5 32.0 - 36.0 g/dL    RDW 18.6 (H) 11.5 - 14.5 %    Platelets 61 (L) 150 - 350 K/uL    MPV 10.6 9.2 - 12.9 fL    Immature Granulocytes CANCELED 0.0 - 0.5 %    Immature Grans (Abs) CANCELED 0.00 - 0.04 K/uL    Lymph # CANCELED 1.0 - 4.8 K/uL    Mono # CANCELED 0.3 - 1.0 K/uL    Eos # CANCELED 0.0 - 0.5 K/uL    Baso # CANCELED 0.00 - 0.20 K/uL    nRBC 3 (A) 0 /100 WBC    Gran % 83.0 (H) 38.0 - 73.0 %    Lymph % 2.0 (L) 18.0 - 48.0 %    Mono % 7.0 4.0 - 15.0 %    Eosinophil % 0.0 0.0 - 8.0 %    Basophil % 0.0 0.0 - 1.9 %    Bands 4.0 %    Myelocytes 3.0 %    Promyelocytes 1.0 %    Platelet Estimate Decreased (A)     Aniso Slight     Poik Slight     Target Cells Occasional     Alphonse Cells Occasional     Acanthocytes Present      Fragmented Cells Occasional     Differential Method Manual    POCT glucose    Collection Time: 11/04/20  1:13 PM   Result Value Ref Range    POCT Glucose 99 70 - 110 mg/dL   Type & Screen    Collection Time: 11/04/20  1:23 PM   Result Value Ref Range    Group & Rh B NEG     Indirect Maria Teresa NEG    Prepare RBC 1 Unit    Collection Time: 11/04/20  1:23 PM   Result Value Ref Range    UNIT NUMBER [id number]     Product Code D7674L70     DISPENSE STATUS TRANSFUSED     CODING SYSTEM [id number]     Unit Blood Type Code 9500     Unit Blood Type O NEG     Unit Expiration [id number]    Prepare Fresh Frozen Plasma 2 Units    Collection Time: 11/04/20  1:23 PM   Result Value Ref Range    UNIT NUMBER [id number]     Product Code K2765H08     DISPENSE STATUS TRANSFUSED     CODING SYSTEM DBMR264     Unit Blood Type Code 7300     Unit Blood Type B POS     Unit Expiration [id number]     UNIT NUMBER [id number]     Product Code J8853N31     DISPENSE STATUS TRANSFUSED     CODING SYSTEM CJSW751     Unit Blood Type Code 7300     Unit Blood Type B POS     Unit Expiration [id number]    Prepare RBC 1 Unit    Collection Time: 11/04/20  1:23 PM   Result Value Ref Range    UNIT NUMBER [id number]     Product Code K5391Z46     DISPENSE STATUS ISSUED     CODING SYSTEM TAZH267     Unit Blood Type Code 9500     Unit Blood Type O NEG     Unit Expiration [id number]    Prepare Fresh Frozen Plasma 2 Units    Collection Time: 11/04/20  1:23 PM   Result Value Ref Range    UNIT NUMBER [id number]     Product Code I5704A01     DISPENSE STATUS ISSUED     CODING SYSTEM NGDM162     Unit Blood Type Code 7300     Unit Blood Type B POS     Unit Expiration [id number]     UNIT NUMBER [id number]     Product Code A2709U90     DISPENSE STATUS ISSUED     CODING SYSTEM OKGD462     Unit Blood Type Code 7300     Unit Blood Type B POS     Unit Expiration [id number]    Renal function panel    Collection Time: 11/04/20  1:36 PM   Result Value Ref  Range    Glucose 96 70 - 110 mg/dL    Sodium 128 (L) 136 - 145 mmol/L    Potassium 4.3 3.5 - 5.1 mmol/L    Chloride 94 (L) 95 - 110 mmol/L    CO2 21 (L) 23 - 29 mmol/L    BUN 10 6 - 20 mg/dL    Calcium 7.7 (L) 8.7 - 10.5 mg/dL    Creatinine 0.9 0.5 - 1.4 mg/dL    Albumin 2.6 (L) 3.5 - 5.2 g/dL    Phosphorus 3.3 2.7 - 4.5 mg/dL    eGFR if African American >60.0 >60 mL/min/1.73 m^2    eGFR if non African American >60.0 >60 mL/min/1.73 m^2    Anion Gap 13 8 - 16 mmol/L   Magnesium    Collection Time: 11/04/20  1:36 PM   Result Value Ref Range    Magnesium 2.2 1.6 - 2.6 mg/dL   Comprehensive metabolic panel    Collection Time: 11/04/20  2:41 PM   Result Value Ref Range    Sodium 129 (L) 136 - 145 mmol/L    Potassium 4.5 3.5 - 5.1 mmol/L    Chloride 96 95 - 110 mmol/L    CO2 19 (L) 23 - 29 mmol/L    Glucose 86 70 - 110 mg/dL    BUN 9 6 - 20 mg/dL    Creatinine 0.8 0.5 - 1.4 mg/dL    Calcium 7.3 (L) 8.7 - 10.5 mg/dL    Total Protein 4.4 (L) 6.0 - 8.4 g/dL    Albumin 2.7 (L) 3.5 - 5.2 g/dL    Total Bilirubin 28.3 (H) 0.1 - 1.0 mg/dL    Alkaline Phosphatase 260 (H) 55 - 135 U/L     (H) 10 - 40 U/L     (H) 10 - 44 U/L    Anion Gap 14 8 - 16 mmol/L    eGFR if African American >60.0 >60 mL/min/1.73 m^2    eGFR if non African American >60.0 >60 mL/min/1.73 m^2   Lactic Acid, Plasma    Collection Time: 11/04/20  3:27 PM   Result Value Ref Range    Lactate (Lactic Acid) 4.2 (HH) 0.5 - 2.2 mmol/L   CBC Auto Differential    Collection Time: 11/04/20  3:27 PM   Result Value Ref Range    WBC 37.81 (H) 3.90 - 12.70 K/uL    RBC 2.08 (L) 4.00 - 5.40 M/uL    Hemoglobin 6.8 (L) 12.0 - 16.0 g/dL    Hematocrit 21.0 (L) 37.0 - 48.5 %     (H) 82 - 98 fL    MCH 32.7 (H) 27.0 - 31.0 pg    MCHC 32.4 32.0 - 36.0 g/dL    RDW 18.5 (H) 11.5 - 14.5 %    Platelets 122 (L) 150 - 350 K/uL    MPV 10.8 9.2 - 12.9 fL    Immature Granulocytes CANCELED 0.0 - 0.5 %    Immature Grans (Abs) CANCELED 0.00 - 0.04 K/uL    nRBC 4 (A) 0 /100 WBC     Gran % 87.0 (H) 38.0 - 73.0 %    Lymph % 4.0 (L) 18.0 - 48.0 %    Mono % 3.0 (L) 4.0 - 15.0 %    Eosinophil % 0.0 0.0 - 8.0 %    Basophil % 0.0 0.0 - 1.9 %    Metamyelocytes 5.0 %    Myelocytes 1.0 %    Platelet Estimate Decreased (A)     Aniso Slight     Poik Slight     Poly Occasional     Hypo Occasional     Ovalocytes Occasional     Tear Drop Cells Occasional     [place] Cells Occasional     Spherocytes Occasional     Basophilic Stippling Occasional     Toxic Granulation Present     Fragmented Cells Occasional     Differential Method Manual    Comprehensive Metabolic Panel    Collection Time: 11/04/20  3:27 PM   Result Value Ref Range    Sodium 128 (L) 136 - 145 mmol/L    Potassium 4.5 3.5 - 5.1 mmol/L    Chloride 94 (L) 95 - 110 mmol/L    CO2 20 (L) 23 - 29 mmol/L    Glucose 75 70 - 110 mg/dL    BUN 9 6 - 20 mg/dL    Creatinine 0.8 0.5 - 1.4 mg/dL    Calcium 7.2 (L) 8.7 - 10.5 mg/dL    Total Protein 4.5 (L) 6.0 - 8.4 g/dL    Albumin 2.6 (L) 3.5 - 5.2 g/dL    Total Bilirubin 28.6 (H) 0.1 - 1.0 mg/dL    Alkaline Phosphatase 275 (H) 55 - 135 U/L     (H) 10 - 40 U/L     (H) 10 - 44 U/L    Anion Gap 14 8 - 16 mmol/L    eGFR if African American >60.0 >60 mL/min/1.73 m^2    eGFR if non African American >60.0 >60 mL/min/1.73 m^2   Fibrinogen    Collection Time: 11/04/20  3:27 PM   Result Value Ref Range    Fibrinogen <70 (AA) 182 - 366 mg/dL   Protime-INR    Collection Time: 11/04/20  3:27 PM   Result Value Ref Range    Prothrombin Time 31.0 (H) 9.0 - 12.5 sec    INR 3.1 (H) 0.8 - 1.2   ISTAT PROCEDURE    Collection Time: 11/04/20  4:40 PM   Result Value Ref Range    POC PH 7.399 7.35 - 7.45    POC PCO2 37.6 35 - 45 mmHg    POC PO2 81 80 - 100 mmHg    POC HCO3 23.3 (L) 24 - 28 mmol/L    POC BE -2 -2 to 2 mmol/L    POC SATURATED O2 96 95 - 100 %    POC TCO2 24 23 - 27 mmol/L    Rate 26     Sample ARTERIAL     Site Leti/UAC     Allens Test N/A     DelSys Adult Vent     Mode AC/PRVC     Vt 400     PEEP 8      FiO2 50    Culture, Respiratory with Gram Stain    Collection Time: 11/04/20  4:46 PM    Specimen: Endotracheal Aspirate; Respiratory   Result Value Ref Range    Gram Stain (Respiratory) Rare WBC's     Gram Stain (Respiratory) No organisms seen    Lactic Acid, Plasma    Collection Time: 11/04/20  4:59 PM   Result Value Ref Range    Lactate (Lactic Acid) 4.2 (HH) 0.5 - 2.2 mmol/L   Blood culture    Collection Time: 11/04/20  5:13 PM    Specimen: Peripheral, Antecubital, Left; Blood   Result Value Ref Range    Blood Culture, Routine No Growth to date    Blood culture    Collection Time: 11/04/20  5:43 PM    Specimen: Blood   Result Value Ref Range    Blood Culture, Routine No Growth to date    CBC Auto Differential    Collection Time: 11/04/20  5:55 PM   Result Value Ref Range    WBC 37.58 (H) 3.90 - 12.70 K/uL    RBC 2.39 (L) 4.00 - 5.40 M/uL    Hemoglobin 7.9 (L) 12.0 - 16.0 g/dL    Hematocrit 23.3 (L) 37.0 - 48.5 %    MCV 98 82 - 98 fL    MCH 33.1 (H) 27.0 - 31.0 pg    MCHC 33.9 32.0 - 36.0 g/dL    RDW 16.5 (H) 11.5 - 14.5 %    Platelets 108 (L) 150 - 350 K/uL    MPV 11.8 9.2 - 12.9 fL    Immature Granulocytes CANCELED 0.0 - 0.5 %    Immature Grans (Abs) CANCELED 0.00 - 0.04 K/uL    nRBC 5 (A) 0 /100 WBC    Gran % 77.0 (H) 38.0 - 73.0 %    Lymph % 4.0 (L) 18.0 - 48.0 %    Mono % 14.0 4.0 - 15.0 %    Eosinophil % 0.0 0.0 - 8.0 %    Basophil % 0.0 0.0 - 1.9 %    Bands 1.0 %    Metamyelocytes 2.0 %    Myelocytes 1.0 %    Promyelocytes 1.0 %    Platelet Estimate Decreased (A)     Aniso Slight     Poik Slight     Poly Occasional     Hypo Occasional     Ovalocytes Occasional     Target Cells Occasional     Pound Cells Occasional     Basophilic Stippling Occasional     Toxic Granulation Present     Fragmented Cells Occasional     Vacuolated Granulocytes Present     Differential Method Manual    Protime-INR    Collection Time: 11/04/20  5:55 PM   Result Value Ref Range    Prothrombin Time 31.0 (H) 9.0 - 12.5 sec    INR 3.1  (H) 0.8 - 1.2   POCT glucose    Collection Time: 11/04/20  6:12 PM   Result Value Ref Range    POCT Glucose 86 70 - 110 mg/dL   Renal function panel    Collection Time: 11/04/20  9:44 PM   Result Value Ref Range    Glucose 76 70 - 110 mg/dL    Sodium 127 (L) 136 - 145 mmol/L    Potassium 4.4 3.5 - 5.1 mmol/L    Chloride 94 (L) 95 - 110 mmol/L    CO2 21 (L) 23 - 29 mmol/L    BUN 13 6 - 20 mg/dL    Calcium 7.7 (L) 8.7 - 10.5 mg/dL    Creatinine 1.2 0.5 - 1.4 mg/dL    Albumin 2.9 (L) 3.5 - 5.2 g/dL    Phosphorus 3.1 2.7 - 4.5 mg/dL    eGFR if African American >60.0 >60 mL/min/1.73 m^2    eGFR if non  54.3 (A) >60 mL/min/1.73 m^2    Anion Gap 12 8 - 16 mmol/L   Magnesium    Collection Time: 11/04/20  9:44 PM   Result Value Ref Range    Magnesium 2.2 1.6 - 2.6 mg/dL   Renal function panel    Collection Time: 11/04/20  9:44 PM   Result Value Ref Range    Glucose 76 70 - 110 mg/dL    Sodium 127 (L) 136 - 145 mmol/L    Potassium 4.4 3.5 - 5.1 mmol/L    Chloride 94 (L) 95 - 110 mmol/L    CO2 21 (L) 23 - 29 mmol/L    BUN 13 6 - 20 mg/dL    Calcium 7.7 (L) 8.7 - 10.5 mg/dL    Creatinine 1.2 0.5 - 1.4 mg/dL    Albumin 2.9 (L) 3.5 - 5.2 g/dL    Phosphorus 3.1 2.7 - 4.5 mg/dL    eGFR if African American >60.0 >60 mL/min/1.73 m^2    eGFR if non  54.3 (A) >60 mL/min/1.73 m^2    Anion Gap 12 8 - 16 mmol/L   Magnesium    Collection Time: 11/04/20  9:44 PM   Result Value Ref Range    Magnesium 2.2 1.6 - 2.6 mg/dL   POCT glucose    Collection Time: 11/04/20  9:46 PM   Result Value Ref Range    POCT Glucose 74 70 - 110 mg/dL   CBC Auto Differential    Collection Time: 11/04/20 11:06 PM   Result Value Ref Range    WBC 33.14 (H) 3.90 - 12.70 K/uL    RBC 2.15 (L) 4.00 - 5.40 M/uL    Hemoglobin 7.0 (L) 12.0 - 16.0 g/dL    Hematocrit 20.8 (L) 37.0 - 48.5 %    MCV 97 82 - 98 fL    MCH 32.6 (H) 27.0 - 31.0 pg    MCHC 33.7 32.0 - 36.0 g/dL    RDW 17.2 (H) 11.5 - 14.5 %    Platelets 62 (L) 150 - 350 K/uL    MPV  13.0 (H) 9.2 - 12.9 fL    Immature Granulocytes CANCELED 0.0 - 0.5 %    Immature Grans (Abs) CANCELED 0.00 - 0.04 K/uL    Lymph # CANCELED 1.0 - 4.8 K/uL    Mono # CANCELED 0.3 - 1.0 K/uL    Eos # CANCELED 0.0 - 0.5 K/uL    Baso # CANCELED 0.00 - 0.20 K/uL    nRBC 6 (A) 0 /100 WBC    Gran % 77.0 (H) 38.0 - 73.0 %    Lymph % 4.5 (L) 18.0 - 48.0 %    Mono % 6.0 4.0 - 15.0 %    Eosinophil % 1.0 0.0 - 8.0 %    Basophil % 0.0 0.0 - 1.9 %    Bands 5.0 %    Metamyelocytes 2.5 %    Myelocytes 3.5 %    Promyelocytes 0.5 %    Platelet Estimate Decreased (A)     Aniso Moderate     Poik Slight     Poly Occasional     Target Cells Occasional     Basophilic Stippling Occasional     Dohle Bodies Present     Toxic Granulation Present     Vacuolated Granulocytes Present     Differential Method Manual    Lactic Acid, Plasma    Collection Time: 11/04/20 11:06 PM   Result Value Ref Range    Lactate (Lactic Acid) 3.2 (H) 0.5 - 2.2 mmol/L   Protime-INR    Collection Time: 11/04/20 11:06 PM   Result Value Ref Range    Prothrombin Time 27.8 (H) 9.0 - 12.5 sec    INR 2.6 (H) 0.8 - 1.2   Phosphorus    Collection Time: 11/05/20  3:49 AM   Result Value Ref Range    Phosphorus 2.3 (L) 2.7 - 4.5 mg/dL   Protime-INR    Collection Time: 11/05/20  3:49 AM   Result Value Ref Range    Prothrombin Time 33.9 (H) 9.0 - 12.5 sec    INR 3.4 (H) 0.8 - 1.2   Vancomycin, Random    Collection Time: 11/05/20  3:49 AM   Result Value Ref Range    Vancomycin, Random 14.7 Not established ug/mL   Comprehensive metabolic panel    Collection Time: 11/05/20  3:49 AM   Result Value Ref Range    Sodium 130 (L) 136 - 145 mmol/L    Potassium 4.5 3.5 - 5.1 mmol/L    Chloride 97 95 - 110 mmol/L    CO2 23 23 - 29 mmol/L    Glucose 68 (L) 70 - 110 mg/dL    BUN 9 6 - 20 mg/dL    Creatinine 0.8 0.5 - 1.4 mg/dL    Calcium 7.6 (L) 8.7 - 10.5 mg/dL    Total Protein 4.4 (L) 6.0 - 8.4 g/dL    Albumin 2.8 (L) 3.5 - 5.2 g/dL    Total Bilirubin 29.2 (H) 0.1 - 1.0 mg/dL    Alkaline  Phosphatase 266 (H) 55 - 135 U/L     (H) 10 - 40 U/L     (H) 10 - 44 U/L    Anion Gap 10 8 - 16 mmol/L    eGFR if African American >60.0 >60 mL/min/1.73 m^2    eGFR if non African American >60.0 >60 mL/min/1.73 m^2   Renal function panel    Collection Time: 11/05/20  3:49 AM   Result Value Ref Range    Glucose 64 (L) 70 - 110 mg/dL    Sodium 132 (L) 136 - 145 mmol/L    Potassium 4.5 3.5 - 5.1 mmol/L    Chloride 98 95 - 110 mmol/L    CO2 23 23 - 29 mmol/L    BUN 9 6 - 20 mg/dL    Calcium 7.4 (L) 8.7 - 10.5 mg/dL    Creatinine 0.8 0.5 - 1.4 mg/dL    Albumin 2.8 (L) 3.5 - 5.2 g/dL    Phosphorus 2.3 (L) 2.7 - 4.5 mg/dL    eGFR if African American >60.0 >60 mL/min/1.73 m^2    eGFR if non African American >60.0 >60 mL/min/1.73 m^2    Anion Gap 11 8 - 16 mmol/L   Magnesium    Collection Time: 11/05/20  3:49 AM   Result Value Ref Range    Magnesium 2.0 1.6 - 2.6 mg/dL   D-Dimer, Quantitative    Collection Time: 11/05/20  3:49 AM   Result Value Ref Range    D-Dimer >33.00 (H) <0.50 mg/L FEU   Lactic Acid, Plasma    Collection Time: 11/05/20  3:49 AM   Result Value Ref Range    Lactate (Lactic Acid) 2.5 (H) 0.5 - 2.2 mmol/L   Fibrinogen    Collection Time: 11/05/20  3:49 AM   Result Value Ref Range    Fibrinogen <70 (AA) 182 - 366 mg/dL   Renal function panel    Collection Time: 11/05/20  3:49 AM   Result Value Ref Range    Glucose 64 (L) 70 - 110 mg/dL    Sodium 132 (L) 136 - 145 mmol/L    Potassium 4.5 3.5 - 5.1 mmol/L    Chloride 98 95 - 110 mmol/L    CO2 23 23 - 29 mmol/L    BUN 9 6 - 20 mg/dL    Calcium 7.4 (L) 8.7 - 10.5 mg/dL    Creatinine 0.8 0.5 - 1.4 mg/dL    Albumin 2.8 (L) 3.5 - 5.2 g/dL    Phosphorus 2.3 (L) 2.7 - 4.5 mg/dL    eGFR if African American >60.0 >60 mL/min/1.73 m^2    eGFR if non African American >60.0 >60 mL/min/1.73 m^2    Anion Gap 11 8 - 16 mmol/L   Magnesium    Collection Time: 11/05/20  3:49 AM   Result Value Ref Range    Magnesium 2.0 1.6 - 2.6 mg/dL   CBC Auto Differential     Collection Time: 11/05/20  5:09 AM   Result Value Ref Range    WBC 36.84 (H) 3.90 - 12.70 K/uL    RBC 2.53 (L) 4.00 - 5.40 M/uL    Hemoglobin 7.9 (L) 12.0 - 16.0 g/dL    Hematocrit 23.9 (L) 37.0 - 48.5 %    MCV 95 82 - 98 fL    MCH 31.2 (H) 27.0 - 31.0 pg    MCHC 33.1 32.0 - 36.0 g/dL    RDW 16.8 (H) 11.5 - 14.5 %    Platelets 60 (L) 150 - 350 K/uL    MPV 12.5 9.2 - 12.9 fL    Immature Granulocytes CANCELED 0.0 - 0.5 %    Immature Grans (Abs) CANCELED 0.00 - 0.04 K/uL    Lymph # CANCELED 1.0 - 4.8 K/uL    Mono # CANCELED 0.3 - 1.0 K/uL    Eos # CANCELED 0.0 - 0.5 K/uL    Baso # CANCELED 0.00 - 0.20 K/uL    nRBC 6 (A) 0 /100 WBC    Gran % 82.0 (H) 38.0 - 73.0 %    Lymph % 2.0 (L) 18.0 - 48.0 %    Mono % 3.0 (L) 4.0 - 15.0 %    Eosinophil % 1.0 0.0 - 8.0 %    Basophil % 0.0 0.0 - 1.9 %    Metamyelocytes 2.0 %    Myelocytes 10.0 %    Platelet Estimate Decreased (A)     Aniso Moderate     Poik Slight     Poly Occasional     Hypo Occasional     Ovalocytes Occasional     [place] Cells Occasional     Differential Method Manual    POCT glucose    Collection Time: 11/05/20  6:24 AM   Result Value Ref Range    POCT Glucose 101 70 - 110 mg/dL   POCT glucose    Collection Time: 11/05/20  7:57 AM   Result Value Ref Range    POCT Glucose 93 70 - 110 mg/dL   ISTAT PROCEDURE    Collection Time: 11/05/20  9:07 AM   Result Value Ref Range    POC PH 7.459 (H) 7.35 - 7.45    POC PCO2 36.4 35 - 45 mmHg    POC PO2 76 (L) 80 - 100 mmHg    POC HCO3 25.8 24 - 28 mmol/L    POC BE 2 -2 to 2 mmol/L    POC SATURATED O2 96 95 - 100 %    POC TCO2 27 23 - 27 mmol/L    Rate 26     Sample ARTERIAL     Site Other     Allens Test N/A     DelSys Adult Vent     Mode AC/PRVC     Vt 400     PEEP 5     FiO2 40        Current Facility-Administered Medications:     0.9%  NaCl infusion (CRRT USE ONLY), , Intravenous, Continuous, Carlos Manuel Kilgore MD, Last Rate: 200 mL/hr at 11/05/20 0900    0.9%  NaCl infusion (for blood administration), , Intravenous, Q24H PRN,  Cedric E. Judice, NP    0.9%  NaCl infusion (for blood administration), , Intravenous, Q24H PRN, Juan Steinberg MD    0.9%  NaCl infusion (for blood administration), , Intravenous, Q24H PRN, Juan Steinberg MD    amiodarone tablet 200 mg, 200 mg, Per NG tube, Daily, Cedric Jimenez NP, 200 mg at 20 0910    dextrose 50% injection 12.5 g, 12.5 g, Intravenous, PRN, Dana Altamirano MD, 12.5 g at 20 0539    famotidine (PF) injection 20 mg, 20 mg, Intravenous, Daily, Codie Wong PA-C, 20 mg at 20 0911    fentaNYL 2500 mcg in 0.9% sodium chloride 250 mL infusion premix (titrating), 25 mcg/hr, Intravenous, Continuous, Codie Wong PA-C, Stopped at 20 1600    [] fentaNYL injection 50 mcg, 50 mcg, Intravenous, Q15 Min PRN **FOLLOWED BY** fentaNYL injection 50 mcg, 50 mcg, Intravenous, Q1H PRN, Codie Wong PA-C    fluconazole (DIFLUCAN) IVPB 200 mg 100 mL, 200 mg, Intravenous, Q24H, Codie Wnog PA-C, 200 mg at 20 0812    fludrocortisone tablet 100 mcg, 100 mcg, Per OG tube, Daily, Cedric Jimenez NP, 100 mcg at 20 0913    glucagon (human recombinant) injection 1 mg, 1 mg, Intramuscular, PRN, Dana Altamirano MD    hydrocortisone sodium succinate injection 100 mg, 100 mg, Intravenous, Q8H, Codie Wong PA-C, 100 mg at 20 0505    lactulose 20 gram/30 mL solution Soln 20 g, 20 g, Oral, TID, Codie Wong PA-C, 20 g at 20 0911    magnesium sulfate 2g in water 50mL IVPB (premix), 2 g, Intravenous, PRN, Carlos Manuel Kilgore MD    meropenem-0.9% sodium chloride 1 g/50 mL IVPB, 1 g, Intravenous, Q12H, Ebony May PA-C, Last Rate: 50 mL/hr at 20, 1 g at 20    midodrine tablet 15 mg, 15 mg, Per OG tube, TID WM, Cedric Jimenez NP, 15 mg at 20    norepinephrine 32 mg in sodium chloride 0.9% 250 mL infusion, 0.02 mcg/kg/min, Intravenous, Continuous, Cedric THOMPSON  ALCIDES Jimenez, Last Rate: 49.5 mL/hr at 11/05/20 0922, 1.48 mcg/kg/min at 11/05/20 0922    ondansetron injection 4 mg, 4 mg, Intravenous, Q6H PRN, Codie Wong PA-C, 4 mg at 10/31/20 1719    phenylephrine HCl in 0.9% NaCl 1 mg/10 mL (100 mcg/mL) syringe 500 mcg, 500 mcg, Intravenous, PRN, Codie Wong PA-C    phytonadione vitamin k (AQUA-MEPHYTON) 10 mg in dextrose 5 % 50 mL IVPB, 10 mg, Intravenous, Once, Cedric Jimenez NP    [START ON 11/6/2020] phytonadione vitamin k (AQUA-MEPHYTON) 10 mg in dextrose 5 % 50 mL IVPB, 10 mg, Intravenous, Once, Cedric Jimenez NP    pneumoc 13-david conj-dip cr(PF) (PREVNAR 13 (PF)) 0.5 mL, 0.5 mL, Intramuscular, vaccine x 1 dose, Thierry Kong MD    polyethylene glycol packet 17 g, 17 g, Per NG tube, Daily, Cedric Jimenez NP, 17 g at 11/04/20 0812    promethazine (PHENERGAN) 6.25 mg in dextrose 5 % 50 mL IVPB, 6.25 mg, Intravenous, Q6H PRN, Alex Rene DO, Last Rate: 150 mL/hr at 10/31/20 2111, 6.25 mg at 10/31/20 2111    propofol (DIPRIVAN) 10 mg/mL infusion, 5 mcg/kg/min, Intravenous, Continuous, Codie Wong PA-C, Stopped at 11/04/20 1600    rifAXIMin tablet 550 mg, 550 mg, Per NG tube, BID, Cedric Jimenez NP, 550 mg at 11/05/20 0910    sennosides 8.8 mg/5 ml syrup 5 mL, 5 mL, Per NG tube, QHS, Cedric Jimenez NP, 5 mL at 11/04/20 2017    sodium chloride 0.9% flush 10 mL, 10 mL, Intravenous, PRN, Maninder Coates MD    sodium phosphate 20.01 mmol in dextrose 5 % 250 mL IVPB, 20.01 mmol, Intravenous, PRN, Carlos Manuel Kilgore MD    sodium phosphate 30 mmol in dextrose 5 % 250 mL IVPB, 30 mmol, Intravenous, PRN, Carlos Manuel Kilgore MD    sodium phosphate 39.99 mmol in dextrose 5 % 250 mL IVPB, 39.99 mmol, Intravenous, PRN, Carlos Manuel Kilgore MD    Pharmacy to dose Vancomycin consult, , , Once **AND** vancomycin - pharmacy to dose, , Intravenous, pharmacy to manage frequency, Codie Wong PA-C    vancomycin in dextrose 5 %  1 gram/250 mL IVPB 1,000 mg, 1,000 mg, Intravenous, Once, Hal Shea MD, Last Rate: 166.7 mL/hr at 11/05/20 0911, 1,000 mg at 11/05/20 0911    vasopressin (PITRESSIN) 0.2 Units/mL in dextrose 5 % 100 mL infusion, 0.04 Units/min, Intravenous, Continuous, Codie Wong PA-C, Last Rate: 12 mL/hr at 11/05/20 0922, 0.04 Units/min at 11/05/20 0922     Assessment/Plan  Patient is a 46yo female with LINN.      1. LINN  - most likely ATN secondary to sepsis/ischemia  - UA 2+ protein, 3+ blood and 2+ WBC. Urine MCS on 11/2 showed muddy brown casts and normal RBCs. Suggests ATN but does not rule out HRS  - Started on SLED 11/2. Continue and repeat evening labs. Currently tolerating  but has increased pressor requirements compared to yesterday. May change to SCUF pending evening labs  - Hypocalcemia (stable at 7.4)  - Metabolic acidosis - slight improvement. HCO - 23 from 21.  - Hyponatremia - improved, Na - 132 from 128  - Phos - 2.3 (from 3.0), Lactate - 2.5 (from 1.9), both improving after starting dialysis  - Strict I/O and chart  - Avoid nephrotoxic medications, NSAIDs, IV contrast, etc.  - Medication doses adjusted to GFR  - Hb > 7 gm/dL      Philip Eddy  MS4    Staff attestation  Date of service 11/5/20  I have seen the patient, reviewed MS-4 assessment, plan and progress note. I personally evaluated patient, reviewed serial labs, imaging studies, pertinent clinical data and formulated treatment plan from renal standpoint, details as included in the note by MS-4 who was under my direct supervision. Pt was seen on CRRT which was running for managing uremic toxin clearance, volume management in the setting of LINN due to ATN, hemodynamic instability with 2 vasopressors infusing. CRRT prescription was reviewed, serial labs were noted, UF goal was limited with her tenuous hemodynamics, changes were discussed with the team. Her family was updated on the plan.

## 2020-11-06 PROBLEM — G93.41 ENCEPHALOPATHY, METABOLIC: Status: ACTIVE | Noted: 2020-01-01

## 2020-11-06 NOTE — ASSESSMENT & PLAN NOTE
Reports x 3 thoracentesis in the past for symptom management.     --s/p thoracentesis of 1.5 L removal on 10/30; labs negative for infection  --Repeat thora 10/31 with 2L removed  --Repeat thoracentesis with IR 11/6

## 2020-11-06 NOTE — ASSESSMENT & PLAN NOTE
Platelets down-trending likely related to underlying liver failure    --transfuse plt <50 with active bleeding

## 2020-11-06 NOTE — PROGRESS NOTES
[facility]  Infectious Disease  Progress Note    Patient Name: Madhavi Bell  MRN: [id number]  Admission Date: 10/30/2020  Length of Stay: 7 days  Attending Physician: Hal Shea*  Primary Care Provider: Anurag Knutson MD    Isolation Status: No active isolations  Assessment/Plan:      * Decompensated hepatic cirrhosis  45-year-old female with history of alpha-1-antitrypsin deficiency / EtOH cirrhosis c/b HCC, HE, recurrent right hepatohydrothorax, presented to OSH 10/29/2020 with worsening SOB, labs notable for LINN, transferred to [facility] [place]/30/2020 for liver transplant evaluation. Patient had thoracentesis on 10/30/2020 and 10/31/2020, found to have spontaneous bacterial empyema (PMN 1,725, culture negative), subsequent acute hypoxemic respiratory failure requiring intubation 11/1/2020, BAL performed 11/1/2020, started on CRRT 11/2/2020.     Pleural fluid, respiratory cultures, blood cultures with no growth. Crypto Ag, Aspergillus Ag, urine histo/blasto negative. Fungitell positive 167 - unclear significance (Candida gut translocation?), will repeat.    Pip-tazo 10/30-11/1  Azithro 11/1-11/4  Vanc 10/30 -  Fluc 11/1-  Douglas 11/1-    Patient with increasing pressor requirements - possibly related to acute decompensated liver failure or uncontrolled infection - okay to hold off on thoracentesis given high risk for bleeding given coagulopathy, also results would not  at this time - patient is worsening clinically and not medically optimized for liver transplant at this time.    Recommendations:  - Continue douglas / vanc / fluconazole               Thank you for your consult. I will follow-up with patient. Please contact us if you have any additional questions.    Clara Auguste MD  Infectious Disease  [facility]    Subjective:     Principal Problem:Decompensated hepatic cirrhosis    HPI: Madhavi Bell is a 45 year old female with decompensated  liver cirrhosis due to alpha-1 antitrypsin deficiency and alcohol use, recurrent right sided hepato-hydrothorax, HCC who presented to [place] ED on 10/29 with shortness of breath and [place] [facility] [place]. Patient reports worsening SOB over the last two days. Patient scheduled for outpatient thoracentesis next week however was advised by GI doctor to present to the ED due to worsening symptoms. Patient reports associated dry cough but no worse than baseline. Denies fever, chills, chest pain, abdominal pain, confusion.      Upon arrival, patient with oxygen saturations >94% on 4L NC. No fever. CXR with significant right sided pleural effusion. Labs significant for new leukocytosis, t bili of 24 and LINN with sCr 2.7. Patient admitted to the CMICU. Patient underwent liver biopsy and thoracentesis (1.5 L removed) on 10/30. Started on empiric Vanc/Zosyn for pneumonia coverage.  Also received a dose of Tobramycin. Blood, urine and resp cx thus far unrevealing. Over the last 24 hours her oxygen requirements increased from 4L to 9L and she has been started on pressors. WBC has increased to 25K. CXR revealed Bilateral pulmonary opacities with significant interval detrimental change, there is prominent appearance of confluent infiltrates/airspace disease and consolidation with air bronchogram involving the mid to lower right hemithorax, with obscuration of the right hemidiaphragm and right heart border, the areas of multifocal rounded opacification on the left appears somewhat more confluent, with prominent confluent abnormal opacity seen throughout the left hemithorax, and central air bronchogram formation.  There is some obscuration of the left heart border however without significant obscuration of the left hemidiaphragm.  There is likely a component of pleural fluid on the right however significant pleural fluid on the left is not appreciated.  There is no evidence for  pneumothorax. Repeat thoracentesis showed 2527 WBC, 67% segs.  ID consulted for abx recs.    Further hx obtained from . 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 Two kids (one physically going to school). No recent illnesses or known COVID exposures. Two dogs. No recent travel.  Interval History:   No fevers  Norepi requirements increasing, plans to start zahra  On vaso  On fentanyl  On CRRT  Vent requirements decreased    Vent Mode: A/C  Oxygen Concentration (%):  [50-60] 50  Resp Rate Total:  [26 br/min-30 br/min] 26 br/min  Vt Set:  [400 mL] 400 mL  PEEP/CPAP:  [8 cmH20] 8 cmH20  Mean Airway Pressure:  [14 cmH20-15 cmH20] 14 cmH20      Review of Systems   Unable to perform ROS: Acuity of condition     Objective:     Vital Signs (Most Recent):  Temp: 98.1 °F (36.7 °C) (11/06/20 0715)  Pulse: 89 (11/06/20 1600)  Resp: (!) 26 (11/06/20 1600)  BP: 102/60 (11/06/20 1600)  SpO2: (!) 90 % (11/06/20 1600) Vital Signs (24h Range):  Temp:  [98.1 °F (36.7 °C)-98.5 °F (36.9 °C)] 98.1 °F (36.7 °C)  Pulse:  [] 89  Resp:  [21-50] 26  SpO2:  [90 %-95 %] 90 %  BP: ()/(41-81) 102/60  Arterial Line BP: (101-143)/(41-81) 101/45     Weight: 84.7 kg (186 lb 11.7 oz)  Body mass index is 30.14 kg/m².    Estimated Creatinine Clearance: 154.3 mL/min (based on SCr of 0.5 mg/dL).    Physical Exam  Vitals signs reviewed.   Constitutional:       General: She is not in acute distress.     Appearance: She is well-developed. She is ill-appearing and toxic-appearing. She is not diaphoretic.   HENT:      Mouth/Throat:      Comments: OG tube  Neck:      Comments: RIJ line, LIJ line  Cardiovascular:      Rate and Rhythm: Tachycardia present.   Pulmonary:      Effort: Pulmonary effort is normal. No respiratory distress.      Comments: Intubated  Abdominal:      General: There is no distension.      Palpations: Abdomen is soft.   Genitourinary:      Comments: Rectal tube with dark liquid stool  Skin:     Coloration: Skin is jaundiced.   Neurological:      Comments: Sedated         Significant Labs: All pertinent labs within the past 24 hours have been reviewed.    Significant Imaging: I have reviewed all pertinent imaging results/findings within the past 24 hours.

## 2020-11-06 NOTE — PROGRESS NOTES
[facility]  Infectious Disease  Progress Note    Patient Name: Madhavi Bell  MRN: [id number]  Admission Date: 10/30/2020  Length of Stay: 6 days  Attending Physician: Hal Shea*  Primary Care Provider: Anurag Knutson MD    Isolation Status: No active isolations  Assessment/Plan:      * Decompensated hepatic cirrhosis  45-year-old female with history of alpha-1-antitrypsin deficiency / EtOH cirrhosis c/b HCC, HE, recurrent right hepatohydrothorax, presented to OSH 10/29/2020 with worsening SOB, labs notable for LINN, transferred to [facility] [place]/30/2020 for liver transplant evaluation. Patient had thoracentesis on 10/30/2020 and 10/31/2020, found to have spontaneous bacterial empyema (PMN 1,725, culture negative), subsequent acute hypoxemic respiratory failure requiring intubation 11/1/2020, BAL performed 11/1/2020, started on CRRT 11/2/2020. Today, patient     Pleural fluid, respiratory cultures, blood cultures with no growth. Crypto Ag, Aspergillus Ag, urine histo/blasto negative. Fungitell positive 167 - unclear significance (Candida gut translocation?), will repeat.    Pip-tazo 10/30-11/1  Azithro 11/1-11/4  Vanc 10/30 -  Fluc 11/1-  Douglas 11/1-    Recommendations:  - Repeat thoracentesis to assess if patient is responding appropriately to antibiotic therapy for spontaneous bacterial empyema - send for cell count with diff, gram stain, culture, protein, albumin, LDH, bili, pH  - Continue douglas / vanc / fluconazole   - Repeat fungitell ordered          Thank you for your consult. I will follow-up with patient. Please contact us if you have any additional questions.    Clara Auguste MD  Infectious Disease  [facility]    Subjective:     Principal Problem:Decompensated hepatic cirrhosis    HPI: Madhavi Bell is a 45 year old female with decompensated liver cirrhosis due to alpha-1 antitrypsin deficiency and alcohol use, recurrent right sided hepato-hydrothorax,  HCC who presented to [place] ED on 10/29 with shortness of breath [place] [facility] [place]. Patient reports worsening SOB over the last two days. Patient scheduled for outpatient thoracentesis next week however was advised by GI doctor to present to the ED due to worsening symptoms. Patient reports associated dry cough but no worse than baseline. Denies fever, chills, chest pain, abdominal pain, confusion.      Upon arrival, patient with oxygen saturations >94% on 4L NC. No fever. CXR with significant right sided pleural effusion. Labs significant for new leukocytosis, t bili of 24 and LINN with sCr 2.7. Patient admitted to the CMICU. Patient underwent liver biopsy and thoracentesis (1.5 L removed) on 10/30. Started on empiric Vanc/Zosyn for pneumonia coverage.  Also received a dose of Tobramycin. Blood, urine and resp cx thus far unrevealing. Over the last 24 hours her oxygen requirements increased from 4L to 9L and she has been started on pressors. WBC has increased to 25K. CXR revealed Bilateral pulmonary opacities with significant interval detrimental change, there is prominent appearance of confluent infiltrates/airspace disease and consolidation with air bronchogram involving the mid to lower right hemithorax, with obscuration of the right hemidiaphragm and right heart border, the areas of multifocal rounded opacification on the left appears somewhat more confluent, with prominent confluent abnormal opacity seen throughout the left hemithorax, and central air bronchogram formation.  There is some obscuration of the left heart border however without significant obscuration of the left hemidiaphragm.  There is likely a component of pleural fluid on the right however significant pleural fluid on the left is not appreciated.  There is no evidence for pneumothorax. Repeat thoracentesis showed 2527 WBC, 67% segs.  ID consulted for abx recs.    Further hx obtained from  . 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 Two kids (one physically going to school). No recent illnesses or known COVID exposures. Two dogs. No recent travel.  Interval History:   No fevers  Remains on norepi and vaso  On fentanyl and propofol  On CRRT  Bloody secretions removed from ET tube - subsequently vent requirements increased    Vent Mode: A/C  Oxygen Concentration (%):  [] 90  Resp Rate Total:  [26 br/min-27 br/min] 26 br/min  Vt Set:  [400 mL] 400 mL  PEEP/CPAP:  [8 cmH20] 8 cmH20  Mean Airway Pressure:  [12 cmH20-15 cmH20] 15 cmH20      Review of Systems   Unable to perform ROS: Acuity of condition     Objective:     Vital Signs (Most Recent):  Temp: 98.8 °F (37.1 °C) (11/05/20 1622)  Pulse: 91 (11/05/20 1622)  Resp: (!) 26 (11/05/20 1622)  BP: (!) 100/54 (11/05/20 1607)  SpO2: (!) 94 % (11/05/20 1622) Vital Signs (24h Range):  Temp:  [97.8 °F (36.6 °C)-98.8 °F (37.1 °C)] 98.8 °F (37.1 °C)  Pulse:  [] 91  Resp:  [25-29] 26  SpO2:  [89 %-100 %] 94 %  BP: (100-124)/(47-60) 100/54  Arterial Line BP: ()/(37-63) 108/48     Weight: 81 kg (178 lb 9.2 oz)  Body mass index is 28.82 kg/m².    Estimated Creatinine Clearance: 125.8 mL/min (based on SCr of 0.6 mg/dL).    Physical Exam  Vitals signs reviewed.   Constitutional:       General: She is not in acute distress.     Appearance: She is well-developed. She is ill-appearing and toxic-appearing. She is not diaphoretic.   HENT:      Head: Normocephalic and atraumatic.   Eyes:      Conjunctiva/sclera: Conjunctivae normal.   Neck:      Musculoskeletal: Normal range of motion and neck supple.   Cardiovascular:      Rate and Rhythm: Normal rate.   Pulmonary:      Effort: Pulmonary effort is normal. No respiratory distress.   Abdominal:      General: There is no distension.      Palpations: Abdomen is soft.   Musculoskeletal: Normal range of motion.   Skin:      General: Skin is warm and dry.      Findings: No erythema or rash.   Neurological:      Mental Status: She is oriented to person, place, and time.   Psychiatric:         Behavior: Behavior normal.         Significant Labs: All pertinent labs within the past 24 hours have been reviewed.    Significant Imaging: I have reviewed all pertinent imaging results/findings within the past 24 hours.

## 2020-11-06 NOTE — SUBJECTIVE & OBJECTIVE
Interval History/Significant Events: Patients remains critically ill on mechanical ventilation at 50%, 8 PEEP. She is requiring norepinephrine 1.4 mcg/kg/min and vasopressin 0.04 mcg/kig/min. Low dose sedation weaned off to assess neuro function. Remains on CRRT.     Review of Systems   Unable to perform ROS: Intubated     Objective:     Vital Signs (Most Recent):  Temp: 98.1 °F (36.7 °C) (11/06/20 0715)  Pulse: 86 (11/06/20 0710)  Resp: (!) 26 (11/06/20 0710)  BP: (!) 105/51 (11/06/20 0405)  SpO2: (!) 94 % (11/06/20 0710) Vital Signs (24h Range):  Temp:  [98 °F (36.7 °C)-98.8 °F (37.1 °C)] 98.1 °F (36.7 °C)  Pulse:  [] 86  Resp:  [21-34] 26  SpO2:  [91 %-95 %] 94 %  BP: ()/(41-81) 105/51  Arterial Line BP: (101-143)/(37-81) 102/45   Weight: 84.7 kg (186 lb 11.7 oz)  Body mass index is 30.14 kg/m².      Intake/Output Summary (Last 24 hours) at 11/6/2020 0931  Last data filed at 11/6/2020 0900  Gross per 24 hour   Intake 8803.21 ml   Output 7341 ml   Net 1462.21 ml       Physical Exam  Vitals signs reviewed.   Constitutional:       Appearance: She is well-developed. She is toxic-appearing.      Interventions: She is sedated and restrained.   HENT:      Head: Normocephalic and atraumatic.   Eyes:      General: Scleral icterus present.      Pupils: Pupils are equal, round, and reactive to light.   Neck:      Thyroid: No thyromegaly.      Trachea: No tracheal deviation.      Comments: RIJ trialysis and LIJ CVC in place.   Cardiovascular:      Rate and Rhythm: Normal rate and regular rhythm.      Heart sounds: Normal heart sounds. No murmur. No friction rub. No gallop.    Pulmonary:      Effort: Pulmonary effort is normal.      Breath sounds: Normal breath sounds. No decreased breath sounds, wheezing, rhonchi or rales.   Abdominal:      General: Bowel sounds are normal. There is distension.      Palpations: Abdomen is soft.      Comments: No peritoneal fluid seen on bedside US   Musculoskeletal: Normal range  of motion.   Skin:     General: Skin is warm and dry.      Coloration: Skin is jaundiced.   Neurological:      Mental Status: She is unresponsive.      Sensory: No sensory deficit.      Comments: Cough, gag, and corneal reflex present. Small bilateral LE movements with stimulation. No withdraw to pain in bilateral UE. No spontaneous eye movements.          Vents:  Vent Mode: A/C (11/06/20 0710)  Set Rate: 26 BPM (11/06/20 0710)  Vt Set: 400 mL (11/06/20 0710)  PEEP/CPAP: 8 cmH20 (11/06/20 0710)  Oxygen Concentration (%): 60 (11/06/20 0710)  Peak Airway Pressure: 27 cmH2O (11/06/20 0710)  Plateau Pressure: 19 cmH20 (11/06/20 0710)  Total Ve: 11.3 mL (11/06/20 0710)  F/VT Ratio<105 (RSBI): (!) 60.75 (11/06/20 0710)  Lines/Drains/Airways     Central Venous Catheter Line            Percutaneous Central Line Insertion/Assessment - Triple Lumen  10/30/20 0855 left internal jugular 7 days    Trialysis (Dialysis) Catheter 11/02/20 1500 right internal jugular 3 days          Drain                 Fecal Incontinence  11/01/20 1535 4 days         NG/OG Tube 11/01/20 1434 Center mouth 4 days          Airway                 Airway - Non-Surgical 11/01/20 1424 Endotracheal Tube 4 days          Arterial Line            Arterial Line 11/03/20 1600 Right Radial 2 days              Significant Labs:    CBC/Anemia Profile:  Recent Labs   Lab 11/05/20  1840 11/06/20  0106 11/06/20  0517   WBC 33.01* 36.18* 32.92*   HGB 8.2* 7.8* 7.6*   HCT 23.8* 23.3* 22.2*   PLT 61* 42* 40*   MCV 93 94 94   RDW 16.8* 17.2* 17.5*        Chemistries:  Recent Labs   Lab 11/05/20  0349 11/05/20  1516 11/05/20  2303 11/06/20  0517   *  132*  132* 132*  132*  132* 131*  131* 133*   K 4.5  4.5  4.5 4.7  4.8  4.8 4.4  4.4 4.3   CL 97  98  98 98  97  97 99  99 100   CO2 23  23  23 21*  22*  22* 21*  21* 18*   BUN 9  9  9 9  9  9 7  7 6   CREATININE 0.8  0.8  0.8 0.6  0.7  0.7 0.6  0.6 0.6   CALCIUM 7.6*  7.4*   7.4* 7.6*  7.8*  7.8* 7.8*  7.8* 7.8*   ALBUMIN 2.8*  2.8*  2.8* 2.8*  2.9*  2.9* 2.9*  2.9* 2.9*   PROT 4.4* 4.9*  --  5.0*   BILITOT 29.2* 25.0*  --  27.5*   ALKPHOS 266* 249*  --  264*   * 167*  --  170*   * 561*  --  514*   MG 2.0  2.0 2.1  2.1 2.0  2.0 1.9  1.9   PHOS 2.3*  2.3*  2.3* 2.8  2.8 2.5*  2.5* 3.9       All pertinent labs within the past 24 hours have been reviewed.    Significant Imaging:  I have reviewed and interpreted all pertinent imaging results/findings within the past 24 hours.

## 2020-11-06 NOTE — CONSULTS
See full H&P.    46 y/o female with decompensated cirrhosis (2/2 AAT, ETOH) and 3.9 cm enhancing liver lesion (with additional indeterminate liver lesions) who was transferred from [place] for hypotension/higher level of care. She has been following with HEPATOLOGY as outpatient and has required thoracentesis for hepatic hydrothorax. She underwent liver mass biopsy and R thoracentesis on 10/30/20. Liver lesion biopsy with cirrhosis/no malignancy.    Primary team requesting R thoracentesis/chest tube. Patient on 0.14 mcg/kg/min NE, 0.04U/min vasopressin, CRRT. Plt 42, INR 2.7, fibrinogen < 70.    Plan for procedure today. Please have Plt/FFP/cryo on hold.

## 2020-11-06 NOTE — PLAN OF CARE
CMICU DAILY GOALS       A: Awake    RASS: Goal - RASS Goal: -3-->moderate sedation  Actual - RASS (Phan Agitation-Sedation Scale): -4-->deep sedation   Restraint necessity: Clinical Justification: Removing medical devices  B: Breath   SBT: Not attempted   C: Coordinate A & B, analgesics/sedatives   Pain: managed    SAT: Not attempted  D: Delirium   CAM-ICU: Overall CAM-ICU: Positive  E: Early(intubated/ Progressive (non-intubated) Mobility   MOVE Screen: Fail   Activity: Activity Management: patient unable to perform activities(unstable)  FAS: Feeding/Nutrition   Diet order: Diet/Nutrition Received: NPO, Specialty Diet/Nutrition Received: renal diet Fluid restriction:    T: Thrombus   DVT prophylaxis: VTE Required Core Measure: (SCDs) Sequential compression device initiated/maintained, Pharmacological prophylaxis initiated/maintained  H: HOB Elevation   Head of Bed (HOB): HOB at 30 degrees  U: Ulcer Prophylaxis   GI: yes  G: Glucose control   managed Glycemic Management: blood glucose monitoring  S: Skin   Bundle compliance: yes   Bathing/Skin Care: incontinence care Date: 11/06/2020  B: Bowel Function   diarrhea   I: Indwelling Catheters   Garcia necessity: [REMOVED]      Urethral Catheter 10/30/20 1615 16 Fr.-Reason for Continuing Urinary Catheterization: Critically ill in ICU and requiring hourly monitoring of intake/output   CVC necessity: yes   IPAD offered: Not appropriate  D: De-escalation Antibx   Yes  Plan for the day   Manage blood pressure and continue monitoring hemodynamics  Family/Goals of care/Code Status   Code Status: Full Code      VS and assessment per flow sheet, patient progressing towards goals as tolerated, plan of care reviewed with Madhavi Bell and family, all concerns addressed, will continue to monitor.

## 2020-11-06 NOTE — PROGRESS NOTES
[facility]  Critical Care Medicine  Progress Note    Patient Name: Madhavi Bell  MRN: [id number]  Admission Date: 10/30/2020  Hospital Length of Stay: 7 days  Code Status: Full Code  Attending Provider: Hal Shea*  Primary Care Provider: Anurag Knutson MD   Principal Problem: Decompensated hepatic cirrhosis    Subjective:     HPI:  Madhavi Bell is a 45 year old female with decompensated liver cirrhosis due to alpha-1 antitrypsin deficiency and alcohol use, recurrent right sided hepato-hydrothorax, HCC who presented to [place] ED on 10/29 with shortness of breath and transferred to [facility] for liver transplant evaluation on 10/30. Patient reports worsening SOB over the last two days. Patient scheduled for outpatient thoracentesis next week however was advised by GI doctor to present to the ED due to worsening symptoms. Patient reports associated dry cough with pleural effusions but no worse than baseline. Denies fever, chills, chest pain, abdominal pain, confusion.     Upon arrival, patient with oxygen saturations >94% on 4L NC with borderline BP (SBP 88-92). CXR with significant right sided pleural effusion. Labs significant for new leukocytosis, t bili of 24 and LINN with sCr 2.7.     Hospital/ICU Course:  Patient admitted to the CMICU as a transfer for liver transplant evaluation. Patient underwent liver biopsy and thoracentesis (1.5 L removed) on 10/30. Hepatology and nephrology consulted for assistance. Patient with worsening respiratory status on 10/31 and thoracentesis was completed again for symptom management with 2L removed. Vasopressor requirements significantly increased in order to maintain blood pressure. Unfortunately, her respiratory status continued to decline and ultimately required intubation 11/1.  New pulmonary infiltrates developed on the left side more concerning for PNA. Bronchoscopy completed 11/1 with essentially a negative work up. ID consulted  for assistance with abx broadened to vancomycin, meropenem, azithromycin, and fluconazole. Patient with worsening renal failure requiring initiation of CRRT on 11/2. Patient presented to liver transplant committee on 11/4. They deemed has no absolute contraindications for liver transplant, and that she will be listed pending medical and clinical improvement and financial approval.     Interval History/Significant Events: Patients remains critically ill on mechanical ventilation at 50%, 8 PEEP. She is requiring norepinephrine 1.4 mcg/kg/min and vasopressin 0.04 mcg/kig/min. Low dose sedation weaned off to assess neuro function. Remains on CRRT.     Review of Systems   Unable to perform ROS: Intubated     Objective:     Vital Signs (Most Recent):  Temp: 98.1 °F (36.7 °C) (11/06/20 0715)  Pulse: 86 (11/06/20 0710)  Resp: (!) 26 (11/06/20 0710)  BP: (!) 105/51 (11/06/20 0405)  SpO2: (!) 94 % (11/06/20 0710) Vital Signs (24h Range):  Temp:  [98 °F (36.7 °C)-98.8 °F (37.1 °C)] 98.1 °F (36.7 °C)  Pulse:  [] 86  Resp:  [21-34] 26  SpO2:  [91 %-95 %] 94 %  BP: ()/(41-81) 105/51  Arterial Line BP: (101-143)/(37-81) 102/45   Weight: 84.7 kg (186 lb 11.7 oz)  Body mass index is 30.14 kg/m².      Intake/Output Summary (Last 24 hours) at 11/6/2020 0931  Last data filed at 11/6/2020 0900  Gross per 24 hour   Intake 8803.21 ml   Output 7341 ml   Net 1462.21 ml       Physical Exam  Vitals signs reviewed.   Constitutional:       Appearance: She is well-developed. She is toxic-appearing.      Interventions: She is sedated and restrained.   HENT:      Head: Normocephalic and atraumatic.   Eyes:      General: Scleral icterus present.      Pupils: Pupils are equal, round, and reactive to light.   Neck:      Thyroid: No thyromegaly.      Trachea: No tracheal deviation.      Comments: RIJ trialysis and LIJ CVC in place.   Cardiovascular:      Rate and Rhythm: Normal rate and regular rhythm.      Heart sounds: Normal heart  sounds. No murmur. No friction rub. No gallop.    Pulmonary:      Effort: Pulmonary effort is normal.      Breath sounds: Normal breath sounds. No decreased breath sounds, wheezing, rhonchi or rales.   Abdominal:      General: Bowel sounds are normal. There is distension.      Palpations: Abdomen is soft.      Comments: No peritoneal fluid seen on bedside US   Musculoskeletal: Normal range of motion.   Skin:     General: Skin is warm and dry.      Coloration: Skin is jaundiced.   Neurological:      Mental Status: She is unresponsive.      Sensory: No sensory deficit.      Comments: Cough, gag, and corneal reflex present. Small bilateral LE movements with stimulation. No withdraw to pain in bilateral UE. No spontaneous eye movements.          Vents:  Vent Mode: A/C (11/06/20 0710)  Set Rate: 26 BPM (11/06/20 0710)  Vt Set: 400 mL (11/06/20 0710)  PEEP/CPAP: 8 cmH20 (11/06/20 0710)  Oxygen Concentration (%): 60 (11/06/20 0710)  Peak Airway Pressure: 27 cmH2O (11/06/20 0710)  Plateau Pressure: 19 cmH20 (11/06/20 0710)  Total Ve: 11.3 mL (11/06/20 0710)  F/VT Ratio<105 (RSBI): (!) 60.75 (11/06/20 0710)  Lines/Drains/Airways     Central Venous Catheter Line            Percutaneous Central Line Insertion/Assessment - Triple Lumen  10/30/20 0855 left internal jugular 7 days    Trialysis (Dialysis) Catheter 11/02/20 1500 right internal jugular 3 days          Drain                 Fecal Incontinence  11/01/20 1535 4 days         NG/OG Tube 11/01/20 1434 Center mouth 4 days          Airway                 Airway - Non-Surgical 11/01/20 1424 Endotracheal Tube 4 days          Arterial Line            Arterial Line 11/03/20 1600 Right Radial 2 days              Significant Labs:    CBC/Anemia Profile:  Recent Labs   Lab 11/05/20  1840 11/06/20  0106 11/06/20  0517   WBC 33.01* 36.18* 32.92*   HGB 8.2* 7.8* 7.6*   HCT 23.8* 23.3* 22.2*   PLT 61* 42* 40*   MCV 93 94 94   RDW 16.8* 17.2* 17.5*        Chemistries:  Recent  Labs   Lab 11/05/20  0349 11/05/20  1516 11/05/20  2303 11/06/20  0517   *  132*  132* 132*  132*  132* 131*  131* 133*   K 4.5  4.5  4.5 4.7  4.8  4.8 4.4  4.4 4.3   CL 97  98  98 98  97  97 99  99 100   CO2 23  23  23 21*  22*  22* 21*  21* 18*   BUN 9  9  9 9  9  9 7  7 6   CREATININE 0.8  0.8  0.8 0.6  0.7  0.7 0.6  0.6 0.6   CALCIUM 7.6*  7.4*  7.4* 7.6*  7.8*  7.8* 7.8*  7.8* 7.8*   ALBUMIN 2.8*  2.8*  2.8* 2.8*  2.9*  2.9* 2.9*  2.9* 2.9*   PROT 4.4* 4.9*  --  5.0*   BILITOT 29.2* 25.0*  --  27.5*   ALKPHOS 266* 249*  --  264*   * 167*  --  170*   * 561*  --  514*   MG 2.0  2.0 2.1  2.1 2.0  2.0 1.9  1.9   PHOS 2.3*  2.3*  2.3* 2.8  2.8 2.5*  2.5* 3.9       All pertinent labs within the past 24 hours have been reviewed.    Significant Imaging:  I have reviewed and interpreted all pertinent imaging results/findings within the past 24 hours.      ABG  Recent Labs   Lab 11/06/20  0953   PH 7.398   PO2 103*   PCO2 39.7   HCO3 24.5   BE 0     Assessment/Plan:     Neuro  Encephalopathy, metabolic  Suspect 2/2 profound shock vs electrolyte abnormalities vs hepatic encephalopathy    --Sedation weaned off today. Minimize use  --Continue lactulose/rifaximin  --Non-focal on neuro examination    Pulmonary  Acute hypoxemic respiratory failure  Likely related to volume overloaded in setting of decompensated cirrhosis complicated by hepato-hydrothorax and worsening LINN with minimal UOP vs underlying PNA. CXR with significant pleural effusion of the right side. Most recent CXR with worsening opacifications on the left concerning for possible PNA.    --S/p thoracentesis with 1.5L removal on 10/30 and 10/31 with 2L removed  --Intubated 11/1  --S/p bronschoscopy 11/1; fungitell + (167). Repeat pending  --Continue abx (vancomycin, meropenem, fluconazole) per ID  --Wean oxygen for goal saturation 88-92%  --IR consulted for possible repeat thoracentesis  11/6    Renal/  Acute renal failure  Possibly iATN vs HRS. Urine lytes indicating pre-renal cause. RP US negative for hydronephrosis.    --Nephrology following; Map goal >65   --CRRT initiated on 11/2  --HRS treatment d/c'ed after CRRT initiation    Hyponatremia  2/2 decompensated liver cirrhosis vs new LINN    --frequent BMP  --will continue to monitor  --CRRT initiated on 11/2    Hematology  Elevated INR  2/2 liver fx    --transfuse FFP/Vk as needed    Thrombocytopenia  Platelets down-trending likely related to underlying liver failure    --transfuse plt <50 with active bleeding    Oncology  Anemia  2/2 liver fx and clotting off on CRRT    --Frequent CBC  --Monitor for active bleeding  --transfuse for Hgb <7    GI  * Decompensated hepatic cirrhosis  Secondary to A1AT deficiency and ETOH abuse complicated by HCC, HE, hepatic hydrothorax, ascites, hepatic varices and hyponatremia. Abdominal US with doppler 10/31 with solid lesions in right and left lower lobe; portal HTN; splenomegaly    --S/p liver biopsy 10/30, pathology negative for malignancy  --Hepatology following  --Continue midodrine 15 TID  --Lactulose/rifaximin  --presented to transplant committee on 11/4; patient approved but not listed. Plan to get presented again on Friday, 11/6.    MELD-Na score: 31 at 11/6/2020  5:17 AM  MELD score: 30 at 11/6/2020  5:17 AM  Calculated from:  Serum Creatinine: 0.6 mg/dL (Rounded to 1 mg/dL) at 11/6/2020  5:17 AM  Serum Sodium: 133 mmol/L at 11/6/2020  5:17 AM  Total Bilirubin: 27.5 mg/dL at 11/6/2020  5:17 AM  INR(ratio): 2.7 at 11/6/2020  5:17 AM  Age: 45 years    Pleural effusion associated with hepatic disorder  Reports x 3 thoracentesis in the past for symptom management.     --s/p thoracentesis of 1.5 L removal on 10/30; labs negative for infection  --Repeat thora 10/31 with 2L removed  --Repeat thoracentesis with IR 11/6    Other  Shock, unspecified  Likely related to decompensated cirrhosis vs underlying  infection (less likely). Infectious work up essentially negative. Fungitell positive and repeat pending.     --Continue vancomycin, meropenem and fluconazole.  --Stress dose steroids initiated  --Continue norepinephrine and vasopressin to maintain MAP >65  --transplant ID following  --Repeat thoracentesis with IR 11/6    Palliative care encounter  Palliative care consulted due to liver transplant evaluation       Critical Care Daily Checklist:    A: Awake: RASS Goal/Actual Goal: RASS Goal: -3-->moderate sedation  Actual: Phan Agitation Sedation Scale (RASS): Moderate sedation   B: Spontaneous Breathing Trial Performed? Spon. Breathing Trial Initiated?: Not initiated (11/06/20 0710)   C: SAT & SBT Coordinated?  N/A                      D: Delirium: CAM-ICU Overall CAM-ICU: Positive   E: Early Mobility Performed? Yes   F: Feeding Goal: Goals: Meet % EEN, EPN by RD f/u date  Status: Nutrition Goal Status: goal not met   Current Diet Order   Procedures    Diet NPO      AS: Analgesia/Sedation None; PRN fentanyl   T: Thromboembolic Prophylaxis None   H: HOB > 300 Yes   U: Stress Ulcer Prophylaxis (if needed) Famotidine   G: Glucose Control  at goal 140-180   B: Bowel Function Stool Occurrence: 0   I: Indwelling Catheter (Lines & Garcia) Necessity RIJ/LIJ CVC, arterial line   D: De-escalation of Antimicrobials/Pharmacotherapies Vancomycin, meropenem, fluconazole    Plan for the day/ETD Supportive care    Code Status:  Family/Goals of Care: Full Code       Discussed with Dr. Shea.  updated at the bedside.     Critical Care Time: 50 minutes  Critical secondary to Patient has a condition that poses threat to life and bodily function: acute hypoxemic respiratory failure on mechanical ventilation and shock requiring vasopressors.       Critical care was time spent personally by me on the following activities: development of treatment plan with patient or surrogate and bedside caregivers, discussions with  consultants, evaluation of patient's response to treatment, examination of patient, ordering and performing treatments and interventions, ordering and review of laboratory studies, ordering and review of radiographic studies, pulse oximetry, re-evaluation of patient's condition. This critical care time did not overlap with that of any other provider or involve time for any procedures.     Codie Wong PA-C  Critical Care Medicine  [facility]

## 2020-11-06 NOTE — ASSESSMENT & PLAN NOTE
Secondary to A1AT deficiency and ETOH abuse complicated by HCC, HE, hepatic hydrothorax, ascites, hepatic varices and hyponatremia. Abdominal US with doppler 10/31 with solid lesions in right and left lower lobe; portal HTN; splenomegaly    --S/p liver biopsy 10/30, pathology negative for malignancy  --Hepatology following  --Continue midodrine 15 TID  --Lactulose/rifaximin  --presented to transplant committee on 11/4; patient approved but not listed. Plan to get presented again on Friday, 11/6.    MELD-Na score: 31 at 11/6/2020  5:17 AM  MELD score: 30 at 11/6/2020  5:17 AM  Calculated from:  Serum Creatinine: 0.6 mg/dL (Rounded to 1 mg/dL) at 11/6/2020  5:17 AM  Serum Sodium: 133 mmol/L at 11/6/2020  5:17 AM  Total Bilirubin: 27.5 mg/dL at 11/6/2020  5:17 AM  INR(ratio): 2.7 at 11/6/2020  5:17 AM  Age: 45 years

## 2020-11-06 NOTE — MEDICAL/APP STUDENT
[facility]  Nephrology  Progress Note    Patient Name: Madhavi Bell   : 1975   MRN: [id number]  Admission Date: 10/30/2020  Attending Physician:    Date of Admission: 10/30/2020  1:06 PM      No chief complaint on file.      Subjective:  HPI: Patient is a 44yo female with decompensated liver cirrhosis secondary to alpha-1 antitrypsin deficiency and alcohol abuse. She has been following hepatology as outpatient and has required frequent thoracentesis for recurrent hepatic hydrothorax. She had an MRI on  that showed a R liver lobe lesion. She presented to [place] ED on 10/29 with SOB. CXR showed large R sided pleural effusion. Was noted to be hypotensive (systolic BP in 90s), hyponatremic (Na - 123), and had decreased renal function (BUN - 48 and Cr - 2.7). Baseline BUN is 19 and Cr is 1.0. She was transferred to [facility] [place] and on 10/30 had an IR biopsy of the liver lesion and thoracentesis. She was managed for ?HRS and started on albumin, midodrine, octreotide. Developed leukocytosis and started on broad spectrum antibiotics (vancomycin, meropenem, azithromycin, fluconazole, tobramycin). Zosyn was discontinued. Intubated on  due to worsening respiratory status.     Nephrology was consulted for worsening renal function and oliguria.     Today:  Required 3U cryo, 4U FFP, 1U platelets, 1U pRBCs due to fibrinogen <70 and elevated d-dimer and LDH.Tolerating SLED at . Urine, resp, and blood cultures NGTD. Palliative care has been consulted.    Objective:  Vitals:    20 0800 20 0900 20 0953 20 1000   BP: (!) 110/53      BP Location:       Patient Position:       Pulse: 87 86 86 85   Resp: (!) 26 (!) 25 (!) 24 (!) 26   Temp:       TempSrc:       SpO2: (!) 93% (!) 91% (!) 92% (!) 91%   Weight:       Height:            Intake/Output Summary (Last 24 hours) at 2020 1202  Last data filed at 2020 1200  Gross per 24 hour   Intake 8177.22 ml   Output 7211  ml   Net 966.22 ml         Physical Exam  Constitutional:       Interventions: She is sedated, intubated and restrained.   Eyes:      General: Scleral icterus present.   Neck:      Comments: L IJ central line and R trialysis catheter in place  Cardiovascular:      Rate and Rhythm: Normal rate. Rhythm irregular.      Heart sounds: Normal heart sounds.   Pulmonary:      Effort: She is intubated.      Breath sounds: Normal breath sounds.   Abdominal:      General: Bowel sounds are decreased. There is distension.   Musculoskeletal:      Right lower leg: No edema.      Left lower leg: No edema.   Skin:     General: Skin is warm and dry.      Capillary Refill: Capillary refill takes 2 to 3 seconds.      Coloration: Skin is jaundiced.          Recent Results (from the past 48 hour(s))   CBC auto differential    Collection Time: 11/04/20 12:05 PM   Result Value Ref Range    WBC 34.44 (H) 3.90 - 12.70 K/uL    RBC 2.08 (L) 4.00 - 5.40 M/uL    Hemoglobin 6.9 (L) 12.0 - 16.0 g/dL    Hematocrit 20.6 (L) 37.0 - 48.5 %    MCV 99 (H) 82 - 98 fL    MCH 33.2 (H) 27.0 - 31.0 pg    MCHC 33.5 32.0 - 36.0 g/dL    RDW 18.6 (H) 11.5 - 14.5 %    Platelets 61 (L) 150 - 350 K/uL    MPV 10.6 9.2 - 12.9 fL    Immature Granulocytes CANCELED 0.0 - 0.5 %    Immature Grans (Abs) CANCELED 0.00 - 0.04 K/uL    Lymph # CANCELED 1.0 - 4.8 K/uL    Mono # CANCELED 0.3 - 1.0 K/uL    Eos # CANCELED 0.0 - 0.5 K/uL    Baso # CANCELED 0.00 - 0.20 K/uL    nRBC 3 (A) 0 /100 WBC    Gran % 83.0 (H) 38.0 - 73.0 %    Lymph % 2.0 (L) 18.0 - 48.0 %    Mono % 7.0 4.0 - 15.0 %    Eosinophil % 0.0 0.0 - 8.0 %    Basophil % 0.0 0.0 - 1.9 %    Bands 4.0 %    Myelocytes 3.0 %    Promyelocytes 1.0 %    Platelet Estimate Decreased (A)     Aniso Slight     Poik Slight     Target Cells Occasional     [place] Cells Occasional     Acanthocytes Present     Fragmented Cells Occasional     Differential Method Manual    POCT glucose    Collection Time: 11/04/20  1:13 PM   Result Value  Ref Range    POCT Glucose 99 70 - 110 mg/dL   Type & Screen    Collection Time: 11/04/20  1:23 PM   Result Value Ref Range    Group & Rh B NEG     Indirect Maria Teresa NEG    Prepare RBC 1 Unit    Collection Time: 11/04/20  1:23 PM   Result Value Ref Range    UNIT NUMBER [id number]     Product Code V6785I71     DISPENSE STATUS TRANSFUSED     CODING SYSTEM [id number]     Unit Blood Type Code 9500     Unit Blood Type O NEG     Unit Expiration [id number]    Prepare Fresh Frozen Plasma 2 Units    Collection Time: 11/04/20  1:23 PM   Result Value Ref Range    UNIT NUMBER [id number]     Product Code S0308K44     DISPENSE STATUS TRANSFUSED     CODING SYSTEM [id number]     Unit Blood Type Code 7300     Unit Blood Type B POS     Unit Expiration [id number]     UNIT NUMBER [id number]     Product Code Q6801Z47     DISPENSE STATUS TRANSFUSED     CODING SYSTEM SAWH611     Unit Blood Type Code 7300     Unit Blood Type B POS     Unit Expiration [id number]    Prepare RBC 1 Unit    Collection Time: 11/04/20  1:23 PM   Result Value Ref Range    UNIT NUMBER [id number]     Product Code D5698D57     DISPENSE STATUS TRANSFUSED     CODING SYSTEM ENXF941     Unit Blood Type Code 9500     Unit Blood Type O NEG     Unit Expiration [id number]    Prepare Fresh Frozen Plasma 2 Units    Collection Time: 11/04/20  1:23 PM   Result Value Ref Range    UNIT NUMBER [id number]     Product Code B2790N09     DISPENSE STATUS TRANSFUSED     CODING SYSTEM APWA886     Unit Blood Type Code 7300     Unit Blood Type B POS     Unit Expiration [id number]     UNIT NUMBER [id number]     Product Code U4900O92     DISPENSE STATUS TRANSFUSED     CODING SYSTEM SCGB496     Unit Blood Type Code 7300     Unit Blood Type B POS     Unit Expiration [id number]    Prepare Cryoprecipitate 1 Dose    Collection Time: 11/04/20  1:23 PM   Result Value Ref Range    UNIT NUMBER [id number]     Product Code Q1988K27     DISPENSE STATUS TRANSFUSED     CODING SYSTEM  TLUA638     Unit Blood Type Code 5100     Unit Blood Type O POS     Unit Expiration [id number]    Prepare Platelets 1 Dose    Collection Time: 11/04/20  1:23 PM   Result Value Ref Range    UNIT NUMBER [id number]     Product Code D6298C73     DISPENSE STATUS TRANSFUSED     CODING SYSTEM ZKWN022     Unit Blood Type Code 6200     Unit Blood Type A POS     Unit Expiration [id number]    Prepare RBC 1 Unit    Collection Time: 11/04/20  1:23 PM   Result Value Ref Range    UNIT NUMBER [id number]     Product Code J9708I41     DISPENSE STATUS TRANSFUSED     CODING SYSTEM DJZF864     Unit Blood Type Code 1700     Unit Blood Type B NEG     Unit Expiration [id number]    Prepare Fresh Frozen Plasma 1 Unit    Collection Time: 11/04/20  1:23 PM   Result Value Ref Range    UNIT NUMBER [id number]     Product Code Z8139XE1     DISPENSE STATUS TRANSFUSED     CODING SYSTEM CWRP997     Unit Blood Type Code 7300     Unit Blood Type B POS     Unit Expiration [id number]    Prepare Cryoprecipitate 1 Dose    Collection Time: 11/04/20  1:23 PM   Result Value Ref Range    UNIT NUMBER [id number]     Product Code I4093R85     DISPENSE STATUS ISSUED     CODING SYSTEM MXXT294     Unit Blood Type Code 6200     Unit Blood Type A POS     Unit Expiration [id number]    Prepare Cryoprecipitate 1 Dose    Collection Time: 11/04/20  1:23 PM   Result Value Ref Range    UNIT NUMBER [id number]     Product Code N6763N34     DISPENSE STATUS ISSUED     CODING SYSTEM EPJZ496     Unit Blood Type Code D000     Unit Blood Type O MIX     Unit Expiration [id number]    Renal function panel    Collection Time: 11/04/20  1:36 PM   Result Value Ref Range    Glucose 96 70 - 110 mg/dL    Sodium 128 (L) 136 - 145 mmol/L    Potassium 4.3 3.5 - 5.1 mmol/L    Chloride 94 (L) 95 - 110 mmol/L    CO2 21 (L) 23 - 29 mmol/L    BUN 10 6 - 20 mg/dL    Calcium 7.7 (L) 8.7 - 10.5 mg/dL    Creatinine 0.9 0.5 - 1.4 mg/dL    Albumin 2.6 (L) 3.5 - 5.2 g/dL     Phosphorus 3.3 2.7 - 4.5 mg/dL    eGFR if African American >60.0 >60 mL/min/1.73 m^2    eGFR if non African American >60.0 >60 mL/min/1.73 m^2    Anion Gap 13 8 - 16 mmol/L   Magnesium    Collection Time: 11/04/20  1:36 PM   Result Value Ref Range    Magnesium 2.2 1.6 - 2.6 mg/dL   Comprehensive metabolic panel    Collection Time: 11/04/20  2:41 PM   Result Value Ref Range    Sodium 129 (L) 136 - 145 mmol/L    Potassium 4.5 3.5 - 5.1 mmol/L    Chloride 96 95 - 110 mmol/L    CO2 19 (L) 23 - 29 mmol/L    Glucose 86 70 - 110 mg/dL    BUN 9 6 - 20 mg/dL    Creatinine 0.8 0.5 - 1.4 mg/dL    Calcium 7.3 (L) 8.7 - 10.5 mg/dL    Total Protein 4.4 (L) 6.0 - 8.4 g/dL    Albumin 2.7 (L) 3.5 - 5.2 g/dL    Total Bilirubin 28.3 (H) 0.1 - 1.0 mg/dL    Alkaline Phosphatase 260 (H) 55 - 135 U/L     (H) 10 - 40 U/L     (H) 10 - 44 U/L    Anion Gap 14 8 - 16 mmol/L    eGFR if African American >60.0 >60 mL/min/1.73 m^2    eGFR if non African American >60.0 >60 mL/min/1.73 m^2   Lactic Acid, Plasma    Collection Time: 11/04/20  3:27 PM   Result Value Ref Range    Lactate (Lactic Acid) 4.2 (HH) 0.5 - 2.2 mmol/L   CBC Auto Differential    Collection Time: 11/04/20  3:27 PM   Result Value Ref Range    WBC 37.81 (H) 3.90 - 12.70 K/uL    RBC 2.08 (L) 4.00 - 5.40 M/uL    Hemoglobin 6.8 (L) 12.0 - 16.0 g/dL    Hematocrit 21.0 (L) 37.0 - 48.5 %     (H) 82 - 98 fL    MCH 32.7 (H) 27.0 - 31.0 pg    MCHC 32.4 32.0 - 36.0 g/dL    RDW 18.5 (H) 11.5 - 14.5 %    Platelets 122 (L) 150 - 350 K/uL    MPV 10.8 9.2 - 12.9 fL    Immature Granulocytes CANCELED 0.0 - 0.5 %    Immature Grans (Abs) CANCELED 0.00 - 0.04 K/uL    nRBC 4 (A) 0 /100 WBC    Gran % 87.0 (H) 38.0 - 73.0 %    Lymph % 4.0 (L) 18.0 - 48.0 %    Mono % 3.0 (L) 4.0 - 15.0 %    Eosinophil % 0.0 0.0 - 8.0 %    Basophil % 0.0 0.0 - 1.9 %    Metamyelocytes 5.0 %    Myelocytes 1.0 %    Platelet Estimate Decreased (A)     Aniso Slight     Poik Slight     Poly Occasional      Hypo Occasional     Ovalocytes Occasional     Tear Drop Cells Occasional     [place] Cells Occasional     Spherocytes Occasional     Basophilic Stippling Occasional     Toxic Granulation Present     Fragmented Cells Occasional     Differential Method Manual    Comprehensive Metabolic Panel    Collection Time: 11/04/20  3:27 PM   Result Value Ref Range    Sodium 128 (L) 136 - 145 mmol/L    Potassium 4.5 3.5 - 5.1 mmol/L    Chloride 94 (L) 95 - 110 mmol/L    CO2 20 (L) 23 - 29 mmol/L    Glucose 75 70 - 110 mg/dL    BUN 9 6 - 20 mg/dL    Creatinine 0.8 0.5 - 1.4 mg/dL    Calcium 7.2 (L) 8.7 - 10.5 mg/dL    Total Protein 4.5 (L) 6.0 - 8.4 g/dL    Albumin 2.6 (L) 3.5 - 5.2 g/dL    Total Bilirubin 28.6 (H) 0.1 - 1.0 mg/dL    Alkaline Phosphatase 275 (H) 55 - 135 U/L     (H) 10 - 40 U/L     (H) 10 - 44 U/L    Anion Gap 14 8 - 16 mmol/L    eGFR if African American >60.0 >60 mL/min/1.73 m^2    eGFR if non African American >60.0 >60 mL/min/1.73 m^2   Fibrinogen    Collection Time: 11/04/20  3:27 PM   Result Value Ref Range    Fibrinogen <70 (AA) 182 - 366 mg/dL   Protime-INR    Collection Time: 11/04/20  3:27 PM   Result Value Ref Range    Prothrombin Time 31.0 (H) 9.0 - 12.5 sec    INR 3.1 (H) 0.8 - 1.2   ISTAT PROCEDURE    Collection Time: 11/04/20  4:40 PM   Result Value Ref Range    POC PH 7.399 7.35 - 7.45    POC PCO2 37.6 35 - 45 mmHg    POC PO2 81 80 - 100 mmHg    POC HCO3 23.3 (L) 24 - 28 mmol/L    POC BE -2 -2 to 2 mmol/L    POC SATURATED O2 96 95 - 100 %    POC TCO2 24 23 - 27 mmol/L    Rate 26     Sample ARTERIAL     Site Leti/UAC     Allens Test N/A     DelSys Adult Vent     Mode AC/PRVC     Vt 400     PEEP 8     FiO2 50    Culture, Respiratory with Gram Stain    Collection Time: 11/04/20  4:46 PM    Specimen: Endotracheal Aspirate; Respiratory   Result Value Ref Range    Respiratory Culture No growth     Gram Stain (Respiratory) Rare WBC's     Gram Stain (Respiratory) No organisms seen    Lactic Acid,  Plasma    Collection Time: 11/04/20  4:59 PM   Result Value Ref Range    Lactate (Lactic Acid) 4.2 (HH) 0.5 - 2.2 mmol/L   Blood culture    Collection Time: 11/04/20  5:13 PM    Specimen: Peripheral, Antecubital, Left; Blood   Result Value Ref Range    Blood Culture, Routine No Growth to date     Blood Culture, Routine No Growth to date    Blood culture    Collection Time: 11/04/20  5:43 PM    Specimen: Blood   Result Value Ref Range    Blood Culture, Routine No Growth to date     Blood Culture, Routine No Growth to date    CBC Auto Differential    Collection Time: 11/04/20  5:55 PM   Result Value Ref Range    WBC 37.58 (H) 3.90 - 12.70 K/uL    RBC 2.39 (L) 4.00 - 5.40 M/uL    Hemoglobin 7.9 (L) 12.0 - 16.0 g/dL    Hematocrit 23.3 (L) 37.0 - 48.5 %    MCV 98 82 - 98 fL    MCH 33.1 (H) 27.0 - 31.0 pg    MCHC 33.9 32.0 - 36.0 g/dL    RDW 16.5 (H) 11.5 - 14.5 %    Platelets 108 (L) 150 - 350 K/uL    MPV 11.8 9.2 - 12.9 fL    Immature Granulocytes CANCELED 0.0 - 0.5 %    Immature Grans (Abs) CANCELED 0.00 - 0.04 K/uL    nRBC 5 (A) 0 /100 WBC    Gran % 77.0 (H) 38.0 - 73.0 %    Lymph % 4.0 (L) 18.0 - 48.0 %    Mono % 14.0 4.0 - 15.0 %    Eosinophil % 0.0 0.0 - 8.0 %    Basophil % 0.0 0.0 - 1.9 %    Bands 1.0 %    Metamyelocytes 2.0 %    Myelocytes 1.0 %    Promyelocytes 1.0 %    Platelet Estimate Decreased (A)     Aniso Slight     Poik Slight     Poly Occasional     Hypo Occasional     Ovalocytes Occasional     Target Cells Occasional     [place]avage Cells Occasional     Basophilic Stippling Occasional     Toxic Granulation Present     Fragmented Cells Occasional     Vacuolated Granulocytes Present     Differential Method Manual    Protime-INR    Collection Time: 11/04/20  5:55 PM   Result Value Ref Range    Prothrombin Time 31.0 (H) 9.0 - 12.5 sec    INR 3.1 (H) 0.8 - 1.2   POCT glucose    Collection Time: 11/04/20  6:12 PM   Result Value Ref Range    POCT Glucose 86 70 - 110 mg/dL   Renal function panel    Collection Time:  11/04/20  9:44 PM   Result Value Ref Range    Glucose 76 70 - 110 mg/dL    Sodium 127 (L) 136 - 145 mmol/L    Potassium 4.4 3.5 - 5.1 mmol/L    Chloride 94 (L) 95 - 110 mmol/L    CO2 21 (L) 23 - 29 mmol/L    BUN 13 6 - 20 mg/dL    Calcium 7.7 (L) 8.7 - 10.5 mg/dL    Creatinine 1.2 0.5 - 1.4 mg/dL    Albumin 2.9 (L) 3.5 - 5.2 g/dL    Phosphorus 3.1 2.7 - 4.5 mg/dL    eGFR if African American >60.0 >60 mL/min/1.73 m^2    eGFR if non  54.3 (A) >60 mL/min/1.73 m^2    Anion Gap 12 8 - 16 mmol/L   Magnesium    Collection Time: 11/04/20  9:44 PM   Result Value Ref Range    Magnesium 2.2 1.6 - 2.6 mg/dL   Renal function panel    Collection Time: 11/04/20  9:44 PM   Result Value Ref Range    Glucose 76 70 - 110 mg/dL    Sodium 127 (L) 136 - 145 mmol/L    Potassium 4.4 3.5 - 5.1 mmol/L    Chloride 94 (L) 95 - 110 mmol/L    CO2 21 (L) 23 - 29 mmol/L    BUN 13 6 - 20 mg/dL    Calcium 7.7 (L) 8.7 - 10.5 mg/dL    Creatinine 1.2 0.5 - 1.4 mg/dL    Albumin 2.9 (L) 3.5 - 5.2 g/dL    Phosphorus 3.1 2.7 - 4.5 mg/dL    eGFR if African American >60.0 >60 mL/min/1.73 m^2    eGFR if non  54.3 (A) >60 mL/min/1.73 m^2    Anion Gap 12 8 - 16 mmol/L   Magnesium    Collection Time: 11/04/20  9:44 PM   Result Value Ref Range    Magnesium 2.2 1.6 - 2.6 mg/dL   POCT glucose    Collection Time: 11/04/20  9:46 PM   Result Value Ref Range    POCT Glucose 74 70 - 110 mg/dL   CBC Auto Differential    Collection Time: 11/04/20 11:06 PM   Result Value Ref Range    WBC 33.14 (H) 3.90 - 12.70 K/uL    RBC 2.15 (L) 4.00 - 5.40 M/uL    Hemoglobin 7.0 (L) 12.0 - 16.0 g/dL    Hematocrit 20.8 (L) 37.0 - 48.5 %    MCV 97 82 - 98 fL    MCH 32.6 (H) 27.0 - 31.0 pg    MCHC 33.7 32.0 - 36.0 g/dL    RDW 17.2 (H) 11.5 - 14.5 %    Platelets 62 (L) 150 - 350 K/uL    MPV 13.0 (H) 9.2 - 12.9 fL    Immature Granulocytes CANCELED 0.0 - 0.5 %    Immature Grans (Abs) CANCELED 0.00 - 0.04 K/uL    Lymph # CANCELED 1.0 - 4.8 K/uL    Mono #  CANCELED 0.3 - 1.0 K/uL    Eos # CANCELED 0.0 - 0.5 K/uL    Baso # CANCELED 0.00 - 0.20 K/uL    nRBC 6 (A) 0 /100 WBC    Gran % 77.0 (H) 38.0 - 73.0 %    Lymph % 4.5 (L) 18.0 - 48.0 %    Mono % 6.0 4.0 - 15.0 %    Eosinophil % 1.0 0.0 - 8.0 %    Basophil % 0.0 0.0 - 1.9 %    Bands 5.0 %    Metamyelocytes 2.5 %    Myelocytes 3.5 %    Promyelocytes 0.5 %    Platelet Estimate Decreased (A)     Aniso Moderate     Poik Slight     Poly Occasional     Target Cells Occasional     Basophilic Stippling Occasional     Dohle Bodies Present     Toxic Granulation Present     Vacuolated Granulocytes Present     Differential Method Manual    Lactic Acid, Plasma    Collection Time: 11/04/20 11:06 PM   Result Value Ref Range    Lactate (Lactic Acid) 3.2 (H) 0.5 - 2.2 mmol/L   Protime-INR    Collection Time: 11/04/20 11:06 PM   Result Value Ref Range    Prothrombin Time 27.8 (H) 9.0 - 12.5 sec    INR 2.6 (H) 0.8 - 1.2   Phosphorus    Collection Time: 11/05/20  3:49 AM   Result Value Ref Range    Phosphorus 2.3 (L) 2.7 - 4.5 mg/dL   Protime-INR    Collection Time: 11/05/20  3:49 AM   Result Value Ref Range    Prothrombin Time 33.9 (H) 9.0 - 12.5 sec    INR 3.4 (H) 0.8 - 1.2   Vancomycin, Random    Collection Time: 11/05/20  3:49 AM   Result Value Ref Range    Vancomycin, Random 14.7 Not established ug/mL   Comprehensive metabolic panel    Collection Time: 11/05/20  3:49 AM   Result Value Ref Range    Sodium 130 (L) 136 - 145 mmol/L    Potassium 4.5 3.5 - 5.1 mmol/L    Chloride 97 95 - 110 mmol/L    CO2 23 23 - 29 mmol/L    Glucose 68 (L) 70 - 110 mg/dL    BUN 9 6 - 20 mg/dL    Creatinine 0.8 0.5 - 1.4 mg/dL    Calcium 7.6 (L) 8.7 - 10.5 mg/dL    Total Protein 4.4 (L) 6.0 - 8.4 g/dL    Albumin 2.8 (L) 3.5 - 5.2 g/dL    Total Bilirubin 29.2 (H) 0.1 - 1.0 mg/dL    Alkaline Phosphatase 266 (H) 55 - 135 U/L     (H) 10 - 40 U/L     (H) 10 - 44 U/L    Anion Gap 10 8 - 16 mmol/L    eGFR if African American >60.0 >60 mL/min/1.73  m^2    eGFR if non African American >60.0 >60 mL/min/1.73 m^2   Renal function panel    Collection Time: 11/05/20  3:49 AM   Result Value Ref Range    Glucose 64 (L) 70 - 110 mg/dL    Sodium 132 (L) 136 - 145 mmol/L    Potassium 4.5 3.5 - 5.1 mmol/L    Chloride 98 95 - 110 mmol/L    CO2 23 23 - 29 mmol/L    BUN 9 6 - 20 mg/dL    Calcium 7.4 (L) 8.7 - 10.5 mg/dL    Creatinine 0.8 0.5 - 1.4 mg/dL    Albumin 2.8 (L) 3.5 - 5.2 g/dL    Phosphorus 2.3 (L) 2.7 - 4.5 mg/dL    eGFR if African American >60.0 >60 mL/min/1.73 m^2    eGFR if non African American >60.0 >60 mL/min/1.73 m^2    Anion Gap 11 8 - 16 mmol/L   Magnesium    Collection Time: 11/05/20  3:49 AM   Result Value Ref Range    Magnesium 2.0 1.6 - 2.6 mg/dL   D-Dimer, Quantitative    Collection Time: 11/05/20  3:49 AM   Result Value Ref Range    D-Dimer >33.00 (H) <0.50 mg/L FEU   Lactic Acid, Plasma    Collection Time: 11/05/20  3:49 AM   Result Value Ref Range    Lactate (Lactic Acid) 2.5 (H) 0.5 - 2.2 mmol/L   Fibrinogen    Collection Time: 11/05/20  3:49 AM   Result Value Ref Range    Fibrinogen <70 (AA) 182 - 366 mg/dL   Renal function panel    Collection Time: 11/05/20  3:49 AM   Result Value Ref Range    Glucose 64 (L) 70 - 110 mg/dL    Sodium 132 (L) 136 - 145 mmol/L    Potassium 4.5 3.5 - 5.1 mmol/L    Chloride 98 95 - 110 mmol/L    CO2 23 23 - 29 mmol/L    BUN 9 6 - 20 mg/dL    Calcium 7.4 (L) 8.7 - 10.5 mg/dL    Creatinine 0.8 0.5 - 1.4 mg/dL    Albumin 2.8 (L) 3.5 - 5.2 g/dL    Phosphorus 2.3 (L) 2.7 - 4.5 mg/dL    eGFR if African American >60.0 >60 mL/min/1.73 m^2    eGFR if non African American >60.0 >60 mL/min/1.73 m^2    Anion Gap 11 8 - 16 mmol/L   Magnesium    Collection Time: 11/05/20  3:49 AM   Result Value Ref Range    Magnesium 2.0 1.6 - 2.6 mg/dL   CBC Auto Differential    Collection Time: 11/05/20  5:09 AM   Result Value Ref Range    WBC 36.84 (H) 3.90 - 12.70 K/uL    RBC 2.53 (L) 4.00 - 5.40 M/uL    Hemoglobin 7.9 (L) 12.0 - 16.0 g/dL     Hematocrit 23.9 (L) 37.0 - 48.5 %    MCV 95 82 - 98 fL    MCH 31.2 (H) 27.0 - 31.0 pg    MCHC 33.1 32.0 - 36.0 g/dL    RDW 16.8 (H) 11.5 - 14.5 %    Platelets 60 (L) 150 - 350 K/uL    MPV 12.5 9.2 - 12.9 fL    Immature Granulocytes CANCELED 0.0 - 0.5 %    Immature Grans (Abs) CANCELED 0.00 - 0.04 K/uL    Lymph # CANCELED 1.0 - 4.8 K/uL    Mono # CANCELED 0.3 - 1.0 K/uL    Eos # CANCELED 0.0 - 0.5 K/uL    Baso # CANCELED 0.00 - 0.20 K/uL    nRBC 6 (A) 0 /100 WBC    Gran % 82.0 (H) 38.0 - 73.0 %    Lymph % 2.0 (L) 18.0 - 48.0 %    Mono % 3.0 (L) 4.0 - 15.0 %    Eosinophil % 1.0 0.0 - 8.0 %    Basophil % 0.0 0.0 - 1.9 %    Metamyelocytes 2.0 %    Myelocytes 10.0 %    Platelet Estimate Decreased (A)     Aniso Moderate     Poik Slight     Poly Occasional     Hypo Occasional     Ovalocytes Occasional     [place] Cells Occasional     Differential Method Manual    POCT glucose    Collection Time: 11/05/20  6:24 AM   Result Value Ref Range    POCT Glucose 101 70 - 110 mg/dL   POCT glucose    Collection Time: 11/05/20  7:57 AM   Result Value Ref Range    POCT Glucose 93 70 - 110 mg/dL   ISTAT PROCEDURE    Collection Time: 11/05/20  9:07 AM   Result Value Ref Range    POC PH 7.459 (H) 7.35 - 7.45    POC PCO2 36.4 35 - 45 mmHg    POC PO2 76 (L) 80 - 100 mmHg    POC HCO3 25.8 24 - 28 mmol/L    POC BE 2 -2 to 2 mmol/L    POC SATURATED O2 96 95 - 100 %    POC TCO2 27 23 - 27 mmol/L    Rate 26     Sample ARTERIAL     Site Other     Allens Test N/A     DelSys Adult Vent     Mode AC/PRVC     Vt 400     PEEP 5     FiO2 40    CBC Auto Differential    Collection Time: 11/05/20 11:26 AM   Result Value Ref Range    WBC 27.22 (H) 3.90 - 12.70 K/uL    RBC 2.16 (L) 4.00 - 5.40 M/uL    Hemoglobin 6.9 (L) 12.0 - 16.0 g/dL    Hematocrit 20.0 (L) 37.0 - 48.5 %    MCV 93 82 - 98 fL    MCH 31.9 (H) 27.0 - 31.0 pg    MCHC 34.5 32.0 - 36.0 g/dL    RDW 17.2 (H) 11.5 - 14.5 %    Platelets 43 (L) 150 - 350 K/uL    MPV 13.1 (H) 9.2 - 12.9 fL    Immature  Granulocytes CANCELED 0.0 - 0.5 %    Immature Grans (Abs) CANCELED 0.00 - 0.04 K/uL    Lymph # CANCELED 1.0 - 4.8 K/uL    Mono # CANCELED 0.3 - 1.0 K/uL    Eos # CANCELED 0.0 - 0.5 K/uL    Baso # CANCELED 0.00 - 0.20 K/uL    nRBC 7 (A) 0 /100 WBC    Gran % 87.0 (H) 38.0 - 73.0 %    Lymph % 6.0 (L) 18.0 - 48.0 %    Mono % 1.0 (L) 4.0 - 15.0 %    Eosinophil % 0.0 0.0 - 8.0 %    Basophil % 0.0 0.0 - 1.9 %    Myelocytes 5.0 %    Promyelocytes 1.0 %    Aniso Slight     Poik Slight     Poly Occasional     Hypo Occasional     Ovalocytes Occasional     Target Cells Occasional     Differential Method Manual    Lactic Acid, Plasma    Collection Time: 11/05/20 11:26 AM   Result Value Ref Range    Lactate (Lactic Acid) 3.5 (HH) 0.5 - 2.2 mmol/L   Protime-INR    Collection Time: 11/05/20 11:26 AM   Result Value Ref Range    Prothrombin Time 25.6 (H) 9.0 - 12.5 sec    INR 2.4 (H) 0.8 - 1.2   Fibrinogen    Collection Time: 11/05/20 11:26 AM   Result Value Ref Range    Fibrinogen <70 (AA) 182 - 366 mg/dL   Comprehensive metabolic panel    Collection Time: 11/05/20  3:16 PM   Result Value Ref Range    Sodium 132 (L) 136 - 145 mmol/L    Potassium 4.7 3.5 - 5.1 mmol/L    Chloride 98 95 - 110 mmol/L    CO2 21 (L) 23 - 29 mmol/L    Glucose 85 70 - 110 mg/dL    BUN 9 6 - 20 mg/dL    Creatinine 0.6 0.5 - 1.4 mg/dL    Calcium 7.6 (L) 8.7 - 10.5 mg/dL    Total Protein 4.9 (L) 6.0 - 8.4 g/dL    Albumin 2.8 (L) 3.5 - 5.2 g/dL    Total Bilirubin 25.0 (H) 0.1 - 1.0 mg/dL    Alkaline Phosphatase 249 (H) 55 - 135 U/L     (H) 10 - 40 U/L     (H) 10 - 44 U/L    Anion Gap 13 8 - 16 mmol/L    eGFR if African American >60.0 >60 mL/min/1.73 m^2    eGFR if non African American >60.0 >60 mL/min/1.73 m^2   Renal function panel    Collection Time: 11/05/20  3:16 PM   Result Value Ref Range    Glucose 90 70 - 110 mg/dL    Sodium 132 (L) 136 - 145 mmol/L    Potassium 4.8 3.5 - 5.1 mmol/L    Chloride 97 95 - 110 mmol/L    CO2 22 (L) 23 - 29  mmol/L    BUN 9 6 - 20 mg/dL    Calcium 7.8 (L) 8.7 - 10.5 mg/dL    Creatinine 0.7 0.5 - 1.4 mg/dL    Albumin 2.9 (L) 3.5 - 5.2 g/dL    Phosphorus 2.8 2.7 - 4.5 mg/dL    eGFR if African American >60.0 >60 mL/min/1.73 m^2    eGFR if non African American >60.0 >60 mL/min/1.73 m^2    Anion Gap 13 8 - 16 mmol/L   Magnesium    Collection Time: 11/05/20  3:16 PM   Result Value Ref Range    Magnesium 2.1 1.6 - 2.6 mg/dL   Renal function panel    Collection Time: 11/05/20  3:16 PM   Result Value Ref Range    Glucose 90 70 - 110 mg/dL    Sodium 132 (L) 136 - 145 mmol/L    Potassium 4.8 3.5 - 5.1 mmol/L    Chloride 97 95 - 110 mmol/L    CO2 22 (L) 23 - 29 mmol/L    BUN 9 6 - 20 mg/dL    Calcium 7.8 (L) 8.7 - 10.5 mg/dL    Creatinine 0.7 0.5 - 1.4 mg/dL    Albumin 2.9 (L) 3.5 - 5.2 g/dL    Phosphorus 2.8 2.7 - 4.5 mg/dL    eGFR if African American >60.0 >60 mL/min/1.73 m^2    eGFR if non African American >60.0 >60 mL/min/1.73 m^2    Anion Gap 13 8 - 16 mmol/L   Magnesium    Collection Time: 11/05/20  3:16 PM   Result Value Ref Range    Magnesium 2.1 1.6 - 2.6 mg/dL   POCT glucose    Collection Time: 11/05/20  3:37 PM   Result Value Ref Range    POCT Glucose 80 70 - 110 mg/dL   CBC Auto Differential    Collection Time: 11/05/20  6:40 PM   Result Value Ref Range    WBC 33.01 (H) 3.90 - 12.70 K/uL    RBC 2.56 (L) 4.00 - 5.40 M/uL    Hemoglobin 8.2 (L) 12.0 - 16.0 g/dL    Hematocrit 23.8 (L) 37.0 - 48.5 %    MCV 93 82 - 98 fL    MCH 32.0 (H) 27.0 - 31.0 pg    MCHC 34.5 32.0 - 36.0 g/dL    RDW 16.8 (H) 11.5 - 14.5 %    Platelets 61 (L) 150 - 350 K/uL    MPV 11.2 9.2 - 12.9 fL    Immature Granulocytes CANCELED 0.0 - 0.5 %    Immature Grans (Abs) CANCELED 0.00 - 0.04 K/uL    nRBC 7 (A) 0 /100 WBC    Gran % 81.0 (H) 38.0 - 73.0 %    Lymph % 6.0 (L) 18.0 - 48.0 %    Mono % 3.0 (L) 4.0 - 15.0 %    Eosinophil % 0.0 0.0 - 8.0 %    Basophil % 0.0 0.0 - 1.9 %    Bands 2.0 %    Metamyelocytes 2.0 %    Myelocytes 4.0 %    Promyelocytes 2.0 %     Platelet Estimate Decreased (A)     Aniso Slight     Poik Slight     Poly Occasional     Hypo Occasional     Ovalocytes Occasional     Target Cells Occasional     Alphonse Cells Occasional     Basophilic Stippling Occasional     Toxic Granulation Present     Fragmented Cells Occasional     Vacuolated Granulocytes Present     Differential Method Manual    Protime-INR    Collection Time: 11/05/20  6:40 PM   Result Value Ref Range    Prothrombin Time 27.9 (H) 9.0 - 12.5 sec    INR 2.6 (H) 0.8 - 1.2   Fibrinogen    Collection Time: 11/05/20  6:40 PM   Result Value Ref Range    Fibrinogen <70 (AA) 182 - 366 mg/dL   POCT glucose    Collection Time: 11/05/20  6:40 PM   Result Value Ref Range    POCT Glucose 77 70 - 110 mg/dL   Renal function panel    Collection Time: 11/05/20 11:03 PM   Result Value Ref Range    Glucose 80 70 - 110 mg/dL    Sodium 131 (L) 136 - 145 mmol/L    Potassium 4.4 3.5 - 5.1 mmol/L    Chloride 99 95 - 110 mmol/L    CO2 21 (L) 23 - 29 mmol/L    BUN 7 6 - 20 mg/dL    Calcium 7.8 (L) 8.7 - 10.5 mg/dL    Creatinine 0.6 0.5 - 1.4 mg/dL    Albumin 2.9 (L) 3.5 - 5.2 g/dL    Phosphorus 2.5 (L) 2.7 - 4.5 mg/dL    eGFR if African American >60.0 >60 mL/min/1.73 m^2    eGFR if non African American >60.0 >60 mL/min/1.73 m^2    Anion Gap 11 8 - 16 mmol/L   Magnesium    Collection Time: 11/05/20 11:03 PM   Result Value Ref Range    Magnesium 2.0 1.6 - 2.6 mg/dL   Renal function panel    Collection Time: 11/05/20 11:03 PM   Result Value Ref Range    Glucose 80 70 - 110 mg/dL    Sodium 131 (L) 136 - 145 mmol/L    Potassium 4.4 3.5 - 5.1 mmol/L    Chloride 99 95 - 110 mmol/L    CO2 21 (L) 23 - 29 mmol/L    BUN 7 6 - 20 mg/dL    Calcium 7.8 (L) 8.7 - 10.5 mg/dL    Creatinine 0.6 0.5 - 1.4 mg/dL    Albumin 2.9 (L) 3.5 - 5.2 g/dL    Phosphorus 2.5 (L) 2.7 - 4.5 mg/dL    eGFR if African American >60.0 >60 mL/min/1.73 m^2    eGFR if non African American >60.0 >60 mL/min/1.73 m^2    Anion Gap 11 8 - 16 mmol/L   Magnesium     Collection Time: 11/05/20 11:03 PM   Result Value Ref Range    Magnesium 2.0 1.6 - 2.6 mg/dL   POCT glucose    Collection Time: 11/05/20 11:15 PM   Result Value Ref Range    POCT Glucose 76 70 - 110 mg/dL   Fibrinogen    Collection Time: 11/06/20  1:06 AM   Result Value Ref Range    Fibrinogen <70 (AA) 182 - 366 mg/dL   Protime-INR    Collection Time: 11/06/20  1:06 AM   Result Value Ref Range    Prothrombin Time 28.7 (H) 9.0 - 12.5 sec    INR 2.7 (H) 0.8 - 1.2   CBC auto differential    Collection Time: 11/06/20  1:06 AM   Result Value Ref Range    WBC 36.18 (H) 3.90 - 12.70 K/uL    RBC 2.49 (L) 4.00 - 5.40 M/uL    Hemoglobin 7.8 (L) 12.0 - 16.0 g/dL    Hematocrit 23.3 (L) 37.0 - 48.5 %    MCV 94 82 - 98 fL    MCH 31.3 (H) 27.0 - 31.0 pg    MCHC 33.5 32.0 - 36.0 g/dL    RDW 17.2 (H) 11.5 - 14.5 %    Platelets 42 (L) 150 - 350 K/uL    MPV 10.7 9.2 - 12.9 fL    Immature Granulocytes CANCELED 0.0 - 0.5 %    Immature Grans (Abs) CANCELED 0.00 - 0.04 K/uL    Lymph # CANCELED 1.0 - 4.8 K/uL    Mono # CANCELED 0.3 - 1.0 K/uL    Eos # CANCELED 0.0 - 0.5 K/uL    Baso # CANCELED 0.00 - 0.20 K/uL    nRBC 6 (A) 0 /100 WBC    Gran % 81.5 (H) 38.0 - 73.0 %    Lymph % 4.0 (L) 18.0 - 48.0 %    Mono % 6.5 4.0 - 15.0 %    Eosinophil % 0.0 0.0 - 8.0 %    Basophil % 0.0 0.0 - 1.9 %    Bands 2.5 %    Metamyelocytes 1.5 %    Myelocytes 3.5 %    Promyelocytes 0.5 %    Platelet Estimate Decreased (A)     Aniso Moderate     Poik Moderate     Poly Occasional     Ovalocytes Occasional     Target Cells Occasional     Moran Cells Moderate     Basophilic Stippling Occasional     Toxic Granulation Present     Smudge Cells Present     Vacuolated Granulocytes Present     Differential Method Manual    Phosphorus    Collection Time: 11/06/20  5:17 AM   Result Value Ref Range    Phosphorus 3.9 2.7 - 4.5 mg/dL   Protime-INR    Collection Time: 11/06/20  5:17 AM   Result Value Ref Range    Prothrombin Time 28.7 (H) 9.0 - 12.5 sec    INR 2.7 (H) 0.8 -  1.2   Vancomycin, Random    Collection Time: 11/06/20  5:17 AM   Result Value Ref Range    Vancomycin, Random 16.1 Not established ug/mL   Comprehensive metabolic panel    Collection Time: 11/06/20  5:17 AM   Result Value Ref Range    Sodium 133 (L) 136 - 145 mmol/L    Potassium 4.3 3.5 - 5.1 mmol/L    Chloride 100 95 - 110 mmol/L    CO2 18 (L) 23 - 29 mmol/L    Glucose 99 70 - 110 mg/dL    BUN 6 6 - 20 mg/dL    Creatinine 0.6 0.5 - 1.4 mg/dL    Calcium 7.8 (L) 8.7 - 10.5 mg/dL    Total Protein 5.0 (L) 6.0 - 8.4 g/dL    Albumin 2.9 (L) 3.5 - 5.2 g/dL    Total Bilirubin 27.5 (H) 0.1 - 1.0 mg/dL    Alkaline Phosphatase 264 (H) 55 - 135 U/L     (H) 10 - 40 U/L     (H) 10 - 44 U/L    Anion Gap 15 8 - 16 mmol/L    eGFR if African American >60.0 >60 mL/min/1.73 m^2    eGFR if non African American >60.0 >60 mL/min/1.73 m^2   Magnesium    Collection Time: 11/06/20  5:17 AM   Result Value Ref Range    Magnesium 1.9 1.6 - 2.6 mg/dL   D-Dimer, Quantitative    Collection Time: 11/06/20  5:17 AM   Result Value Ref Range    D-Dimer >33.00 (H) <0.50 mg/L FEU   Fibrinogen    Collection Time: 11/06/20  5:17 AM   Result Value Ref Range    Fibrinogen <70 (AA) 182 - 366 mg/dL   Magnesium    Collection Time: 11/06/20  5:17 AM   Result Value Ref Range    Magnesium 1.9 1.6 - 2.6 mg/dL   CBC auto differential    Collection Time: 11/06/20  5:17 AM   Result Value Ref Range    WBC 32.92 (H) 3.90 - 12.70 K/uL    RBC 2.37 (L) 4.00 - 5.40 M/uL    Hemoglobin 7.6 (L) 12.0 - 16.0 g/dL    Hematocrit 22.2 (L) 37.0 - 48.5 %    MCV 94 82 - 98 fL    MCH 32.1 (H) 27.0 - 31.0 pg    MCHC 34.2 32.0 - 36.0 g/dL    RDW 17.5 (H) 11.5 - 14.5 %    Platelets 40 (L) 150 - 350 K/uL    MPV 12.5 9.2 - 12.9 fL    Immature Granulocytes CANCELED 0.0 - 0.5 %    Immature Grans (Abs) CANCELED 0.00 - 0.04 K/uL    nRBC 6 (A) 0 /100 WBC    Gran % 80.0 (H) 38.0 - 73.0 %    Lymph % 8.0 (L) 18.0 - 48.0 %    Mono % 5.0 4.0 - 15.0 %    Eosinophil % 1.0 0.0 - 8.0 %     Basophil % 0.0 0.0 - 1.9 %    Bands 3.0 %    Metamyelocytes 1.0 %    Myelocytes 2.0 %    Platelet Estimate Decreased (A)     Aniso Slight     Poik Slight     Poly Occasional     Hypo Occasional     Ovalocytes Occasional     Alphonse Cells Occasional     Basophilic Stippling Occasional     Toxic Granulation Present     Smudge Cells Present     Differential Method Manual    POCT glucose    Collection Time: 11/06/20  5:56 AM   Result Value Ref Range    POCT Glucose 98 70 - 110 mg/dL   POCT glucose    Collection Time: 11/06/20  9:52 AM   Result Value Ref Range    POCT Glucose 106 70 - 110 mg/dL   ISTAT PROCEDURE    Collection Time: 11/06/20  9:53 AM   Result Value Ref Range    POC PH 7.398 7.35 - 7.45    POC PCO2 39.7 35 - 45 mmHg    POC PO2 103 (H) 80 - 100 mmHg    POC HCO3 24.5 24 - 28 mmol/L    POC BE 0 -2 to 2 mmol/L    POC SATURATED O2 98 95 - 100 %    POC TCO2 26 23 - 27 mmol/L    Sample ARTERIAL     Site Leti/UAC     Allens Test N/A     DelSys Adult Vent     Mode AC/PRVC    Lipase    Collection Time: 11/06/20  9:58 AM   Result Value Ref Range    Lipase 52 4 - 60 U/L   Amylase    Collection Time: 11/06/20  9:58 AM   Result Value Ref Range    Amylase 115 (H) 20 - 110 U/L   CBC Auto Differential    Collection Time: 11/06/20 10:47 AM   Result Value Ref Range    WBC 33.94 (H) 3.90 - 12.70 K/uL    RBC 2.39 (L) 4.00 - 5.40 M/uL    Hemoglobin 7.7 (L) 12.0 - 16.0 g/dL    Hematocrit 22.9 (L) 37.0 - 48.5 %    MCV 96 82 - 98 fL    MCH 32.2 (H) 27.0 - 31.0 pg    MCHC 33.6 32.0 - 36.0 g/dL    RDW 18.2 (H) 11.5 - 14.5 %    Platelets 37 (LL) 150 - 350 K/uL    MPV 12.5 9.2 - 12.9 fL    Immature Granulocytes CANCELED 0.0 - 0.5 %    Immature Grans (Abs) CANCELED 0.00 - 0.04 K/uL    nRBC 6 (A) 0 /100 WBC    Gran % 78.0 (H) 38.0 - 73.0 %    Lymph % 13.0 (L) 18.0 - 48.0 %    Mono % 4.0 4.0 - 15.0 %    Eosinophil % 1.0 0.0 - 8.0 %    Basophil % 0.0 0.0 - 1.9 %    Bands 1.0 %    Metamyelocytes 2.0 %    Myelocytes 1.0 %    Platelet Estimate  Decreased (A)     Aniso Slight     Poik Slight     Poly Occasional     Ovalocytes Occasional     Target Cells Occasional     Weiser Cells Occasional     Basophilic Stippling Occasional     Toxic Granulation Present     Smudge Cells Present     Differential Method Manual    Protime-INR    Collection Time: 20 10:47 AM   Result Value Ref Range    Prothrombin Time 31.9 (H) 9.0 - 12.5 sec    INR 3.0 (H) 0.8 - 1.2   Fibrinogen    Collection Time: 20 10:47 AM   Result Value Ref Range    Fibrinogen <70 (AA) 182 - 366 mg/dL   Lactate Dehydrogenase    Collection Time: 20 10:47 AM   Result Value Ref Range     (H) 110 - 260 U/L   Protein, Total    Collection Time: 20 10:47 AM   Result Value Ref Range    Total Protein 4.9 (L) 6.0 - 8.4 g/dL   Albumin    Collection Time: 20 10:47 AM   Result Value Ref Range    Albumin 2.9 (L) 3.5 - 5.2 g/dL       Current Facility-Administered Medications:     0.9%  NaCl infusion (for blood administration), , Intravenous, Q24H PRN, Cedric Jimenez NP    0.9%  NaCl infusion (for blood administration), , Intravenous, Q24H PRN, Juan Steinberg MD    0.9%  NaCl infusion (for blood administration), , Intravenous, Q24H PRN, Juan Steinberg MD    amiodarone tablet 200 mg, 200 mg, Per NG tube, Daily, Cedric Jimenez NP, 200 mg at 20 0815    dextrose 50% injection 12.5 g, 12.5 g, Intravenous, PRN, Dana Altamirano MD, 12.5 g at 20 0539    famotidine (PF) injection 20 mg, 20 mg, Intravenous, Daily, Codie Wong PA-C, 20 mg at 20 0816    fentaNYL 2500 mcg in 0.9% sodium chloride 250 mL infusion premix (titrating), 25 mcg/hr, Intravenous, Continuous, Codie Wong PA-C, Last Rate: 2.5 mL/hr at 20 1156, 25 mcg/hr at 20 1156    [] fentaNYL injection 50 mcg, 50 mcg, Intravenous, Q15 Min PRN **FOLLOWED BY** fentaNYL injection 50 mcg, 50 mcg, Intravenous, Q1H PRN, Codie Wong PA-C    fluconazole  (DIFLUCAN) IVPB 200 mg 100 mL, 200 mg, Intravenous, Q24H, Codie Wong PA-C, 200 mg at 11/06/20 0955    fludrocortisone tablet 100 mcg, 100 mcg, Per OG tube, Daily, Cedric Jimenez NP, 100 mcg at 11/06/20 0815    glucagon (human recombinant) injection 1 mg, 1 mg, Intramuscular, PRN, Dana Altamirano MD    hydrocortisone sodium succinate injection 100 mg, 100 mg, Intravenous, Q8H, Codie Wong PA-C, 100 mg at 11/06/20 0527    lactulose 20 gram/30 mL solution Soln 20 g, 20 g, Oral, TID, Codie Wong PA-C, 20 g at 11/06/20 0815    meropenem-0.9% sodium chloride 1 g/50 mL IVPB, 1 g, Intravenous, Q12H, Ebony May PA-C, Last Rate: 50 mL/hr at 11/06/20 0816, 1 g at 11/06/20 0816    midodrine tablet 15 mg, 15 mg, Per OG tube, TID WM, Cedric Jimenez NP, 15 mg at 11/06/20 0815    norepinephrine 32 mg in sodium chloride 0.9% 250 mL infusion, 0.02 mcg/kg/min, Intravenous, Continuous, Cedric Jimenez NP, Last Rate: 46.8 mL/hr at 11/06/20 1000, 1.4 mcg/kg/min at 11/06/20 1000    ondansetron injection 4 mg, 4 mg, Intravenous, Q6H PRN, Codie Wong PA-C, 4 mg at 10/31/20 1719    phenylephrine HCl in 0.9% NaCl 1 mg/10 mL (100 mcg/mL) syringe 500 mcg, 500 mcg, Intravenous, PRN, Codie Wong PA-C    pneumoc 13-david conj-dip cr(PF) (PREVNAR 13 (PF)) 0.5 mL, 0.5 mL, Intramuscular, vaccine x 1 dose, Thierry Kong MD    polyethylene glycol packet 17 g, 17 g, Per NG tube, Daily, Cedric Jimenez NP, 17 g at 11/06/20 0815    promethazine (PHENERGAN) 6.25 mg in dextrose 5 % 50 mL IVPB, 6.25 mg, Intravenous, Q6H PRN, Alex Rene DO, Last Rate: 150 mL/hr at 10/31/20 2111, 6.25 mg at 10/31/20 2111    propofol (DIPRIVAN) 10 mg/mL infusion, 5 mcg/kg/min, Intravenous, Continuous, Codie Wong PA-C, Stopped at 11/04/20 1600    rifAXIMin tablet 550 mg, 550 mg, Per NG tube, BID, Cedric Jimenez NP, 550 mg at 11/06/20 0815    sennosides 8.8 mg/5 ml syrup 5  mL, 5 mL, Per NG tube, QHS, Cedric Jimenez, ALCIDES, 5 mL at 11/05/20 2218    sodium chloride 0.9% flush 10 mL, 10 mL, Intravenous, PRN, Maninder Coates MD    Pharmacy to dose Vancomycin consult, , , Once **AND** vancomycin - pharmacy to dose, , Intravenous, pharmacy to manage frequency, Codie Wong PA-C    vasopressin (PITRESSIN) 0.2 Units/mL in dextrose 5 % 100 mL infusion, 0.04 Units/min, Intravenous, Continuous, Codie Wong PA-C, Last Rate: 12 mL/hr at 11/06/20 1000, 0.04 Units/min at 11/06/20 1000     Assessment/Plan  Patient is a 44yo female with LINN.      1. LINN  - most likely ATN secondary to sepsis/ischemia  - UA 2+ protein, 3+ blood and 2+ WBC. Urine MCS on 11/2 showed muddy brown casts and normal RBCs. Suggests ATN but does not rule out HRS  - Started on SLED 11/2. Continue and repeat evening labs. Currently tolerating . Net +2.2L, so recommend increasing to 350 as long as pressor requirements remain the same. May change to SCUF pending evening labs  - Hypocalcemia (stable at 7.8)  - Metabolic acidosis - worsened. HCO - 18 from 23.  - Hyponatremia - stable at 133  - Phos - 3.9 (from 3.0), Lactate - 3.5 (from 2.5)  - Strict I/O and chart  - Avoid nephrotoxic medications, NSAIDs, IV contrast, etc.  - Medication doses adjusted to GFR  - Hb > 7 gm/dL  - MAP goal >65    2. Decompensated hepatic cirrhosis  - secondary to A1AT deficiency and ETOH abuse complicated by HCC, HE, hepatic hydrothorax, ascites, hepatic varices and hyponatremia  - approved for liver transplant, pending medical and financial clearance    3. Acute hypoxemic respiratory failures  - hepato-hydrothorax vs pneumonia    4. Thrombocytopenia    Philip Eddy  MS4

## 2020-11-06 NOTE — PROGRESS NOTES
[facility]  Hepatology Service  Progress Note    Patient Name: Madhavi Bell  MRN: [id number]  Admission Date: 10/30/2020  Hospital Length of Stay: 7 days  Code Status: Full Code   Principal Problem:Decompensated hepatic cirrhosis      Subjective:     Interval history:   Remains intubated.  On levo and vaso - pressor requirements overall stable.  On Vanc, meropenem and fluconazole.  Cultures so far unrevealing.  On FiO2 50% and PEEP 8.      Objective:     Vitals:    11/06/20 1000   BP:    Pulse: 85   Resp: (!) 26   Temp:        General:  Intubated, sedated  HEENT: +scleral icterus.  Resp:  Ventilator breath sounds  Cardiac: RRR  Abdomen: Normoactive bowel sounds. Distended.  Extremities: No peripheral edema.   Neurologic:  Sedated      Significant Labs:  Recent Labs   Lab 11/06/20  0106 11/06/20  0517 11/06/20  1047   HGB 7.8* 7.6* 7.7*       Lab Results   Component Value Date    WBC 33.94 (H) 11/06/2020    HGB 7.7 (L) 11/06/2020    HCT 22.9 (L) 11/06/2020    MCV 96 11/06/2020    PLT 37 (LL) 11/06/2020       Lab Results   Component Value Date     (L) 11/06/2020    K 4.3 11/06/2020     11/06/2020    CO2 18 (L) 11/06/2020    BUN 6 11/06/2020    CREATININE 0.6 11/06/2020    CALCIUM 7.8 (L) 11/06/2020    ANIONGAP 15 11/06/2020    ESTGFRAFRICA >60.0 11/06/2020    EGFRNONAA >60.0 11/06/2020       Lab Results   Component Value Date     (H) 11/06/2020     (H) 11/06/2020     (H) 10/08/2020    ALKPHOS 264 (H) 11/06/2020    BILITOT 27.5 (H) 11/06/2020       Lab Results   Component Value Date    INR 3.0 (H) 11/06/2020    INR 2.7 (H) 11/06/2020    INR 2.7 (H) 11/06/2020       Significant Imaging:  Reviewed pertinent radiology findings.       Assessment/Plan:     Madhavi Bell is a 45 y.o. female with history of decompensated liver cirrhosis due to alpha-1 antitrypsin deficiency and alcohol, recurrent pleural effusion, HCC (with the additional bilobar indeterminate  liver lesions) who presented to [place] with shortness of breath and transferred to [facility] for further evaluation.    Course has been complicated by worsening hypoxic respiratory failure.  S/p thoracentesis x2, but developed new pulmonary infiltrates vs pulmonary edema from lung expansion and intubated 11/1.  Bronch with evidence of pneumonia, but infectious workup so far, including bronch cultures, with no growth.  She did undergo liver biopsy of indeterminate lesion on 10/30 which was negative for malignancy.     Problem List:  1. Decompensated liver cirrhosis due to alpha-1 antitrypsin deficiency and alcohol  2. Hepatocellular carcinoma - 3.9 cm right lesion with additional indeterminate foci.  Bx of indeterminate liver lesion negative for malignancy which means she meets [place] criteria  3. Hypoxemic respiratory failure / septic shock due to pneumonia vs pulmonary edema vs empyema?  4. Acute renal failure / HRS - on CRRT  5. Hyponatremia     Plan:  - Septic shock / Pneumonia / Pulmonary edema:  Appreciate ICU assistance.  On levo and vaso.  Intubated.  - Acute hypoxemic respiratory failure:  S/p thoracentesis on 10/30 (1.7L) and 10/31 (2L).  Now intubated on 11/01.  Bronchoscopy 11/1 - cultures with GPC and GNRs, but no growth.  On broad-spectrum antibiotics and antifungal.  ID following.  - HCC:  3.9cm HCC lesion with other indeterminate liver lesions - biopsy on 10/30 negative for malignancy  - Hepatic hydrothorax: CXR on presentation with right lung opacification.  Thoracentesis  as above.  2 gram Na restrict.  Hold diuretics given hyponatremia and renal failure.  Recommend against chest tube for hepatic hydrothorax alone, as hydrothorax will continuously drain and lead to significant dehydration/electrolyte abnormalities; also can have difficult to control leakage after removal.  - LINN/HRS:  Nephrology following.  On CRRT since 11/2  - Hyponatremia:  Hold diuretics, fluid restrict.  Trial of IV albumin.  -  Esophageal varices: last EGD 9/9 with small esophageal varices  - Hepatic encephalopathy:  Continue lactulose titrated to 3-4 BMs daily  - Transplant:  Candidacy was initially dependent on ruling out multifocal HCC, but biopsy of indeterminate liver lesion is negative for malignancy (known HCC lesion meets [place] criteria).  However, now with septic shock due to pneumonia.  Pending T-spot to complete evaluation. Plan to discuss today at committee - approved for liver transplant pending medical and clinical improvement and financial approval    Thank you for involving us in the care of Madhavi Bell. Please call with any additional questions, concerns or changes in the patient's clinical status.    Shane Ross MD  Gastroenterology Fellow PGY IV   [facility]

## 2020-11-06 NOTE — NURSING
Juan Steinberg MD notified of fibrinogen <70, absent bowel sounds, no response to pain in upper extremities, mucus and dark blood in stool. No new orders at this time, WCTM.

## 2020-11-06 NOTE — ASSESSMENT & PLAN NOTE
45-year-old female with history of alpha-1-antitrypsin deficiency / EtOH cirrhosis c/b HCC, HE, recurrent right hepatohydrothorax, presented to OSH 10/29/2020 with worsening SOB, labs notable for LINN, transferred to [facility] 10/30/2020 for liver transplant evaluation. Patient had thoracentesis on 10/30/2020 and 10/31/2020, found to have spontaneous bacterial empyema (PMN 1,725, culture negative), subsequent acute hypoxemic respiratory failure requiring intubation 11/1/2020, BAL performed 11/1/2020, started on CRRT 11/2/2020. Today, patient     Pleural fluid, respiratory cultures, blood cultures with no growth. Crypto Ag, Aspergillus Ag, urine histo/blasto negative. Fungitell positive 167 - unclear significance (Candida gut translocation?), will repeat.    Pip-tazo 10/30-11/1  Azithro 11/1-11/4  Vanc 10/30 -  Fluc 11/1-  Sybil 11/1-    Recommendations:  - Repeat thoracentesis to assess if patient is responding appropriately to antibiotic therapy for spontaneous bacterial empyema - send for cell count with diff, gram stain, culture, protein, albumin, LDH, bili, pH  - Continue sybil / vanc / fluconazole   - Repeat fungitell ordered

## 2020-11-06 NOTE — ASSESSMENT & PLAN NOTE
Likely related to volume overloaded in setting of decompensated cirrhosis complicated by hepato-hydrothorax and worsening LINN with minimal UOP vs underlying PNA. CXR with significant pleural effusion of the right side. Most recent CXR with worsening opacifications on the left concerning for possible PNA.    --S/p thoracentesis with 1.5L removal on 10/30 and 10/31 with 2L removed  --Intubated 11/1  --S/p bronschoscopy 11/1; fungitell + (167). Repeat pending  --Continue abx (vancomycin, meropenem, fluconazole) per ID  --Wean oxygen for goal saturation 88-92%  --IR consulted for possible repeat thoracentesis 11/6

## 2020-11-06 NOTE — NURSING
Juan Steinberg MD, notified of fibrinogen <70. Physician stated he would order cryo to be administered. WCTM.

## 2020-11-06 NOTE — ASSESSMENT & PLAN NOTE
Suspect 2/2 profound shock vs electrolyte abnormalities vs hepatic encephalopathy    --Sedation weaned off today. Minimize use  --Continue lactulose/rifaximin  --Non-focal on neuro examination

## 2020-11-06 NOTE — ASSESSMENT & PLAN NOTE
2/2 liver fx and clotting off on CRRT    --Frequent CBC  --Monitor for active bleeding  --transfuse for Hgb <7

## 2020-11-06 NOTE — PLAN OF CARE
CMICU DAILY GOALS       A: Awake    RASS: Goal - RASS Goal: 0-->alert and calm  Actual - RASS (Phan Agitation-Sedation Scale): -4-->deep sedation   Restraint necessity: Clinical Justification: Removing medical devices, Treatment Interference  B: Breath   SBT: Not intubated   C: Coordinate A & B, analgesics/sedatives   Pain: managed    SAT: Not attempted  D: Delirium   CAM-ICU: Overall CAM-ICU: Positive  E: Early(intubated/ Progressive (non-intubated) Mobility   MOVE Screen: Fail   Activity: Activity Management: patient unable to perform activities  FAS: Feeding/Nutrition   Diet order: Diet/Nutrition Received: NPO, Specialty Diet/Nutrition Received: renal diet Fluid restriction:    T: Thrombus   DVT prophylaxis: VTE Required Core Measure: (SCDs) Sequential compression device initiated/maintained  H: HOB Elevation   Head of Bed (HOB): HOB at 30-45 degrees  U: Ulcer Prophylaxis   GI: yes  G: Glucose control   managed Glycemic Management: blood glucose monitoring  S: Skin   Bundle compliance: yes   Bathing/Skin Care: incontinence care Date: 11/4/20  B: Bowel Function   constipation   I: Indwelling Catheters   Garcia necessity: [REMOVED]      Urethral Catheter 10/30/20 1615 16 Fr.-Reason for Continuing Urinary Catheterization: Critically ill in ICU and requiring hourly monitoring of intake/output   CVC necessity: Yes   IPAD offered: Not appropriate  D: De-escalation Antibx   No  Plan for the day   Wean pressors, CRRT, discussed on liver transplant panel tomorrow   Family/Goals of care/Code Status   Code Status: Full Code     No acute events throughout day, VS and assessment per flow sheet, patient progressing towards goals as tolerated, plan of care reviewed with Madhavi Bell and family, all concerns addressed, will continue to monitor.

## 2020-11-06 NOTE — ASSESSMENT & PLAN NOTE
Possibly iATN vs HRS. Urine lytes indicating pre-renal cause. RP US negative for hydronephrosis.    --Nephrology following; Map goal >65   --CRRT initiated on 11/2  --HRS treatment d/c'ed after CRRT initiation

## 2020-11-06 NOTE — PROGRESS NOTES
Pharmacokinetic Assessment Follow Up: IV Vancomycin    Vancomycin serum concentration assessment(s):    Vancomycin random level is therapeutic resulting at 16.1 mcg/mL, goal level of 15 to 20 mcg/mL.    Nephrology is consulted and plans to continue SLED for now, monitor for potential to switch to SCUF.     Drug levels (last 3 results):  Recent Labs   Lab Result Units 11/04/20  0331 11/05/20  0349 11/06/20  0517   Vancomycin, Random ug/mL 18.5 14.7 16.1     Vancomycin Regimen Plan:    Administer vancomycin IV 1000 mg and redose when the random level is less than 20 mcg/mL or as needed for dialysis. Next level to be drawn with morning labs on 11/7.     Pharmacy will continue to follow and monitor vancomycin.    Please contact pharmacy at extension [phone] for questions regarding this assessment.    Thank you for the consult,   Karen Mixon, PharmD         Patient brief summary:  Madhavi Bell is a 45 y.o. female initiated on antimicrobial therapy with IV vancomycin for treatment of sepsis    Drug Allergies:   Review of patient's allergies indicates:  No Known Allergies    Actual Body Weight:   84.7 kg    Renal Function:   Estimated Creatinine Clearance: 154.3 mL/min (based on SCr of 0.5 mg/dL).    Dialysis Method (if applicable):  SLED    CBC (last 72 hours):  Recent Labs   Lab Result Units 11/03/20  2321 11/04/20  0331 11/04/20  1205 11/04/20  1527 11/04/20  1755 11/04/20  2306 11/05/20  0509 11/05/20  1126 11/05/20  1840 11/06/20  0106 11/06/20  0517 11/06/20  1047   WBC K/uL 28.85* 26.35* 34.44* 37.81* 37.58* 33.14* 36.84* 27.22* 33.01* 36.18* 32.92* 33.94*   Hemoglobin g/dL 8.1* 7.7* 6.9* 6.8* 7.9* 7.0* 7.9* 6.9* 8.2* 7.8* 7.6* 7.7*   Hematocrit % 23.5* 22.6* 20.6* 21.0* 23.3* 20.8* 23.9* 20.0* 23.8* 23.3* 22.2* 22.9*   Platelets K/uL 63* 48* 61* 122* 108* 62* 60* 43* 61* 42* 40* 37*   Gran % % 84.5* 85.0* 83.0* 87.0* 77.0* 77.0* 82.0* 87.0* 81.0* 81.5* 80.0* 78.0*   Lymph % % 4.5* 1.5* 2.0* 4.0* 4.0* 4.5*  2.0* 6.0* 6.0* 4.0* 8.0* 13.0*   Mono % % 7.5 6.5 7.0 3.0* 14.0 6.0 3.0* 1.0* 3.0* 6.5 5.0 4.0   Eosinophil % % 0.0 0.0 0.0 0.0 0.0 1.0 1.0 0.0 0.0 0.0 1.0 1.0   Basophil % % 0.0 0.0 0.0 0.0 0.0 0.0 0.0 0.0 0.0 0.0 0.0 0.0   Differential Method  Manual Manual Manual Manual Manual Manual Manual Manual Manual Manual Manual Manual       Metabolic Panel (last 72 hours):  Recent Labs   Lab Result Units 11/03/20  2321 11/04/20  0331 11/04/20  1336 11/04/20  1441 11/04/20  1527 11/04/20  2144 11/05/20  0349 11/05/20  1516 11/05/20  2303 11/06/20  0517 11/06/20  1047 11/06/20  1340   Sodium mmol/L 129* 128* 128* 129* 128* 127*  127* 130*  132*  132* 132*  132*  132* 131*  131* 133*  --  133*  133*   Potassium mmol/L 4.4 4.2 4.3 4.5 4.5 4.4  4.4 4.5  4.5  4.5 4.7  4.8  4.8 4.4  4.4 4.3  --  4.5  4.5   Chloride mmol/L 95 94* 94* 96 94* 94*  94* 97  98  98 98  97  97 99  99 100  --  100  101   CO2 mmol/L 21* 21* 21* 19* 20* 21*  21* 23  23  23 21*  22*  22* 21*  21* 18*  --  20*  20*   Glucose mg/dL 105 92 96 86 75 76  76 68*  64*  64* 85  90  90 80  80 99  --  76  74   BUN mg/dL 22* 19 10 9 9 13  13 9  9  9 9  9  9 7  7 6  --  4*  4*   Creatinine mg/dL 1.8* 1.7* 0.9 0.8 0.8 1.2  1.2 0.8  0.8  0.8 0.6  0.7  0.7 0.6  0.6 0.6  --  0.5  0.5   Albumin g/dL 3.0* 2.8* 2.6* 2.7* 2.6* 2.9*  2.9* 2.8*  2.8*  2.8* 2.8*  2.9*  2.9* 2.9*  2.9* 2.9* 2.9* 2.9*  2.9*   Total Bilirubin mg/dL  --  29.2*  --  28.3* 28.6*  --  29.2* 25.0*  --  27.5*  --  29.1*   Alkaline Phosphatase U/L  --  277*  --  260* 275*  --  266* 249*  --  264*  --  275*   AST U/L  --  195*  --  309* 314*  --  632* 561*  --  514*  --  519*   ALT U/L  --  82*  --  109* 109*  --  176* 167*  --  170*  --  180*   Magnesium mg/dL 2.3 1.9 2.2  --   --  2.2  2.2 2.0  2.0 2.1  2.1 2.0  2.0 1.9  1.9  --  1.9   Phosphorus mg/dL 3.2 3.0 3.3  --   --  3.1  3.1 2.3*  2.3*  2.3* 2.8  2.8 2.5*  2.5* 3.9  --  2.4*        Vancomycin Administrations:  vancomycin given in the last 96 hours                   vancomycin 750 mg in dextrose 5 % 250 mL IVPB (ready to mix system) (mg) 750 mg New Bag 11/03/20 0847    vancomycin 750 mg in dextrose 5 % 250 mL IVPB (ready to mix system) (mg) 750 mg New Bag 11/01/20 0847    vancomycin 1.25 g in dextrose 5% 250 mL IVPB (ready to mix) (mg) 1,250 mg New Bag 10/30/20 1838                Microbiologic Results:  Microbiology Results (last 7 days)     Procedure Component Value Units Date/Time    Culture, Anaerobic [[id number]]     Order Status: No result Specimen: Body Fluid from Pleural Fluid     Culture, Body Fluid (Aerobic) w/ GS [[id number]]     Order Status: No result Specimen: Body Fluid from Pleural Fluid     Culture, Respiratory with Gram Stain [[id number]] Collected: 11/04/20 1646    Order Status: Completed Specimen: Respiratory from Endotracheal Aspirate Updated: 11/06/20 1012     Respiratory Culture No growth     Gram Stain (Respiratory) Rare WBC's     Gram Stain (Respiratory) No organisms seen    Blood culture [[id number]] Collected: 11/04/20 1743    Order Status: Completed Specimen: Blood Updated: 11/05/20 2012     Blood Culture, Routine No Growth to date      No Growth to date    Narrative:      Blood cultures from 2 different sites. 4 bottles total.  Please draw before starting antibiotics.    Blood culture [[id number]] Collected: 11/04/20 1713    Order Status: Completed Specimen: Blood from Peripheral, Antecubital, Left Updated: 11/05/20 1812     Blood Culture, Routine No Growth to date      No Growth to date    Narrative:      Blood cultures x 2 different sites. 4 bottles total. Please  draw cultures before administering antibiotics.    Urine Culture High Risk [[id number]]     Order Status: Canceled Specimen: Urine     Culture, Respiratory [[id number]] Collected: 11/01/20 1521    Order Status: Completed Specimen: Respiratory from Bronchial Wash, LLL Updated: 11/04/20 1039     Respiratory  Culture No growth     Gram Stain (Respiratory) <10 epithelial cells per low power field.     Gram Stain (Respiratory) Rare WBC's     Gram Stain (Respiratory) Rare Gram negative rods    Culture, Anaerobic [[id number]] Collected: 10/30/20 1716    Order Status: Completed Specimen: Body Fluid from Pleural Fluid Updated: 11/04/20 0939     Anaerobic Culture No anaerobes isolated    Aerobic culture [[id number]] Collected: 10/31/20 2038    Order Status: Completed Specimen: Pleural Fluid Updated: 11/04/20 0928     Aerobic Bacterial Culture No growth    Culture, Body Fluid (Aerobic) w/ GS [[id number]] Collected: 10/30/20 1716    Order Status: Completed Specimen: Body Fluid from Pleural Fluid Updated: 11/04/20 0927     AEROBIC CULTURE - FLUID No growth     Gram Stain Result Few WBC's      No organisms seen    Fungus culture [[id number]] Collected: 11/01/20 1521    Order Status: Completed Specimen: Body Fluid from Lung, LLL Updated: 11/03/20 1409     Fungus (Mycology) Culture Culture in progress    Narrative:      Bronchial Wash    Cryptococcal antigen [[id number]] Collected: 11/02/20 1319    Order Status: Completed Specimen: Blood, Venous Updated: 11/03/20 1141     Cryptococcal Ag, Blood Negative    Culture, Respiratory with Gram Stain [[id number]] Collected: 11/01/20 0153    Order Status: Completed Specimen: Respiratory from Sputum, Expectorated Updated: 11/03/20 1037     Respiratory Culture Normal respiratory ishan      No S aureus or Pseudomonas isolated.     Gram Stain (Respiratory) >10 epithelial cells per low power field     Gram Stain (Respiratory) Many WBC's     Gram Stain (Respiratory) Rare Gram positive cocci    AFB Culture & Smear [[id number]] Collected: 11/01/20 1521    Order Status: Completed Specimen: Body Fluid from Lung, LLL Updated: 11/02/20 2127     AFB Culture & Smear Culture in progress     AFB CULTURE STAIN No acid fast bacilli seen.    AFB Culture & Smear [[id number]] Collected: 10/31/20 2038    Order Status:  Completed Specimen: Pleural Fluid Updated: 11/02/20 1558     AFB Culture & Smear Culture in progress     AFB CULTURE STAIN No acid fast bacilli seen.    Cryptococcal antigen, blood [[id number]]     Order Status: Completed Specimen: Blood     KOH prep [[id number]] Collected: 11/01/20 1521    Order Status: Completed Specimen: Body Fluid from Lung, LLL Updated: 11/02/20 0043     KOH Prep No yeast or fungal elements seen    Narrative:      Bronchial Wash    Gram stain [[id number]] Collected: 11/01/20 1521    Order Status: Canceled Specimen: Body Fluid from Lung, LLL     Urine culture [[id number]] Collected: 10/30/20 1334    Order Status: Completed Specimen: Urine Updated: 11/01/20 0744     Urine Culture, Routine No growth    Narrative:      Specimen Source->Urine    Gram stain [[id number]] Collected: 10/31/20 2038    Order Status: Completed Specimen: Pleural Fluid Updated: 11/01/20 0026     Gram Stain Result Rare WBC's      No organisms seen    Urine Culture High Risk [[id number]] Collected: 10/30/20 1802    Order Status: Completed Specimen: Urine, Catheterized Updated: 10/31/20 2242     Urine Culture, Routine No significant growth    Narrative:      Indicated criteria for high risk culture:->Other  Other (specify):->liver patient         Quality 47: Advance Care Plan: Advance Care Planning discussed and documented; advance care plan or surrogate decision maker documented in the medical record. Quality 110: Preventive Care And Screening: Influenza Immunization: Influenza Immunization Administered during Influenza season Quality 130: Documentation Of Current Medications In The Medical Record: Current Medications Documented Quality 431: Preventive Care And Screening: Unhealthy Alcohol Use - Screening: Patient screened for unhealthy alcohol use using a single question and scores less than 2 times per year Detail Level: Detailed Quality 226: Preventive Care And Screening: Tobacco Use: Screening And Cessation Intervention: Patient screened for tobacco use and is an ex/non-smoker

## 2020-11-06 NOTE — ASSESSMENT & PLAN NOTE
45-year-old female with history of alpha-1-antitrypsin deficiency / EtOH cirrhosis c/b HCC, HE, recurrent right hepatohydrothorax, presented to OSH 10/29/2020 with worsening SOB, labs notable for LINN, transferred to [facility] [place]/30/2020 for liver transplant evaluation. Patient had thoracentesis on 10/30/2020 and 10/31/2020, found to have spontaneous bacterial empyema (PMN 1,725, culture negative), subsequent acute hypoxemic respiratory failure requiring intubation 11/1/2020, BAL performed 11/1/2020, started on CRRT 11/2/2020.     Pleural fluid, respiratory cultures, blood cultures with no growth. Crypto Ag, Aspergillus Ag, urine histo/blasto negative. Fungitell positive 167 - unclear significance (Candida gut translocation?), will repeat.    Pip-tazo 10/30-11/1  Azithro 11/1-11/4  Vanc 10/30 -  Fluc 11/1-  Sybil 11/1-    Patient with increasing pressor requirements - possibly related to acute decompensated liver failure or uncontrolled infection - okay to hold off on thoracentesis given high risk for bleeding given coagulopathy, also results would not  at this time - patient is worsening clinically and not medically optimized for liver transplant at this time.    Recommendations:  - Continue sybil / vanc / fluconazole

## 2020-11-06 NOTE — SUBJECTIVE & OBJECTIVE
Interval History:   No fevers  Norepi requirements increasing, plans to start zahra  On vaso  On fentanyl  On CRRT  Vent requirements decreased    Vent Mode: A/C  Oxygen Concentration (%):  [50-60] 50  Resp Rate Total:  [26 br/min-30 br/min] 26 br/min  Vt Set:  [400 mL] 400 mL  PEEP/CPAP:  [8 cmH20] 8 cmH20  Mean Airway Pressure:  [14 cmH20-15 cmH20] 14 cmH20      Review of Systems   Unable to perform ROS: Acuity of condition     Objective:     Vital Signs (Most Recent):  Temp: 98.1 °F (36.7 °C) (11/06/20 0715)  Pulse: 89 (11/06/20 1600)  Resp: (!) 26 (11/06/20 1600)  BP: 102/60 (11/06/20 1600)  SpO2: (!) 90 % (11/06/20 1600) Vital Signs (24h Range):  Temp:  [98.1 °F (36.7 °C)-98.5 °F (36.9 °C)] 98.1 °F (36.7 °C)  Pulse:  [] 89  Resp:  [21-50] 26  SpO2:  [90 %-95 %] 90 %  BP: ()/(41-81) 102/60  Arterial Line BP: (101-143)/(41-81) 101/45     Weight: 84.7 kg (186 lb 11.7 oz)  Body mass index is 30.14 kg/m².    Estimated Creatinine Clearance: 154.3 mL/min (based on SCr of 0.5 mg/dL).    Physical Exam  Vitals signs reviewed.   Constitutional:       General: She is not in acute distress.     Appearance: She is well-developed. She is ill-appearing and toxic-appearing. She is not diaphoretic.   HENT:      Mouth/Throat:      Comments: OG tube  Neck:      Comments: RIJ line, LIJ line  Cardiovascular:      Rate and Rhythm: Tachycardia present.   Pulmonary:      Effort: Pulmonary effort is normal. No respiratory distress.      Comments: Intubated  Abdominal:      General: There is no distension.      Palpations: Abdomen is soft.   Genitourinary:     Comments: Rectal tube with dark liquid stool  Skin:     Coloration: Skin is jaundiced.   Neurological:      Comments: Sedated         Significant Labs: All pertinent labs within the past 24 hours have been reviewed.    Significant Imaging: I have reviewed all pertinent imaging results/findings within the past 24 hours.

## 2020-11-06 NOTE — PLAN OF CARE
Per interdisciplinary team meeting; Pt not medically ready for step down.  Pt had blood transfusion and in end stage liver failure.  Pt on CRRT.  Pt being eval for liver transplant.      SW will continue to follow to assist w/discharge planning needs.    Lynn Brown LMSW  [facility]  [phone]

## 2020-11-06 NOTE — PLAN OF CARE
CMICU DAILY GOALS       A: Awake    RASS: Goal - RASS Goal: -4-->deep sedation  Actual - RASS (Phan Agitation-Sedation Scale): -4-->deep sedation   Restraint necessity: Clinical Justification: Removing medical devices, Treatment Interference  B: Breath   SBT: Fail   C: Coordinate A & B, analgesics/sedatives   Pain: managed    SAT: Not attempted  D: Delirium   CAM-ICU: Overall CAM-ICU: Positive  E: Early(intubated/ Progressive (non-intubated) Mobility   MOVE Screen: Fail   Activity: Activity Management: patient unable to perform activities(unstable)  FAS: Feeding/Nutrition   Diet order: Diet/Nutrition Received: NPO, Specialty Diet/Nutrition Received: renal diet Fluid restriction:    T: Thrombus   DVT prophylaxis: VTE Required Core Measure: (SCDs) Sequential compression device initiated/maintained  H: HOB Elevation   Head of Bed (HOB): HOB at 30-45 degrees  U: Ulcer Prophylaxis   GI: no  G: Glucose control   managed Glycemic Management: blood glucose monitoring  S: Skin   Bundle compliance: yes   Bathing/Skin Care: bath, partial, bath, chlorhexidine, dressed/undressed, linen changed Date: 11/6/20 PM shift  B: Bowel Function   no issues   I: Indwelling Catheters   Garcia necessity: [REMOVED]      Urethral Catheter 10/30/20 1615 16 Fr.-Reason for Continuing Urinary Catheterization: Critically ill in ICU and requiring hourly monitoring of intake/output   CVC necessity: Yes   IPAD offered: Not appropriate  D: De-escalation Antibx   Yes  Plan for the day   Continue to monitor hemodynamic status, optimize pressor and sedative use. Continue CRRT.   Family/Goals of care/Code Status   Code Status: Full Code     No acute events throughout day, VS and assessment per flow sheet, patient progressing towards goals as tolerated, plan of care reviewed with Mahdavi Bell, all concerns addressed, will continue to monitor.

## 2020-11-07 NOTE — DISCHARGE SUMMARY
Death Note  Critical Care Medicine      Admit Date: 10/30/2020    Date of Death: 2020    Time of Death: 12:52    Attending Physician: Hal Shea*    Principal Diagnoses: Decompensated hepatic cirrhosis    Preliminary Cause of Death: Decompensated hepatic cirrhosis    Secondary Diagnoses:   Active Hospital Problems    Diagnosis  POA    *Decompensated hepatic cirrhosis [K72.90, K74.60]  Yes    Encephalopathy, metabolic [G93.41]  No    Anemia [D64.9]  Unknown    Elevated INR [R79.1]  Unknown    Thrombocytopenia [D69.6]  Unknown    Metabolic acidosis [E87.2]  Unknown    Palliative care encounter [Z51.5]  Not Applicable    Acute hypoxemic respiratory failure [J96.01]  Yes    Shock, unspecified [R57.9]  Yes    Pleural effusion associated with hepatic disorder [K76.9, J91.8]  Yes    Acute renal failure [N17.9]  Yes    Hyponatremia [E87.1]  Yes      Resolved Hospital Problems    Diagnosis Date Resolved POA    Lactic acidosis [E87.2] 2020 No        Discharged Condition:     HPI:  Madhavi Bell is a 45 year old female with decompensated liver cirrhosis due to alpha-1 antitrypsin deficiency and alcohol use, recurrent right sided hepato-hydrothorax, HCC who presented to [facility] ED on 10/29 with shortness of breath and transferred to [facility] for liver transplant evaluation on 10/30. Patient reports worsening SOB over the last two days. Patient scheduled for outpatient thoracentesis next week however was advised by GI doctor to present to the ED due to worsening symptoms. Patient reports associated dry cough with pleural effusions but no worse than baseline. Denies fever, chills, chest pain, abdominal pain, confusion.     Upon arrival, patient with oxygen saturations >94% on 4L NC with borderline BP (SBP 88-92). CXR with significant right sided pleural effusion. Labs significant for new leukocytosis, t bili of 24 and LINN with sCr 2.7.     Hospital/ICU Course:  Patient admitted  to the CMICU as a transfer for liver transplant evaluation. Patient underwent liver biopsy and thoracentesis (1.5 L removed) on 10/30. Hepatology and nephrology consulted for assistance. Patient with worsening respiratory status on 10/31 and thoracentesis was completed again for symptom management with 2L removed. Vasopressor requirements significantly increased in order to maintain blood pressure. Unfortunately, her respiratory status continued to decline and ultimately required intubation 11/1.  New pulmonary infiltrates developed on the left side more concerning for PNA. Bronchoscopy completed 11/1 with essentially a negative work up. ID consulted for assistance with abx broadened to vancomycin, meropenem, azithromycin, and fluconazole. Patient with worsening renal failure requiring initiation of CRRT on 11/2. Patient presented to liver transplant committee on 11/4. They deemed has no absolute contraindications for liver transplant, and that she will be listed pending medical and clinical improvement and financial approval. On 11/6, patient with drastic increase in vasopressor requirements. IR consulted for right sided thoracentesis to rule out further source of infection. Fluid was serosanguinous and not purulent appearing. Patient with worsening vasopressor requirements and now maximized on 3 vasopressors morning on 11/7.  Patient also with acute blood loss overnight with hemoglogin drop to 5.7 with jamil blood coming from ET tube and melenic stools. Liver enzymes with a drastic increase. Expressed concern with the  that despite maximum forms of life support, the patient is actively dying. Code status changed to DNR at that time.     Further consultations were held with the family regarding the patient's expected poor prognosis. At the direction of the family, vasopressors were discontinued and measures to ensure the comfort of the patient including, but not limited to, opioid as needed for pain and air  hunger as well as benzodiazepines as needed for agitation, were initiated. The patient was subsequently declared dead.        Codie Wong PA-C  Critical Care Medicine  11/7/2020   1:14 PM

## 2020-11-07 NOTE — NURSING
11/06/20 at 1900: zahra added per MAR to maintain MAP >65.     11/06/20 at 2000: critical values of platelets 33 and fibrinogen <70 reported to MD Maryan. Cryo and platelet transfusions ordered.    11/06/20 at 2200: family to bedside, questions answered.     11/06/20 at 2330: patient became hypotensive while on levo 3 mcg/kg/min, zahra 5 mcg/kg/min, vaso 0.04 mcg/kg/min. CRRT UF decreased from 300 to 200. Team called to bedside to evaluate patient. CRRT rinsed back, blood products ordered by ALCIEDS Kim and administered per blood transfusion flowsheet. 1 amp D50 given for blood glucose 56.     11/07/20 at 0100: Additional labs ordered per ALCIDES Kim, blood glucose monitored q1 hr and D10 gtt initiated. Octreotide pushed IV per MAR, octreotide gtt initiated. RT attempted to suction for a sputum culture, but only was able to obtain jamil red blood. NP at bedside, aware of bloody secretions from ETT suction. Critical values of hemoglobin 5.7, hematocrit 17.2, lactic acid 5.8 reported to ALCIDES Kim at bedside.    11/07/20 at 0600: OGT hooked back up to suction, output 650 mL clear yellow fluid. Octreotide gtt discontinued per ALCIDES Kim.

## 2020-11-07 NOTE — PLAN OF CARE
CMICU DAILY GOALS       A: Awake    RASS: Goal - RASS Goal: -4-->deep sedation  Actual - RASS (Phan Agitation-Sedation Scale): -4-->deep sedation   Restraint necessity: Clinical Justification: Removing medical devices, Treatment Interference  B: Breath   SBT: Not attempted   C: Coordinate A & B, analgesics/sedatives   Pain: managed    SAT: Not attempted  D: Delirium   CAM-ICU: Overall CAM-ICU: Positive  E: Early(intubated/ Progressive (non-intubated) Mobility   MOVE Screen: Fail   Activity: Activity Management: patient unable to perform activities(unstable)  FAS: Feeding/Nutrition   Diet order: Diet/Nutrition Received: NPO, Specialty Diet/Nutrition Received: renal diet Fluid restriction:    T: Thrombus   DVT prophylaxis: VTE Required Core Measure: (SCDs) Sequential compression device initiated/maintained  H: HOB Elevation   Head of Bed (HOB): HOB at 30-45 degrees  U: Ulcer Prophylaxis   GI: yes  G: Glucose control   managed Glycemic Management: blood glucose monitoring  S: Skin   Bundle compliance: yes   Bathing/Skin Care: other (see comments)(unstable, unable to bathe) Date: 11/06/20 PM shift; unable to bathe today due to unstable condition  B: Bowel Function   Flexiseal in place, put out 400 mL overnight  I: Indwelling Catheters   Garcia necessity: [REMOVED]      Urethral Catheter 10/30/20 1615 16 Fr.-Reason for Continuing Urinary Catheterization: Critically ill in ICU and requiring hourly monitoring of intake/output   CVC necessity: Yes   IPAD offered: Not appropriate  D: De-escalation Antibx   Yes  Plan for the day   Continue to monitor blood pressures and labs, sustain MAP of >65. Monitor blood glucose.   Family/Goals of care/Code Status   Code Status: Full Code     Third pressor added this shift and blood products administered to improve lab values/blood pressure per blood administration flowsheet. VS and assessment per flow sheet, patient progressing towards goals as tolerated, plan of care reviewed with  Madhavi Bell and family, all concerns addressed, will continue to monitor.

## 2020-11-07 NOTE — PROGRESS NOTES
Pharmacokinetic Assessment Follow Up: IV Vancomycin    Assessment and Plan:    - Vanc random level this morning is 12.7 mcg/mL (~21 hours since last dose)  - Dosing by level in the setting of RRT- pt not tolerating continuous SLED, which has been off since around midnight  - Administer vanc 1000 mg x1  - Draw random level tomorrow 11/8 with AM labs  - Will re-dose based on level and RRT plan    Pharmacy will continue to follow and monitor vancomycin.    Drug levels (last 3 results):  Recent Labs   Lab Result Units 11/05/20  0349 11/06/20  0517 11/07/20  0454   Vancomycin, Random ug/mL 14.7 16.1 12.7     Please contact pharmacy at extension [phone] for questions regarding this assessment.    Thank you for the consult,   Jaqueline Mora, PharmD, [facility]         Patient brief summary:  Madhavi Bell is a 45 y.o. female initiated on antimicrobial therapy with IV vancomycin for treatment of sepsis    Drug Allergies:   Review of patient's allergies indicates:  No Known Allergies    Actual Body Weight:   84.7 kg    Renal Function:   Estimated Creatinine Clearance: 128.4 mL/min (based on SCr of 0.6 mg/dL).    Dialysis Method (if applicable):  SLED    CBC (last 72 hours):  Recent Labs   Lab Result Units 11/04/20  1205 11/04/20  1527 11/04/20  1755 11/04/20  2306 11/05/20  0509 11/05/20  1126 11/05/20  1840 11/06/20  0106 11/06/20  0517 11/06/20  1047 11/06/20  1753 11/07/20  0032 11/07/20  0454   WBC K/uL 34.44* 37.81* 37.58* 33.14* 36.84* 27.22* 33.01* 36.18* 32.92* 33.94* 36.14* 31.47* 33.18*   Hemoglobin g/dL 6.9* 6.8* 7.9* 7.0* 7.9* 6.9* 8.2* 7.8* 7.6* 7.7* 7.5* 5.7* 6.9*   Hematocrit % 20.6* 21.0* 23.3* 20.8* 23.9* 20.0* 23.8* 23.3* 22.2* 22.9* 22.4* 17.2* 22.9*   Platelets K/uL 61* 122* 108* 62* 60* 43* 61* 42* 40* 37* 33* 52* 40*   Gran % % 83.0* 87.0* 77.0* 77.0* 82.0* 87.0* 81.0* 81.5* 80.0* 78.0* 78.0* 80.0* 79.0*   Lymph % % 2.0* 4.0* 4.0* 4.5* 2.0* 6.0* 6.0* 4.0* 8.0* 13.0* 10.0* 6.0* 10.0*   Mono % % 7.0 3.0*  14.0 6.0 3.0* 1.0* 3.0* 6.5 5.0 4.0 7.0 7.0 5.0   Eosinophil % % 0.0 0.0 0.0 1.0 1.0 0.0 0.0 0.0 1.0 1.0 0.0 0.0 2.0   Basophil % % 0.0 0.0 0.0 0.0 0.0 0.0 0.0 0.0 0.0 0.0 0.0 0.0 0.0   Differential Method  Manual Manual Manual Manual Manual Manual Manual Manual Manual Manual Manual Manual Manual       Metabolic Panel (last 72 hours):  Recent Labs   Lab Result Units 11/04/20  1336 11/04/20  1441 11/04/20  1527 11/04/20  2144 11/05/20  0349 11/05/20  1516 11/05/20  2303 11/06/20  0517 11/06/20  1047 11/06/20  1340 11/06/20  1917 11/06/20  2208 11/07/20  0032 11/07/20  0454   Sodium mmol/L 128* 129* 128* 127*  127* 130*  132*  132* 132*  132*  132* 131*  131* 133*  --  133*  133*  --  135* 132* 133*  133*   Potassium mmol/L 4.3 4.5 4.5 4.4  4.4 4.5  4.5  4.5 4.7  4.8  4.8 4.4  4.4 4.3  --  4.5  4.5  --  4.5 4.5 4.7  4.7   Chloride mmol/L 94* 96 94* 94*  94* 97  98  98 98  97  97 99  99 100  --  100  101  --  101 102 104  104   CO2 mmol/L 21* 19* 20* 21*  21* 23  23  23 21*  22*  22* 21*  21* 18*  --  20*  20*  --  21* 18* 11*  11*   Glucose mg/dL 96 86 75 76  76 68*  64*  64* 85  90  90 80  80 99  --  76  74  --  66* 179* 102  102   Glucose, Fluid mg/dL  --   --   --   --   --   --   --   --   --   --  78  --   --   --    BUN mg/dL 10 9 9 13  13 9  9  9 9  9  9 7  7 6  --  4*  4*  --  3* 3* 6  6   Creatinine mg/dL 0.9 0.8 0.8 1.2  1.2 0.8  0.8  0.8 0.6  0.7  0.7 0.6  0.6 0.6  --  0.5  0.5  --  0.5 0.4* 0.6  0.6   Albumin g/dL 2.6* 2.7* 2.6* 2.9*  2.9* 2.8*  2.8*  2.8* 2.8*  2.9*  2.9* 2.9*  2.9* 2.9* 2.9* 2.9*  2.9*  --  3.0* 2.5* 2.5*  2.5*   Total Bilirubin mg/dL  --  28.3* 28.6*  --  29.2* 25.0*  --  27.5*  --  29.1*  --   --  24.0* 24.6*   Alkaline Phosphatase U/L  --  260* 275*  --  266* 249*  --  264*  --  275*  --   --  210* 234*   AST U/L  --  309* 314*  --  632* 561*  --  514*  --  519*  --   --  1,245* 2,999*   ALT U/L  --  109* 109*  --  176*  167*  --  170*  --  180*  --   --  323* 720*   Magnesium mg/dL 2.2  --   --  2.2  2.2 2.0  2.0 2.1  2.1 2.0  2.0 1.9  1.9  --  1.9  --  2.0 1.8 1.9   Phosphorus mg/dL 3.3  --   --  3.1  3.1 2.3*  2.3*  2.3* 2.8  2.8 2.5*  2.5* 3.9  --  2.4*  --  2.3* 2.8 4.3  4.3       Vancomycin Administrations:  vancomycin given in the last 96 hours                   vancomycin 750 mg in dextrose 5 % 250 mL IVPB (ready to mix system) (mg) 750 mg New Bag 11/03/20 0847    vancomycin 750 mg in dextrose 5 % 250 mL IVPB (ready to mix system) (mg) 750 mg New Bag 11/01/20 0847    vancomycin 1.25 g in dextrose 5% 250 mL IVPB (ready to mix) (mg) 1,250 mg New Bag 10/30/20 1838                Microbiologic Results:  Microbiology Results (last 7 days)     Procedure Component Value Units Date/Time    Blood culture [[id number]] Collected: 11/07/20 0113    Order Status: Sent Specimen: Blood from Peripheral, Upper Arm, Left Updated: 11/07/20 0118    Blood culture [[id number]] Collected: 11/07/20 0112    Order Status: Sent Specimen: Blood from Peripheral, Antecubital, Right Updated: 11/07/20 0118    Culture, Respiratory with Gram Stain [[id number]]     Order Status: No result Specimen: Respiratory     Culture, Body Fluid (Aerobic) w/ GS [[id number]] Collected: 11/06/20 1917    Order Status: Completed Specimen: Body Fluid from Thoracentesis Fluid Updated: 11/07/20 0036     Gram Stain Result Rare WBC's      No organisms seen    Culture, Anaerobic [[id number]] Collected: 11/06/20 1918    Order Status: Sent Specimen: Pleural Fluid Updated: 11/06/20 2140    Blood culture [[id number]] Collected: 11/04/20 1743    Order Status: Completed Specimen: Blood Updated: 11/06/20 2012     Blood Culture, Routine No Growth to date      No Growth to date      No Growth to date    Narrative:      Blood cultures from 2 different sites. 4 bottles total.  Please draw before starting antibiotics.    Blood culture [[id number]] Collected: 11/04/20 1713    Order Status:  Completed Specimen: Blood from Peripheral, Antecubital, Left Updated: 11/06/20 1812     Blood Culture, Routine No Growth to date      No Growth to date      No Growth to date    Narrative:      Blood cultures x 2 different sites. 4 bottles total. Please  draw cultures before administering antibiotics.    Culture, Anaerobic [[id number]]     Order Status: Canceled Specimen: Body Fluid from Pleural Fluid     Culture, Respiratory with Gram Stain [[id number]] Collected: 11/04/20 1646    Order Status: Completed Specimen: Respiratory from Endotracheal Aspirate Updated: 11/06/20 1012     Respiratory Culture No growth     Gram Stain (Respiratory) Rare WBC's     Gram Stain (Respiratory) No organisms seen    Urine Culture High Risk [[id number]]     Order Status: Canceled Specimen: Urine     Culture, Respiratory [[id number]] Collected: 11/01/20 1521    Order Status: Completed Specimen: Respiratory from Bronchial Wash, LLL Updated: 11/04/20 1039     Respiratory Culture No growth     Gram Stain (Respiratory) <10 epithelial cells per low power field.     Gram Stain (Respiratory) Rare WBC's     Gram Stain (Respiratory) Rare Gram negative rods    Culture, Anaerobic [[id number]] Collected: 10/30/20 1716    Order Status: Completed Specimen: Body Fluid from Pleural Fluid Updated: 11/04/20 0939     Anaerobic Culture No anaerobes isolated    Aerobic culture [[id number]] Collected: 10/31/20 2038    Order Status: Completed Specimen: Pleural Fluid Updated: 11/04/20 0928     Aerobic Bacterial Culture No growth    Culture, Body Fluid (Aerobic) w/ GS [[id number]] Collected: 10/30/20 1716    Order Status: Completed Specimen: Body Fluid from Pleural Fluid Updated: 11/04/20 0927     AEROBIC CULTURE - FLUID No growth     Gram Stain Result Few WBC's      No organisms seen    Fungus culture [[id number]] Collected: 11/01/20 1521    Order Status: Completed Specimen: Body Fluid from Lung, LLL Updated: 11/03/20 1409     Fungus (Mycology) Culture Culture in  progress    Narrative:      Bronchial Wash    Cryptococcal antigen [[id number]] Collected: 11/02/20 1319    Order Status: Completed Specimen: Blood, Venous Updated: 11/03/20 1141     Cryptococcal Ag, Blood Negative    Culture, Respiratory with Gram Stain [[id number]] Collected: 11/01/20 0153    Order Status: Completed Specimen: Respiratory from Sputum, Expectorated Updated: 11/03/20 1037     Respiratory Culture Normal respiratory ishan      No S aureus or Pseudomonas isolated.     Gram Stain (Respiratory) >10 epithelial cells per low power field     Gram Stain (Respiratory) Many WBC's     Gram Stain (Respiratory) Rare Gram positive cocci    AFB Culture & Smear [[id number]] Collected: 11/01/20 1521    Order Status: Completed Specimen: Body Fluid from Lung, LLL Updated: 11/02/20 2127     AFB Culture & Smear Culture in progress     AFB CULTURE STAIN No acid fast bacilli seen.    AFB Culture & Smear [[id number]] Collected: 10/31/20 2038    Order Status: Completed Specimen: Pleural Fluid Updated: 11/02/20 1558     AFB Culture & Smear Culture in progress     AFB CULTURE STAIN No acid fast bacilli seen.    Cryptococcal antigen, blood [[id number]]     Order Status: Completed Specimen: Blood     KOH prep [[id number]] Collected: 11/01/20 1521    Order Status: Completed Specimen: Body Fluid from Lung, LLL Updated: 11/02/20 0043     KOH Prep No yeast or fungal elements seen    Narrative:      Bronchial Wash    Gram stain [[id number]] Collected: 11/01/20 1521    Order Status: Canceled Specimen: Body Fluid from Lung, LLL     Urine culture [[id number]] Collected: 10/30/20 1334    Order Status: Completed Specimen: Urine Updated: 11/01/20 0744     Urine Culture, Routine No growth    Narrative:      Specimen Source->Urine    Gram stain [[id number]] Collected: 10/31/20 2038    Order Status: Completed Specimen: Pleural Fluid Updated: 11/01/20 0026     Gram Stain Result Rare WBC's      No organisms seen    Urine Culture High Risk [[id number]]  Collected: 10/30/20 1802    Order Status: Completed Specimen: Urine, Catheterized Updated: 10/31/20 2242     Urine Culture, Routine No significant growth    Narrative:      Indicated criteria for high risk culture:->Other  Other (specify):->liver patient

## 2020-11-07 NOTE — PLAN OF CARE
Thoracentesis done bedside for patient. thora done instead of chest tube per Dr. Camargo. Patient tolerated well JUNG Quintana stayed bedside. 600ML of fluid removed.

## 2020-11-07 NOTE — SIGNIFICANT EVENT
Significant Event  Critical Care Medicine    CC: Decompensated hepatic cirrhosis  Date: 11/07/2020  Admit Date: 10/30/2020  Hospital Length of Stay: 8    Code Status: Full Code     SUBJECTIVE:     Significant Events: Called urgently to the bedside by nurse to evaluate patient for worsening hypotension.         OBJECTIVE:     Physical Exam:  Last Vitals:  Vitals:    11/07/20 0032   BP:    Pulse: 98   Resp: (!) 23   Temp:      GA: critically ill  HEENT: Pupils L3/R2 reactive. + scleral icterus.   Pulmonary: Mechanically ventilated. Coarse breath sounds bilaterally  Cardiac: NSR on cardiac monitor.   Abdominal: Bowel sounds present x 4. No appreciable hepatosplenomegaly.  Neuro:  --Mental Status:  unresponsive    ASSESSMENT AND PLAN/INTERVENTIONS:     1) worsening septic shock  Plan/Interventions:  --repeat Bcx, sputum cx now. Pt anuric so will defer UA  --continue vanc and douglas. Will change fluc to olesya  --currently max'd on levo, vaso and zahra. Already on stress dose steroids  --BP not tolerating RRT. No indications for emergent rrt at this time. CRRT rinsed back.     2) hypoxemic respiratory failure  --cxry with worsening bilateral airspace disease, no ptx  --paO2 discordant with sat on bedside monitor; suspect poor peripheral perfusion  --? Development of dah (jamil blood in ett). Will discuss possible bronch in am, however suspect patient is too unstable to undergo at this time.     3) acute blood loss anemia  --2 gm drop in hgb; unclear exact source, though concern for melanotic stool (vs pulmonary hemorrhage given jamil blood from ETT)  --small non-bleeding grade 1 esophageal varices & portal hypertensive gastropathy on previous EGD  --start ppi & octroetide  --too unstable for repeat EGD at this time  --transfuse prn & correct coagulopathy    4) hypoglycemia  --likely secondary to liver failure  --starting D10 gtt     updated earlier this evening on decline in clinical condition. He wishes to continue  with aggressive care/full code at this time. He is staying in [facility].      Uninterrupted Critical Care/Counseling Time (not including procedures): 45 mins    Yue Kim NP  Critical Care Medicine

## 2020-11-07 NOTE — SIGNIFICANT EVENT
Upon my evaluation this morning, patient maximized on 3 vasopressors with inability to tolerate CRRT now with a bicarb of 11.  Patient also with acute blood loss overnight with hemoglogin drop to 5.7 with jamil blood coming from ET tube and melenic stools. Liver enzymes with a drastic increase. Expressed concern with the  that despite maximum forms of life support, the patient is actively dying. I recommended all family come to the bedside to be with her.  appropriately upset but states understanding. I recommended initiating medications to keep her comfortable during the dying process and  agrees. Discussed further options of removing life support and focusing fully on comfort care.  wishes to discuss with patient's brother before making any further decisions. Code status changed to DNR at that time. Updated the entire family upon arrival to the bedside. Condolences were offered and  was called.     Plan discussed with Dr. Shea.     Will up-titrate fentanyl infusion for symptom management.  PRN ativan available for terminal agitation.       Codie Wong PA-C  Critical Care Medicine  11/7/2020   9:32 AM

## 2020-11-07 NOTE — SIGNIFICANT EVENT
CCS Attending    Presented to bedside and noted patient to have the following exam findings:  No spontaneous respirations  No palpable carotid pulse  No heart beat or breath sounds on ausculation  Pupils fixed and dilated bilaterally  Asystole in 3 leads    Time of death: 1253  Patient's family was at bedside and informed of his death    Hal Shea MD  [phone]  Critical Care Medicine

## 2020-11-07 NOTE — PROGRESS NOTES
Madhavi Carr [facility] [id number] is a 45 y.o. female who had been consulted for vancomycin dosing.    Vancomycin has been discontinued.  Pharmacy consult for vancomycin dosing in no longer required.    Thank you for allowing us to participate in this patient's care.     Tiffanie Skelton

## 2020-11-09 LAB
BACTERIA BLD CULT: NORMAL
BACTERIA BLD CULT: NORMAL
FACT VIII ACT/NOR PPP: 232 % (ref 60–170)
MVISTA HISTOPLASMA AG  INTERPRETATION: NEGATIVE
MVISTA HISTOPLASMA AG: NORMAL NG/ML

## 2020-11-09 NOTE — PHYSICIAN QUERY
PT Name: Madhavi Bell  MR #: [id number]     Diagnosis Clarification      CDS: Taina FOLEY,RN        Contact information:[email or web address]    This form is a permanent document in the medical record.     Query Date: November 9, 2020    Dear Provider,  By submitting this query, we are merely seeking further clarification of documentation.  Please utilize your independent clinical judgment when addressing the question(s) below.     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  She was discussed at liver selection committee meeting today with recommendations for IR biopsy of indeterminate liver lesion.  Last PETH 10/08 negative.   Hepatic encephalopathy: none  Current Outpatient Medications on File Prior to Encounter  lactulose (CHRONULAC) 10 gram/15 mL solution  Take 20 g by mouth daily as needed.                                                                                                              Neurological: no change in mental status                                                       Behavioral/Psych: no change in mood                                                           Neurologic: No gross neurological Deficits                                                      Psych: Calm, cooperative. Normal mood and affect.  Hepatic encephalopathy: Continue lactulose titrated to 3-4 BMs daily  General:  Intubated, sedated.    Lactulose 20  gram/30 ml solution Oral 3 times daily   Rifaximin tablet 550 mg Oral 2 times daily   Rifaximin tablet 550 mg Per NG Tube 2 times daily   10/30----->11/7 Labs              10/30/20 Hepatology Consults/Dr. Ross                       11/2/20 Hepatology Service Progress Note/ Dr. Ross    10/30---->11/7 MAR  10/30---->11/2 MAR  11/2----->11/7 MAR     Please clarify if the hepatic encephalopathy___________________________ diagnosis has been:    [  ] Ruled In   [  ] Ruled Out   [  ] Other/Clarification of findings (please specify)_______________    [ x  ] Clinically undetermined     Please document in your progress notes daily for the duration of treatment, until resolved, and include in your discharge summary.

## 2020-11-09 NOTE — PLAN OF CARE
Time of Death: 12:52pm  Date of Death: 2020  Cause of Death: Decompensated Hepatic Cirrhosis       20 0913   Final Note   Assessment Type Final Discharge Note   Anticipated Discharge Disposition      Lynn Brown LMSW  [facility]  X [phone]

## 2020-11-10 LAB
1,3 BETA GLUCAN SER-MCNC: 65 PG/ML
BACTERIA FLD AEROBE CULT: NO GROWTH
FUNGITELL COMMENTS: ABNORMAL
GRAM STN SPEC: NORMAL
GRAM STN SPEC: NORMAL

## 2020-11-11 LAB — BACTERIA SPEC ANAEROBE CULT: NORMAL

## 2020-11-12 LAB
BACTERIA BLD CULT: NORMAL
BACTERIA BLD CULT: NORMAL

## 2020-11-16 LAB — FUNGUS SPEC CULT: NORMAL

## 2020-11-30 LAB — FUNGUS SPEC CULT: NORMAL

## 2020-12-18 LAB
ACID FAST MOD KINY STN SPEC: NORMAL
MYCOBACTERIUM SPEC QL CULT: NORMAL

## 2021-01-03 LAB
ACID FAST MOD KINY STN SPEC: NORMAL
ACID FAST MOD KINY STN SPEC: NORMAL
MYCOBACTERIUM SPEC QL CULT: NORMAL
MYCOBACTERIUM SPEC QL CULT: NORMAL

## 2022-04-27 NOTE — CONSULTS
"[address] Medicine  Discharge Summary      Patient Name: Flory Chacon  MRN: [id number]  Patient Class: IP- Inpatient  Admission Date: 4/23/2022  Hospital Length of Stay: 2 days  Discharge Date and Time:  04/27/2022 12:59 PM  Attending Physician: No att. providers found   Discharging Provider: Tami Campuzano MD  Primary Care Provider: Tami Campuzano MD      HPI:   Pt is a 56 yo female who was transferred from [facility] ED to [facility] with a cc of "elevated troponin." Pt reports a syncopal episode yesterday that occurred while she was at home. She states that she drove home from work, she was walking from her kitchen to another room when she turned around and had the syncopal episode. Patient states that she did not hit her head and that the fall was witnessed by a family member. She was subsequently taken to [facility] ED where it was found that she had elevated troponins. Patient currently has no acute complaints, and denies and CP. SOB, or discomfort. Pt states that she has a negative personal cardiac history, but does have a positive family history of CAD and heart attack.     Significant findings in the ED include:  - Labs: WBC 11.34, Platelets 915, Na 125, proBNP 196. Trop I trend shows 24.3, 80.1, 84.2, 71.6 respectively.  - Images (as per radiology): CXR: Heart size normal. Lungs clear. No pneumothorax or pleural effusion.  - EKG shows: LBBB     Patient was subsequently admitted to the family medicine service for further evaluation and management.      Procedure(s) (LRB):  Left heart cath (Left)      Hospital Course:   57-year-old female smoker with history of hypertension, family history of premature CAD presented to the hospital after an episode of non prodromal syncope.  Troponin was mildly elevated at 84, EKG showed left bundle-branch block. Patient denied any preceding symptoms prior to her syncope.  She got out of her shower was walking to her mother's room and " [facility] -   Critical Care Medicine  Consult Note    Patient Name: Madhavi Bell  MRN: [id number]  Admission Date: 10/14/2020  Hospital Length of Stay: 0 days  Code Status: Prior  Attending Physician: Portia Ladd MD   Primary Care Provider: Anurag Knutson MD   Principal Problem: Pleural effusion      Subjective:     HPI:  44 year old female who  has a past medical history of Alpha-1-antitrypsin deficiency, Eosinophilic esophagitis, Hyperlipidemia, and Schatzki's ring  admitted with chronic recurrent right pleural effusion    Pulmonary has been consulted to assist with management.     Thoracentesis on 10/10/20     TRANSUDATE:    Total serum Protein . 6.2   Pleural fluid Protein <1   Serum    Pleural fluid LDH 92       Hospital/ICU Course:  10/15: Seen and examined at bedside. Hospital chart reviewed. S/P thoracentesis by IR. 1500mls removed.  No acute interval detrimental events noted. She reports that she  has moderately improved. Post procedure reveals interval improvement in pleural effusion    Past Medical History:   Diagnosis Date    Alpha-1-antitrypsin deficiency     ZZ phenotype    Eosinophilic esophagitis     h/o pill impaction, bx proven    Hyperlipidemia     may be related to liver enzymes, 2ary to high HDL    Schatzki's ring     s/p dilatation       Past Surgical History:   Procedure Laterality Date    APPENDECTOMY       SECTION      CHOLECYSTECTOMY      DILATION AND CURETTAGE OF UTERUS      ESOPHAGOGASTRODUODENOSCOPY N/A 2020    Procedure: EGD (ESOPHAGOGASTRODUODENOSCOPY)-need rapid covid;  Surgeon: Jose Smith MD;  Location: [facility];  Service: Endoscopy;  Laterality: N/A;       Review of patient's allergies indicates:  No Known Allergies        Medications:  Continuous Infusions:  Scheduled Meds:   furosemide  40 mg Oral Daily    lactulose  20 g Oral TID    spironolactone  100 mg Oral Daily     PRN Meds:albuterol-ipratropium,  collapsed without any other symptoms, denied any chest pains, dyspnea or palpitations. EMS was called and no report of any arrhythmias.  Echocardiogram done showed mildly depressed LVEF of 40-45% with anterior wall hypokinesis.  Tele was reviewed which showed a 3 minute run of tachycardia at 150 beats per minute yesterday afternoon.  It can be ventricular tachycardia versus supraventricular tachycardia, unable to distinguish as she has an underlying left bundle-branch block.  A LHC was performed on 04/25 which did not reveal any significant CAD and no vessel blockage. Cardiology recommended maximizing medical management. They did recommend that the patient wear a life vest until an underlying arrhythmia can be ruled out in an outpatient EP test. The patient will also see a outpatient Neurologist to rule out an underlying seizure disorder as there cause for her syncope.          Goals of Care Treatment Preferences:  Code Status: Full Code      Consults:   Consults (From admission, onward)        Status Ordering Provider     Inpatient consult to Social Work/Case Management  Once        Provider:  (Not yet assigned)    Completed JAROCHO ROBINS     Inpatient consult to Social Work  Once        Provider:  (Not yet assigned)    Completed KENNY MEYER     Inpatient consult to Cardiology  Once        Provider:  Merlin Epperson MD    Completed MIRANDA OLIVEIRA          Anxiety disorder, unspecified  - Continue home Cymbalta 60mg po qd  - Continue home xanex 1mg po qhs prn  - Continue home ambien 10mg po qhs prn    Hypertension    - continue lisinopril to 40mg po qd          Final Active Diagnoses:    Diagnosis Date Noted POA    Hypertension [I10]  Yes    Anxiety disorder, unspecified [F41.9]  Yes      Problems Resolved During this Admission:    Diagnosis Date Noted Date Resolved POA    PRINCIPAL PROBLEM:  Syncope and collapse [R55] 04/23/2022 04/27/2022 Unknown    Elevated troponin [R77.8] 04/23/2022 04/27/2022 Yes  hydrocortisone, influenza     Family History     None        Tobacco Use    Smoking status: Never Smoker   Substance and Sexual Activity    Alcohol use: Not Currently     Comment: patient reports she has not had any alcohol in past few months    Drug use: No    Sexual activity: Yes     Partners: Male     Birth control/protection: OCP         Review of Systems   Respiratory: Positive for shortness of breath.    All other systems reviewed and are negative.    Objective:     Vital Signs (Most Recent):  Temp: 98.1 °F (36.7 °C) (10/15/20 1129)  Pulse: 92 (10/15/20 1505)  Resp: 18 (10/15/20 1129)  BP: (!) 93/49(MD informed) (10/15/20 1129)  SpO2: (!) 92 % (10/15/20 1129) Vital Signs (24h Range):  Temp:  [97.6 °F (36.4 °C)-98.4 °F (36.9 °C)] 98.1 °F (36.7 °C)  Pulse:  [] 92  Resp:  [16-22] 18  SpO2:  [92 %-96 %] 92 %  BP: ()/(49-65) 93/49     Weight: 75.1 kg (165 lb 9.1 oz)  Body mass index is 26.72 kg/m².    No intake or output data in the 24 hours ending 10/15/20 1535    Physical Exam  Vitals signs and nursing note reviewed.   Constitutional:       Appearance: Normal appearance. She is normal weight.   HENT:      Head: Normocephalic and atraumatic.      Nose: Nose normal.      Mouth/Throat:      Mouth: Mucous membranes are dry.      Pharynx: Oropharynx is clear.   Eyes:      Extraocular Movements: Extraocular movements intact.      Conjunctiva/sclera: Conjunctivae normal.      Pupils: Pupils are equal, round, and reactive to light.   Neck:      Musculoskeletal: Normal range of motion and neck supple.   Cardiovascular:      Rate and Rhythm: Normal rate and regular rhythm.      Pulses: Normal pulses.   Pulmonary:      Effort: Pulmonary effort is normal.      Breath sounds: Decreased air movement present. Examination of the right-middle field reveals decreased breath sounds. Examination of the right-lower field reveals decreased breath sounds. Decreased breath sounds and rales present. No wheezing or        Discharged Condition: good    Disposition: Home or Self Care    Follow Up:   Follow-up Information     Tami Campuzano MD. Schedule an appointment as soon as possible for a visit.    Specialty: Family Medicine  Why: As needed  Contact information:  [address] [facility] [address]  [phone]             Jovani Cordova MD. Schedule an appointment as soon as possible for a visit.    Specialty: Internal Medicine  Contact information:  [address] [place] [address]  [phone]             ALEA Mcconnell .    Specialties: Neurology, Emergency Medicine  Contact information:  [address] [facility]  [facility] [address]  [phone]                       Patient Instructions:      Basic metabolic panel   Standing Status: Future Standing Exp. Date: 06/23/23     CBC auto differential   Standing Status: Future Standing Exp. Date: 06/23/23     Ambulatory referral/consult to Cardiology   Standing Status: Future   Referral Priority: Routine Referral Type: Consultation   Referral Reason: Specialty Services Required   Referred to Provider: JOVANI CORDOVA   Number of Visits Requested: 1     Ambulatory referral/consult to Neurology   Standing Status: Future   Referral Priority: Routine Referral Type: Consultation   Referral Reason: Specialty Services Required   Referred to Provider: EUGENE FELDMAN Requested Specialty: Neurology   Number of Visits Requested: 1     Diet Cardiac     Notify your health care provider if you experience any of the following:  temperature >100.4     Notify your health care provider if you experience any of the following:  persistent nausea and vomiting or diarrhea     Notify your health care provider if you experience any of the following:  severe uncontrolled pain     Notify your health care provider if you experience any of the following:  redness, tenderness, or signs of infection (pain, swelling, redness, odor or green/yellow discharge around incision  rhonchi.       Abdominal:      General: Bowel sounds are normal.      Palpations: Abdomen is soft.   Musculoskeletal: Normal range of motion.   Skin:     General: Skin is warm and dry.      Capillary Refill: Capillary refill takes 2 to 3 seconds.   Neurological:      General: No focal deficit present.      Mental Status: She is alert and oriented to person, place, and time.   Psychiatric:         Mood and Affect: Mood normal.         Vents:       Lines/Drains/Airways     Peripheral Intravenous Line                 Peripheral IV - Single Lumen 10/14/20 1751 20 G Right Wrist less than 1 day                Significant Labs:    CBC/Anemia Profile:  Recent Labs   Lab 10/14/20  1752 10/15/20  0623   WBC 7.56 5.60   HGB 12.2 10.8*   HCT 34.7* 30.6*    114*   MCV 90 91   RDW 18.4* 18.3*        Chemistries:  Recent Labs   Lab 10/14/20  1909 10/15/20  0623   * 126*   K 4.1 3.5   CL 93* 96   CO2 24 24   BUN 22* 21*   CREATININE 1.2 1.1   CALCIUM 7.9* 7.8*   ALBUMIN 1.7* 1.6*   PROT 6.2 5.7*   BILITOT 19.3* 17.7*   ALKPHOS 642* 582*   * 112*   * 187*   MG  --  2.1       Bilirubin:   Recent Labs   Lab 10/05/20  0818 10/08/20  0747 10/12/20  1517 10/14/20  1909 10/15/20  0623   BILIDIR  --  13.5*  --   --   --    BILITOT 15.9* 18.2* 20.0* 19.3* 17.7*     Coagulation:   Recent Labs   Lab 10/15/20  0623   INR 1.6*   APTT 34.5*     POCT Glucose:   Recent Labs   Lab 10/15/20  0820   POCTGLUCOSE 85       Significant Imaging:     CXR: 10/15/20:    Interval improvement without complete resolution of right pleural effusion with continued dense opacity involving the mid lower right lung.  Negative for pneumothorax.  Interstitial changes are again seen throughout the left lung.  The hilar and mediastinal contours and osseous structures are otherwise unchanged.  EKG leads overlie the chest          Assessment/Plan:     Pulmonary  * Right-sided pleural effusion, recurrent  HEPATIC HYDROTHORAX.    [x]        Serial  site)     Notify your health care provider if you experience any of the following:  difficulty breathing or increased cough     Notify your health care provider if you experience any of the following:  severe persistent headache     Notify your health care provider if you experience any of the following:  worsening rash     Notify your health care provider if you experience any of the following:  persistent dizziness, light-headedness, or visual disturbances     Notify your health care provider if you experience any of the following:  increased confusion or weakness     Notify your health care provider if you experience any of the following:     Activity as tolerated       Significant Diagnostic Studies: Labs: All labs within the past 24 hours have been reviewed    Pending Diagnostic Studies:     Procedure Component Value Units Date/Time    EKG 12-lead [[id number]] Collected: 04/23/22 1121    Order Status: Sent Lab Status: In process Updated: 04/25/22 0624    Narrative:      Test Reason : R07.9,    Vent. Rate : 091 BPM     Atrial Rate : 000 BPM     P-R Int : 178 ms          QRS Dur : 130 ms      QT Int : 364 ms       P-R-T Axes : 068 -37 107 degrees     QTc Int : 418 ms    Sinus rhythm  Possible left anterior fascicular block  Incomplete LBBB  Possible anteroseptal infarct - age undetermined  Possible left ventricular hypertrophy  Lateral T wave abnormality  may be due to the hypertrophy and/or ischemia  Abnormal ECG      Referred By: JOSEY MARTINO           Confirmed By:     EXTRA TUBES [[id number]] Collected: 04/23/22 1546    Order Status: Sent Lab Status: In process Updated: 04/23/22 1546    Specimen: Blood, Venous     Narrative:      The following orders were created for panel order EXTRA TUBES.  Procedure                               Abnormality         Status                     ---------                               -----------         ------                     Red Top Hold[[id number]]                           thoracentesis:  [x]        Sodium restriction  [x]        Diuresis (Furosemide + Aldactone)  []        Consider TIPS  []        Consider LiverTransplantation.       REPEAT THORACENTESIS IN AM.    Complications  of the procedure discussed in detail with patient. Complications including but not limited to infection that may require hospital  admission, bleeding that may require blood transfusion and or hospital  admission, perforation of the lung which may require surgery. Patient expressed and verbalized understanding.  Alternate treatments and material risks associated with such alternatives were  discussed with pateint. These include radiologic surveillance  with  minimal risk and sugery with an indeterminate risk. The material risks  of refusing the procedure was discussed in detail. This includes no  diagnosis or confirmation of diagnisis and rendering of appropriate treatment the risk of which depends on the nature of the diagnosed illness. Patient expressed and verbalized understanding. Pleural fluid will be sent for chenistry, microbiology and cytology.           Thank you for your consult. I will follow-up with patient. Please contact us if you have any additional questions.     Chacorta Hoang MD  Critical Care Medicine  [facility] -               In process                 Light Green Top Hold[[id number]]                             In process                   Please view results for these tests on the individual orders.         Medications:  Reconciled Home Medications:      Medication List      START taking these medications    aspirin 81 MG Chew  Take 1 tablet (81 mg total) by mouth once daily.  Start taking on: April 28, 2022     ezetimibe 10 mg tablet  Commonly known as: ZETIA  Take 1 tablet (10 mg total) by mouth every evening.     metoprolol succinate 50 MG 24 hr tablet  Commonly known as: TOPROL-XL  Take 1 tablet (50 mg total) by mouth once daily.  Start taking on: April 28, 2022        CONTINUE taking these medications    ALPRAZolam 1 MG tablet  Commonly known as: XANAX     cetirizine 10 MG tablet  Commonly known as: ZYRTEC  Take 10 mg by mouth once daily.     DULoxetine 60 MG capsule  Commonly known as: CYMBALTA     estradioL 0.5 MG tablet  Commonly known as: ESTRACE  Take 0.5 mg by mouth once daily. for 90 days     hydroCHLOROthiazide 12.5 mg capsule  Commonly known as: MICROZIDE     hydrocortisone 2.5 % cream  APPLY CREAM TO AFFECTED AREA TO AFFECTED AREA ONCE DAILY FOR 5 DAYS TO UPPER BILATERAL EXTREMITIES     lisinopriL 30 MG tablet  Commonly known as: PRINIVIL,ZESTRIL     montelukast 10 mg tablet  Commonly known as: SINGULAIR  Take 10 mg by mouth once daily.     NURTEC 75 mg odt  Generic drug: rimegepant  DISSOLVE 1 TABLET BY MOUTH ONCE DAILY AS NEEDED FOR HEADACHE     ondansetron 4 MG Tbdl  Commonly known as: ZOFRAN-ODT  DISSOLVE 1 TABLET ON TONGUE ONCE DAILY     progesterone 100 MG capsule  Commonly known as: PROMETRIUM  TAKE 1 CAPSULE BY MOUTH ONCE DAILY AT BEDTIME EVERY CYCLE FOR 12 DAYS     triamcinolone acetonide 0.1% 0.1 % ointment  Commonly known as: KENALOG  Apply topically 2 (two) times daily.     zolpidem 10 mg Tab  Commonly known as: AMBIEN        STOP taking these medications    atenoloL 100 MG tablet  Commonly known  as: TENORMIN     propranoloL 10 MG tablet  Commonly known as: INDERAL        ASK your doctor about these medications    EMGALITY SYRINGE 120 mg/mL Syrg  Generic drug: galcanezumab-gnlm  INJECT SUBCUTANEOUS 2 ML THE FIRST MONTH AND THEN 1 ML ONCE MONTHLLY            Indwelling Lines/Drains at time of discharge:   Lines/Drains/Airways     None                 Time spent on the discharge of patient: 40 minutes         Tami Campuzano MD  Department of Hospital Medicine  [facility] [place] - [address] [place] [address]

## 2024-02-07 NOTE — ASSESSMENT & PLAN NOTE
New leukocytosis with hypotension concerning for possible infection    --Broad spectrum abx initiated  --Cultures pending  --Will assess for possible paracentesis on 10/31 to rule out SBP   Regular Diet - No restrictions